# Patient Record
Sex: FEMALE | Race: WHITE | Employment: UNEMPLOYED | URBAN - METROPOLITAN AREA
[De-identification: names, ages, dates, MRNs, and addresses within clinical notes are randomized per-mention and may not be internally consistent; named-entity substitution may affect disease eponyms.]

---

## 2017-02-06 ENCOUNTER — HOSPITAL ENCOUNTER (OUTPATIENT)
Dept: NUCLEAR MEDICINE | Age: 62
Discharge: HOME | End: 2017-02-06
Payer: MEDICARE

## 2017-02-06 DIAGNOSIS — Z96.653 H/O TOTAL KNEE REPLACEMENT, BILATERAL: ICD-10-CM

## 2017-02-06 PROCEDURE — 78315 BONE IMAGING 3 PHASE: CPT

## 2017-02-06 PROCEDURE — 3430000000 HC RX DIAGNOSTIC RADIOPHARMACEUTICAL: Performed by: ORTHOPAEDIC SURGERY

## 2017-02-06 PROCEDURE — A9503 TC99M MEDRONATE: HCPCS | Performed by: ORTHOPAEDIC SURGERY

## 2017-02-06 RX ORDER — TC 99M MEDRONATE 20 MG/10ML
25 INJECTION, POWDER, LYOPHILIZED, FOR SOLUTION INTRAVENOUS
Status: COMPLETED | OUTPATIENT
Start: 2017-02-06 | End: 2017-02-06

## 2017-02-06 RX ADMIN — Medication 25 MILLICURIE: at 10:08

## 2017-02-17 ENCOUNTER — HOSPITAL ENCOUNTER (OUTPATIENT)
Dept: PREADMISSION TESTING | Age: 62
Discharge: HOME | End: 2017-02-17
Payer: MEDICARE

## 2017-02-17 VITALS
BODY MASS INDEX: 39.11 KG/M2 | RESPIRATION RATE: 16 BRPM | HEIGHT: 59 IN | WEIGHT: 194 LBS | HEART RATE: 102 BPM | TEMPERATURE: 99 F | DIASTOLIC BLOOD PRESSURE: 85 MMHG | SYSTOLIC BLOOD PRESSURE: 127 MMHG | OXYGEN SATURATION: 98 %

## 2017-02-17 LAB
ABSOLUTE EOS #: 0 K/UL (ref 0–0.4)
ABSOLUTE LYMPH #: 4.6 K/UL (ref 1–4.8)
ABSOLUTE MONO #: 0.9 K/UL (ref 0.1–1.3)
ANION GAP SERPL CALCULATED.3IONS-SCNC: 15 MMOL/L (ref 9–17)
BASOPHILS # BLD: 0 % (ref 0–2)
BASOPHILS ABSOLUTE: 0 K/UL (ref 0–0.2)
BUN BLDV-MCNC: 27 MG/DL (ref 8–23)
BUN/CREAT BLD: ABNORMAL (ref 9–20)
CALCIUM SERPL-MCNC: 10 MG/DL (ref 8.6–10.4)
CHLORIDE BLD-SCNC: 96 MMOL/L (ref 98–107)
CO2: 26 MMOL/L (ref 20–31)
CREAT SERPL-MCNC: 0.87 MG/DL (ref 0.5–0.9)
DIFFERENTIAL TYPE: ABNORMAL
EOSINOPHILS RELATIVE PERCENT: 0 % (ref 0–4)
GFR AFRICAN AMERICAN: >60 ML/MIN
GFR NON-AFRICAN AMERICAN: >60 ML/MIN
GFR SERPL CREATININE-BSD FRML MDRD: ABNORMAL ML/MIN/{1.73_M2}
GFR SERPL CREATININE-BSD FRML MDRD: ABNORMAL ML/MIN/{1.73_M2}
GLUCOSE BLD-MCNC: 86 MG/DL (ref 70–99)
HCT VFR BLD CALC: 39 % (ref 36–46)
HEMOGLOBIN: 12.9 G/DL (ref 12–16)
LYMPHOCYTES # BLD: 44 % (ref 24–44)
MCH RBC QN AUTO: 29.5 PG (ref 26–34)
MCHC RBC AUTO-ENTMCNC: 33.2 G/DL (ref 31–37)
MCV RBC AUTO: 89.1 FL (ref 80–100)
MONOCYTES # BLD: 9 % (ref 1–7)
PDW BLD-RTO: 13 % (ref 11.5–14.9)
PLATELET # BLD: 334 K/UL (ref 150–450)
PLATELET ESTIMATE: ABNORMAL
PMV BLD AUTO: 7.3 FL (ref 6–12)
POTASSIUM SERPL-SCNC: 4.7 MMOL/L (ref 3.7–5.3)
RBC # BLD: 4.37 M/UL (ref 4–5.2)
RBC # BLD: ABNORMAL 10*6/UL
SEG NEUTROPHILS: 47 % (ref 36–66)
SEGMENTED NEUTROPHILS ABSOLUTE COUNT: 5 K/UL (ref 1.3–9.1)
SODIUM BLD-SCNC: 137 MMOL/L (ref 135–144)
WBC # BLD: 10.5 K/UL (ref 3.5–11)
WBC # BLD: ABNORMAL 10*3/UL

## 2017-02-17 PROCEDURE — 80048 BASIC METABOLIC PNL TOTAL CA: CPT

## 2017-02-17 PROCEDURE — 85025 COMPLETE CBC W/AUTO DIFF WBC: CPT

## 2017-02-17 PROCEDURE — 93005 ELECTROCARDIOGRAM TRACING: CPT

## 2017-02-17 PROCEDURE — 36415 COLL VENOUS BLD VENIPUNCTURE: CPT

## 2017-02-21 ENCOUNTER — HOSPITAL ENCOUNTER (OUTPATIENT)
Age: 62
Discharge: HOME | End: 2017-02-21
Payer: MEDICARE

## 2017-02-21 ENCOUNTER — ANESTHESIA EVENT (OUTPATIENT)
Dept: OPERATING ROOM | Age: 62
End: 2017-02-21
Payer: MEDICARE

## 2017-02-21 ENCOUNTER — HOSPITAL ENCOUNTER (OUTPATIENT)
Dept: GENERAL RADIOLOGY | Age: 62
Discharge: HOME | End: 2017-02-21
Payer: MEDICARE

## 2017-02-21 DIAGNOSIS — L50.9 DIFFUSE URTICARIA: ICD-10-CM

## 2017-02-21 PROCEDURE — 71020 XR CHEST STANDARD TWO VW: CPT

## 2017-02-22 ENCOUNTER — HOSPITAL ENCOUNTER (OUTPATIENT)
Age: 62
Setting detail: OUTPATIENT SURGERY
Discharge: HOME | End: 2017-02-22
Attending: ORTHOPAEDIC SURGERY | Admitting: ORTHOPAEDIC SURGERY
Payer: MEDICARE

## 2017-02-22 ENCOUNTER — ANESTHESIA (OUTPATIENT)
Dept: OPERATING ROOM | Age: 62
End: 2017-02-22
Payer: MEDICARE

## 2017-02-22 VITALS
TEMPERATURE: 97.5 F | HEART RATE: 100 BPM | BODY MASS INDEX: 39.11 KG/M2 | SYSTOLIC BLOOD PRESSURE: 124 MMHG | HEIGHT: 59 IN | RESPIRATION RATE: 16 BRPM | WEIGHT: 194 LBS | DIASTOLIC BLOOD PRESSURE: 79 MMHG | OXYGEN SATURATION: 96 %

## 2017-02-22 VITALS — SYSTOLIC BLOOD PRESSURE: 155 MMHG | TEMPERATURE: 95.2 F | OXYGEN SATURATION: 100 % | DIASTOLIC BLOOD PRESSURE: 74 MMHG

## 2017-02-22 LAB
CULTURE: NORMAL
DIRECT EXAM: NORMAL
GLUCOSE BLD-MCNC: 141 MG/DL (ref 65–105)
GLUCOSE BLD-MCNC: 151 MG/DL (ref 65–105)
Lab: NORMAL
SPECIMEN DESCRIPTION: NORMAL
SPECIMEN DESCRIPTION: NORMAL
STATUS: NORMAL

## 2017-02-22 PROCEDURE — 3600000013 HC SURGERY LEVEL 3 ADDTL 15MIN: Performed by: ORTHOPAEDIC SURGERY

## 2017-02-22 PROCEDURE — 82947 ASSAY GLUCOSE BLOOD QUANT: CPT

## 2017-02-22 PROCEDURE — 87205 SMEAR GRAM STAIN: CPT

## 2017-02-22 PROCEDURE — 6360000002 HC RX W HCPCS: Performed by: ANESTHESIOLOGY

## 2017-02-22 PROCEDURE — 87075 CULTR BACTERIA EXCEPT BLOOD: CPT

## 2017-02-22 PROCEDURE — 3700000000 HC ANESTHESIA ATTENDED CARE: Performed by: ORTHOPAEDIC SURGERY

## 2017-02-22 PROCEDURE — 2580000003 HC RX 258: Performed by: ANESTHESIOLOGY

## 2017-02-22 PROCEDURE — 87070 CULTURE OTHR SPECIMN AEROBIC: CPT

## 2017-02-22 PROCEDURE — 6360000002 HC RX W HCPCS

## 2017-02-22 PROCEDURE — 6360000002 HC RX W HCPCS: Performed by: ORTHOPAEDIC SURGERY

## 2017-02-22 PROCEDURE — 7100000010 HC PHASE II RECOVERY - FIRST 15 MIN: Performed by: ORTHOPAEDIC SURGERY

## 2017-02-22 PROCEDURE — 7100000001 HC PACU RECOVERY - ADDTL 15 MIN: Performed by: ORTHOPAEDIC SURGERY

## 2017-02-22 PROCEDURE — 7100000011 HC PHASE II RECOVERY - ADDTL 15 MIN: Performed by: ORTHOPAEDIC SURGERY

## 2017-02-22 PROCEDURE — 3600000003 HC SURGERY LEVEL 3 BASE: Performed by: ORTHOPAEDIC SURGERY

## 2017-02-22 PROCEDURE — 7100000000 HC PACU RECOVERY - FIRST 15 MIN: Performed by: ORTHOPAEDIC SURGERY

## 2017-02-22 PROCEDURE — 2720000010 HC SURG SUPPLY STERILE: Performed by: ORTHOPAEDIC SURGERY

## 2017-02-22 PROCEDURE — 3700000001 HC ADD 15 MINUTES (ANESTHESIA): Performed by: ORTHOPAEDIC SURGERY

## 2017-02-22 RX ORDER — TRIAMCINOLONE ACETONIDE 40 MG/ML
INJECTION, SUSPENSION INTRA-ARTICULAR; INTRAMUSCULAR PRN
Status: DISCONTINUED | OUTPATIENT
Start: 2017-02-22 | End: 2017-02-22 | Stop reason: HOSPADM

## 2017-02-22 RX ORDER — SODIUM CHLORIDE 0.9 % (FLUSH) 0.9 %
10 SYRINGE (ML) INJECTION EVERY 12 HOURS SCHEDULED
Status: DISCONTINUED | OUTPATIENT
Start: 2017-02-22 | End: 2017-02-22 | Stop reason: HOSPADM

## 2017-02-22 RX ORDER — MEPERIDINE HYDROCHLORIDE 25 MG/ML
12.5 INJECTION INTRAMUSCULAR; INTRAVENOUS; SUBCUTANEOUS EVERY 5 MIN PRN
Status: DISCONTINUED | OUTPATIENT
Start: 2017-02-22 | End: 2017-02-22 | Stop reason: HOSPADM

## 2017-02-22 RX ORDER — DIPHENHYDRAMINE HYDROCHLORIDE 50 MG/ML
12.5 INJECTION INTRAMUSCULAR; INTRAVENOUS
Status: DISCONTINUED | OUTPATIENT
Start: 2017-02-22 | End: 2017-02-22 | Stop reason: HOSPADM

## 2017-02-22 RX ORDER — FENTANYL CITRATE 50 UG/ML
25 INJECTION, SOLUTION INTRAMUSCULAR; INTRAVENOUS EVERY 5 MIN PRN
Status: DISCONTINUED | OUTPATIENT
Start: 2017-02-22 | End: 2017-02-22 | Stop reason: HOSPADM

## 2017-02-22 RX ORDER — METOCLOPRAMIDE HYDROCHLORIDE 5 MG/ML
10 INJECTION INTRAMUSCULAR; INTRAVENOUS
Status: DISCONTINUED | OUTPATIENT
Start: 2017-02-22 | End: 2017-02-22 | Stop reason: HOSPADM

## 2017-02-22 RX ORDER — ROPIVACAINE HYDROCHLORIDE 5 MG/ML
INJECTION, SOLUTION EPIDURAL; INFILTRATION; PERINEURAL PRN
Status: DISCONTINUED | OUTPATIENT
Start: 2017-02-22 | End: 2017-02-22 | Stop reason: HOSPADM

## 2017-02-22 RX ORDER — SODIUM CHLORIDE 0.9 % (FLUSH) 0.9 %
10 SYRINGE (ML) INJECTION PRN
Status: DISCONTINUED | OUTPATIENT
Start: 2017-02-22 | End: 2017-02-22 | Stop reason: HOSPADM

## 2017-02-22 RX ORDER — SODIUM CHLORIDE 9 MG/ML
INJECTION, SOLUTION INTRAVENOUS CONTINUOUS PRN
Status: DISCONTINUED | OUTPATIENT
Start: 2017-02-22 | End: 2017-02-22 | Stop reason: SDUPTHER

## 2017-02-22 RX ORDER — ONDANSETRON 2 MG/ML
4 INJECTION INTRAMUSCULAR; INTRAVENOUS
Status: DISCONTINUED | OUTPATIENT
Start: 2017-02-22 | End: 2017-02-22 | Stop reason: HOSPADM

## 2017-02-22 RX ORDER — ONDANSETRON 2 MG/ML
INJECTION INTRAMUSCULAR; INTRAVENOUS PRN
Status: DISCONTINUED | OUTPATIENT
Start: 2017-02-22 | End: 2017-02-22 | Stop reason: SDUPTHER

## 2017-02-22 RX ORDER — HYDROCODONE BITARTRATE AND ACETAMINOPHEN 5; 325 MG/1; MG/1
TABLET ORAL
Qty: 40 TABLET | Refills: 0 | Status: SHIPPED | OUTPATIENT
Start: 2017-02-22 | End: 2017-05-12

## 2017-02-22 RX ORDER — LABETALOL HYDROCHLORIDE 5 MG/ML
5 INJECTION, SOLUTION INTRAVENOUS EVERY 10 MIN PRN
Status: DISCONTINUED | OUTPATIENT
Start: 2017-02-22 | End: 2017-02-22 | Stop reason: HOSPADM

## 2017-02-22 RX ORDER — HYDRALAZINE HYDROCHLORIDE 20 MG/ML
5 INJECTION INTRAMUSCULAR; INTRAVENOUS EVERY 10 MIN PRN
Status: DISCONTINUED | OUTPATIENT
Start: 2017-02-22 | End: 2017-02-22 | Stop reason: HOSPADM

## 2017-02-22 RX ORDER — SODIUM CHLORIDE, SODIUM LACTATE, POTASSIUM CHLORIDE, CALCIUM CHLORIDE 600; 310; 30; 20 MG/100ML; MG/100ML; MG/100ML; MG/100ML
INJECTION, SOLUTION INTRAVENOUS CONTINUOUS
Status: DISCONTINUED | OUTPATIENT
Start: 2017-02-22 | End: 2017-02-22 | Stop reason: HOSPADM

## 2017-02-22 RX ORDER — PROPOFOL 10 MG/ML
INJECTION, EMULSION INTRAVENOUS PRN
Status: DISCONTINUED | OUTPATIENT
Start: 2017-02-22 | End: 2017-02-22 | Stop reason: SDUPTHER

## 2017-02-22 RX ADMIN — PROPOFOL 200 MG: 10 INJECTION, EMULSION INTRAVENOUS at 07:42

## 2017-02-22 RX ADMIN — ONDANSETRON 4 MG: 2 INJECTION, SOLUTION INTRAMUSCULAR; INTRAVENOUS at 07:50

## 2017-02-22 RX ADMIN — SODIUM CHLORIDE: 9 INJECTION, SOLUTION INTRAVENOUS at 09:00

## 2017-02-22 RX ADMIN — FENTANYL CITRATE 100 MCG: 50 INJECTION, SOLUTION INTRAMUSCULAR; INTRAVENOUS at 07:42

## 2017-02-22 RX ADMIN — SODIUM CHLORIDE: 9 INJECTION, SOLUTION INTRAVENOUS at 07:39

## 2017-02-22 RX ADMIN — HYDROMORPHONE HYDROCHLORIDE 0.5 MG: 1 INJECTION, SOLUTION INTRAMUSCULAR; INTRAVENOUS; SUBCUTANEOUS at 09:22

## 2017-02-22 RX ADMIN — SODIUM CHLORIDE, POTASSIUM CHLORIDE, SODIUM LACTATE AND CALCIUM CHLORIDE: 600; 310; 30; 20 INJECTION, SOLUTION INTRAVENOUS at 07:05

## 2017-02-22 ASSESSMENT — PAIN DESCRIPTION - PAIN TYPE
TYPE: SURGICAL PAIN

## 2017-02-22 ASSESSMENT — PAIN SCALES - GENERAL
PAINLEVEL_OUTOF10: 4
PAINLEVEL_OUTOF10: 3
PAINLEVEL_OUTOF10: 5
PAINLEVEL_OUTOF10: 3
PAINLEVEL_OUTOF10: 0

## 2017-02-22 ASSESSMENT — PAIN DESCRIPTION - ORIENTATION
ORIENTATION: LEFT

## 2017-02-22 ASSESSMENT — PAIN - FUNCTIONAL ASSESSMENT: PAIN_FUNCTIONAL_ASSESSMENT: 0-10

## 2017-02-22 ASSESSMENT — PAIN DESCRIPTION - LOCATION
LOCATION: KNEE

## 2017-02-23 ENCOUNTER — TELEPHONE (OUTPATIENT)
Dept: PAIN MANAGEMENT | Age: 62
End: 2017-02-23

## 2017-02-23 LAB
EKG ATRIAL RATE: 99 BPM
EKG P AXIS: 45 DEGREES
EKG P-R INTERVAL: 158 MS
EKG Q-T INTERVAL: 350 MS
EKG QRS DURATION: 82 MS
EKG QTC CALCULATION (BAZETT): 449 MS
EKG R AXIS: -27 DEGREES
EKG T AXIS: 24 DEGREES
EKG VENTRICULAR RATE: 99 BPM

## 2017-02-24 ENCOUNTER — TELEPHONE (OUTPATIENT)
Dept: PAIN MANAGEMENT | Age: 62
End: 2017-02-24

## 2017-02-28 LAB
CULTURE: NORMAL
CULTURE: NORMAL
DIRECT EXAM: NORMAL
DIRECT EXAM: NORMAL
Lab: NORMAL
SPECIMEN DESCRIPTION: NORMAL
STATUS: NORMAL

## 2017-03-06 ENCOUNTER — HOSPITAL ENCOUNTER (OUTPATIENT)
Dept: PAIN MANAGEMENT | Age: 62
Discharge: HOME | End: 2017-03-06
Payer: MEDICARE

## 2017-03-06 DIAGNOSIS — G89.29 CHRONIC PAIN OF BOTH KNEES: Primary | ICD-10-CM

## 2017-03-06 DIAGNOSIS — G89.29 ENCOUNTER FOR CHRONIC PAIN MANAGEMENT: ICD-10-CM

## 2017-03-06 DIAGNOSIS — M85.80 OSTEOPENIA: ICD-10-CM

## 2017-03-06 DIAGNOSIS — M25.562 CHRONIC PAIN OF BOTH KNEES: Primary | ICD-10-CM

## 2017-03-06 DIAGNOSIS — M25.561 CHRONIC PAIN OF BOTH KNEES: Primary | ICD-10-CM

## 2017-03-06 DIAGNOSIS — Z96.651 HX OF TOTAL KNEE ARTHROPLASTY, RIGHT: ICD-10-CM

## 2017-03-06 PROCEDURE — 99213 OFFICE O/P EST LOW 20 MIN: CPT | Performed by: NURSE PRACTITIONER

## 2017-03-06 PROCEDURE — 99213 OFFICE O/P EST LOW 20 MIN: CPT

## 2017-03-06 RX ORDER — HYDROCODONE BITARTRATE AND ACETAMINOPHEN 5; 325 MG/1; MG/1
1 TABLET ORAL EVERY 8 HOURS PRN
Qty: 90 TABLET | Refills: 0 | Status: SHIPPED | OUTPATIENT
Start: 2017-03-08 | End: 2017-04-03 | Stop reason: SDUPTHER

## 2017-03-13 ENCOUNTER — OFFICE VISIT (OUTPATIENT)
Dept: OBGYN CLINIC | Age: 62
End: 2017-03-13
Payer: MEDICARE

## 2017-03-13 VITALS
HEART RATE: 88 BPM | SYSTOLIC BLOOD PRESSURE: 116 MMHG | WEIGHT: 199 LBS | RESPIRATION RATE: 16 BRPM | DIASTOLIC BLOOD PRESSURE: 74 MMHG | BODY MASS INDEX: 40.19 KG/M2

## 2017-03-13 DIAGNOSIS — R10.2 PELVIC PAIN IN FEMALE: Primary | ICD-10-CM

## 2017-03-13 PROCEDURE — 99213 OFFICE O/P EST LOW 20 MIN: CPT | Performed by: SPECIALIST

## 2017-03-13 ASSESSMENT — ENCOUNTER SYMPTOMS
ABDOMINAL DISTENTION: 0
NAUSEA: 0
EYE PAIN: 0
DIARRHEA: 0
COUGH: 0
CONSTIPATION: 0
VOMITING: 0
APNEA: 0
ABDOMINAL PAIN: 1

## 2017-03-20 ENCOUNTER — OFFICE VISIT (OUTPATIENT)
Dept: OBGYN CLINIC | Age: 62
End: 2017-03-20
Payer: MEDICARE

## 2017-03-20 DIAGNOSIS — R10.2 PELVIC PAIN IN FEMALE: ICD-10-CM

## 2017-03-20 PROCEDURE — 76856 US EXAM PELVIC COMPLETE: CPT | Performed by: SPECIALIST

## 2017-04-03 ENCOUNTER — OFFICE VISIT (OUTPATIENT)
Dept: OBGYN CLINIC | Age: 62
End: 2017-04-03
Payer: MEDICARE

## 2017-04-03 VITALS
SYSTOLIC BLOOD PRESSURE: 124 MMHG | WEIGHT: 200 LBS | DIASTOLIC BLOOD PRESSURE: 83 MMHG | BODY MASS INDEX: 40.4 KG/M2 | RESPIRATION RATE: 16 BRPM | HEART RATE: 62 BPM

## 2017-04-03 DIAGNOSIS — R10.2 PELVIC PAIN IN FEMALE: Primary | ICD-10-CM

## 2017-04-03 DIAGNOSIS — R35.0 URINARY FREQUENCY: ICD-10-CM

## 2017-04-03 DIAGNOSIS — Z90.710 STATUS POST HYSTERECTOMY: ICD-10-CM

## 2017-04-03 LAB
BILIRUBIN, POC: NORMAL
BLOOD URINE, POC: NORMAL
CLARITY, POC: CLEAR
COLOR, POC: YELLOW
GLUCOSE URINE, POC: NORMAL
KETONES, POC: NORMAL
LEUKOCYTE EST, POC: NORMAL
NITRITE, POC: NORMAL
PH, POC: NORMAL
PROTEIN, POC: NORMAL
SPECIFIC GRAVITY, POC: NORMAL
UROBILINOGEN, POC: NORMAL

## 2017-04-03 PROCEDURE — 81002 URINALYSIS NONAUTO W/O SCOPE: CPT | Performed by: SPECIALIST

## 2017-04-03 PROCEDURE — 99212 OFFICE O/P EST SF 10 MIN: CPT | Performed by: SPECIALIST

## 2017-04-03 RX ORDER — HYDROCODONE BITARTRATE AND ACETAMINOPHEN 5; 325 MG/1; MG/1
1 TABLET ORAL EVERY 8 HOURS PRN
Qty: 90 TABLET | Refills: 0 | Status: SHIPPED | OUTPATIENT
Start: 2017-04-07 | End: 2017-05-03 | Stop reason: SDUPTHER

## 2017-04-03 RX ORDER — CLOBETASOL PROPIONATE 0.5 MG/G
OINTMENT TOPICAL
Refills: 0 | COMMUNITY
Start: 2017-03-17 | End: 2018-10-18 | Stop reason: ALTCHOICE

## 2017-04-03 RX ORDER — AMOXICILLIN 500 MG/1
CAPSULE ORAL
Refills: 0 | COMMUNITY
Start: 2017-03-16 | End: 2017-04-03

## 2017-04-03 ASSESSMENT — ENCOUNTER SYMPTOMS
DIARRHEA: 0
VOMITING: 0
CONSTIPATION: 0
ABDOMINAL DISTENTION: 0
COUGH: 0
APNEA: 0
NAUSEA: 0
EYE PAIN: 0
ABDOMINAL PAIN: 0

## 2017-04-05 ENCOUNTER — TELEPHONE (OUTPATIENT)
Dept: PAIN MANAGEMENT | Age: 62
End: 2017-04-05

## 2017-04-25 ENCOUNTER — HOSPITAL ENCOUNTER (OUTPATIENT)
Dept: VASCULAR LAB | Age: 62
Discharge: HOME | End: 2017-04-25
Payer: MEDICARE

## 2017-04-25 ENCOUNTER — HOSPITAL ENCOUNTER (OUTPATIENT)
Age: 62
Discharge: HOME | End: 2017-04-25
Payer: MEDICARE

## 2017-04-25 LAB
C-REACTIVE PROTEIN: 10.9 MG/L (ref 0–5)
HCT VFR BLD CALC: 37.1 % (ref 36–46)
HEMOGLOBIN: 12 G/DL (ref 12–16)
MCH RBC QN AUTO: 28.8 PG (ref 26–34)
MCHC RBC AUTO-ENTMCNC: 32.4 G/DL (ref 31–37)
MCV RBC AUTO: 88.9 FL (ref 80–100)
PDW BLD-RTO: 13.1 % (ref 11.5–14.9)
PLATELET # BLD: 270 K/UL (ref 150–450)
PMV BLD AUTO: 7.8 FL (ref 6–12)
RBC # BLD: 4.18 M/UL (ref 4–5.2)
SEDIMENTATION RATE, ERYTHROCYTE: 58 MM (ref 0–20)
WBC # BLD: 8.1 K/UL (ref 3.5–11)

## 2017-04-25 PROCEDURE — 85027 COMPLETE CBC AUTOMATED: CPT

## 2017-04-25 PROCEDURE — 86140 C-REACTIVE PROTEIN: CPT

## 2017-04-25 PROCEDURE — 36415 COLL VENOUS BLD VENIPUNCTURE: CPT

## 2017-04-25 PROCEDURE — 93971 EXTREMITY STUDY: CPT

## 2017-04-25 PROCEDURE — 85651 RBC SED RATE NONAUTOMATED: CPT

## 2017-04-27 ENCOUNTER — HOSPITAL ENCOUNTER (OUTPATIENT)
Age: 62
Setting detail: SPECIMEN
Discharge: HOME | End: 2017-04-27
Payer: MEDICARE

## 2017-04-27 LAB
APPEARANCE FLUID: NORMAL
COLOR FLUID: YELLOW
CRYSTALS, FLUID: POSITIVE
GLUCOSE, FLUID: 85 MG/DL
RBC FLUID: 680 /MM3
SPECIMEN TYPE: NORMAL
TOTAL PROTEIN, BODY FLUID: 4.5 G/DL
WBC FLUID: 60 /MM3

## 2017-04-27 PROCEDURE — 84157 ASSAY OF PROTEIN OTHER: CPT

## 2017-04-27 PROCEDURE — 88108 CYTOPATH CONCENTRATE TECH: CPT

## 2017-04-27 PROCEDURE — 87070 CULTURE OTHR SPECIMN AEROBIC: CPT

## 2017-04-27 PROCEDURE — 87205 SMEAR GRAM STAIN: CPT

## 2017-04-27 PROCEDURE — 82945 GLUCOSE OTHER FLUID: CPT

## 2017-04-27 PROCEDURE — 89060 EXAM SYNOVIAL FLUID CRYSTALS: CPT

## 2017-04-27 PROCEDURE — 87075 CULTR BACTERIA EXCEPT BLOOD: CPT

## 2017-04-28 ENCOUNTER — TELEPHONE (OUTPATIENT)
Dept: PAIN MANAGEMENT | Age: 62
End: 2017-04-28

## 2017-04-28 LAB
BASO FLUID: ABNORMAL %
CELLS COUNTED: 50
EOSINOPHIL FLUID: ABNORMAL %
FLUID DIFF COMMENT: ABNORMAL
LYMPHOCYTES, BODY FLUID: 52 %
MONOCYTE, FLUID: 12 %
NEUTROPHIL, FLUID: 36 %
OTHER CELLS FLUID: ABNORMAL %

## 2017-05-03 ENCOUNTER — HOSPITAL ENCOUNTER (OUTPATIENT)
Dept: PAIN MANAGEMENT | Age: 62
Discharge: HOME | End: 2017-05-03
Payer: MEDICARE

## 2017-05-03 DIAGNOSIS — G89.29 CHRONIC PAIN OF BOTH KNEES: Primary | ICD-10-CM

## 2017-05-03 DIAGNOSIS — M25.562 CHRONIC PAIN OF BOTH KNEES: Primary | ICD-10-CM

## 2017-05-03 DIAGNOSIS — Z96.651 HX OF TOTAL KNEE ARTHROPLASTY, RIGHT: ICD-10-CM

## 2017-05-03 DIAGNOSIS — M85.80 OSTEOPENIA: ICD-10-CM

## 2017-05-03 DIAGNOSIS — Z96.652 STATUS POST TOTAL LEFT KNEE REPLACEMENT: ICD-10-CM

## 2017-05-03 DIAGNOSIS — G89.29 ENCOUNTER FOR CHRONIC PAIN MANAGEMENT: ICD-10-CM

## 2017-05-03 DIAGNOSIS — M25.561 CHRONIC PAIN OF BOTH KNEES: Primary | ICD-10-CM

## 2017-05-03 LAB
CULTURE: ABNORMAL
CULTURE: ABNORMAL
DIRECT EXAM: ABNORMAL
Lab: ABNORMAL
SPECIMEN DESCRIPTION: ABNORMAL
SPECIMEN DESCRIPTION: ABNORMAL
STATUS: ABNORMAL

## 2017-05-03 PROCEDURE — 99213 OFFICE O/P EST LOW 20 MIN: CPT | Performed by: NURSE PRACTITIONER

## 2017-05-03 PROCEDURE — 99213 OFFICE O/P EST LOW 20 MIN: CPT

## 2017-05-03 RX ORDER — ACETAMINOPHEN 500 MG
500 TABLET ORAL PRN
COMMUNITY

## 2017-05-03 RX ORDER — HYDROCODONE BITARTRATE AND ACETAMINOPHEN 5; 325 MG/1; MG/1
1 TABLET ORAL EVERY 8 HOURS PRN
Qty: 90 TABLET | Refills: 0 | Status: SHIPPED | OUTPATIENT
Start: 2017-05-07 | End: 2017-05-03

## 2017-05-09 ENCOUNTER — TELEPHONE (OUTPATIENT)
Dept: PAIN MANAGEMENT | Age: 62
End: 2017-05-09

## 2017-05-12 RX ORDER — HYDROCODONE BITARTRATE AND ACETAMINOPHEN 5; 325 MG/1; MG/1
1 TABLET ORAL 4 TIMES DAILY PRN
Qty: 120 TABLET | Refills: 0 | Status: SHIPPED | OUTPATIENT
Start: 2017-05-12 | End: 2017-06-06 | Stop reason: SDUPTHER

## 2017-05-17 ENCOUNTER — HOSPITAL ENCOUNTER (OUTPATIENT)
Dept: PHYSICAL THERAPY | Age: 62
Setting detail: THERAPIES SERIES
Discharge: HOME | End: 2017-05-17
Payer: MEDICARE

## 2017-05-17 PROCEDURE — 97110 THERAPEUTIC EXERCISES: CPT

## 2017-05-17 PROCEDURE — G8978 MOBILITY CURRENT STATUS: HCPCS

## 2017-05-17 PROCEDURE — 97162 PT EVAL MOD COMPLEX 30 MIN: CPT

## 2017-05-17 PROCEDURE — G8979 MOBILITY GOAL STATUS: HCPCS

## 2017-05-17 ASSESSMENT — PAIN DESCRIPTION - DESCRIPTORS: DESCRIPTORS: ACHING;BURNING;SHARP;SHOOTING;STABBING

## 2017-05-17 ASSESSMENT — PAIN DESCRIPTION - ONSET: ONSET: ON-GOING

## 2017-05-17 ASSESSMENT — PAIN DESCRIPTION - PAIN TYPE: TYPE: CHRONIC PAIN

## 2017-05-17 ASSESSMENT — PAIN DESCRIPTION - LOCATION: LOCATION: KNEE

## 2017-05-17 ASSESSMENT — PAIN DESCRIPTION - PROGRESSION: CLINICAL_PROGRESSION: GRADUALLY IMPROVING

## 2017-05-17 ASSESSMENT — PAIN DESCRIPTION - FREQUENCY: FREQUENCY: INTERMITTENT

## 2017-05-17 ASSESSMENT — PAIN SCALES - GENERAL: PAINLEVEL_OUTOF10: 4

## 2017-05-17 ASSESSMENT — PAIN DESCRIPTION - ORIENTATION: ORIENTATION: LEFT

## 2017-05-18 ENCOUNTER — HOSPITAL ENCOUNTER (OUTPATIENT)
Dept: PHYSICAL THERAPY | Age: 62
Setting detail: THERAPIES SERIES
Discharge: HOME | End: 2017-05-18
Payer: MEDICARE

## 2017-05-18 PROCEDURE — 97110 THERAPEUTIC EXERCISES: CPT

## 2017-05-18 ASSESSMENT — PAIN SCALES - GENERAL: PAINLEVEL_OUTOF10: 4

## 2017-05-18 ASSESSMENT — PAIN DESCRIPTION - ORIENTATION: ORIENTATION: LEFT

## 2017-05-18 ASSESSMENT — PAIN DESCRIPTION - PAIN TYPE: TYPE: CHRONIC PAIN

## 2017-05-18 ASSESSMENT — PAIN DESCRIPTION - LOCATION: LOCATION: KNEE

## 2017-05-23 ENCOUNTER — HOSPITAL ENCOUNTER (OUTPATIENT)
Dept: PHYSICAL THERAPY | Age: 62
Setting detail: THERAPIES SERIES
Discharge: HOME | End: 2017-05-23
Payer: MEDICARE

## 2017-05-23 PROCEDURE — 97110 THERAPEUTIC EXERCISES: CPT

## 2017-05-24 ENCOUNTER — HOSPITAL ENCOUNTER (OUTPATIENT)
Dept: PHYSICAL THERAPY | Age: 62
Setting detail: THERAPIES SERIES
Discharge: HOME | End: 2017-05-24
Payer: MEDICARE

## 2017-05-24 PROCEDURE — 97110 THERAPEUTIC EXERCISES: CPT

## 2017-05-24 ASSESSMENT — PAIN DESCRIPTION - ORIENTATION: ORIENTATION: LEFT

## 2017-05-24 ASSESSMENT — PAIN SCALES - GENERAL: PAINLEVEL_OUTOF10: 5

## 2017-05-24 ASSESSMENT — PAIN DESCRIPTION - LOCATION: LOCATION: KNEE

## 2017-05-24 ASSESSMENT — PAIN DESCRIPTION - DESCRIPTORS: DESCRIPTORS: ACHING

## 2017-05-24 ASSESSMENT — PAIN DESCRIPTION - FREQUENCY: FREQUENCY: INTERMITTENT

## 2017-05-26 ENCOUNTER — HOSPITAL ENCOUNTER (OUTPATIENT)
Dept: PHYSICAL THERAPY | Age: 62
Setting detail: THERAPIES SERIES
Discharge: HOME | End: 2017-05-26
Payer: MEDICARE

## 2017-05-26 PROCEDURE — 97110 THERAPEUTIC EXERCISES: CPT

## 2017-05-31 ENCOUNTER — HOSPITAL ENCOUNTER (OUTPATIENT)
Dept: PHYSICAL THERAPY | Age: 62
Setting detail: THERAPIES SERIES
Discharge: HOME | End: 2017-05-31
Payer: MEDICARE

## 2017-05-31 PROCEDURE — 97110 THERAPEUTIC EXERCISES: CPT

## 2017-06-05 ENCOUNTER — OFFICE VISIT (OUTPATIENT)
Dept: OBGYN CLINIC | Age: 62
End: 2017-06-05
Payer: MEDICARE

## 2017-06-05 VITALS
SYSTOLIC BLOOD PRESSURE: 124 MMHG | RESPIRATION RATE: 18 BRPM | BODY MASS INDEX: 39.72 KG/M2 | DIASTOLIC BLOOD PRESSURE: 90 MMHG | HEART RATE: 93 BPM | WEIGHT: 197 LBS | HEIGHT: 59 IN

## 2017-06-05 DIAGNOSIS — N64.59 ABNORMAL BREAST FINDING: Primary | ICD-10-CM

## 2017-06-05 PROCEDURE — 99213 OFFICE O/P EST LOW 20 MIN: CPT | Performed by: SPECIALIST

## 2017-06-06 ENCOUNTER — HOSPITAL ENCOUNTER (OUTPATIENT)
Dept: PAIN MANAGEMENT | Age: 62
Discharge: HOME | End: 2017-06-06
Payer: MEDICARE

## 2017-06-06 DIAGNOSIS — M25.562 CHRONIC PAIN OF BOTH KNEES: Primary | ICD-10-CM

## 2017-06-06 DIAGNOSIS — M85.80 OSTEOPENIA: ICD-10-CM

## 2017-06-06 DIAGNOSIS — G89.29 CHRONIC PAIN OF BOTH KNEES: Primary | ICD-10-CM

## 2017-06-06 DIAGNOSIS — Z96.652 STATUS POST TOTAL LEFT KNEE REPLACEMENT: ICD-10-CM

## 2017-06-06 DIAGNOSIS — M25.561 CHRONIC PAIN OF BOTH KNEES: Primary | ICD-10-CM

## 2017-06-06 DIAGNOSIS — G89.29 ENCOUNTER FOR CHRONIC PAIN MANAGEMENT: ICD-10-CM

## 2017-06-06 PROCEDURE — 99213 OFFICE O/P EST LOW 20 MIN: CPT

## 2017-06-06 PROCEDURE — 99213 OFFICE O/P EST LOW 20 MIN: CPT | Performed by: NURSE PRACTITIONER

## 2017-06-06 RX ORDER — HYDROCODONE BITARTRATE AND ACETAMINOPHEN 5; 325 MG/1; MG/1
1 TABLET ORAL 4 TIMES DAILY PRN
Qty: 120 TABLET | Refills: 0 | Status: SHIPPED | OUTPATIENT
Start: 2017-06-11 | End: 2017-07-11 | Stop reason: SDUPTHER

## 2017-06-06 RX ORDER — AMLODIPINE BESYLATE 5 MG/1
TABLET ORAL
Refills: 0 | COMMUNITY
Start: 2017-05-22

## 2017-06-12 ENCOUNTER — HOSPITAL ENCOUNTER (OUTPATIENT)
Dept: PHYSICAL THERAPY | Age: 62
Setting detail: THERAPIES SERIES
Discharge: HOME | End: 2017-06-12
Payer: MEDICARE

## 2017-06-12 PROCEDURE — G8978 MOBILITY CURRENT STATUS: HCPCS

## 2017-06-12 PROCEDURE — 97164 PT RE-EVAL EST PLAN CARE: CPT

## 2017-06-12 PROCEDURE — 97110 THERAPEUTIC EXERCISES: CPT

## 2017-06-12 PROCEDURE — G8979 MOBILITY GOAL STATUS: HCPCS

## 2017-06-19 ENCOUNTER — HOSPITAL ENCOUNTER (OUTPATIENT)
Dept: PHYSICAL THERAPY | Age: 62
Setting detail: THERAPIES SERIES
Discharge: HOME | End: 2017-06-19
Payer: MEDICARE

## 2017-06-19 PROCEDURE — 97110 THERAPEUTIC EXERCISES: CPT

## 2017-06-19 ASSESSMENT — PAIN DESCRIPTION - FREQUENCY: FREQUENCY: INTERMITTENT

## 2017-06-19 ASSESSMENT — PAIN DESCRIPTION - LOCATION: LOCATION: KNEE

## 2017-06-19 ASSESSMENT — PAIN SCALES - GENERAL: PAINLEVEL_OUTOF10: 4

## 2017-06-19 ASSESSMENT — PAIN DESCRIPTION - DESCRIPTORS: DESCRIPTORS: SHARP

## 2017-06-19 ASSESSMENT — PAIN DESCRIPTION - ORIENTATION: ORIENTATION: LEFT

## 2017-06-21 ENCOUNTER — HOSPITAL ENCOUNTER (OUTPATIENT)
Dept: PHYSICAL THERAPY | Age: 62
Setting detail: THERAPIES SERIES
Discharge: HOME | End: 2017-06-21
Payer: MEDICARE

## 2017-06-28 ENCOUNTER — TELEPHONE (OUTPATIENT)
Dept: OBGYN CLINIC | Age: 62
End: 2017-06-28

## 2017-06-28 DIAGNOSIS — N64.59 THICKENING OF SKIN OF BREAST: Primary | ICD-10-CM

## 2017-06-29 ENCOUNTER — HOSPITAL ENCOUNTER (OUTPATIENT)
Dept: PHYSICAL THERAPY | Age: 62
Setting detail: THERAPIES SERIES
Discharge: HOME | End: 2017-06-29
Payer: MEDICARE

## 2017-06-29 PROCEDURE — 97110 THERAPEUTIC EXERCISES: CPT

## 2017-06-29 ASSESSMENT — PAIN DESCRIPTION - ORIENTATION: ORIENTATION: RIGHT

## 2017-06-29 ASSESSMENT — PAIN DESCRIPTION - FREQUENCY: FREQUENCY: INTERMITTENT

## 2017-06-29 ASSESSMENT — PAIN DESCRIPTION - LOCATION: LOCATION: KNEE

## 2017-06-29 ASSESSMENT — PAIN SCALES - GENERAL: PAINLEVEL_OUTOF10: 3

## 2017-06-29 ASSESSMENT — PAIN DESCRIPTION - DESCRIPTORS: DESCRIPTORS: ACHING;DULL

## 2017-07-07 ENCOUNTER — HOSPITAL ENCOUNTER (OUTPATIENT)
Dept: PHYSICAL THERAPY | Age: 62
Setting detail: THERAPIES SERIES
Discharge: HOME | End: 2017-07-07
Payer: MEDICARE

## 2017-07-07 PROCEDURE — 97110 THERAPEUTIC EXERCISES: CPT

## 2017-07-10 ENCOUNTER — HOSPITAL ENCOUNTER (OUTPATIENT)
Dept: WOMENS IMAGING | Age: 62
Discharge: HOME | End: 2017-07-10
Payer: MEDICARE

## 2017-07-10 DIAGNOSIS — N64.59 THICKENING OF SKIN OF BREAST: ICD-10-CM

## 2017-07-10 PROCEDURE — G0206 DX MAMMO INCL CAD UNI: HCPCS

## 2017-07-11 ENCOUNTER — HOSPITAL ENCOUNTER (OUTPATIENT)
Dept: PAIN MANAGEMENT | Age: 62
Discharge: HOME | End: 2017-07-11
Payer: MEDICARE

## 2017-07-11 ENCOUNTER — HOSPITAL ENCOUNTER (OUTPATIENT)
Dept: PHYSICAL THERAPY | Age: 62
Setting detail: THERAPIES SERIES
Discharge: HOME | End: 2017-07-11
Payer: MEDICARE

## 2017-07-11 VITALS
BODY MASS INDEX: 40.32 KG/M2 | WEIGHT: 200 LBS | SYSTOLIC BLOOD PRESSURE: 118 MMHG | HEIGHT: 59 IN | HEART RATE: 70 BPM | OXYGEN SATURATION: 100 % | RESPIRATION RATE: 16 BRPM | TEMPERATURE: 98.4 F | DIASTOLIC BLOOD PRESSURE: 79 MMHG

## 2017-07-11 DIAGNOSIS — M25.561 CHRONIC PAIN OF BOTH KNEES: Primary | ICD-10-CM

## 2017-07-11 DIAGNOSIS — Z96.651 HX OF TOTAL KNEE ARTHROPLASTY, RIGHT: ICD-10-CM

## 2017-07-11 DIAGNOSIS — Z96.652 STATUS POST TOTAL LEFT KNEE REPLACEMENT: ICD-10-CM

## 2017-07-11 DIAGNOSIS — M25.562 CHRONIC PAIN OF BOTH KNEES: Primary | ICD-10-CM

## 2017-07-11 DIAGNOSIS — G89.29 ENCOUNTER FOR CHRONIC PAIN MANAGEMENT: ICD-10-CM

## 2017-07-11 DIAGNOSIS — M85.80 OSTEOPENIA: ICD-10-CM

## 2017-07-11 DIAGNOSIS — G89.29 CHRONIC PAIN OF BOTH KNEES: Primary | ICD-10-CM

## 2017-07-11 PROCEDURE — 99213 OFFICE O/P EST LOW 20 MIN: CPT | Performed by: NURSE PRACTITIONER

## 2017-07-11 PROCEDURE — 99213 OFFICE O/P EST LOW 20 MIN: CPT

## 2017-07-11 PROCEDURE — 77002 NEEDLE LOCALIZATION BY XRAY: CPT

## 2017-07-11 PROCEDURE — 97110 THERAPEUTIC EXERCISES: CPT

## 2017-07-11 PROCEDURE — 20610 DRAIN/INJ JOINT/BURSA W/O US: CPT

## 2017-07-11 RX ORDER — HYDROCODONE BITARTRATE AND ACETAMINOPHEN 5; 325 MG/1; MG/1
1 TABLET ORAL 4 TIMES DAILY PRN
Qty: 120 TABLET | Refills: 0 | Status: SHIPPED | OUTPATIENT
Start: 2017-07-12 | End: 2017-08-11 | Stop reason: SDUPTHER

## 2017-07-11 ASSESSMENT — ENCOUNTER SYMPTOMS
COUGH: 1
GASTROINTESTINAL NEGATIVE: 1

## 2017-07-11 ASSESSMENT — PAIN SCALES - GENERAL: PAINLEVEL_OUTOF10: 4

## 2017-07-11 ASSESSMENT — PAIN DESCRIPTION - FREQUENCY: FREQUENCY: CONTINUOUS

## 2017-07-11 ASSESSMENT — PAIN DESCRIPTION - DESCRIPTORS: DESCRIPTORS: DULL;CONSTANT

## 2017-07-11 ASSESSMENT — PAIN DESCRIPTION - ORIENTATION: ORIENTATION: RIGHT;LEFT

## 2017-07-11 ASSESSMENT — PAIN DESCRIPTION - LOCATION: LOCATION: KNEE

## 2017-07-13 ENCOUNTER — HOSPITAL ENCOUNTER (OUTPATIENT)
Dept: PHYSICAL THERAPY | Age: 62
Setting detail: THERAPIES SERIES
Discharge: HOME | End: 2017-07-13
Payer: MEDICARE

## 2017-07-13 PROCEDURE — 97110 THERAPEUTIC EXERCISES: CPT

## 2017-07-13 ASSESSMENT — PAIN DESCRIPTION - LOCATION: LOCATION: KNEE

## 2017-07-13 ASSESSMENT — PAIN DESCRIPTION - ORIENTATION: ORIENTATION: RIGHT;LEFT

## 2017-07-13 ASSESSMENT — PAIN SCALES - GENERAL: PAINLEVEL_OUTOF10: 0

## 2017-07-21 ENCOUNTER — HOSPITAL ENCOUNTER (OUTPATIENT)
Dept: GENERAL RADIOLOGY | Age: 62
Discharge: HOME | End: 2017-07-21
Payer: MEDICARE

## 2017-07-21 ENCOUNTER — HOSPITAL ENCOUNTER (OUTPATIENT)
Age: 62
Discharge: HOME | End: 2017-07-21
Payer: MEDICARE

## 2017-07-21 DIAGNOSIS — M54.41 MIDLINE LOW BACK PAIN WITH BILATERAL SCIATICA, UNSPECIFIED CHRONICITY: ICD-10-CM

## 2017-07-21 DIAGNOSIS — M54.42 MIDLINE LOW BACK PAIN WITH BILATERAL SCIATICA, UNSPECIFIED CHRONICITY: ICD-10-CM

## 2017-07-21 PROCEDURE — 72100 X-RAY EXAM L-S SPINE 2/3 VWS: CPT

## 2017-08-02 ENCOUNTER — OFFICE VISIT (OUTPATIENT)
Dept: OBGYN CLINIC | Age: 62
End: 2017-08-02
Payer: MEDICARE

## 2017-08-02 VITALS
WEIGHT: 201 LBS | DIASTOLIC BLOOD PRESSURE: 72 MMHG | BODY MASS INDEX: 40.52 KG/M2 | SYSTOLIC BLOOD PRESSURE: 138 MMHG | OXYGEN SATURATION: 99 % | HEART RATE: 103 BPM | HEIGHT: 59 IN | RESPIRATION RATE: 18 BRPM

## 2017-08-02 DIAGNOSIS — N64.59 ABNORMAL BREAST FINDING: Primary | ICD-10-CM

## 2017-08-02 DIAGNOSIS — R31.9 URINARY TRACT INFECTION WITH HEMATURIA, SITE UNSPECIFIED: ICD-10-CM

## 2017-08-02 DIAGNOSIS — R30.9 PAINFUL URINATION: ICD-10-CM

## 2017-08-02 DIAGNOSIS — N39.0 URINARY TRACT INFECTION WITH HEMATURIA, SITE UNSPECIFIED: ICD-10-CM

## 2017-08-02 LAB
BILIRUBIN, POC: NORMAL
BLOOD URINE, POC: NORMAL
CLARITY, POC: CLEAR
COLOR, POC: YELLOW
GLUCOSE URINE, POC: NORMAL
KETONES, POC: NORMAL
LEUKOCYTE EST, POC: NORMAL
NITRITE, POC: NORMAL
PH, POC: 6
PROTEIN, POC: NORMAL
SPECIFIC GRAVITY, POC: 1.02
UROBILINOGEN, POC: NORMAL

## 2017-08-02 PROCEDURE — 99213 OFFICE O/P EST LOW 20 MIN: CPT | Performed by: SPECIALIST

## 2017-08-02 RX ORDER — NITROFURANTOIN 25; 75 MG/1; MG/1
100 CAPSULE ORAL 2 TIMES DAILY
Qty: 14 CAPSULE | Refills: 0 | Status: SHIPPED | OUTPATIENT
Start: 2017-08-02 | End: 2017-08-09

## 2017-08-02 ASSESSMENT — ENCOUNTER SYMPTOMS
ABDOMINAL PAIN: 0
ABDOMINAL DISTENTION: 0
COUGH: 0
NAUSEA: 0
DIARRHEA: 0
APNEA: 0
VOMITING: 0
EYE PAIN: 0
CONSTIPATION: 0

## 2017-08-11 ENCOUNTER — HOSPITAL ENCOUNTER (OUTPATIENT)
Dept: PAIN MANAGEMENT | Age: 62
Discharge: HOME | End: 2017-08-11
Payer: MEDICARE

## 2017-08-11 VITALS
WEIGHT: 197 LBS | RESPIRATION RATE: 20 BRPM | HEIGHT: 59 IN | TEMPERATURE: 98.1 F | DIASTOLIC BLOOD PRESSURE: 72 MMHG | OXYGEN SATURATION: 99 % | SYSTOLIC BLOOD PRESSURE: 116 MMHG | HEART RATE: 96 BPM | BODY MASS INDEX: 39.72 KG/M2

## 2017-08-11 DIAGNOSIS — M25.561 CHRONIC PAIN OF BOTH KNEES: Primary | ICD-10-CM

## 2017-08-11 DIAGNOSIS — Z96.651 HX OF TOTAL KNEE ARTHROPLASTY, RIGHT: ICD-10-CM

## 2017-08-11 DIAGNOSIS — M25.562 CHRONIC PAIN OF BOTH KNEES: Primary | ICD-10-CM

## 2017-08-11 DIAGNOSIS — Z79.899 ENCOUNTER FOR LONG-TERM (CURRENT) USE OF OTHER MEDICATIONS: ICD-10-CM

## 2017-08-11 DIAGNOSIS — G89.29 ENCOUNTER FOR CHRONIC PAIN MANAGEMENT: ICD-10-CM

## 2017-08-11 DIAGNOSIS — G89.29 CHRONIC PAIN OF BOTH KNEES: Primary | ICD-10-CM

## 2017-08-11 DIAGNOSIS — Z96.652 STATUS POST TOTAL LEFT KNEE REPLACEMENT: ICD-10-CM

## 2017-08-11 PROCEDURE — 99214 OFFICE O/P EST MOD 30 MIN: CPT | Performed by: PAIN MEDICINE

## 2017-08-11 PROCEDURE — 99213 OFFICE O/P EST LOW 20 MIN: CPT

## 2017-08-11 RX ORDER — HYDROCODONE BITARTRATE AND ACETAMINOPHEN 5; 325 MG/1; MG/1
1 TABLET ORAL 4 TIMES DAILY PRN
Qty: 120 TABLET | Refills: 0 | Status: SHIPPED | OUTPATIENT
Start: 2017-08-13 | End: 2017-09-12 | Stop reason: SDUPTHER

## 2017-08-11 ASSESSMENT — PAIN DESCRIPTION - DESCRIPTORS: DESCRIPTORS: ACHING;SHARP

## 2017-08-11 ASSESSMENT — PAIN DESCRIPTION - LOCATION: LOCATION: BACK

## 2017-08-11 ASSESSMENT — PAIN DESCRIPTION - FREQUENCY: FREQUENCY: INTERMITTENT

## 2017-08-11 ASSESSMENT — ENCOUNTER SYMPTOMS
BACK PAIN: 1
CONSTIPATION: 0
PHOTOPHOBIA: 0
SHORTNESS OF BREATH: 1
EYES NEGATIVE: 1
HEARTBURN: 0
VOMITING: 0
NAUSEA: 0
COUGH: 0
BLURRED VISION: 0

## 2017-08-11 ASSESSMENT — PAIN DESCRIPTION - ONSET: ONSET: ON-GOING

## 2017-08-11 ASSESSMENT — PAIN SCALES - GENERAL: PAINLEVEL_OUTOF10: 4

## 2017-08-11 ASSESSMENT — PAIN DESCRIPTION - ORIENTATION: ORIENTATION: RIGHT;LEFT

## 2017-08-11 ASSESSMENT — PAIN DESCRIPTION - PAIN TYPE: TYPE: CHRONIC PAIN

## 2017-08-11 ASSESSMENT — PAIN DESCRIPTION - PROGRESSION: CLINICAL_PROGRESSION: GRADUALLY WORSENING

## 2017-09-05 ENCOUNTER — HOSPITAL ENCOUNTER (OUTPATIENT)
Dept: PREADMISSION TESTING | Age: 62
Discharge: HOME | End: 2017-09-05
Payer: MEDICARE

## 2017-09-05 VITALS
OXYGEN SATURATION: 96 % | DIASTOLIC BLOOD PRESSURE: 74 MMHG | SYSTOLIC BLOOD PRESSURE: 116 MMHG | RESPIRATION RATE: 18 BRPM | WEIGHT: 197 LBS | BODY MASS INDEX: 39.72 KG/M2 | TEMPERATURE: 95.7 F | HEIGHT: 59 IN | HEART RATE: 92 BPM

## 2017-09-05 LAB
ABSOLUTE EOS #: 0.2 K/UL (ref 0–0.4)
ABSOLUTE LYMPH #: 2.7 K/UL (ref 1–4.8)
ABSOLUTE MONO #: 0.6 K/UL (ref 0.1–1.3)
ANION GAP SERPL CALCULATED.3IONS-SCNC: 16 MMOL/L (ref 9–17)
BASOPHILS # BLD: 1 %
BASOPHILS ABSOLUTE: 0 K/UL (ref 0–0.2)
BUN BLDV-MCNC: 17 MG/DL (ref 8–23)
BUN/CREAT BLD: ABNORMAL (ref 9–20)
CALCIUM SERPL-MCNC: 9.5 MG/DL (ref 8.6–10.4)
CHLORIDE BLD-SCNC: 98 MMOL/L (ref 98–107)
CO2: 24 MMOL/L (ref 20–31)
CREAT SERPL-MCNC: 0.97 MG/DL (ref 0.5–0.9)
DIFFERENTIAL TYPE: NORMAL
EOSINOPHILS RELATIVE PERCENT: 3 %
GFR AFRICAN AMERICAN: >60 ML/MIN
GFR NON-AFRICAN AMERICAN: 58 ML/MIN
GFR SERPL CREATININE-BSD FRML MDRD: ABNORMAL ML/MIN/{1.73_M2}
GFR SERPL CREATININE-BSD FRML MDRD: ABNORMAL ML/MIN/{1.73_M2}
GLUCOSE BLD-MCNC: 127 MG/DL (ref 70–99)
HCT VFR BLD CALC: 39.8 % (ref 36–46)
HEMOGLOBIN: 13.3 G/DL (ref 12–16)
LYMPHOCYTES # BLD: 40 %
MCH RBC QN AUTO: 29.8 PG (ref 26–34)
MCHC RBC AUTO-ENTMCNC: 33.4 G/DL (ref 31–37)
MCV RBC AUTO: 89.1 FL (ref 80–100)
MONOCYTES # BLD: 9 %
PDW BLD-RTO: 13.1 % (ref 11.5–14.9)
PLATELET # BLD: 292 K/UL (ref 150–450)
PLATELET ESTIMATE: NORMAL
PMV BLD AUTO: 8.9 FL (ref 6–12)
POTASSIUM SERPL-SCNC: 4.5 MMOL/L (ref 3.7–5.3)
RBC # BLD: 4.46 M/UL (ref 4–5.2)
RBC # BLD: NORMAL 10*6/UL
SEG NEUTROPHILS: 47 %
SEGMENTED NEUTROPHILS ABSOLUTE COUNT: 3.4 K/UL (ref 1.3–9.1)
SODIUM BLD-SCNC: 138 MMOL/L (ref 135–144)
WBC # BLD: 6.9 K/UL (ref 3.5–11)
WBC # BLD: NORMAL 10*3/UL

## 2017-09-05 PROCEDURE — 36415 COLL VENOUS BLD VENIPUNCTURE: CPT

## 2017-09-05 PROCEDURE — 80048 BASIC METABOLIC PNL TOTAL CA: CPT

## 2017-09-05 PROCEDURE — 85025 COMPLETE CBC W/AUTO DIFF WBC: CPT

## 2017-09-05 PROCEDURE — 93005 ELECTROCARDIOGRAM TRACING: CPT

## 2017-09-05 ASSESSMENT — PAIN DESCRIPTION - ORIENTATION: ORIENTATION: LEFT

## 2017-09-05 ASSESSMENT — PAIN SCALES - GENERAL: PAINLEVEL_OUTOF10: 4

## 2017-09-05 ASSESSMENT — PAIN DESCRIPTION - PAIN TYPE: TYPE: CHRONIC PAIN

## 2017-09-05 ASSESSMENT — PAIN DESCRIPTION - LOCATION: LOCATION: FOOT

## 2017-09-06 LAB
EKG ATRIAL RATE: 92 BPM
EKG P AXIS: 50 DEGREES
EKG P-R INTERVAL: 190 MS
EKG Q-T INTERVAL: 392 MS
EKG QRS DURATION: 84 MS
EKG QTC CALCULATION (BAZETT): 484 MS
EKG R AXIS: -39 DEGREES
EKG T AXIS: 28 DEGREES
EKG VENTRICULAR RATE: 92 BPM

## 2017-09-06 RX ORDER — SODIUM CHLORIDE 0.9 % (FLUSH) 0.9 %
10 SYRINGE (ML) INJECTION EVERY 12 HOURS SCHEDULED
Status: CANCELLED | OUTPATIENT
Start: 2017-09-06

## 2017-09-06 RX ORDER — SODIUM CHLORIDE 0.9 % (FLUSH) 0.9 %
10 SYRINGE (ML) INJECTION PRN
Status: CANCELLED | OUTPATIENT
Start: 2017-09-06

## 2017-09-06 RX ORDER — SODIUM CHLORIDE 9 MG/ML
INJECTION, SOLUTION INTRAVENOUS CONTINUOUS
Status: CANCELLED | OUTPATIENT
Start: 2017-09-06

## 2017-09-12 ENCOUNTER — HOSPITAL ENCOUNTER (OUTPATIENT)
Dept: PAIN MANAGEMENT | Age: 62
Discharge: HOME | End: 2017-09-12
Payer: MEDICARE

## 2017-09-12 VITALS
OXYGEN SATURATION: 95 % | WEIGHT: 196 LBS | RESPIRATION RATE: 16 BRPM | DIASTOLIC BLOOD PRESSURE: 74 MMHG | HEIGHT: 59 IN | TEMPERATURE: 98.5 F | BODY MASS INDEX: 39.51 KG/M2 | HEART RATE: 93 BPM | SYSTOLIC BLOOD PRESSURE: 99 MMHG

## 2017-09-12 DIAGNOSIS — G89.29 CHRONIC PAIN OF BOTH KNEES: Primary | ICD-10-CM

## 2017-09-12 DIAGNOSIS — Z96.651 HX OF TOTAL KNEE ARTHROPLASTY, RIGHT: ICD-10-CM

## 2017-09-12 DIAGNOSIS — Z96.652 STATUS POST TOTAL LEFT KNEE REPLACEMENT: ICD-10-CM

## 2017-09-12 DIAGNOSIS — G89.29 ENCOUNTER FOR CHRONIC PAIN MANAGEMENT: ICD-10-CM

## 2017-09-12 DIAGNOSIS — Z79.899 ENCOUNTER FOR LONG-TERM (CURRENT) USE OF OTHER MEDICATIONS: ICD-10-CM

## 2017-09-12 DIAGNOSIS — M25.561 CHRONIC PAIN OF BOTH KNEES: Primary | ICD-10-CM

## 2017-09-12 DIAGNOSIS — M25.562 CHRONIC PAIN OF BOTH KNEES: Primary | ICD-10-CM

## 2017-09-12 PROCEDURE — 99213 OFFICE O/P EST LOW 20 MIN: CPT | Performed by: NURSE PRACTITIONER

## 2017-09-12 PROCEDURE — 80307 DRUG TEST PRSMV CHEM ANLYZR: CPT

## 2017-09-12 PROCEDURE — 99213 OFFICE O/P EST LOW 20 MIN: CPT

## 2017-09-12 RX ORDER — HYDROCODONE BITARTRATE AND ACETAMINOPHEN 5; 325 MG/1; MG/1
1 TABLET ORAL 4 TIMES DAILY PRN
Qty: 120 TABLET | Refills: 0 | Status: SHIPPED | OUTPATIENT
Start: 2017-09-14 | End: 2017-10-13 | Stop reason: SDUPTHER

## 2017-09-12 RX ORDER — HYDROCODONE BITARTRATE AND ACETAMINOPHEN 5; 325 MG/1; MG/1
1 TABLET ORAL 4 TIMES DAILY PRN
Qty: 120 TABLET | Refills: 0 | Status: SHIPPED | OUTPATIENT
Start: 2017-09-12 | End: 2017-09-12 | Stop reason: SDUPTHER

## 2017-09-12 ASSESSMENT — ENCOUNTER SYMPTOMS
GASTROINTESTINAL NEGATIVE: 1
ROS SKIN COMMENTS: ARMS
EYES NEGATIVE: 1
RESPIRATORY NEGATIVE: 1

## 2017-09-16 LAB
6-ACETYLMORPHINE, UR: NOT DETECTED
7-AMINOCLONAZEPAM, URINE: NOT DETECTED
ALPHA-OH-ALPRAZ, URINE: NOT DETECTED
ALPRAZOLAM, URINE: NOT DETECTED
AMPHETAMINES, URINE: NOT DETECTED
BARBITURATES, URINE: NOT DETECTED
BENZOYLECGONINE, UR: NOT DETECTED
BUPRENORPHINE URINE: NOT DETECTED
CARISOPRODOL, UR: NOT DETECTED
CLONAZEPAM, URINE: NOT DETECTED
CODEINE, URINE: NOT DETECTED
CREATININE URINE: 85.1 MG/DL (ref 20–400)
DIAZEPAM, URINE: NOT DETECTED
DRUGS EXPECTED, UR: NORMAL
EER HI RES INTERP UR: NORMAL
ETHYL GLUCURONIDE UR: NOT DETECTED
FENTANYL URINE: NOT DETECTED
HYDROCODONE, URINE: PRESENT
HYDROMORPHONE, URINE: NOT DETECTED
LORAZEPAM, URINE: NOT DETECTED
MARIJUANA METAB, UR: NOT DETECTED
MDA, UR: NOT DETECTED
MDEA, EVE, UR: NOT DETECTED
MDMA URINE: NOT DETECTED
MEPERIDINE METAB, UR: NOT DETECTED
METHADONE, URINE: NOT DETECTED
METHAMPHETAMINE, URINE: NOT DETECTED
METHYLPHENIDATE: NOT DETECTED
MIDAZOLAM, URINE: NOT DETECTED
MORPHINE URINE: NOT DETECTED
NORBUPRENORPHINE, URINE: NOT DETECTED
NORDIAZEPAM, URINE: NOT DETECTED
NORFENTANYL, URINE: NOT DETECTED
NORHYDROCODONE, URINE: PRESENT
NOROXYCODONE, URINE: NOT DETECTED
NOROXYMORPHONE, URINE: NOT DETECTED
OXAZEPAM, URINE: NOT DETECTED
OXYCODONE URINE: NOT DETECTED
OXYMORPHONE, URINE: NOT DETECTED
PAIN MANAGEMENT DRUG PANEL INTERP, URINE: NORMAL
PAIN MGT DRUG PANEL, HI RES, UR: NORMAL
PCP,URINE: NOT DETECTED
PHENTERMINE, UR: NOT DETECTED
PROPOXYPHENE, URINE: NOT DETECTED
TAPENTADOL, URINE: NOT DETECTED
TAPENTADOL-O-SULFATE, URINE: NOT DETECTED
TEMAZEPAM, URINE: NOT DETECTED
TRAMADOL, URINE: NOT DETECTED
ZOLPIDEM, URINE: NOT DETECTED

## 2017-09-28 ENCOUNTER — HOSPITAL ENCOUNTER (OUTPATIENT)
Dept: NUCLEAR MEDICINE | Age: 62
Discharge: HOME | End: 2017-09-28
Payer: MEDICARE

## 2017-09-28 ENCOUNTER — HOSPITAL ENCOUNTER (OUTPATIENT)
Dept: NON INVASIVE DIAGNOSTICS | Age: 62
Discharge: HOME | End: 2017-09-28
Payer: MEDICARE

## 2017-09-28 VITALS
SYSTOLIC BLOOD PRESSURE: 141 MMHG | BODY MASS INDEX: 39.51 KG/M2 | WEIGHT: 196 LBS | HEART RATE: 99 BPM | HEIGHT: 59 IN | DIASTOLIC BLOOD PRESSURE: 80 MMHG

## 2017-09-28 DIAGNOSIS — R94.31 ABNORMAL ELECTROCARDIOGRAM: ICD-10-CM

## 2017-09-28 LAB
LV EF: 81 %
LVEF MODALITY: NORMAL

## 2017-09-28 PROCEDURE — 3430000000 HC RX DIAGNOSTIC RADIOPHARMACEUTICAL: Performed by: INTERNAL MEDICINE

## 2017-09-28 PROCEDURE — 78452 HT MUSCLE IMAGE SPECT MULT: CPT

## 2017-09-28 PROCEDURE — A9500 TC99M SESTAMIBI: HCPCS | Performed by: INTERNAL MEDICINE

## 2017-09-28 PROCEDURE — 2580000003 HC RX 258: Performed by: INTERNAL MEDICINE

## 2017-09-28 PROCEDURE — 93017 CV STRESS TEST TRACING ONLY: CPT

## 2017-09-28 PROCEDURE — 6360000002 HC RX W HCPCS: Performed by: INTERNAL MEDICINE

## 2017-09-28 RX ORDER — SODIUM CHLORIDE 0.9 % (FLUSH) 0.9 %
10 SYRINGE (ML) INJECTION PRN
Status: DISCONTINUED | OUTPATIENT
Start: 2017-09-28 | End: 2017-09-29 | Stop reason: HOSPADM

## 2017-09-28 RX ORDER — SODIUM CHLORIDE 0.9 % (FLUSH) 0.9 %
10 SYRINGE (ML) INJECTION PRN
Status: DISCONTINUED | OUTPATIENT
Start: 2017-09-28 | End: 2017-10-01 | Stop reason: HOSPADM

## 2017-09-28 RX ORDER — NITROGLYCERIN 0.4 MG/1
0.4 TABLET SUBLINGUAL EVERY 5 MIN PRN
Status: DISCONTINUED | OUTPATIENT
Start: 2017-09-28 | End: 2017-09-29 | Stop reason: HOSPADM

## 2017-09-28 RX ORDER — METOPROLOL TARTRATE 5 MG/5ML
2.5 INJECTION INTRAVENOUS PRN
Status: DISCONTINUED | OUTPATIENT
Start: 2017-09-28 | End: 2017-09-29 | Stop reason: HOSPADM

## 2017-09-28 RX ORDER — AMINOPHYLLINE DIHYDRATE 25 MG/ML
100 INJECTION, SOLUTION INTRAVENOUS
Status: ACTIVE | OUTPATIENT
Start: 2017-09-28 | End: 2017-09-28

## 2017-09-28 RX ORDER — 0.9 % SODIUM CHLORIDE 0.9 %
250 INTRAVENOUS SOLUTION INTRAVENOUS ONCE
Status: DISCONTINUED | OUTPATIENT
Start: 2017-09-28 | End: 2017-09-29 | Stop reason: HOSPADM

## 2017-09-28 RX ADMIN — Medication 10 ML: at 08:32

## 2017-09-28 RX ADMIN — TETRAKIS(2-METHOXYISOBUTYLISOCYANIDE)COPPER(I) TETRAFLUOROBORATE 9 MILLICURIE: 1 INJECTION, POWDER, LYOPHILIZED, FOR SOLUTION INTRAVENOUS at 07:58

## 2017-09-28 RX ADMIN — REGADENOSON 0.4 MG: 0.08 INJECTION, SOLUTION INTRAVENOUS at 09:11

## 2017-09-28 RX ADMIN — Medication 10 ML: at 07:58

## 2017-09-28 RX ADMIN — TETRAKIS(2-METHOXYISOBUTYLISOCYANIDE)COPPER(I) TETRAFLUOROBORATE 28.1 MILLICURIE: 1 INJECTION, POWDER, LYOPHILIZED, FOR SOLUTION INTRAVENOUS at 09:13

## 2017-09-28 RX ADMIN — Medication 10 ML: at 09:11

## 2017-10-11 ENCOUNTER — ANESTHESIA EVENT (OUTPATIENT)
Dept: OPERATING ROOM | Age: 62
End: 2017-10-11
Payer: MEDICARE

## 2017-10-12 ENCOUNTER — HOSPITAL ENCOUNTER (OUTPATIENT)
Age: 62
Setting detail: OUTPATIENT SURGERY
Discharge: HOME | End: 2017-10-12
Attending: PODIATRIST | Admitting: PODIATRIST
Payer: MEDICARE

## 2017-10-12 ENCOUNTER — ANESTHESIA (OUTPATIENT)
Dept: OPERATING ROOM | Age: 62
End: 2017-10-12
Payer: MEDICARE

## 2017-10-12 VITALS
SYSTOLIC BLOOD PRESSURE: 138 MMHG | HEIGHT: 59 IN | WEIGHT: 195 LBS | DIASTOLIC BLOOD PRESSURE: 84 MMHG | BODY MASS INDEX: 39.31 KG/M2 | OXYGEN SATURATION: 92 % | TEMPERATURE: 98.2 F | HEART RATE: 103 BPM | RESPIRATION RATE: 16 BRPM

## 2017-10-12 VITALS — OXYGEN SATURATION: 91 % | DIASTOLIC BLOOD PRESSURE: 70 MMHG | SYSTOLIC BLOOD PRESSURE: 102 MMHG | TEMPERATURE: 96.8 F

## 2017-10-12 LAB
GLUCOSE BLD-MCNC: 121 MG/DL (ref 65–105)
GLUCOSE BLD-MCNC: 126 MG/DL (ref 65–105)

## 2017-10-12 PROCEDURE — 7100000000 HC PACU RECOVERY - FIRST 15 MIN: Performed by: PODIATRIST

## 2017-10-12 PROCEDURE — 2580000003 HC RX 258: Performed by: ANESTHESIOLOGY

## 2017-10-12 PROCEDURE — 6360000002 HC RX W HCPCS: Performed by: NURSE ANESTHETIST, CERTIFIED REGISTERED

## 2017-10-12 PROCEDURE — 6360000002 HC RX W HCPCS: Performed by: STUDENT IN AN ORGANIZED HEALTH CARE EDUCATION/TRAINING PROGRAM

## 2017-10-12 PROCEDURE — 3700000000 HC ANESTHESIA ATTENDED CARE: Performed by: PODIATRIST

## 2017-10-12 PROCEDURE — 7100000031 HC ASPR PHASE II RECOVERY - ADDTL 15 MIN: Performed by: PODIATRIST

## 2017-10-12 PROCEDURE — 2500000003 HC RX 250 WO HCPCS: Performed by: PODIATRIST

## 2017-10-12 PROCEDURE — 7100000001 HC PACU RECOVERY - ADDTL 15 MIN: Performed by: PODIATRIST

## 2017-10-12 PROCEDURE — 82947 ASSAY GLUCOSE BLOOD QUANT: CPT

## 2017-10-12 PROCEDURE — 3600000012 HC SURGERY LEVEL 2 ADDTL 15MIN: Performed by: PODIATRIST

## 2017-10-12 PROCEDURE — 7100000030 HC ASPR PHASE II RECOVERY - FIRST 15 MIN: Performed by: PODIATRIST

## 2017-10-12 PROCEDURE — 3600000002 HC SURGERY LEVEL 2 BASE: Performed by: PODIATRIST

## 2017-10-12 PROCEDURE — 88305 TISSUE EXAM BY PATHOLOGIST: CPT

## 2017-10-12 PROCEDURE — 2720000010 HC SURG SUPPLY STERILE: Performed by: PODIATRIST

## 2017-10-12 PROCEDURE — 3700000001 HC ADD 15 MINUTES (ANESTHESIA): Performed by: PODIATRIST

## 2017-10-12 RX ORDER — MEPERIDINE HYDROCHLORIDE 25 MG/ML
12.5 INJECTION INTRAMUSCULAR; INTRAVENOUS; SUBCUTANEOUS EVERY 5 MIN PRN
Status: DISCONTINUED | OUTPATIENT
Start: 2017-10-12 | End: 2017-10-12 | Stop reason: HOSPADM

## 2017-10-12 RX ORDER — OXYCODONE HYDROCHLORIDE AND ACETAMINOPHEN 5; 325 MG/1; MG/1
2 TABLET ORAL EVERY 4 HOURS PRN
Status: DISCONTINUED | OUTPATIENT
Start: 2017-10-12 | End: 2017-10-12 | Stop reason: HOSPADM

## 2017-10-12 RX ORDER — DIPHENHYDRAMINE HYDROCHLORIDE 50 MG/ML
12.5 INJECTION INTRAMUSCULAR; INTRAVENOUS
Status: DISCONTINUED | OUTPATIENT
Start: 2017-10-12 | End: 2017-10-12 | Stop reason: HOSPADM

## 2017-10-12 RX ORDER — MIDAZOLAM HYDROCHLORIDE 1 MG/ML
INJECTION INTRAMUSCULAR; INTRAVENOUS PRN
Status: DISCONTINUED | OUTPATIENT
Start: 2017-10-12 | End: 2017-10-12 | Stop reason: SDUPTHER

## 2017-10-12 RX ORDER — ONDANSETRON 2 MG/ML
INJECTION INTRAMUSCULAR; INTRAVENOUS PRN
Status: DISCONTINUED | OUTPATIENT
Start: 2017-10-12 | End: 2017-10-12 | Stop reason: SDUPTHER

## 2017-10-12 RX ORDER — BUPIVACAINE HYDROCHLORIDE 5 MG/ML
INJECTION, SOLUTION EPIDURAL; INTRACAUDAL PRN
Status: DISCONTINUED | OUTPATIENT
Start: 2017-10-12 | End: 2017-10-12 | Stop reason: HOSPADM

## 2017-10-12 RX ORDER — PROPOFOL 10 MG/ML
INJECTION, EMULSION INTRAVENOUS CONTINUOUS PRN
Status: DISCONTINUED | OUTPATIENT
Start: 2017-10-12 | End: 2017-10-12 | Stop reason: SDUPTHER

## 2017-10-12 RX ORDER — SODIUM CHLORIDE 0.9 % (FLUSH) 0.9 %
10 SYRINGE (ML) INJECTION PRN
Status: DISCONTINUED | OUTPATIENT
Start: 2017-10-12 | End: 2017-10-12 | Stop reason: HOSPADM

## 2017-10-12 RX ORDER — FENTANYL CITRATE 50 UG/ML
INJECTION, SOLUTION INTRAMUSCULAR; INTRAVENOUS PRN
Status: DISCONTINUED | OUTPATIENT
Start: 2017-10-12 | End: 2017-10-12 | Stop reason: SDUPTHER

## 2017-10-12 RX ORDER — LABETALOL HYDROCHLORIDE 5 MG/ML
5 INJECTION, SOLUTION INTRAVENOUS EVERY 10 MIN PRN
Status: DISCONTINUED | OUTPATIENT
Start: 2017-10-12 | End: 2017-10-12 | Stop reason: HOSPADM

## 2017-10-12 RX ORDER — DEXAMETHASONE SODIUM PHOSPHATE 4 MG/ML
INJECTION, SOLUTION INTRA-ARTICULAR; INTRALESIONAL; INTRAMUSCULAR; INTRAVENOUS; SOFT TISSUE PRN
Status: DISCONTINUED | OUTPATIENT
Start: 2017-10-12 | End: 2017-10-12 | Stop reason: SDUPTHER

## 2017-10-12 RX ORDER — SODIUM CHLORIDE, SODIUM LACTATE, POTASSIUM CHLORIDE, CALCIUM CHLORIDE 600; 310; 30; 20 MG/100ML; MG/100ML; MG/100ML; MG/100ML
INJECTION, SOLUTION INTRAVENOUS CONTINUOUS
Status: DISCONTINUED | OUTPATIENT
Start: 2017-10-12 | End: 2017-10-12 | Stop reason: HOSPADM

## 2017-10-12 RX ORDER — ONDANSETRON 2 MG/ML
4 INJECTION INTRAMUSCULAR; INTRAVENOUS
Status: DISCONTINUED | OUTPATIENT
Start: 2017-10-12 | End: 2017-10-12 | Stop reason: HOSPADM

## 2017-10-12 RX ORDER — LIDOCAINE HYDROCHLORIDE 10 MG/ML
INJECTION, SOLUTION INFILTRATION; PERINEURAL PRN
Status: DISCONTINUED | OUTPATIENT
Start: 2017-10-12 | End: 2017-10-12 | Stop reason: HOSPADM

## 2017-10-12 RX ORDER — PROPOFOL 10 MG/ML
INJECTION, EMULSION INTRAVENOUS PRN
Status: DISCONTINUED | OUTPATIENT
Start: 2017-10-12 | End: 2017-10-12 | Stop reason: SDUPTHER

## 2017-10-12 RX ORDER — SODIUM CHLORIDE 0.9 % (FLUSH) 0.9 %
10 SYRINGE (ML) INJECTION EVERY 12 HOURS SCHEDULED
Status: DISCONTINUED | OUTPATIENT
Start: 2017-10-12 | End: 2017-10-12 | Stop reason: HOSPADM

## 2017-10-12 RX ADMIN — FENTANYL CITRATE 100 MCG: 50 INJECTION, SOLUTION INTRAMUSCULAR; INTRAVENOUS at 09:41

## 2017-10-12 RX ADMIN — ONDANSETRON 4 MG: 2 INJECTION INTRAMUSCULAR; INTRAVENOUS at 10:26

## 2017-10-12 RX ADMIN — PROPOFOL 30 MG: 10 INJECTION, EMULSION INTRAVENOUS at 09:41

## 2017-10-12 RX ADMIN — SODIUM CHLORIDE, POTASSIUM CHLORIDE, SODIUM LACTATE AND CALCIUM CHLORIDE: 600; 310; 30; 20 INJECTION, SOLUTION INTRAVENOUS at 09:34

## 2017-10-12 RX ADMIN — SODIUM CHLORIDE, POTASSIUM CHLORIDE, SODIUM LACTATE AND CALCIUM CHLORIDE: 600; 310; 30; 20 INJECTION, SOLUTION INTRAVENOUS at 08:16

## 2017-10-12 RX ADMIN — Medication 2 G: at 09:50

## 2017-10-12 RX ADMIN — MIDAZOLAM 2 MG: 1 INJECTION INTRAMUSCULAR; INTRAVENOUS at 09:41

## 2017-10-12 RX ADMIN — DEXAMETHASONE SODIUM PHOSPHATE 4 MG: 4 INJECTION, SOLUTION INTRAMUSCULAR; INTRAVENOUS at 09:41

## 2017-10-12 RX ADMIN — PROPOFOL 100 MCG/KG/MIN: 10 INJECTION, EMULSION INTRAVENOUS at 09:41

## 2017-10-12 ASSESSMENT — ENCOUNTER SYMPTOMS
SHORTNESS OF BREATH: 0
STRIDOR: 0

## 2017-10-12 ASSESSMENT — PAIN SCALES - GENERAL
PAINLEVEL_OUTOF10: 0

## 2017-10-12 ASSESSMENT — PAIN - FUNCTIONAL ASSESSMENT: PAIN_FUNCTIONAL_ASSESSMENT: 0-10

## 2017-10-12 ASSESSMENT — LIFESTYLE VARIABLES: SMOKING_STATUS: 0

## 2017-10-12 NOTE — H&P
HISTORY and Treinta OSBALDO [address]       NAME:  Erinn Johnston  MRN: [id number]   YOB: 1955   Date: 10/12/2017   Age: 59 y.o. Gender: female       COMPLAINT AND PRESENT HISTORY:     Erinn Johnston is 59 y.o.,  female,is here today for Lt 3rd hammertoes correction and lesion excision. Pt C/O of pain, deformity, limitation in the range of motion, soft tissue lesion. The symptoms started 3+ years. Pt states pain with weight bearing, 6/10 intensity at times, radiates throughout Lt foot. Pt states lesion has gradual increased, redness, discharge. No recent falls or trauma. No redness, swelling or rashes. PAST MEDICAL HISTORY     Past Medical History:   Diagnosis Date    Arthritis     Diabetes mellitus ([address]     Eczema     arms, trunk    GERD (gastroesophageal reflux disease)     Hearing loss     left and right ears, wears hearing aids    Hyperlipidemia     Hypertension     Kidney infection     Osteoporosis     Wears glasses     for reading       Pt denies any history of stroke, heart disease, COPD, Asthma, Cancer, Seizures,Thyroid disease, Kidney Disease, Hepatitis, TB, Psychiatric Disorders or Substance abuse. SURGICAL HISTORY       Past Surgical History:   Procedure Laterality Date    APPENDECTOMY      WITH HYSTERECTOMY    BLADDER SUSPENSION      BLEPHAROPLASTY Bilateral     BREAST BIOPSY Left     benign    COLONOSCOPY      CYST REMOVAL Right     HEEL    CYST REMOVAL Right     wrist    HYSTERECTOMY      JOINT REPLACEMENT Bilateral     lt had revision    KNEE ARTHROSCOPY Right 8/12/15    Dr Campbell Amen ARTHROSCOPY Left 02/22/2017     &RIGHT  KNEE MANIPULATION    NE KNEE SCOPE,DIAGNOSTIC Left 2/22/2017    KNEE ARTHROSCOPY FOR LATERAL PATELLA RELEASE, SYNOVECTOMY AND STEROID INJECTION, LEFT KNEE. MANIPULATION OF RIGHT KNEE.  performed by Ankit Mosher MD at [address] Left 05/02/2017    3rd toe    TONSILLECTOMY      TYMPANOPLASTY Left FAMILY HISTORY       Family History   Problem Relation Age of Onset    Stroke Father     Emphysema Father     Diabetes Mother     Heart Failure Mother     Osteoarthritis Mother        SOCIAL HISTORY       Social History     Social History    Marital status:      Spouse name: N/A    Number of children: N/A    Years of education: N/A     Occupational History    disability      Social History Main Topics    Smoking status: Never Smoker    Smokeless tobacco: Never Used    Alcohol use No    Drug use: No    Sexual activity: Not Currently     Other Topics Concern    None     Social History Narrative    None           REVIEW OF SYSTEMS      Allergies   Allergen Reactions    Aspirin Other (See Comments)     Chest pain    Codeine Other (See Comments)     Chest pain    Lidoderm [Lidocaine]      Skin reddened , itching    Avelox [Moxifloxacin] Rash    Sulfa Antibiotics Rash       No current facility-administered medications on file prior to encounter. Current Outpatient Prescriptions on File Prior to Encounter   Medication Sig Dispense Refill    HYDROcodone-acetaminophen (NORCO) 5-325 MG per tablet Take 1 tablet by mouth 4 times daily as needed for Pain .  Earliest Fill Date: 9/14/17 120 tablet 0    amLODIPine (NORVASC) 5 MG tablet take 1 tablet by mouth once daily, takes at night  0    acetaminophen (TYLENOL) 500 MG tablet Take 500 mg by mouth as needed for Pain      hydrOXYzine (ATARAX) 50 MG tablet 50 mg every 6 hours as needed   0    alendronate (FOSAMAX) 35 MG tablet Take 1 tablet by mouth every 7 days 4 tablet 3    omeprazole (PRILOSEC) 20 MG capsule take 1 capsule by mouth once daily (Patient taking differently: take 1 capsule by mouth once patient takes at supper) 90 capsule 0    simvastatin (ZOCOR) 20 MG tablet take 1 tablet by mouth ONCE NIGHTLY 30 tablet 3    loratadine (CLARITIN) 10 MG tablet take 1 tablet by mouth once daily 30 tablet 3    venlafaxine (EFFEXOR-XR) 37.5 MG XR capsule take 1 capsule by mouth once daily (Patient taking differently: take 1 capsule by mouth every evening) 30 capsule 3    lisinopril-hydrochlorothiazide (PRINZIDE) 20-12.5 MG per tablet Take 1 tablet by mouth daily 30 tablet 3    clobetasol (TEMOVATE) 0.05 % ointment apply to affected area once daily if needed for itching for 14 days  0    metFORMIN (GLUCOPHAGE) 500 MG tablet take 1 tablet by mouth three times a day with meals  1    fluocinonide (LIDEX) 0.05 % ointment apply to affected area once daily for 14 days  0    fluticasone (FLONASE) 50 MCG/ACT nasal spray instill 2 sprays into each nostril once daily ,AVOID SPRAYING TOWARDS SEPTUM  0    Blood Glucose Monitoring Suppl (TRUE METRIX METER) W/DEVICE KIT TEST 1 TO 2 TIMES DAILY AS DIRECTED  0    TRUE METRIX BLOOD GLUCOSE TEST strip TEST 1 TO 2 TIMES BEFORE MEALS OR AS DIRECTED  1    Lancets (BD LANCET ULTRAFINE 30G) MISC USE 1 TO 2 TIMES A DAY  1    BIOTIN PO   Take 1 tablet by mouth daily       Calcium Carbonate-Vitamin D (CALCIUM + D PO) Take by mouth      busPIRone (BUSPAR) 10 MG tablet Take 1 tablet by mouth 2 times daily. (Patient taking differently: Take 10 mg by mouth 2 times daily as needed ) 60 tablet 3        General health:  Fairly good. No fever or chills. Skin:  No itching, redness or rash. HEENT:  No headache, epistaxis or sore throat. Neck:  No pain, stiffness or masses. Cardiovascular/Respiratory system:  No chest pain, palpitation or shortness of breath. Gastrointestinal tract: No abdominal pain, nausea, vomiting, diarrhea or constipation. Genitourinary:  No burning on micturition. No hesitancy, urgency, frequency or discoloration of urine. Locomotor:  See HPI. Neuropsychiatric:  No referable complaints.                      GENERAL PHYSICAL EXAM:     Vitals: /76   Pulse 103   Temp 98.4 °F (36.9 °C) (Oral)   Resp 17   Ht 4' 11\" (1.499 m)   Wt 195 lb (88.5 kg)   SpO2 99%   BMI 39.39 kg/m²  Body mass index is 39.39 kg/m². GENERAL APPEARANCE:   Jeramy Renner is 59 y.o.,  female, moderately obese, nourished, conscious, alert. Does not appear to be distress or pain at this time. SKIN:  Warm, dry, no cyanosis or jaundice. HEAD:  Normocephalic, atraumatic, no swelling or tenderness. EYES:  Pupils equal, reactive to light. EARS:  No discharge, no marked hearing loss. NOSE:  No rhinorrhea, epistaxis or septal deformity. THROAT:  Not congested. No ulceration bleeding or discharge. NECK:  No stiffness, trachea central.  No palpable masses or L.N.                 CHEST:  Symmetrical and equal on expansion. HEART:  Tachycardiac, regular rhythm, S1 > S2. No audible murmurs or gallops. LUNGS:  Equal on expansion, normal breath sounds. No adventitious sounds. ABDOMEN:  Obese. Soft on palpation. No localized tenderness. No guarding or rigidity. No palpable organomegaly. LYMPHATICS:  No palpable cervical lymphadenopathy. LOCOMOTOR, BACK AND SPINE:  No tenderness or deformities. EXTREMITIES:  Symmetrical, no pedal edema. Romans sign negative. No discoloration or ulcerations. NEUROLOGIC:  The patient is conscious, alert, oriented, No apparent focal sensory or motor deficits. PROVISIONAL DIAGNOSES / SURGERY:      Lt hammertoe & Soft tissue lesion. Lt foot metatarsal head resection 3rd w/ arthroplasty, Lt foot excision lesion soft tissue.     Patient Active Problem List    Diagnosis Date Noted    Pain in joint, lower leg 07/02/2014     Priority: High     Class: Chronic    Status post total left knee replacement    

## 2017-10-12 NOTE — BRIEF OP NOTE
PODIATRY BRIEF OP NOTE    PATIENT NAME: Georgiana Paul  YOB: 1955  -  59 y.o. female  MRN: [id number]  DATE: 10/12/2017    BILLING #:[id number]     SURGEON: Dr. Gwendolyn Cooper    ASSISTANTS: Abbi Giron PGY1    PRE-OP DIAGNOSIS: 1. Soft tissue mass beneath metatarsal head three, Left foot, 2. Hammertoe left third toe, Metatarsalgia Left Foot. POST-OP DIAGNOSIS: Same as above. PROCEDURE: 1. Resection of head of 3rd metatarsal, Left foot  2. Excision of soft tissue mass, Left foot     ANESTHESIA: MAC with local- 10ccs of 1% Lidocaine plain and 0.5% Marcaine plain    HEMOSTASIS: Pneumatic ankle tourniquet @ 250 mmHg for 37 minutes. ESTIMATED BLOOD LOSS: Less than 5cc.     MATERIALS: 4-0 vicryl, 3-0 prolene, 4-0 prolene    INJECTABLES: 20ccs of 0.5% Marcaine plain    SPECIMEN:   ID Type Source Tests Collected by Time Destination   A :  Tissue Foot SURGICAL PATHOLOGY Adela Mathews DPM 30/11/6720 [id number]      COMPLICATIONS: None    FINDINGS: Consistent with above diagnosis     Abbi Giron DPM  10/12/17, 10:44 AM

## 2017-10-12 NOTE — ANESTHESIA PRE PROCEDURE
Department of Anesthesiology  Preprocedure Note       Name:  Brett Griffiths   Age:  59 y.o.  :  1955                                          MRN:  [id number]         Date:  10/12/2017      Surgeon: Tosin Ferrer):  Teetee Washington DPM    Procedure: Procedure(s):  METATARSAL HEAD RESECTION 3RD LEFT FOOT W/ARTHROPLASTY 3RD LEFT FOOT & EXCISION LESION SOFT TISSUE LEFT FOOT    Medications prior to admission:   Prior to Admission medications    Medication Sig Start Date End Date Taking? Authorizing Provider   HYDROcodone-acetaminophen (NORCO) 5-325 MG per tablet Take 1 tablet by mouth 4 times daily as needed for Pain .  Earliest Fill Date: 17  Yes DARREL López   amLODIPine (NORVASC) 5 MG tablet take 1 tablet by mouth once daily, takes at night 17  Yes Historical Provider, MD   acetaminophen (TYLENOL) 500 MG tablet Take 500 mg by mouth as needed for Pain   Yes Historical Provider, MD   hydrOXYzine (ATARAX) 50 MG tablet 50 mg every 6 hours as needed  17  Yes Historical Provider, MD   alendronate (FOSAMAX) 35 MG tablet Take 1 tablet by mouth every 7 days 16  Yes Ewa Lomas CNP   omeprazole (PRILOSEC) 20 MG capsule take 1 capsule by mouth once daily  Patient taking differently: take 1 capsule by mouth once patient takes at supper 16  Yes Ewa Lomas CNP   simvastatin (ZOCOR) 20 MG tablet take 1 tablet by mouth ONCE NIGHTLY 10/30/15  Yes Ewa Lomas CNP   loratadine (CLARITIN) 10 MG tablet take 1 tablet by mouth once daily 10/20/15  Yes Ewa Lomas CNP   venlafaxine (EFFEXOR-XR) 37.5 MG XR capsule take 1 capsule by mouth once daily  Patient taking differently: take 1 capsule by mouth every evening 9/28/15  Yes Hossein South CNP   lisinopril-hydrochlorothiazide (PRINZIDE) 20-12.5 MG per tablet Take 1 tablet by mouth daily 6/26/15  Yes Rayna Jaramillo MD   clobetasol (TEMOVATE) 0.05 % ointment apply to affected area once daily if needed for itching for 14 days 3/17/17 Height: 4' 11\" (1.499 m)                                              BP Readings from Last 3 Encounters:   10/12/17 139/76   09/28/17 (!) 141/80   09/12/17 99/74       NPO Status: Time of last liquid consumption: 0300                        Time of last solid consumption: 1900                        Date of last liquid consumption: 10/12/17                        Date of last solid food consumption: 10/11/17    BMI:   Wt Readings from Last 3 Encounters:   10/12/17 195 lb (88.5 kg)   09/28/17 196 lb (88.9 kg)   09/12/17 196 lb (88.9 kg)     Body mass index is 39.39 kg/m².     CBC:   Lab Results   Component Value Date    WBC 6.9 09/05/2017    RBC 4.46 09/05/2017    HGB 13.3 09/05/2017    HCT 39.8 09/05/2017    MCV 89.1 09/05/2017    RDW 13.1 09/05/2017     09/05/2017     LR    CMP:   Lab Results   Component Value Date     09/05/2017    K 4.5 09/05/2017    CL 98 09/05/2017    CO2 24 09/05/2017    BUN 17 09/05/2017    CREATININE 0.97 09/05/2017    GFRAA >60 09/05/2017    LABGLOM 58 09/05/2017    GLUCOSE 127 09/05/2017    PROT 8.2 01/16/2017    CALCIUM 9.5 09/05/2017    BILITOT 0.27 01/16/2017    ALKPHOS 56 01/16/2017    AST 37 01/16/2017    ALT 23 01/16/2017       POC Tests:   Recent Labs      10/12/17   0815   POCGLU  126*       Coags: No results found for: PROTIME, INR, APTT    HCG (If Applicable): No results found for: PREGTESTUR, PREGSERUM, HCG, HCGQUANT     ABGs: No results found for: PHART, PO2ART, RAM2JGE, XKS6FJW, BEART, R0KDTGFR     Type & Screen (If Applicable):  No results found for: IGLESIA [facility]    Anesthesia Evaluation  Patient summary reviewed no history of anesthetic complications:   Airway: Mallampati: III  TM distance: >3 FB   Neck ROM: full  Mouth opening: > = 3 FB Dental: normal exam         Pulmonary:negative ROS and normal exam        (-) pneumonia, COPD, asthma, shortness of breath, recent URI, sleep apnea, rhonchi, wheezes, rales and stridor

## 2017-10-13 ENCOUNTER — HOSPITAL ENCOUNTER (OUTPATIENT)
Dept: PAIN MANAGEMENT | Age: 62
Discharge: HOME | End: 2017-10-13
Payer: MEDICARE

## 2017-10-13 VITALS
HEIGHT: 59 IN | SYSTOLIC BLOOD PRESSURE: 129 MMHG | HEART RATE: 98 BPM | RESPIRATION RATE: 16 BRPM | WEIGHT: 196 LBS | TEMPERATURE: 98.2 F | DIASTOLIC BLOOD PRESSURE: 78 MMHG | OXYGEN SATURATION: 98 % | BODY MASS INDEX: 39.51 KG/M2

## 2017-10-13 DIAGNOSIS — M25.561 CHRONIC PAIN OF BOTH KNEES: Primary | ICD-10-CM

## 2017-10-13 DIAGNOSIS — Z96.651 HX OF TOTAL KNEE ARTHROPLASTY, RIGHT: ICD-10-CM

## 2017-10-13 DIAGNOSIS — G89.29 CHRONIC PAIN OF BOTH KNEES: Primary | ICD-10-CM

## 2017-10-13 DIAGNOSIS — Z96.652 STATUS POST TOTAL LEFT KNEE REPLACEMENT: ICD-10-CM

## 2017-10-13 DIAGNOSIS — Z79.899 ENCOUNTER FOR LONG-TERM (CURRENT) USE OF OTHER MEDICATIONS: ICD-10-CM

## 2017-10-13 DIAGNOSIS — G89.29 ENCOUNTER FOR CHRONIC PAIN MANAGEMENT: ICD-10-CM

## 2017-10-13 DIAGNOSIS — M25.562 CHRONIC PAIN OF BOTH KNEES: Primary | ICD-10-CM

## 2017-10-13 PROCEDURE — 99213 OFFICE O/P EST LOW 20 MIN: CPT

## 2017-10-13 PROCEDURE — 99213 OFFICE O/P EST LOW 20 MIN: CPT | Performed by: NURSE PRACTITIONER

## 2017-10-13 RX ORDER — HYDROCODONE BITARTRATE AND ACETAMINOPHEN 5; 325 MG/1; MG/1
1 TABLET ORAL 3 TIMES DAILY PRN
Qty: 90 TABLET | Refills: 0 | Status: SHIPPED | OUTPATIENT
Start: 2017-10-14 | End: 2017-10-13 | Stop reason: SDUPTHER

## 2017-10-13 RX ORDER — HYDROCODONE BITARTRATE AND ACETAMINOPHEN 5; 325 MG/1; MG/1
1 TABLET ORAL EVERY 6 HOURS PRN
Qty: 120 TABLET | Refills: 0 | Status: SHIPPED | OUTPATIENT
Start: 2017-10-13 | End: 2017-11-13 | Stop reason: SDUPTHER

## 2017-10-13 ASSESSMENT — ENCOUNTER SYMPTOMS
BACK PAIN: 1
GASTROINTESTINAL NEGATIVE: 1
EYES NEGATIVE: 1
RESPIRATORY NEGATIVE: 1

## 2017-10-13 NOTE — PROGRESS NOTES
[facility]  Progress  Note    Patient is here today to review medication contract. Chief Complaint:  Bilateral knee pain. History of bilateral knee arthroscopy. PT did help some. No Ed visits. Sleep is okay. Surgery left foot yesterday. \Knee Pain    The incident occurred more than 1 week ago. There was no injury mechanism. The pain is present in the right knee and left knee. Quality: sharp. The pain is at a severity of 0/10. The patient is experiencing no pain. The pain has been intermittent since onset. She reports no foreign bodies present. Exacerbated by: standing. She has tried ice, elevation and heat for the symptoms. The treatment provided mild relief. Patient denies any new neurological symptoms. No bowel or bladder incontinence, no weakness, and no falling. Pill count: appropriate    :  Review of OARRS does not show any aberrant prescription behavior. Medication is helping the patient stay active. Patient denies any side effects and reports adequate analgesia. No sign of misuse/abuse.     Past Medical History:   Diagnosis Date    Arthritis     Diabetes mellitus ([address]     Eczema     arms, trunk    GERD (gastroesophageal reflux disease)     Hearing loss     left and right ears, wears hearing aids    Hyperlipidemia     Hypertension     Kidney infection     Osteoporosis     Wears glasses     for reading       Past Surgical History:   Procedure Laterality Date    APPENDECTOMY      WITH HYSTERECTOMY    ARTHROPLASTY Left 10/12/2017    METATARSAL HEAD RESECTION 3RD LEFT FOOT  & EXCISION LESION SOFT TISSUE LEFT FOOT performed by Juwan Orozco DPM at [facility] BLEPHAROPLASTY Bilateral     BREAST BIOPSY Left     benign    COLONOSCOPY      CYST REMOVAL Right     HEEL    CYST REMOVAL Right     wrist    HYSTERECTOMY      JOINT REPLACEMENT Bilateral     lt had revision    KNEE ARTHROSCOPY Right 8/12/15    Dr Campbell Amen ARTHROSCOPY Psychiatric/Behavioral: Negative. Physical Exam:  /78   Pulse 98   Temp 98.2 °F (36.8 °C)   Resp 16   Ht 4' 11\" (1.499 m)   Wt 196 lb (88.9 kg)   SpO2 98%   BMI 39.59 kg/m²     Physical Exam   Constitutional: She is well-developed, well-nourished, and in no distress. obese   Neck: Normal range of motion. Pulmonary/Chest: Effort normal.   Musculoskeletal:        Right shoulder: She exhibits tenderness. Right knee: Tenderness found. Left knee: Tenderness found. Left foot: There is tenderness. Scar both knees      Cast left foot, sensation and movement intact, toes warm and pink       Record/Diagnostics Review:    As above, I did review the imaging  9/16/2017  1:37 PM - Kamari Cooper Incoming Lab Results From [facility]     Component Results     Component Value Ref Range & Units Status Collected Lab   Pain Management Drug Panel Interp, Urine Consistent   Final 09/12/2017  1:40  [facility]   (NOTE)   ________________________________________________________________   DRUGS EXPECTED:   HYDROCODONE [9/12/17]   ________________________________________________________________   CONSISTENT with medications provided:   HYDROCODONE : based on hydrocodone, norhydrocodone   ________________________________________________________________   INTERPRETIVE INFORMATION: Pain Mgt [place], Mass Spec/EMIT, Ur,                            Interp   Interpretation depends on accuracy and completeness of patient   medication information submitted by client.     6-Acetylmorphine, Ur Not Detected   Final 09/12/2017  1:40  [facility] [address] [facility]   7-Aminoclonazepam, Urine Not Detected   Final 09/12/2017  1:40  [facility], Urine Not Detected   Final 09/12/2017  1:40  [address] [facility]   Alprazolam, Urine Not Detected   Final 09/12/2017  1:40  [address] [facility]   Amphetamines, urine Not Detected   Final 09/12/2017  1:40  [address] [facility]   Barbiturates, Ur Not Detected   Final 09/12/2017  1:40  [address] [facility]   [place], Ur Not Detected   Final 09/12/2017  1:40  [address] [facility]   Buprenorphine Urine Not Detected   Final 09/12/2017  1:40 PM [facility] [address] Not Detected   Final 09/12/2017  1:40  [address] [facility]   (NOTE)   The carisoprodol immunoassay has cross-reactivity to carisoprodol   and meprobamate.     Clonazepam, Urine Not Detected   Final 09/12/2017  1:40  [address] [facility]   Codeine, Urine Not Detected   Final 09/12/2017  1:40  [address] [facility]   MDA, Ur Not Detected   Final 09/12/2017  1:40  [address] [facility]   Diazepam, Urine Not Detected   Final 09/12/2017  1:40  [address] [facility]   Ethyl Glucuronide Ur Not Detected   Final 09/12/2017  1:40  [address] [facility]   Fentanyl, Ur Not Detected   Final 09/12/2017  1:40  [address] [facility]   Hydrocodone, Urine Present   Final 09/12/2017  1:40  [address] [facility]   Hydromorphone, Urine Not Detected   Final 09/12/2017  1:40  [address] [facility]   Lorazepam, Urine Not Detected   Final 09/12/2017  1:40 PM [id number], Ur Not Detected   Final 09/12/2017  1:40 PM [facility] [address] [facility]   MDEA, LEANA, Ur Not Detected   Final 09/12/2017  1:40  [address] [facility]   MDMA URINE Not Detected   Final 09/12/2017  1:40  [address] [facility]   Meperidine Metab, Ur Not Detected   Final 09/12/2017  1:40  [address] [facility]   Methadone, Urine Not Detected   Final 09/12/2017  1:40  [address] [facility]   Methamphetamine, Urine Not Detected   Final 09/12/2017  1:40  [address] [facility]   Methylphenidate Not Detected   Final 09/12/2017  1:40  [address] [facility]   Midazolam, Urine Not Detected  1:40  [facility]   (NOTE)   Methodology: Qualitative Enzyme Immunoassay and Qualitative Liquid   Chromatography-Time of Flight-Mass Spectrometry or Tandem Mass   Spectrometry, Quantitative Spectrophotometry   The absence of expected drug(s) and/or drug metabolite(s) may   indicate non-compliance, inappropriate timing of specimen   collection relative to drug administration, poor drug absorption,   diluted/adulterated urine, or limitations of testing. The   concentration must be greater than or equal to the cutoff to be   reported as present.  If specific drug concentrations are   required, contact the laboratory within two weeks of specimen   collection to request quantification by a second analytical   technique. Interpretive questions should be directed to the   laboratory. Results based on immunoassay detection that do not match clinical   expectations should be   interpreted with caution. Confirmatory testing by mass   spectrometry for immunoassay-based results is available, if   ordered within two weeks of specimen collection. Additional   charges apply. For medical purposes only; not valid for forensic use. This test was developed and its performance characteristics   determined by [employer]. The U.S. Food and Drug   Administration has not approved or cleared this test; however, FDA   clearance or approval is not currently required for clinical use. The results are not intended to be used as the sole means for   clinical diagnosis or patient management decisions. EER Hi Res Interp Ur See Note   Final 09/12/2017  1:40  [facility]   (NOTE)   Access [employer] Enhanced Report using either link below:   -Direct access: [email or web address]   -Enter Username, Password: [email or web address]   Username: [email or web address]   Password: [email or web address]   Performed by Benny Montejo,   [address], [address] [facility] [phone]   www. [email or web address],

## 2017-10-16 LAB — SURGICAL PATHOLOGY REPORT: NORMAL

## 2017-10-16 NOTE — OP NOTE
[address] [facility]                [address] [facility], [address]                               OPERATIVE REPORT    PATIENT NAME: Kash Houston                      :             1955  MED REC NO:   [id number]                               ROOM:  ACCOUNT NO:   [de-identified]                            ADMISSION DATE:  10/13/2017  PROVIDER:     Amanuel Loya    DATE OF PROCEDURE:  10/12/2017      PREOPERATIVE DIAGNOSIS:  Intractable plantar keratosis, plantar  sub-metatarsal head 3 left foot. POSTOPERATIVE DIAGNOSIS:  Intractable plantar keratosis, plantar  sub-metatarsal head 3 left foot. OPERATION PERFORMED:  1. Resection of the head of the third metatarsal left foot. 2.  Excision of soft tissue mass, left foot. SURGEON:  Kelly Healy DPM    ASSISTANT:  Kassie Ludwig PGY1    ANESTHESIA:  MAC with local sedation, 10 mL of 1% percent lidocaine  plain and 0.5% Marcaine plain. HEMOSTASIS:  Pneumatic ankle tourniquet at 250 mmHg for 37 minutes. ESTIMATED BLOOD LOSS:  Less than 5 mL. MATERIALS USED:  1.  4-0 Prolene. 2.  3-0 Prolene. INJECTABLES: 20 mL of 0.5% Marcaine plain. SPECIMEN:  Soft tissue mass plantar left foot. COMPLICATIONS:  None. INDICATIONS FOR PROCEDURE:  This 26-year-old female patient of Dr. Cyndie Calvin has exhausted all conservative treatment options and has  opted for surgical intervention at this time. The risks, benefits,  and complications of the procedure have been discussed with the  patient and the patient expresses full understanding. All the  patient's questions have been answered and no guarantees have been  given or applied. The patient has been n.p.o. since midnight, last  night. A consent form has been reviewed, witnessed, and signed in the  chart. Radiographs have been reviewed and consistent with the above  diagnosis.     DESCRIPTION OF PROCEDURE:  Under mild sedation administered by  Anesthesia Department, the patient was taken from the preoperative  holding area to the operating room and placed on the bed in a supine  position. IV sedation was administered by Anesthesia Department. A  preoperative block of 10 mL of 1:1 mixture of 1% lidocaine plain and  0.5% Marcaine plain was then injected into the patient's left foot. The left foot was then scrubbed, prepped, and draped in the usual  sterile fashion. A timeout was performed to verify the correct  patient, extremity, and procedures to be performed. PROCEDURE IN DETAIL:  Attention was directed to the dorsal aspect of  the third metatarsal head where a 2 cm linear longitudinal incision  was made. The incision was then deepened using blunt and sharp  dissection. Care was taken to retract the extensor tendon. A linear  capsular incision was made with #15 blade down on to the bone. The  capsular and periosteal tissues were then elevated off of the head of  the third metatarsal using a #15 blade. Using a sagittal saw, the  head of the third metatarsal was then resected from a dorsal distal to  plantar proximal orientation. The incision was then flushed with  copious amounts of sterile saline. The capsular and periosteal  tissues were re-approximated using 3-0 Vicryl. The skin was then  re-approximated using 4-0 Prolene. Attention was then directed to the  plantar foot where a 3cm long elliptical incision was made on the  plantar sub-metatarsal head 3 skin lesion. The skin lesion measured roughly   1cm in diameter. The incision was made full thickness. The soft tissue   mass was then freed from all structures and excised with a #15 blade scalpel. The mass was then passed to the back table to be sent to pathology. The   incision was then flushed  with copious amounts of sterile saline. The skin was then closed  using 4-0 Prolene. The incisions were then dressed with Betadine  soaked Adaptic, 4x4s, Kerlix, and then Ace bandage.   The patient  tolerated the procedure and anesthesia well with no complications. The patient was transferred from the operating room to the PACU with  vital signs stable and vascular status intact in all remaining digits  of the foot. The patient will be discharged home with instructions.         Ridge Reina DPM    D: 10/14/2017 12:45:19       T: 10/14/2017 14:22:11     [email or web address]  Job#: [id number]     Doc#: [id number]

## 2017-10-19 NOTE — OP NOTE
Betadine  soaked Adaptic, 4x4s, Kerlix, and then Ace bandage. The patient  tolerated the procedure and anesthesia well with no complications. The patient was transferred from the operating room to the PACU with  vital signs stable and vascular status intact in all remaining digits  of the foot. The patient will be discharged home with instructions.         Daquan Yuan DPM    D: 10/14/2017 12:45:19       T: 10/14/2017 14:22:11     [email or web address]  Job#: [id number]     Doc#: [id number]

## 2017-11-07 ENCOUNTER — OFFICE VISIT (OUTPATIENT)
Dept: OBGYN CLINIC | Age: 62
End: 2017-11-07
Payer: MEDICARE

## 2017-11-07 VITALS
WEIGHT: 194 LBS | BODY MASS INDEX: 39.11 KG/M2 | SYSTOLIC BLOOD PRESSURE: 134 MMHG | DIASTOLIC BLOOD PRESSURE: 92 MMHG | HEART RATE: 103 BPM | TEMPERATURE: 98.6 F | HEIGHT: 59 IN | OXYGEN SATURATION: 99 % | RESPIRATION RATE: 18 BRPM

## 2017-11-07 DIAGNOSIS — N39.0 URINARY TRACT INFECTION WITHOUT HEMATURIA, SITE UNSPECIFIED: Primary | ICD-10-CM

## 2017-11-07 DIAGNOSIS — R30.0 BURNING WITH URINATION: ICD-10-CM

## 2017-11-07 LAB
BILIRUBIN, POC: ABNORMAL
BLOOD URINE, POC: ABNORMAL
CLARITY, POC: CLEAR
COLOR, POC: YELLOW
GLUCOSE URINE, POC: ABNORMAL
KETONES, POC: ABNORMAL
LEUKOCYTE EST, POC: ABNORMAL
NITRITE, POC: ABNORMAL
PH, POC: 5
PROTEIN, POC: ABNORMAL
SPECIFIC GRAVITY, POC: 1
UROBILINOGEN, POC: ABNORMAL

## 2017-11-07 PROCEDURE — G8484 FLU IMMUNIZE NO ADMIN: HCPCS | Performed by: CLINICAL NURSE SPECIALIST

## 2017-11-07 PROCEDURE — 3017F COLORECTAL CA SCREEN DOC REV: CPT | Performed by: CLINICAL NURSE SPECIALIST

## 2017-11-07 PROCEDURE — 3014F SCREEN MAMMO DOC REV: CPT | Performed by: CLINICAL NURSE SPECIALIST

## 2017-11-07 PROCEDURE — 1036F TOBACCO NON-USER: CPT | Performed by: CLINICAL NURSE SPECIALIST

## 2017-11-07 PROCEDURE — G8417 CALC BMI ABV UP PARAM F/U: HCPCS | Performed by: CLINICAL NURSE SPECIALIST

## 2017-11-07 PROCEDURE — G8427 DOCREV CUR MEDS BY ELIG CLIN: HCPCS | Performed by: CLINICAL NURSE SPECIALIST

## 2017-11-07 PROCEDURE — 99213 OFFICE O/P EST LOW 20 MIN: CPT | Performed by: CLINICAL NURSE SPECIALIST

## 2017-11-07 RX ORDER — NITROFURANTOIN 25; 75 MG/1; MG/1
100 CAPSULE ORAL 2 TIMES DAILY
Qty: 14 CAPSULE | Refills: 0 | Status: SHIPPED | OUTPATIENT
Start: 2017-11-07 | End: 2017-11-14

## 2017-11-07 ASSESSMENT — ENCOUNTER SYMPTOMS
ALLERGIC/IMMUNOLOGIC NEGATIVE: 1
RESPIRATORY NEGATIVE: 1
GASTROINTESTINAL NEGATIVE: 1
EYES NEGATIVE: 1

## 2017-11-07 NOTE — PROGRESS NOTES
Subjective:      Patient ID:  Georgiana Paul is a 57 y.o. female who presents for   Chief Complaint   Patient presents with    Urinary Tract Infection     pt states she is here today she is having burning and pain when she uses the restroom she has been feeling very lightheaded and dizzy        HPI     Patient is a 58 yo female who presents for urinary burning , chills and dizziness which began 6 days ago. Patient denies frequency, urgency. Review of Systems   Constitutional: Negative for chills and fever. HENT: Negative. Eyes: Negative. Respiratory: Negative. Cardiovascular: Negative. Gastrointestinal: Negative. Endocrine: Negative. Genitourinary: Positive for dysuria (which began last thursday). Negative for difficulty urinating, enuresis, frequency and urgency. Musculoskeletal: Negative. Skin: Negative. Allergic/Immunologic: Negative. Neurological: Negative. Hematological: Negative. Psychiatric/Behavioral: Negative. BP (!) 134/92 (Site: Left Arm, Position: Sitting, Cuff Size: Medium Adult)   Pulse 103   Temp 98.6 °F (37 °C) (Oral)   Resp 18   Ht 4' 11\" (1.499 m)   Wt 194 lb (88 kg)   SpO2 99%   BMI 39.18 kg/m²    No LMP recorded. Patient has had a hysterectomy. Objective:   Physical Exam   Constitutional: She is oriented to person, place, and time. She appears well-developed and well-nourished. HENT:   Head: Normocephalic and atraumatic. Eyes: Conjunctivae are normal.   Neck: Normal range of motion. Neck supple. Cardiovascular: Normal rate and regular rhythm. Pulmonary/Chest: Effort normal and breath sounds normal.   Abdominal: Bowel sounds are normal.   Neurological: She is oriented to person, place, and time. Skin: Skin is warm and dry. Psychiatric: She has a normal mood and affect. Her behavior is normal. Judgment and thought content normal.   Vitals reviewed. Assessment:     1.  Urinary tract infection without hematuria, site unspecified  POCT Urinalysis no Micro    nitrofurantoin, macrocrystal-monohydrate, (MACROBID) 100 MG capsule   2. Burning with urination  nitrofurantoin, macrocrystal-monohydrate, (MACROBID) 100 MG capsule       Plan:    Return to office if symptoms persist    Return if symptoms worsen or fail to improve. Patient was seen with total face to face time of 15 minutes. More than 50% of this visit was on counseling and education regarding the problems listed below and her options. She was also counseled on her preventative health maintenance recommendations and follow-up.     Electronically signed by: Noemí Plata CNP

## 2017-11-13 ENCOUNTER — HOSPITAL ENCOUNTER (OUTPATIENT)
Dept: PAIN MANAGEMENT | Age: 62
Discharge: HOME | End: 2017-11-13
Payer: MEDICARE

## 2017-11-13 DIAGNOSIS — G89.29 CHRONIC PAIN OF BOTH KNEES: Primary | ICD-10-CM

## 2017-11-13 DIAGNOSIS — G89.29 ENCOUNTER FOR CHRONIC PAIN MANAGEMENT: ICD-10-CM

## 2017-11-13 DIAGNOSIS — M25.562 ARTHRALGIA OF BOTH LOWER LEGS: Chronic | ICD-10-CM

## 2017-11-13 DIAGNOSIS — M25.561 ARTHRALGIA OF BOTH LOWER LEGS: Chronic | ICD-10-CM

## 2017-11-13 DIAGNOSIS — M25.562 CHRONIC PAIN OF BOTH KNEES: Primary | ICD-10-CM

## 2017-11-13 DIAGNOSIS — M25.561 CHRONIC PAIN OF BOTH KNEES: Primary | ICD-10-CM

## 2017-11-13 PROCEDURE — 99214 OFFICE O/P EST MOD 30 MIN: CPT

## 2017-11-13 PROCEDURE — 99214 OFFICE O/P EST MOD 30 MIN: CPT | Performed by: NURSE PRACTITIONER

## 2017-11-13 RX ORDER — HYDROCODONE BITARTRATE AND ACETAMINOPHEN 5; 325 MG/1; MG/1
1 TABLET ORAL EVERY 8 HOURS PRN
Qty: 90 TABLET | Refills: 0 | Status: SHIPPED | OUTPATIENT
Start: 2017-11-13 | End: 2017-12-11 | Stop reason: SDUPTHER

## 2017-11-13 RX ORDER — HYDROCODONE BITARTRATE AND ACETAMINOPHEN 5; 325 MG/1; MG/1
1 TABLET ORAL EVERY 6 HOURS PRN
Qty: 120 TABLET | Refills: 0 | Status: SHIPPED | OUTPATIENT
Start: 2017-11-13 | End: 2017-11-13 | Stop reason: SDUPTHER

## 2017-11-13 ASSESSMENT — ENCOUNTER SYMPTOMS
SHORTNESS OF BREATH: 0
COUGH: 0
CONSTIPATION: 0

## 2017-12-11 ENCOUNTER — HOSPITAL ENCOUNTER (OUTPATIENT)
Dept: PAIN MANAGEMENT | Age: 62
Discharge: HOME | End: 2017-12-11
Payer: MEDICARE

## 2017-12-11 DIAGNOSIS — G89.29 CHRONIC PAIN OF BOTH KNEES: Primary | ICD-10-CM

## 2017-12-11 DIAGNOSIS — G89.29 ENCOUNTER FOR CHRONIC PAIN MANAGEMENT: ICD-10-CM

## 2017-12-11 DIAGNOSIS — M25.561 CHRONIC PAIN OF BOTH KNEES: Primary | ICD-10-CM

## 2017-12-11 DIAGNOSIS — M25.562 CHRONIC PAIN OF BOTH KNEES: Primary | ICD-10-CM

## 2017-12-11 DIAGNOSIS — M25.561 ARTHRALGIA OF BOTH LOWER LEGS: Chronic | ICD-10-CM

## 2017-12-11 DIAGNOSIS — M25.562 ARTHRALGIA OF BOTH LOWER LEGS: Chronic | ICD-10-CM

## 2017-12-11 PROCEDURE — 99213 OFFICE O/P EST LOW 20 MIN: CPT

## 2017-12-11 PROCEDURE — 99213 OFFICE O/P EST LOW 20 MIN: CPT | Performed by: NURSE PRACTITIONER

## 2017-12-11 RX ORDER — HYDROCODONE BITARTRATE AND ACETAMINOPHEN 5; 325 MG/1; MG/1
1 TABLET ORAL EVERY 8 HOURS PRN
Qty: 90 TABLET | Refills: 0 | Status: SHIPPED | OUTPATIENT
Start: 2017-12-13 | End: 2018-01-12 | Stop reason: SDUPTHER

## 2017-12-11 ASSESSMENT — ENCOUNTER SYMPTOMS
COUGH: 0
CONSTIPATION: 0
SHORTNESS OF BREATH: 0

## 2017-12-11 NOTE — PROGRESS NOTES
Patient is here today to review medication contract. Chief Complaint: bilateral knee pain    PMH: Patient has had knee pain for many years with no known injury. She has had three knee surgeries, including bilateral arthroplasty but pain continues. She had PT in the past with moderate relief. Knee Pain    Incident onset: chronic. There was no injury mechanism. The pain is present in the left knee and right knee. The quality of the pain is described as aching and stabbing. The pain is at a severity of 5/10. The pain is moderate. The pain has been fluctuating since onset. Associated symptoms include muscle weakness and tingling. Pertinent negatives include no numbness. The symptoms are aggravated by movement and weight bearing. She has tried rest and non-weight bearing for the symptoms. The treatment provided mild relief. Patient denies any new neurological symptoms. No bowel or bladder incontinence, no weakness, and no falling. Pill count: appropriate     Morphine equivalent: 15    Controlled Substances Monitoring:     Attestation: The Prescription Monitoring Report for this patient was reviewed today.  (Soumya Sloan, CNP)  Documentation: Possible medication side effects, risk of tolerance and/or dependence, and alternative treatments discussed., No signs of potential drug abuse or diversion identified., Existing medication contract. (Soumya Sloan, ARTUR)    Past Medical History:   Diagnosis Date    Arthritis     Diabetes mellitus ([address]     Eczema     arms, trunk    GERD (gastroesophageal reflux disease)     Hearing loss     left and right ears, wears hearing aids    Hyperlipidemia     Hypertension     Kidney infection     Osteoporosis     Wears glasses     for reading       Past Surgical History:   Procedure Laterality Date    APPENDECTOMY      WITH HYSTERECTOMY    ARTHROPLASTY Left 10/12/2017    METATARSAL HEAD RESECTION 3RD LEFT FOOT  & EXCISION LESION SOFT TISSUE LEFT Detected   Final 09/12/2017  1:40  [facility]   Nordiazepam, Urine Not Detected   Final 09/12/2017  1:40  [facility]   Norfentanyl, Urine Not Detected   Final 09/12/2017  1:40  [facility]   NORHYDROCODONE, URINE Present   Final 09/12/2017  1:40  [facility]   Noroxycodone, Urine Not Detected   Final 09/12/2017  1:40  [facility]   NOROXYMORPHONE, URINE Not Detected   Final 09/12/2017  1:40  [facility]   Oxazepam, Urine Not Detected   Final 09/12/2017  1:40  [facility]   Oxycodone Urine Not Detected   Final 09/12/2017  1:40  [facility]   Oxymorphone, Urine Not Detected   Final 09/12/2017  [facility] [address]  [facility]   PCP, Urine Not Detected   Final 09/12/2017  1:40  [facility]   Phentermine, Ur Not Detected   Final 09/12/2017  1:40  [facility]   Propoxyphene, Urine Not Detected   Final 09/12/2017  1:40  [facility]   Tapentadol-O-Sulfate, Urine Not Detected   Final 09/12/2017  1:40  [facility]   Tapentadol, Urine Not Detected   Final 09/12/2017  1:40  [facility]   Temazepam, Urine Not Detected   Final 09/12/2017  1:40  [facility]   Tramadol, Urine Not Detected   Final 09/12/2017  1:40  [facility]   Zolpidem, Urine Not Detected   Final 09/12/2017  1:40  [facility]   Drugs Expected, Ur   Final 09/12/2017  1:40  [facility]   HYDROCODONE [id number] AT 1AM    Creatinine, Ur 85.1  20.0 - 400.0 mg/dL Final 09/12/2017  1:40  [facility]   Pain Mgt Drug Panel, Hi Res, Ur See Below   Final 09/12/2017  1:40  [facility]   (NOTE)   Methodology: Qualitative Enzyme Immunoassay and Qualitative Liquid   Chromatography-Time of Flight-Mass Spectrometry or Tandem Mass   Spectrometry, Quantitative Spectrophotometry   The absence of expected drug(s) and/or drug metabolite(s) may   indicate non-compliance, inappropriate timing of specimen   collection relative to drug administration, poor drug absorption,   diluted/adulterated urine, or limitations of testing. The   concentration must be greater than or equal to the cutoff to be   reported as present.  If specific drug concentrations are   required, contact the laboratory within two weeks of specimen   collection to request quantification by a second analytical   technique. Interpretive questions should be directed to the   laboratory. Results based on immunoassay detection that do not match clinical   expectations should be   interpreted with caution. Confirmatory testing by mass   spectrometry for immunoassay-based results is available, if   ordered within two weeks of specimen collection. Additional   charges apply. For medical purposes only; not valid for forensic use. This test was developed and its performance characteristics   determined by [facility]. The U.S. Food and Drug   Administration has not approved or cleared this test; however, FDA   clearance or approval is not currently required for clinical use. The results are not intended to be used as the sole means for   clinical diagnosis or patient management decisions. EER Hi Res Interp Ur See Note   Final 09/12/2017  1:40  [facility]         Physical Exam   Constitutional: She is oriented to person, place, and time and well-developed, well-nourished, and in no distress. HENT:   Head: Normocephalic. Eyes: EOM are normal.   Neck: Normal range of motion. Pulmonary/Chest: Effort normal.   Musculoskeletal:        Right knee: She exhibits decreased range of motion. Tenderness found. Left knee: She exhibits decreased range of motion. Tenderness found.         Legs:  Neurological: She is alert and oriented to person, place, and time. Skin: Skin is warm and dry. Psychiatric: Affect normal.       Record/Diagnostics Review:    XR Right knee 2016    Assessment:  Problem List Items Addressed This Visit     Arthralgia of both lower legs (Chronic)    Chronic pain of both knees - Primary    Relevant Medications    HYDROcodone-acetaminophen (NORCO) 5-325 MG per tablet (Start on 12/13/2017)    Encounter for chronic pain management      Other Visit Diagnoses    None. Treatment Plan:  DISCUSSION: Treatment options discussed with patient and all questions answered to patient's satisfaction. TREATMENT OPTIONS:   Continue current medication  Contract compliance requirements are met. Satisfactory pain management plan. Medications help with personal goals and self-care needs. Follow up appointment scheduled.

## 2018-01-12 RX ORDER — HYDROCODONE BITARTRATE AND ACETAMINOPHEN 5; 325 MG/1; MG/1
1 TABLET ORAL EVERY 8 HOURS PRN
Qty: 90 TABLET | Refills: 0 | Status: SHIPPED | OUTPATIENT
Start: 2018-01-12 | End: 2018-02-13 | Stop reason: SDUPTHER

## 2018-01-16 ENCOUNTER — HOSPITAL ENCOUNTER (OUTPATIENT)
Dept: PAIN MANAGEMENT | Age: 63
Discharge: HOME | End: 2018-01-16
Payer: MEDICARE

## 2018-01-16 VITALS
RESPIRATION RATE: 16 BRPM | BODY MASS INDEX: 39.51 KG/M2 | HEIGHT: 59 IN | SYSTOLIC BLOOD PRESSURE: 110 MMHG | WEIGHT: 196 LBS | DIASTOLIC BLOOD PRESSURE: 80 MMHG | HEART RATE: 86 BPM

## 2018-01-16 DIAGNOSIS — M25.562 CHRONIC PAIN OF BOTH KNEES: ICD-10-CM

## 2018-01-16 DIAGNOSIS — G89.29 CHRONIC PAIN OF BOTH KNEES: ICD-10-CM

## 2018-01-16 DIAGNOSIS — G89.29 ENCOUNTER FOR CHRONIC PAIN MANAGEMENT: ICD-10-CM

## 2018-01-16 DIAGNOSIS — Z96.653 STATUS POST TOTAL BILATERAL KNEE REPLACEMENT: ICD-10-CM

## 2018-01-16 DIAGNOSIS — M25.561 CHRONIC PAIN OF BOTH KNEES: ICD-10-CM

## 2018-01-16 DIAGNOSIS — M79.604 PAIN OF RIGHT LOWER EXTREMITY: ICD-10-CM

## 2018-01-16 DIAGNOSIS — M17.0 PRIMARY OSTEOARTHRITIS OF BOTH KNEES: Primary | ICD-10-CM

## 2018-01-16 PROCEDURE — 99213 OFFICE O/P EST LOW 20 MIN: CPT | Performed by: NURSE PRACTITIONER

## 2018-01-16 PROCEDURE — 99213 OFFICE O/P EST LOW 20 MIN: CPT

## 2018-01-16 ASSESSMENT — ENCOUNTER SYMPTOMS
SHORTNESS OF BREATH: 0
COUGH: 0
CONSTIPATION: 0

## 2018-01-16 NOTE — PROGRESS NOTES
Patient is here today to review medication contract. Chief Complaint:  bilat knee pain    PMH    Patient has had knee pain for many years with no known injury. She has had three knee surgeries, including bilateral arthroplasty but pain continues. R>L. She had PT in the past with moderate relief. HPI:   Knee Pain    Incident onset: chronic. There was no injury mechanism. The pain is present in the left knee and right knee. The pain is at a severity of 4/10. The pain has been intermittent since onset. Associated symptoms include an inability to bear weight. The symptoms are aggravated by movement (weather). Treatments tried: opioids. The treatment provided mild relief. Pill count: due to fill 1/12/18, took last one last night    Morphine equivalent dose as reported on [place]ARRS:15    Controlled Substances Monitoring:     Attestation: The Prescription Monitoring Report for this patient was reviewed today. DARREL Kate)  Documentation: Possible medication side effects, risk of tolerance and/or dependence, and alternative treatments discussed., Obtaining appropriate analgesic effect of treatment., No signs of potential drug abuse or diversion identified. Carmita Mo, DARREL)  Medication Contracts: Existing medication contract. DARREL Kate)  Review of [place]ARRS does not show any aberrant prescription behavior. Medication is helping the patient stay active. Patient denies any side effects and reports adequate analgesia. No sign of misuse/abuse.     Past Medical History:   Diagnosis Date    Arthritis     Diabetes mellitus ([address]     Eczema     arms, trunk    GERD (gastroesophageal reflux disease)     Hearing loss     left and right ears, wears hearing aids    Hyperlipidemia     Hypertension     Kidney infection     Osteoporosis     Wears glasses     for reading       Past Surgical History:   Procedure Laterality Date    APPENDECTOMY      WITH HYSTERECTOMY    ARTHROPLASTY Left 10/12/2017 osteoarthritis of both knees    -  Primary    Relevant Medications    HYDROcodone-acetaminophen (NORCO) 5-325 MG per tablet            Treatment Plan:  DISCUSSION: Treatment options discussed with patient and all questions answered to patient's satisfaction. TREATMENT OPTIONS:     Continue opioid therapy. Script given to pt norco  Contract requirements met. Satisfactory pain management plan. Medication helps with personal goals and self care needs. Patient is stable on current regimen of meds and medication is effectively managing pain, we will continue current medications without changes. As always, we encourage daily stretching and strengthening exercises, and recommend minimizing use of pain medications unless patient cannot get through daily activities due to pain.   Follow up appointment made for 4 weeks

## 2018-02-13 ENCOUNTER — HOSPITAL ENCOUNTER (OUTPATIENT)
Dept: PAIN MANAGEMENT | Age: 63
Discharge: HOME | End: 2018-02-13
Payer: MEDICARE

## 2018-02-13 VITALS
DIASTOLIC BLOOD PRESSURE: 70 MMHG | OXYGEN SATURATION: 98 % | TEMPERATURE: 98.4 F | SYSTOLIC BLOOD PRESSURE: 133 MMHG | RESPIRATION RATE: 16 BRPM | BODY MASS INDEX: 39.51 KG/M2 | WEIGHT: 196 LBS | HEART RATE: 85 BPM | HEIGHT: 59 IN

## 2018-02-13 DIAGNOSIS — Z51.81 ENCOUNTER FOR MEDICATION MONITORING: ICD-10-CM

## 2018-02-13 DIAGNOSIS — M25.562 CHRONIC PAIN OF BOTH KNEES: ICD-10-CM

## 2018-02-13 DIAGNOSIS — Z96.653 STATUS POST TOTAL BILATERAL KNEE REPLACEMENT: ICD-10-CM

## 2018-02-13 DIAGNOSIS — M17.0 PRIMARY OSTEOARTHRITIS OF BOTH KNEES: ICD-10-CM

## 2018-02-13 DIAGNOSIS — G89.29 ENCOUNTER FOR CHRONIC PAIN MANAGEMENT: Primary | ICD-10-CM

## 2018-02-13 DIAGNOSIS — G89.29 CHRONIC BILATERAL LOW BACK PAIN WITHOUT SCIATICA: ICD-10-CM

## 2018-02-13 DIAGNOSIS — M25.561 CHRONIC PAIN OF BOTH KNEES: ICD-10-CM

## 2018-02-13 DIAGNOSIS — M54.50 CHRONIC BILATERAL LOW BACK PAIN WITHOUT SCIATICA: ICD-10-CM

## 2018-02-13 DIAGNOSIS — G89.29 CHRONIC PAIN OF BOTH KNEES: ICD-10-CM

## 2018-02-13 PROCEDURE — 99213 OFFICE O/P EST LOW 20 MIN: CPT

## 2018-02-13 PROCEDURE — 99214 OFFICE O/P EST MOD 30 MIN: CPT | Performed by: PAIN MEDICINE

## 2018-02-13 RX ORDER — HYDROCODONE BITARTRATE AND ACETAMINOPHEN 5; 325 MG/1; MG/1
1 TABLET ORAL EVERY 8 HOURS PRN
Qty: 90 TABLET | Refills: 0 | Status: SHIPPED | OUTPATIENT
Start: 2018-02-15 | End: 2018-03-13 | Stop reason: SDUPTHER

## 2018-02-13 ASSESSMENT — ENCOUNTER SYMPTOMS
NAUSEA: 0
PHOTOPHOBIA: 0
ABDOMINAL PAIN: 0
BLURRED VISION: 0
CONSTIPATION: 0
ORTHOPNEA: 0
HEARTBURN: 1
SPUTUM PRODUCTION: 0
COUGH: 0

## 2018-02-13 ASSESSMENT — PAIN DESCRIPTION - FREQUENCY: FREQUENCY: INTERMITTENT

## 2018-02-13 ASSESSMENT — PAIN DESCRIPTION - ONSET: ONSET: ON-GOING

## 2018-02-13 ASSESSMENT — PAIN SCALES - GENERAL: PAINLEVEL_OUTOF10: 4

## 2018-02-13 ASSESSMENT — PAIN DESCRIPTION - DESCRIPTORS: DESCRIPTORS: ACHING;SHARP

## 2018-02-13 ASSESSMENT — ACTIVITIES OF DAILY LIVING (ADL): EFFECT OF PAIN ON DAILY ACTIVITIES: UNABLE TO WALK SHORT DISTANCES WITHOUT HAVING PAIN

## 2018-02-13 ASSESSMENT — PAIN DESCRIPTION - PAIN TYPE: TYPE: CHRONIC PAIN

## 2018-02-13 ASSESSMENT — PAIN DESCRIPTION - ORIENTATION: ORIENTATION: RIGHT;LEFT

## 2018-02-13 ASSESSMENT — PAIN DESCRIPTION - LOCATION: LOCATION: KNEE
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
## 2018-03-08 ENCOUNTER — TELEPHONE (OUTPATIENT)
Dept: OBGYN CLINIC | Age: 63
End: 2018-03-08

## 2018-03-08 DIAGNOSIS — N64.59 THICKENING OF SKIN OF BREAST: Primary | ICD-10-CM

## 2018-03-13 ENCOUNTER — HOSPITAL ENCOUNTER (OUTPATIENT)
Dept: PAIN MANAGEMENT | Age: 63
Discharge: HOME | End: 2018-03-13
Payer: MEDICARE

## 2018-03-13 VITALS
BODY MASS INDEX: 38.71 KG/M2 | RESPIRATION RATE: 16 BRPM | HEART RATE: 81 BPM | HEIGHT: 59 IN | TEMPERATURE: 98.2 F | WEIGHT: 192 LBS | OXYGEN SATURATION: 98 % | DIASTOLIC BLOOD PRESSURE: 81 MMHG | SYSTOLIC BLOOD PRESSURE: 134 MMHG

## 2018-03-13 DIAGNOSIS — N64.59 THICKENING OF SKIN OF BREAST: Primary | ICD-10-CM

## 2018-03-13 DIAGNOSIS — M25.562 ARTHRALGIA OF BOTH LOWER LEGS: ICD-10-CM

## 2018-03-13 DIAGNOSIS — Z96.651 HX OF TOTAL KNEE ARTHROPLASTY, RIGHT: ICD-10-CM

## 2018-03-13 DIAGNOSIS — M17.0 PRIMARY OSTEOARTHRITIS OF BOTH KNEES: Primary | ICD-10-CM

## 2018-03-13 DIAGNOSIS — M25.561 CHRONIC PAIN OF BOTH KNEES: ICD-10-CM

## 2018-03-13 DIAGNOSIS — Z96.653 STATUS POST TOTAL BILATERAL KNEE REPLACEMENT: ICD-10-CM

## 2018-03-13 DIAGNOSIS — M25.561 ARTHRALGIA OF BOTH LOWER LEGS: ICD-10-CM

## 2018-03-13 DIAGNOSIS — G89.29 ENCOUNTER FOR CHRONIC PAIN MANAGEMENT: ICD-10-CM

## 2018-03-13 DIAGNOSIS — Z51.81 ENCOUNTER FOR MEDICATION MONITORING: ICD-10-CM

## 2018-03-13 DIAGNOSIS — G89.29 CHRONIC PAIN OF BOTH KNEES: ICD-10-CM

## 2018-03-13 DIAGNOSIS — M79.604 PAIN OF RIGHT LOWER EXTREMITY: ICD-10-CM

## 2018-03-13 DIAGNOSIS — M25.562 CHRONIC PAIN OF BOTH KNEES: ICD-10-CM

## 2018-03-13 DIAGNOSIS — Z96.652 STATUS POST TOTAL LEFT KNEE REPLACEMENT: ICD-10-CM

## 2018-03-13 PROCEDURE — 99213 OFFICE O/P EST LOW 20 MIN: CPT

## 2018-03-13 PROCEDURE — 99213 OFFICE O/P EST LOW 20 MIN: CPT | Performed by: NURSE PRACTITIONER

## 2018-03-13 RX ORDER — HYDROCODONE BITARTRATE AND ACETAMINOPHEN 5; 325 MG/1; MG/1
1 TABLET ORAL EVERY 8 HOURS PRN
Qty: 90 TABLET | Refills: 0 | Status: SHIPPED | OUTPATIENT
Start: 2018-03-17 | End: 2018-04-12 | Stop reason: SDUPTHER

## 2018-03-13 ASSESSMENT — ENCOUNTER SYMPTOMS
GASTROINTESTINAL NEGATIVE: 1
ROS SKIN COMMENTS: CHEST AREA

## 2018-03-13 NOTE — PROGRESS NOTES
Barbiturates, Ur Not Detected   Final 09/12/2017  1:40  [facility] [address] [facility]   [place], Ur Not Detected   Final 09/12/2017  1:40  [facility] [facility]   Buprenorphine Urine Not Detected   Final 09/12/2017  1:40  [address] Not Detected   Final 09/12/2017  1:40  [facility] [facility]   (NOTE)   The carisoprodol immunoassay has cross-reactivity to carisoprodol   and meprobamate.     Clonazepam, Urine Not Detected   Final 09/12/2017  1:40  [facility] [facility]   Codeine, Urine Not Detected   Final 09/12/2017  1:40  [address] [facility]   MDA, Ur Not Detected   Final 09/12/2017  1:40  [facility] [facility]   Diazepam, Urine Not Detected   Final 09/12/2017  1:40  [facility] [facility]   Ethyl Glucuronide Ur Not Detected   Final 09/12/2017  1:40  [address] [facility]   Fentanyl, Ur Not Detected   Final 09/12/2017  1:40  [address] [facility]   Hydrocodone, Urine Present   Final 09/12/2017  1:40  [address] [facility]   Hydromorphone, Urine Not Detected   Final 09/12/2017  1:40  [facility] [facility]   Lorazepam, Urine Not Detected   Final 09/12/2017  1:40  [address] Not Detected   Final 09/12/2017  1:40 PM [address]   MDEA, LEANA, Ur Not Detected   Final 09/12/2017  1:40  [address] [facility]   MDMA URINE Not Detected   Final 09/12/2017  1:40  [address] [facility]   Meperidine Metab, Ur Not Detected   Final 09/12/2017  1:40  [address] [facility]   Methadone, Urine Not Detected   Final 09/12/2017  1:40  [address] [facility]   Methamphetamine, Urine Not Detected   Final 09/12/2017  1:40  [address] [facility]   Methylphenidate Not Detected   Final 09/12/2017  1:40  [address] [facility]   Midazolam, Urine Not Detected   Final 09/12/2017  1:40 PM [address] [facility]   Morphine Urine Not Detected   Final 09/12/2017  1:40  [address] [facility]   Norbuprenorphine, Urine Not Detected   Final 09/12/2017  1:40  [address] [facility]   Nordiazepam, Urine Not Detected   Final 09/12/2017  1:40  [address] [facility]   Norfentanyl, Urine Not Detected   Final 09/12/2017  1:40  [facility] [facility]   NORHYDROCODONE, URINE Present   Final 09/12/2017  1:40  [address] [facility]   Noroxycodone, Urine Not Detected   Final 09/12/2017  1:40  [address] [facility]   NOROXYMORPHONE, URINE Not Detected   Final 09/12/2017  1:40  [address] [facility]   Oxazepam, Urine Not Detected   Final 09/12/2017  1:40  [address] [facility]   Oxycodone Urine Not Detected   Final 09/12/2017  1:40  [address] [facility]   Oxymorphone, Urine Not Detected   Final 09/12/2017  1:40  [address] [facility]   PCP, Urine Not Detected   Final 09/12/2017  1:40  [address] [facility]   Phentermine, Ur Not Detected   Final 09/12/2017  1:40  [address] [facility]   Propoxyphene, Urine Not Detected   Final 09/12/2017  1:40  [address] [facility]   Tapentadol-O-Sulfate, Urine Not Detected   Final 09/12/2017  1:40  [address] [facility]   Tapentadol, Urine Not Detected   Final 09/12/2017  1:40  [address] [facility]   Temazepam, Urine Not Detected   Final 09/12/2017  1:40  [address] [facility]   Tramadol, Urine Not Detected   Final 09/12/2017  1:40  [address] [facility]   Zolpidem, Urine Not Detected   Final 09/12/2017  1:40  [address] [facility]   Drugs Expected, Ur   Final 09/12/2017  1:40  [address] [facility]   HYDROCODONE [id number] AT 1AM    Creatinine, Ur 85.1  20.0 - 400.0 mg/dL Final 09/12/2017  1:40  [address] [facility]   Pain Mgt Drug Panel, Hi Res, Ur See Below   Final 09/12/2017  1:40  [facility]   (NOTE)   Methodology: Qualitative Enzyme Immunoassay and Qualitative Liquid   Chromatography-Time of Flight-Mass Spectrometry or Tandem Mass   Spectrometry, Quantitative Spectrophotometry   The absence of expected drug(s) and/or drug metabolite(s) may   indicate non-compliance, inappropriate timing of specimen   collection relative to drug administration, poor drug absorption,   diluted/adulterated urine, or limitations of testing. The   concentration must be greater than or equal to the cutoff to be   reported as present.  If specific drug concentrations are   required, contact the laboratory within two weeks of specimen   collection to request quantification by a second analytical   technique. Interpretive questions should be directed to the   laboratory. Results based on immunoassay detection that do not match clinical   expectations should be   interpreted with caution. Confirmatory testing by mass   spectrometry for immunoassay-based results is available, if   ordered within two weeks of specimen collection. Additional   charges apply. For medical purposes only; not valid for forensic use. This test was developed and its performance characteristics   determined by [facility]. The U.S. Food and Drug   Administration has not approved or cleared this test; however, FDA   clearance or approval is not currently required for clinical use. The results are not intended to be used as the sole means for   clinical diagnosis or patient management decisions. EER Hi Res Interp Ur See Note   Final 09/12/2017  1:40  [facility]   (NOTE)   Access [place] Enhanced Report using either link below:   -Direct access: [email or web address]   -Enter Username, Password: [email or web address]   Username: [email or web address]   Password: [email or web address]   Performed by [facility],   [place] , [address] [facility] [phone]   www. Sherrie Issa MD, Lab.  Director

## 2018-03-20 ENCOUNTER — HOSPITAL ENCOUNTER (OUTPATIENT)
Dept: WOMENS IMAGING | Age: 63
Discharge: HOME | End: 2018-03-22
Payer: MEDICARE

## 2018-03-20 DIAGNOSIS — N64.59 THICKENING OF SKIN OF BREAST: ICD-10-CM

## 2018-03-20 PROCEDURE — 77066 DX MAMMO INCL CAD BI: CPT

## 2018-04-12 ENCOUNTER — HOSPITAL ENCOUNTER (OUTPATIENT)
Dept: PAIN MANAGEMENT | Age: 63
Discharge: HOME | End: 2018-04-12
Payer: MEDICARE

## 2018-04-12 VITALS
TEMPERATURE: 98.2 F | DIASTOLIC BLOOD PRESSURE: 57 MMHG | WEIGHT: 192 LBS | HEART RATE: 89 BPM | HEIGHT: 59 IN | SYSTOLIC BLOOD PRESSURE: 121 MMHG | OXYGEN SATURATION: 98 % | BODY MASS INDEX: 38.71 KG/M2 | RESPIRATION RATE: 16 BRPM

## 2018-04-12 DIAGNOSIS — M25.562 CHRONIC PAIN OF BOTH KNEES: ICD-10-CM

## 2018-04-12 DIAGNOSIS — Z96.652 STATUS POST TOTAL LEFT KNEE REPLACEMENT: ICD-10-CM

## 2018-04-12 DIAGNOSIS — Z96.653 STATUS POST TOTAL BILATERAL KNEE REPLACEMENT: ICD-10-CM

## 2018-04-12 DIAGNOSIS — Z96.651 HX OF TOTAL KNEE ARTHROPLASTY, RIGHT: ICD-10-CM

## 2018-04-12 DIAGNOSIS — M25.562 ARTHRALGIA OF BOTH LOWER LEGS: ICD-10-CM

## 2018-04-12 DIAGNOSIS — M25.561 ARTHRALGIA OF BOTH LOWER LEGS: ICD-10-CM

## 2018-04-12 DIAGNOSIS — Z51.81 ENCOUNTER FOR MEDICATION MONITORING: ICD-10-CM

## 2018-04-12 DIAGNOSIS — G89.29 CHRONIC BILATERAL LOW BACK PAIN WITHOUT SCIATICA: ICD-10-CM

## 2018-04-12 DIAGNOSIS — G89.29 ENCOUNTER FOR CHRONIC PAIN MANAGEMENT: ICD-10-CM

## 2018-04-12 DIAGNOSIS — M25.561 CHRONIC PAIN OF BOTH KNEES: ICD-10-CM

## 2018-04-12 DIAGNOSIS — G89.29 CHRONIC PAIN OF BOTH KNEES: ICD-10-CM

## 2018-04-12 DIAGNOSIS — M17.0 PRIMARY OSTEOARTHRITIS OF BOTH KNEES: Primary | ICD-10-CM

## 2018-04-12 DIAGNOSIS — M79.604 PAIN OF RIGHT LOWER EXTREMITY: ICD-10-CM

## 2018-04-12 DIAGNOSIS — M54.50 CHRONIC BILATERAL LOW BACK PAIN WITHOUT SCIATICA: ICD-10-CM

## 2018-04-12 PROCEDURE — 99213 OFFICE O/P EST LOW 20 MIN: CPT

## 2018-04-12 PROCEDURE — 80307 DRUG TEST PRSMV CHEM ANLYZR: CPT

## 2018-04-12 PROCEDURE — 99213 OFFICE O/P EST LOW 20 MIN: CPT | Performed by: NURSE PRACTITIONER

## 2018-04-12 RX ORDER — HYDROCODONE BITARTRATE AND ACETAMINOPHEN 5; 325 MG/1; MG/1
1 TABLET ORAL EVERY 8 HOURS PRN
Qty: 90 TABLET | Refills: 0 | Status: SHIPPED | OUTPATIENT
Start: 2018-04-18 | End: 2018-05-10 | Stop reason: SDUPTHER

## 2018-04-12 ASSESSMENT — ENCOUNTER SYMPTOMS
EYES NEGATIVE: 1
GASTROINTESTINAL NEGATIVE: 1
RESPIRATORY NEGATIVE: 1

## 2018-04-16 LAB
6-ACETYLMORPHINE, UR: NOT DETECTED
7-AMINOCLONAZEPAM, URINE: NOT DETECTED
ALPHA-OH-ALPRAZ, URINE: NOT DETECTED
ALPRAZOLAM, URINE: NOT DETECTED
AMPHETAMINES, URINE: NOT DETECTED
BARBITURATES, URINE: NOT DETECTED
BENZOYLECGONINE, UR: NOT DETECTED
BUPRENORPHINE URINE: NOT DETECTED
CARISOPRODOL, UR: NOT DETECTED
CLONAZEPAM, URINE: NOT DETECTED
CODEINE, URINE: NOT DETECTED
CREATININE URINE: 48.4 MG/DL (ref 20–400)
DIAZEPAM, URINE: NOT DETECTED
DRUGS EXPECTED, UR: NORMAL
EER HI RES INTERP UR: NORMAL
ETHYL GLUCURONIDE UR: NOT DETECTED
FENTANYL URINE: NOT DETECTED
HYDROCODONE, URINE: NOT DETECTED
HYDROMORPHONE, URINE: NOT DETECTED
LORAZEPAM, URINE: NOT DETECTED
MARIJUANA METAB, UR: NOT DETECTED
MDA, UR: NOT DETECTED
MDEA, EVE, UR: NOT DETECTED
MDMA URINE: NOT DETECTED
MEPERIDINE METAB, UR: NOT DETECTED
METHADONE, URINE: NOT DETECTED
METHAMPHETAMINE, URINE: NOT DETECTED
METHYLPHENIDATE: NOT DETECTED
MIDAZOLAM, URINE: NOT DETECTED
MORPHINE URINE: NOT DETECTED
NORBUPRENORPHINE, URINE: NOT DETECTED
NORDIAZEPAM, URINE: NOT DETECTED
NORFENTANYL, URINE: NOT DETECTED
NORHYDROCODONE, URINE: NOT DETECTED
NOROXYCODONE, URINE: NOT DETECTED
NOROXYMORPHONE, URINE: NOT DETECTED
OXAZEPAM, URINE: NOT DETECTED
OXYCODONE URINE: NOT DETECTED
OXYMORPHONE, URINE: NOT DETECTED
PAIN MANAGEMENT DRUG PANEL INTERP, URINE: NORMAL
PAIN MGT DRUG PANEL, HI RES, UR: NORMAL
PCP,URINE: NOT DETECTED
PHENTERMINE, UR: NOT DETECTED
PROPOXYPHENE, URINE: NOT DETECTED
TAPENTADOL, URINE: NOT DETECTED
TAPENTADOL-O-SULFATE, URINE: NOT DETECTED
TEMAZEPAM, URINE: NOT DETECTED
TRAMADOL, URINE: NOT DETECTED
ZOLPIDEM, URINE: NOT DETECTED

## 2018-05-10 ENCOUNTER — HOSPITAL ENCOUNTER (OUTPATIENT)
Dept: PAIN MANAGEMENT | Age: 63
Discharge: HOME | End: 2018-05-10
Payer: MEDICARE

## 2018-05-10 ENCOUNTER — HOSPITAL ENCOUNTER (OUTPATIENT)
Age: 63
Discharge: HOME | End: 2018-05-10
Payer: MEDICARE

## 2018-05-10 VITALS
BODY MASS INDEX: 38.71 KG/M2 | OXYGEN SATURATION: 97 % | TEMPERATURE: 98.1 F | SYSTOLIC BLOOD PRESSURE: 128 MMHG | HEART RATE: 92 BPM | DIASTOLIC BLOOD PRESSURE: 75 MMHG | HEIGHT: 59 IN | RESPIRATION RATE: 16 BRPM | WEIGHT: 192 LBS

## 2018-05-10 DIAGNOSIS — Z96.653 STATUS POST TOTAL BILATERAL KNEE REPLACEMENT: ICD-10-CM

## 2018-05-10 DIAGNOSIS — G89.29 CHRONIC PAIN OF BOTH KNEES: ICD-10-CM

## 2018-05-10 DIAGNOSIS — M25.562 ARTHRALGIA OF BOTH LOWER LEGS: ICD-10-CM

## 2018-05-10 DIAGNOSIS — M25.562 CHRONIC PAIN OF BOTH KNEES: ICD-10-CM

## 2018-05-10 DIAGNOSIS — M25.561 CHRONIC PAIN OF BOTH KNEES: ICD-10-CM

## 2018-05-10 DIAGNOSIS — M25.561 ARTHRALGIA OF BOTH LOWER LEGS: ICD-10-CM

## 2018-05-10 DIAGNOSIS — M17.0 PRIMARY OSTEOARTHRITIS OF BOTH KNEES: Primary | ICD-10-CM

## 2018-05-10 DIAGNOSIS — Z96.652 STATUS POST TOTAL LEFT KNEE REPLACEMENT: ICD-10-CM

## 2018-05-10 DIAGNOSIS — G89.29 ENCOUNTER FOR CHRONIC PAIN MANAGEMENT: ICD-10-CM

## 2018-05-10 DIAGNOSIS — G89.29 CHRONIC BILATERAL LOW BACK PAIN WITHOUT SCIATICA: ICD-10-CM

## 2018-05-10 DIAGNOSIS — M54.50 CHRONIC BILATERAL LOW BACK PAIN WITHOUT SCIATICA: ICD-10-CM

## 2018-05-10 DIAGNOSIS — M79.604 PAIN OF RIGHT LOWER EXTREMITY: ICD-10-CM

## 2018-05-10 DIAGNOSIS — Z96.651 HX OF TOTAL KNEE ARTHROPLASTY, RIGHT: ICD-10-CM

## 2018-05-10 LAB
C-REACTIVE PROTEIN: 8 MG/L (ref 0–5)
HCT VFR BLD CALC: 37.9 % (ref 36–46)
HEMOGLOBIN: 12.8 G/DL (ref 12–16)
MCH RBC QN AUTO: 29.9 PG (ref 26–34)
MCHC RBC AUTO-ENTMCNC: 33.8 G/DL (ref 31–37)
MCV RBC AUTO: 88.5 FL (ref 80–100)
NRBC AUTOMATED: NORMAL PER 100 WBC
PDW BLD-RTO: 13.8 % (ref 11.5–14.9)
PLATELET # BLD: 275 K/UL (ref 150–450)
PMV BLD AUTO: 8.1 FL (ref 6–12)
RBC # BLD: 4.29 M/UL (ref 4–5.2)
SEDIMENTATION RATE, ERYTHROCYTE: 43 MM (ref 0–20)
WBC # BLD: 6.4 K/UL (ref 3.5–11)

## 2018-05-10 PROCEDURE — 99213 OFFICE O/P EST LOW 20 MIN: CPT

## 2018-05-10 PROCEDURE — 85027 COMPLETE CBC AUTOMATED: CPT

## 2018-05-10 PROCEDURE — 99213 OFFICE O/P EST LOW 20 MIN: CPT | Performed by: NURSE PRACTITIONER

## 2018-05-10 PROCEDURE — 86140 C-REACTIVE PROTEIN: CPT

## 2018-05-10 PROCEDURE — 36415 COLL VENOUS BLD VENIPUNCTURE: CPT

## 2018-05-10 PROCEDURE — 85651 RBC SED RATE NONAUTOMATED: CPT

## 2018-05-10 RX ORDER — HYDROCODONE BITARTRATE AND ACETAMINOPHEN 5; 325 MG/1; MG/1
1 TABLET ORAL EVERY 8 HOURS PRN
Qty: 90 TABLET | Refills: 0 | Status: SHIPPED | OUTPATIENT
Start: 2018-05-18 | End: 2018-06-19 | Stop reason: SDUPTHER

## 2018-05-10 ASSESSMENT — ENCOUNTER SYMPTOMS
GASTROINTESTINAL NEGATIVE: 1
EYES NEGATIVE: 1
RESPIRATORY NEGATIVE: 1

## 2018-05-24 ENCOUNTER — HOSPITAL ENCOUNTER (OUTPATIENT)
Dept: NUCLEAR MEDICINE | Age: 63
Discharge: HOME | End: 2018-05-26
Payer: MEDICARE

## 2018-05-24 ENCOUNTER — HOSPITAL ENCOUNTER (OUTPATIENT)
Age: 63
Discharge: HOME | End: 2018-05-24
Payer: MEDICARE

## 2018-05-24 DIAGNOSIS — G89.29 CHRONIC PAIN OF BOTH KNEES: ICD-10-CM

## 2018-05-24 DIAGNOSIS — M25.562 CHRONIC PAIN OF BOTH KNEES: ICD-10-CM

## 2018-05-24 DIAGNOSIS — M25.561 CHRONIC PAIN OF BOTH KNEES: ICD-10-CM

## 2018-05-24 LAB
C-REACTIVE PROTEIN: 17.9 MG/L (ref 0–5)
HCT VFR BLD CALC: 37.9 % (ref 36–46)
HEMOGLOBIN: 12.7 G/DL (ref 12–16)
MCH RBC QN AUTO: 30.1 PG (ref 26–34)
MCHC RBC AUTO-ENTMCNC: 33.4 G/DL (ref 31–37)
MCV RBC AUTO: 90 FL (ref 80–100)
NRBC AUTOMATED: NORMAL PER 100 WBC
PDW BLD-RTO: 13.5 % (ref 11.5–14.9)
PLATELET # BLD: 259 K/UL (ref 150–450)
PMV BLD AUTO: 8.6 FL (ref 6–12)
RBC # BLD: 4.21 M/UL (ref 4–5.2)
SEDIMENTATION RATE, ERYTHROCYTE: 45 MM (ref 0–20)
WBC # BLD: 7.7 K/UL (ref 3.5–11)

## 2018-05-24 PROCEDURE — 3430000000 HC RX DIAGNOSTIC RADIOPHARMACEUTICAL: Performed by: ORTHOPAEDIC SURGERY

## 2018-05-24 PROCEDURE — 78315 BONE IMAGING 3 PHASE: CPT

## 2018-05-24 PROCEDURE — 36415 COLL VENOUS BLD VENIPUNCTURE: CPT

## 2018-05-24 PROCEDURE — 85027 COMPLETE CBC AUTOMATED: CPT

## 2018-05-24 PROCEDURE — A9503 TC99M MEDRONATE: HCPCS | Performed by: ORTHOPAEDIC SURGERY

## 2018-05-24 PROCEDURE — 85651 RBC SED RATE NONAUTOMATED: CPT

## 2018-05-24 PROCEDURE — 86140 C-REACTIVE PROTEIN: CPT

## 2018-05-24 RX ORDER — TC 99M MEDRONATE 20 MG/10ML
25 INJECTION, POWDER, LYOPHILIZED, FOR SOLUTION INTRAVENOUS
Status: COMPLETED | OUTPATIENT
Start: 2018-05-24 | End: 2018-05-24

## 2018-05-24 RX ADMIN — Medication 27 MILLICURIE: at 08:11

## 2018-06-19 ENCOUNTER — HOSPITAL ENCOUNTER (OUTPATIENT)
Dept: PAIN MANAGEMENT | Age: 63
Discharge: HOME | End: 2018-06-19
Payer: MEDICARE

## 2018-06-19 VITALS
HEART RATE: 94 BPM | OXYGEN SATURATION: 98 % | RESPIRATION RATE: 16 BRPM | HEIGHT: 59 IN | TEMPERATURE: 98 F | BODY MASS INDEX: 39.11 KG/M2 | WEIGHT: 194 LBS

## 2018-06-19 DIAGNOSIS — Z96.652 STATUS POST TOTAL LEFT KNEE REPLACEMENT: ICD-10-CM

## 2018-06-19 DIAGNOSIS — G89.29 CHRONIC BILATERAL LOW BACK PAIN WITHOUT SCIATICA: ICD-10-CM

## 2018-06-19 DIAGNOSIS — M25.561 ARTHRALGIA OF BOTH LOWER LEGS: ICD-10-CM

## 2018-06-19 DIAGNOSIS — G89.29 ENCOUNTER FOR CHRONIC PAIN MANAGEMENT: ICD-10-CM

## 2018-06-19 DIAGNOSIS — M54.50 CHRONIC BILATERAL LOW BACK PAIN WITHOUT SCIATICA: ICD-10-CM

## 2018-06-19 DIAGNOSIS — Z51.81 ENCOUNTER FOR MEDICATION MONITORING: ICD-10-CM

## 2018-06-19 DIAGNOSIS — M25.562 ARTHRALGIA OF BOTH LOWER LEGS: ICD-10-CM

## 2018-06-19 DIAGNOSIS — M17.0 PRIMARY OSTEOARTHRITIS OF BOTH KNEES: Primary | ICD-10-CM

## 2018-06-19 DIAGNOSIS — Z96.651 HX OF TOTAL KNEE ARTHROPLASTY, RIGHT: ICD-10-CM

## 2018-06-19 DIAGNOSIS — M25.561 CHRONIC PAIN OF BOTH KNEES: ICD-10-CM

## 2018-06-19 DIAGNOSIS — M25.562 CHRONIC PAIN OF BOTH KNEES: ICD-10-CM

## 2018-06-19 DIAGNOSIS — Z96.653 STATUS POST TOTAL BILATERAL KNEE REPLACEMENT: ICD-10-CM

## 2018-06-19 DIAGNOSIS — G89.29 CHRONIC PAIN OF BOTH KNEES: ICD-10-CM

## 2018-06-19 DIAGNOSIS — M79.604 PAIN OF RIGHT LOWER EXTREMITY: ICD-10-CM

## 2018-06-19 PROCEDURE — 99213 OFFICE O/P EST LOW 20 MIN: CPT | Performed by: NURSE PRACTITIONER

## 2018-06-19 PROCEDURE — 99213 OFFICE O/P EST LOW 20 MIN: CPT

## 2018-06-19 RX ORDER — HYDROCODONE BITARTRATE AND ACETAMINOPHEN 5; 325 MG/1; MG/1
1 TABLET ORAL EVERY 8 HOURS PRN
Qty: 90 TABLET | Refills: 0 | Status: SHIPPED | OUTPATIENT
Start: 2018-06-19 | End: 2018-07-20 | Stop reason: SDUPTHER

## 2018-06-19 ASSESSMENT — ENCOUNTER SYMPTOMS
EYES NEGATIVE: 1
GASTROINTESTINAL NEGATIVE: 1
RESPIRATORY NEGATIVE: 1

## 2018-06-28 ENCOUNTER — OFFICE VISIT (OUTPATIENT)
Dept: GASTROENTEROLOGY | Age: 63
End: 2018-06-28
Payer: MEDICARE

## 2018-06-28 VITALS
BODY MASS INDEX: 40.9 KG/M2 | HEIGHT: 59 IN | OXYGEN SATURATION: 96 % | SYSTOLIC BLOOD PRESSURE: 117 MMHG | HEART RATE: 110 BPM | DIASTOLIC BLOOD PRESSURE: 73 MMHG | WEIGHT: 202.9 LBS

## 2018-06-28 DIAGNOSIS — K62.89 RECTAL PAIN: Primary | ICD-10-CM

## 2018-06-28 DIAGNOSIS — Z12.11 SCREEN FOR COLON CANCER: ICD-10-CM

## 2018-06-28 DIAGNOSIS — R10.84 GENERALIZED ABDOMINAL PAIN: ICD-10-CM

## 2018-06-28 PROCEDURE — G8427 DOCREV CUR MEDS BY ELIG CLIN: HCPCS | Performed by: INTERNAL MEDICINE

## 2018-06-28 PROCEDURE — 99244 OFF/OP CNSLTJ NEW/EST MOD 40: CPT | Performed by: INTERNAL MEDICINE

## 2018-06-28 PROCEDURE — G8417 CALC BMI ABV UP PARAM F/U: HCPCS | Performed by: INTERNAL MEDICINE

## 2018-06-28 PROCEDURE — 3017F COLORECTAL CA SCREEN DOC REV: CPT | Performed by: INTERNAL MEDICINE

## 2018-06-28 ASSESSMENT — ENCOUNTER SYMPTOMS
VOMITING: 0
WHEEZING: 0
CONSTIPATION: 1
BACK PAIN: 1
COLOR CHANGE: 0
COUGH: 1
DIARRHEA: 0
SINUS PAIN: 0
STRIDOR: 0
RECTAL PAIN: 1
CHOKING: 0
NAUSEA: 0
ABDOMINAL PAIN: 1
ABDOMINAL DISTENTION: 1
SORE THROAT: 0
SINUS PRESSURE: 0
SHORTNESS OF BREATH: 0
BLOOD IN STOOL: 0
CHEST TIGHTNESS: 0

## 2018-06-29 RX ORDER — POLYETHYLENE GLYCOL 3350 17 G/17G
POWDER, FOR SOLUTION ORAL
Qty: 255 G | Refills: 0 | Status: SHIPPED | OUTPATIENT
Start: 2018-06-29 | End: 2018-08-15

## 2018-07-17 ENCOUNTER — HOSPITAL ENCOUNTER (OUTPATIENT)
Age: 63
Setting detail: OUTPATIENT SURGERY
Discharge: HOME | End: 2018-07-17
Attending: INTERNAL MEDICINE | Admitting: INTERNAL MEDICINE
Payer: MEDICARE

## 2018-07-17 VITALS
HEIGHT: 59 IN | SYSTOLIC BLOOD PRESSURE: 105 MMHG | WEIGHT: 202 LBS | HEART RATE: 90 BPM | RESPIRATION RATE: 14 BRPM | DIASTOLIC BLOOD PRESSURE: 53 MMHG | TEMPERATURE: 97.5 F | BODY MASS INDEX: 40.72 KG/M2 | OXYGEN SATURATION: 98 %

## 2018-07-17 LAB — GLUCOSE BLD-MCNC: 173 MG/DL (ref 65–105)

## 2018-07-17 PROCEDURE — 2709999900 HC NON-CHARGEABLE SUPPLY: Performed by: INTERNAL MEDICINE

## 2018-07-17 PROCEDURE — 3609027000 HC COLONOSCOPY: Performed by: INTERNAL MEDICINE

## 2018-07-17 PROCEDURE — 2580000003 HC RX 258: Performed by: INTERNAL MEDICINE

## 2018-07-17 PROCEDURE — 99153 MOD SED SAME PHYS/QHP EA: CPT | Performed by: INTERNAL MEDICINE

## 2018-07-17 PROCEDURE — 7100000011 HC PHASE II RECOVERY - ADDTL 15 MIN: Performed by: INTERNAL MEDICINE

## 2018-07-17 PROCEDURE — 6360000002 HC RX W HCPCS: Performed by: INTERNAL MEDICINE

## 2018-07-17 PROCEDURE — 7100000010 HC PHASE II RECOVERY - FIRST 15 MIN: Performed by: INTERNAL MEDICINE

## 2018-07-17 PROCEDURE — 82947 ASSAY GLUCOSE BLOOD QUANT: CPT

## 2018-07-17 PROCEDURE — 99152 MOD SED SAME PHYS/QHP 5/>YRS: CPT | Performed by: INTERNAL MEDICINE

## 2018-07-17 RX ORDER — FENTANYL CITRATE 50 UG/ML
INJECTION, SOLUTION INTRAMUSCULAR; INTRAVENOUS PRN
Status: DISCONTINUED | OUTPATIENT
Start: 2018-07-17 | End: 2018-07-17 | Stop reason: HOSPADM

## 2018-07-17 RX ORDER — SODIUM CHLORIDE 9 MG/ML
INJECTION, SOLUTION INTRAVENOUS CONTINUOUS
Status: DISCONTINUED | OUTPATIENT
Start: 2018-07-17 | End: 2018-07-17 | Stop reason: HOSPADM

## 2018-07-17 RX ORDER — MIDAZOLAM HYDROCHLORIDE 1 MG/ML
INJECTION INTRAMUSCULAR; INTRAVENOUS PRN
Status: DISCONTINUED | OUTPATIENT
Start: 2018-07-17 | End: 2018-07-17 | Stop reason: HOSPADM

## 2018-07-17 RX ADMIN — SODIUM CHLORIDE: 9 INJECTION, SOLUTION INTRAVENOUS at 09:22

## 2018-07-17 ASSESSMENT — PAIN - FUNCTIONAL ASSESSMENT: PAIN_FUNCTIONAL_ASSESSMENT: 0-10

## 2018-07-17 ASSESSMENT — PAIN SCALES - GENERAL
PAINLEVEL_OUTOF10: 0

## 2018-07-17 ASSESSMENT — PAIN DESCRIPTION - DESCRIPTORS: DESCRIPTORS: ACHING

## 2018-07-17 NOTE — H&P
RELEASE, SYNOVECTOMY AND STEROID INJECTION, LEFT KNEE. MANIPULATION OF RIGHT KNEE. performed by Maddison Lerner MD at [facility] TOE SURGERY Left 05/02/2017    3rd toe    TONSILLECTOMY      TYMPANOPLASTY Left      FAMILY HISTORY       Family History   Problem Relation Age of Onset    Stroke Father     Emphysema Father     Diabetes Mother     Heart Failure Mother     Osteoarthritis Mother      SOCIAL HISTORY       Social History     Social History    Marital status:      Spouse name: N/A    Number of children: N/A    Years of education: N/A     Occupational History    disability      Social History Main Topics    Smoking status: Never Smoker    Smokeless tobacco: Never Used    Alcohol use No    Drug use: No    Sexual activity: Not Currently     Other Topics Concern    None     Social History Narrative    None     REVIEW OF SYSTEMS      Allergies   Allergen Reactions    Aspirin Other (See Comments)     Chest pain    Codeine Other (See Comments)     Chest pain    Lidoderm [Lidocaine]      Skin reddened , itching    Avelox [Moxifloxacin] Rash    Sulfa Antibiotics Rash     No current facility-administered medications on file prior to encounter. Current Outpatient Prescriptions on File Prior to Encounter   Medication Sig Dispense Refill    polyethylene glycol (MIRALAX) powder Please dispense 4 Ducolax tablets with Miralax Use as directed by following your patient instructions given by office 255 g 0    HYDROcodone-acetaminophen (NORCO) 5-325 MG per tablet Take 1 tablet by mouth every 8 hours as needed for Pain for up to 30 days. . (Patient taking differently: Take 1 tablet by mouth every 8 hours as needed for Pain. Mendoza Rea ) 90 tablet 0    metFORMIN (GLUCOPHAGE) 500 MG tablet Take 1 tablet (500 mg) by mouth 3 times per day with meals  1    amLODIPine (NORVASC) 5 MG tablet take 1 tablet by mouth once daily, takes at night  0    acetaminophen (TYLENOL) 500 MG tablet Take 500 mg by mouth as needed for Pain      clobetasol (TEMOVATE) 0.05 % ointment apply to affected area once daily if needed for itching for 14 days  0    hydrOXYzine (ATARAX) 50 MG tablet 50 mg every 6 hours as needed   0    fluocinonide (LIDEX) 0.05 % ointment apply to affected area once daily for 14 days  0    alendronate (FOSAMAX) 35 MG tablet Take 1 tablet by mouth every 7 days 4 tablet 3    omeprazole (PRILOSEC) 20 MG capsule take 1 capsule by mouth once daily (Patient taking differently: take 1 capsule by mouth once patient takes at supper) 90 capsule 0    simvastatin (ZOCOR) 20 MG tablet take 1 tablet by mouth ONCE NIGHTLY 30 tablet 3    loratadine (CLARITIN) 10 MG tablet take 1 tablet by mouth once daily 30 tablet 3    venlafaxine (EFFEXOR-XR) 37.5 MG XR capsule take 1 capsule by mouth once daily (Patient taking differently: take 1 capsule by mouth every evening) 30 capsule 3    BIOTIN PO   Take 1 tablet by mouth daily       lisinopril-hydrochlorothiazide (PRINZIDE) 20-12.5 MG per tablet Take 1 tablet by mouth daily 30 tablet 3    busPIRone (BUSPAR) 10 MG tablet Take 1 tablet by mouth 2 times daily. (Patient taking differently: Take 10 mg by mouth 2 times daily as needed ) 60 tablet 3    fluticasone (FLONASE) 50 MCG/ACT nasal spray instill 2 sprays into each nostril once daily ,AVOID SPRAYING TOWARDS SEPTUM  0    Blood Glucose Monitoring Suppl (TRUE METRIX METER) W/DEVICE KIT TEST 1 TO 2 TIMES DAILY AS DIRECTED  0    TRUE METRIX BLOOD GLUCOSE TEST strip TEST 1 TO 2 TIMES BEFORE MEALS OR AS DIRECTED  1    Lancets (BD LANCET ULTRAFINE 30G) MISC USE 1 TO 2 TIMES A DAY  1    Calcium Carbonate-Vitamin D (CALCIUM + D PO) Take by mouth       Negative except for what is mentioned in the HPI. GENERAL PHYSICAL EXAM     Vitals: /82   Pulse 102   Temp 97.3 °F (36.3 °C) (Oral)   Resp 18   Ht 4' 11\" (1.499 m)   Wt 202 lb (91.6 kg)   SpO2 98%   BMI 40.80 kg/m²  Body mass index is 40.8 kg/m².      GENERAL arthroplasty, right     Encounter for chronic pain management     HTN (hypertension) 04/30/2015    HLD (hyperlipidemia) 04/30/2015    GERD (gastroesophageal reflux disease) 04/30/2015    Well adult exam 04/30/2015    Osteopenia 04/30/2015    S/P total knee arthroplasty     Chronic pain of both knees     Pain in lower limb     Osteoarthrosis involving lower leg 07/02/2014    Encounter for long-term (current) use of other medications 07/02/2014       ASA Classification:  Class 2 - A normal healthy patient with mild systemic disease    Mallampati Airway Assessment:  Mallampati Class II - (soft palate, fauces & uvula are visible)    I have examined this patient and reviewed the history and physical, vital signs, current medications and review of systems.     Electronically signed by Sherwin Root MD on 7/17/2018 at 9:35 AM        Bibi Bajwa PA-C on 7/17/2018 at 9:22 AM

## 2018-07-20 ENCOUNTER — HOSPITAL ENCOUNTER (OUTPATIENT)
Dept: PAIN MANAGEMENT | Age: 63
Discharge: HOME | End: 2018-07-20
Payer: MEDICARE

## 2018-07-20 VITALS
RESPIRATION RATE: 16 BRPM | HEIGHT: 59 IN | DIASTOLIC BLOOD PRESSURE: 70 MMHG | TEMPERATURE: 98.7 F | SYSTOLIC BLOOD PRESSURE: 127 MMHG | BODY MASS INDEX: 39.11 KG/M2 | HEART RATE: 97 BPM | WEIGHT: 194 LBS | OXYGEN SATURATION: 93 %

## 2018-07-20 DIAGNOSIS — G89.29 ENCOUNTER FOR CHRONIC PAIN MANAGEMENT: ICD-10-CM

## 2018-07-20 DIAGNOSIS — Z96.653 STATUS POST TOTAL BILATERAL KNEE REPLACEMENT: Primary | ICD-10-CM

## 2018-07-20 DIAGNOSIS — M17.0 PRIMARY OSTEOARTHRITIS OF BOTH KNEES: ICD-10-CM

## 2018-07-20 DIAGNOSIS — G89.29 CHRONIC PAIN OF BOTH KNEES: ICD-10-CM

## 2018-07-20 DIAGNOSIS — M25.562 ARTHRALGIA OF BOTH LOWER LEGS: ICD-10-CM

## 2018-07-20 DIAGNOSIS — M25.561 CHRONIC PAIN OF BOTH KNEES: ICD-10-CM

## 2018-07-20 DIAGNOSIS — M25.562 CHRONIC PAIN OF BOTH KNEES: ICD-10-CM

## 2018-07-20 DIAGNOSIS — Z51.81 ENCOUNTER FOR MEDICATION MONITORING: ICD-10-CM

## 2018-07-20 DIAGNOSIS — M25.561 ARTHRALGIA OF BOTH LOWER LEGS: ICD-10-CM

## 2018-07-20 PROCEDURE — 99214 OFFICE O/P EST MOD 30 MIN: CPT | Performed by: PAIN MEDICINE

## 2018-07-20 PROCEDURE — 99213 OFFICE O/P EST LOW 20 MIN: CPT

## 2018-07-20 RX ORDER — HYDROCODONE BITARTRATE AND ACETAMINOPHEN 5; 325 MG/1; MG/1
1 TABLET ORAL EVERY 8 HOURS PRN
Qty: 90 TABLET | Refills: 0 | Status: SHIPPED | OUTPATIENT
Start: 2018-07-20 | End: 2018-08-17 | Stop reason: SDUPTHER

## 2018-07-20 ASSESSMENT — PAIN DESCRIPTION - FREQUENCY: FREQUENCY: CONTINUOUS

## 2018-07-20 ASSESSMENT — ENCOUNTER SYMPTOMS
COUGH: 0
EYES NEGATIVE: 1
BACK PAIN: 1
WHEEZING: 0
BLURRED VISION: 0
HEARTBURN: 1
CONSTIPATION: 1

## 2018-07-20 ASSESSMENT — PAIN DESCRIPTION - ORIENTATION: ORIENTATION: LEFT

## 2018-07-20 ASSESSMENT — PAIN SCALES - GENERAL: PAINLEVEL_OUTOF10: 4

## 2018-07-20 ASSESSMENT — PAIN DESCRIPTION - ONSET: ONSET: ON-GOING

## 2018-07-20 ASSESSMENT — PAIN DESCRIPTION - LOCATION: LOCATION: KNEE;ABDOMEN

## 2018-07-20 ASSESSMENT — PAIN DESCRIPTION - PROGRESSION: CLINICAL_PROGRESSION: GRADUALLY WORSENING

## 2018-07-20 ASSESSMENT — PAIN DESCRIPTION - DESCRIPTORS: DESCRIPTORS: ACHING;CONSTANT;DULL;JABBING;NAGGING;SHARP

## 2018-07-20 ASSESSMENT — PAIN DESCRIPTION - PAIN TYPE: TYPE: CHRONIC PAIN

## 2018-07-20 ASSESSMENT — PAIN DESCRIPTION - DIRECTION: RADIATING_TOWARDS: LEFT AND RIGHT

## 2018-07-20 NOTE — PROGRESS NOTES
[facility] Pain Management  Patient Pain Assessment  RECHECK - Dr. Mainor Mathews    Primary Care Physician: Zoraida Ruvalcaba MD    Chief complaint:   Chief Complaint   Patient presents with    Knee Pain     left   . HISTORY OF PRESENT ILLNESS:  Genna Lacey is 57 y.o. female with chronic knee and abdominal pain. The patient reports most of her pain at todays visit is in the left knee. She has had bilateral knee replacements. She had recent bone scan which showed potential loosening of the hardware on the left knee. She is currently seeing Dr. Kiel Hearn for the knee. She had Colonoscopy on 7/17/18 and reports no findings. Patient return to the pain clinic with a chief complaint of pain involving the knees bilaterally pain in the left knee is worse. This patient had undergone bilateral total knee joint replacements. Patient was evaluated by her orthopedic surgeon at [facility] and was told that left knee prosthesis is loose and there is not much that can be done. She is having the range of movements in her left knee pain. Her pain is unchanged from her previous visit. She is doing home exercises to try to increase the strength in the range of movements. She did physical therapy in the past.   Patient also complains of abdominal pain which is essentially unchanged from previous visit  Patient denies any new neurological symptoms. No bowel or bladder incontinence, no weakness, and no falling. Knee Pain    Incident onset: Long-standing duration. Injury mechanism: Status post knee joint replacements for arthritis. The pain is present in the left knee and right knee (Worse in the left knee). The quality of the pain is described as aching (Jabbing, Sharp, nagging). The pain is at a severity of 4/10 (3-6). The pain is moderate. The pain has been constant since onset. Associated symptoms include an inability to bear weight. Pertinent negatives include no tingling.  Foreign body present: Bilateral History    disability      Social History Main Topics    Smoking status: Never Smoker    Smokeless tobacco: Never Used    Alcohol use No    Drug use: No    Sexual activity: Not Currently     Other Topics Concern    None     Social History Narrative    None      reports that she does not use drugs. REVIEW OF SYSTEMS:  Review of Systems   Constitutional: Negative. Negative for chills, fever and malaise/fatigue. HENT: Negative. Negative for congestion and tinnitus. Eyes: Negative. Negative for blurred vision and photophobia. Respiratory: Negative for cough, sputum production and wheezing. Cardiovascular: Negative. Negative for chest pain and palpitations. Gastrointestinal: Positive for constipation and heartburn. Negative for nausea and vomiting. Genitourinary: Negative. Negative for dysuria and frequency. Musculoskeletal: Positive for back pain and joint pain. Negative for myalgias. Skin: Negative. exyema wide spred body   Neurological: Negative. Negative for dizziness, tingling and focal weakness. Endo/Heme/Allergies: Negative. Psychiatric/Behavioral: Negative. Negative for depression and suicidal ideas. GENERAL PHYSICAL EXAM:  Vitals: /70   Pulse 97   Temp 98.7 °F (37.1 °C) (Oral)   Resp 16   Ht 4' 11\" (1.499 m)   Wt 194 lb (88 kg)   SpO2 93%   BMI 39.18 kg/m² , Body mass index is 39.18 kg/m². Physical Exam   Constitutional: She is oriented to person, place, and time. She appears well-developed and well-nourished. Over weight   HENT:   Head: Normocephalic and atraumatic. Eyes: Conjunctivae and EOM are normal. Pupils are equal, round, and reactive to light. Neck: Normal range of motion. Neck supple. Cardiovascular: Normal rate and regular rhythm. Pulmonary/Chest: Effort normal and breath sounds normal.   Abdominal: Soft. She exhibits no distension. Musculoskeletal:        Left knee: She exhibits no effusion.    Neurological: She Encounter for long-term (current) use of other medications    Arthralgia of both lower legs    Pain in lower limb    S/P total knee arthroplasty    Chronic pain of both knees    HTN (hypertension)    HLD (hyperlipidemia)    GERD (gastroesophageal reflux disease)    Well adult exam    Osteopenia    Encounter for chronic pain management    Hx of total knee arthroplasty, right    Primary osteoarthritis of both knees    Encounter for medication monitoring    Chronic bilateral low back pain without sciatica    Rectal pain    Generalized abdominal pain        ASSESSMENT    Valeriy Blackman is a 57 y.o. female with chronic left knee and abdominal pain    1. Primary osteoarthritis of both knees    2. Status post total bilateral knee replacement    3. Chronic pain of both knees           PLAN  -request records from from orthopedics at [facility]  -suggested talking to family doctor about referral to [facility] to see a knee specialist   -continue current pain medications as it is adequately controlling the patients pain without medication side effects.   -counseled on importance of home exercises       We will continue current pain medications  Current medications are being tolerated without any Adverse side effects. Orders Placed This Encounter   Medications    HYDROcodone-acetaminophen (NORCO) 5-325 MG per tablet     Sig: Take 1 tablet by mouth every 8 hours as needed for Pain for up to 30 days. [address] [facility] Date: 7/20/18     Dispense:  90 tablet     Refill:  0     Urine drug screens have been appropriate. No aberrant activity noted. Analgesia is achieved. Activities of daily living are possible because of medications. Safe use of medications explained to patient. Counselling/Preventive measures for pain  Control:    [x]  Spine strengthening exercises are discussed with patient in detail.      [x] Ill effects of being on chronic pain medications such as sleep disturbances, hormonal changes, withdrawal

## 2018-07-21 ASSESSMENT — ENCOUNTER SYMPTOMS
PHOTOPHOBIA: 0
NAUSEA: 0
SPUTUM PRODUCTION: 0
VOMITING: 0

## 2018-08-15 ENCOUNTER — OFFICE VISIT (OUTPATIENT)
Dept: GASTROENTEROLOGY | Age: 63
End: 2018-08-15
Payer: MEDICARE

## 2018-08-15 VITALS
WEIGHT: 200 LBS | BODY MASS INDEX: 40.32 KG/M2 | OXYGEN SATURATION: 97 % | HEART RATE: 102 BPM | HEIGHT: 59 IN | SYSTOLIC BLOOD PRESSURE: 120 MMHG | DIASTOLIC BLOOD PRESSURE: 75 MMHG

## 2018-08-15 DIAGNOSIS — K62.89 RECTAL PAIN: Primary | ICD-10-CM

## 2018-08-15 DIAGNOSIS — R10.84 GENERALIZED ABDOMINAL PAIN: ICD-10-CM

## 2018-08-15 PROCEDURE — 1036F TOBACCO NON-USER: CPT | Performed by: INTERNAL MEDICINE

## 2018-08-15 PROCEDURE — 99213 OFFICE O/P EST LOW 20 MIN: CPT | Performed by: INTERNAL MEDICINE

## 2018-08-15 PROCEDURE — G8417 CALC BMI ABV UP PARAM F/U: HCPCS | Performed by: INTERNAL MEDICINE

## 2018-08-15 PROCEDURE — 3017F COLORECTAL CA SCREEN DOC REV: CPT | Performed by: INTERNAL MEDICINE

## 2018-08-15 PROCEDURE — G8427 DOCREV CUR MEDS BY ELIG CLIN: HCPCS | Performed by: INTERNAL MEDICINE

## 2018-08-15 ASSESSMENT — ENCOUNTER SYMPTOMS
BLOOD IN STOOL: 0
CONSTIPATION: 0
RESPIRATORY NEGATIVE: 1
DIARRHEA: 1
ABDOMINAL PAIN: 1
ABDOMINAL DISTENTION: 0
BACK PAIN: 1
RECTAL PAIN: 1
ALLERGIC/IMMUNOLOGIC NEGATIVE: 1
EYES NEGATIVE: 1
VOMITING: 0
NAUSEA: 0
ANAL BLEEDING: 0

## 2018-08-17 ENCOUNTER — HOSPITAL ENCOUNTER (OUTPATIENT)
Dept: PAIN MANAGEMENT | Age: 63
Discharge: HOME | End: 2018-08-17
Payer: MEDICARE

## 2018-08-17 VITALS
OXYGEN SATURATION: 96 % | HEIGHT: 59 IN | HEART RATE: 89 BPM | WEIGHT: 200 LBS | RESPIRATION RATE: 16 BRPM | TEMPERATURE: 98.2 F | DIASTOLIC BLOOD PRESSURE: 71 MMHG | BODY MASS INDEX: 40.32 KG/M2 | SYSTOLIC BLOOD PRESSURE: 116 MMHG

## 2018-08-17 DIAGNOSIS — G89.29 ENCOUNTER FOR CHRONIC PAIN MANAGEMENT: ICD-10-CM

## 2018-08-17 DIAGNOSIS — G89.29 CHRONIC BILATERAL LOW BACK PAIN WITHOUT SCIATICA: ICD-10-CM

## 2018-08-17 DIAGNOSIS — M17.0 PRIMARY OSTEOARTHRITIS OF BOTH KNEES: Primary | ICD-10-CM

## 2018-08-17 DIAGNOSIS — Z96.652 STATUS POST TOTAL LEFT KNEE REPLACEMENT: ICD-10-CM

## 2018-08-17 DIAGNOSIS — Z51.81 ENCOUNTER FOR MEDICATION MONITORING: ICD-10-CM

## 2018-08-17 DIAGNOSIS — G89.29 CHRONIC PAIN OF BOTH KNEES: ICD-10-CM

## 2018-08-17 DIAGNOSIS — Z96.651 HX OF TOTAL KNEE ARTHROPLASTY, RIGHT: ICD-10-CM

## 2018-08-17 DIAGNOSIS — M54.50 CHRONIC BILATERAL LOW BACK PAIN WITHOUT SCIATICA: ICD-10-CM

## 2018-08-17 DIAGNOSIS — Z96.653 STATUS POST TOTAL BILATERAL KNEE REPLACEMENT: ICD-10-CM

## 2018-08-17 DIAGNOSIS — M25.562 CHRONIC PAIN OF BOTH KNEES: ICD-10-CM

## 2018-08-17 DIAGNOSIS — M25.561 CHRONIC PAIN OF BOTH KNEES: ICD-10-CM

## 2018-08-17 DIAGNOSIS — M79.604 PAIN OF RIGHT LOWER EXTREMITY: ICD-10-CM

## 2018-08-17 PROCEDURE — 99213 OFFICE O/P EST LOW 20 MIN: CPT

## 2018-08-17 PROCEDURE — 99213 OFFICE O/P EST LOW 20 MIN: CPT | Performed by: NURSE PRACTITIONER

## 2018-08-17 RX ORDER — HYDROCODONE BITARTRATE AND ACETAMINOPHEN 5; 325 MG/1; MG/1
1 TABLET ORAL EVERY 8 HOURS PRN
Qty: 90 TABLET | Refills: 0 | Status: SHIPPED | OUTPATIENT
Start: 2018-08-23 | End: 2018-09-20 | Stop reason: SDUPTHER

## 2018-08-17 RX ORDER — DIPHENHYDRAMINE HCL 25 MG
25 TABLET ORAL EVERY 6 HOURS PRN
COMMUNITY
End: 2019-03-20

## 2018-08-17 ASSESSMENT — ENCOUNTER SYMPTOMS
EYES NEGATIVE: 1
RESPIRATORY NEGATIVE: 1
BACK PAIN: 1
GASTROINTESTINAL NEGATIVE: 1

## 2018-08-17 NOTE — PROGRESS NOTES
[facility] PROGRESS NOTE      Patient here today to review MEDICATION CONTRACT    Chief Complaint:  Left knee    HPI:   She c/o left knee pain. History of 2 knee replacements. She went to [facility] and saw Orthopaedic surgeon for pain left knee, has loosening of hardware. She was told can not do any surgery. She has been in PT which did not help. Her knee pain is the same. She has history of 1 right knee replacement. Pain manageable. She is sleeping okay, She has no Ed visits. Knee Pain    The incident occurred more than 1 week ago. There was no injury mechanism. The pain is present in the left knee. Quality: sharp. The pain is at a severity of 5/10. The pain is moderate. The pain has been constant since onset. Associated symptoms include muscle weakness. Foreign body present: knee replacement. Exacerbated by: any activity. She has tried elevation and ice for the symptoms. The treatment provided mild relief. Patient denies any new neurological symptoms. No bowel or bladder incontinence, no weakness, and no falling. Treatment goals:stop the pain  Functional status:       Aberrancy  None   Analgesia  Pain 5  Adverse  Effects :  BM daily  ADL;s :housework,          Pill count: appropriate  15  Morphine equivalent dose as reported on OARRS:  Attestation: The Prescription Monitoring Report for this patient was reviewed today. DARREL Oconnor CNP)  Documentation: Obtaining appropriate analgesic effect of treatment., No signs of potential drug abuse or diversion identified. DARREL Oconnor CNP)  Medication Contracts: Existing medication contract. DARREL Oconnor CNP)  Review of OARRS does not show any aberrant prescription behavior. Medication is helping the patient stay active. Patient denies any side effects and reports adequate analgesia. No sign of misuse/abuse.         When was the last UDS:    4-12-18       Was the UDS appropriate:    DRUGS EXPECTED:   HYDROCODONE [4-11-18]   ________________________________________________________________   INCONSISTENT with medications provided:   HYDROCODONE : based on the absence of hydrocodone and metabolites   Record/Diagnostics Review:      As above, I did review the imaging    4/16/2018 11:26 AM - Kamari Cooper Incoming Lab Results From [facility]     Component Results     Component Value Ref Range & Units Status Collected Lab   Pain Management Drug Panel Interp, Urine Inconsistent   Final 04/12/2018 11:30 AM [facility]   (NOTE)   ________________________________________________________________   DRUGS EXPECTED:   HYDROCODONE [4-11-18]   ________________________________________________________________   INCONSISTENT with medications provided:   HYDROCODONE : based on the absence of hydrocodone and metabolites   ________________________________________________________________   INTERPRETIVE INFORMATION: Pain Mgt [place], Mass Spec/EMIT, Ur,                            Interp   Interpretation depends on accuracy and completeness of patient   medication information submitted by client. 6-Acetylmorphine, Ur Not Detected   Final 04/12/2018 11:30 AM [facility]   7-Aminoclonazepam, Urine Not Detected   Final 04/12/2018 11:30 AM [facility]   Alpha-OH-Alpraz, Urine Not Detected   Final 04/12/2018 11:30 AM [facility]   Alprazolam, Urine Not Detected   Final 04/12/2018 11:30 AM [facility]   Amphetamines, urine Not Detected   Final 04/12/2018 11:30 AM [facility]   Barbiturates, Ur Not Detected   Final 04/12/2018 11:30 AM [facility]   Benzoylecgonine, Ur Not Detected   Final 04/12/2018 11:30 AM [facility]   Buprenorphine Urine Not Detected   Final 04/12/2018 11:30 AM [facility]   Carisoprodol, Ur Not Detected   Final 04/12/2018 11:30 AM [facility]   (NOTE)   The carisoprodol immunoassay has cross-reactivity to carisoprodol   and meprobamate.     Clonazepam, Urine Not Detected   Final 04/12/2018 11:30 AM [facility]   Codeine, Urine Not Detected   Final 04/12/2018 11:30 AM [facility]   MDA, Ur Not Detected   Final 04/12/2018 Temazepam, Urine Not Detected   Final 04/12/2018 11:30 AM [facility]   Tramadol, Urine Not Detected   Final 04/12/2018 11:30 AM [facility]   Zolpidem, Urine Not Detected   Final 04/12/2018 11:30 AM [facility]   Drugs Expected, Ur   Final 04/12/2018 11:30 AM [facility] [address] Lab   HYDROCODONE 4.11.18 2 PM    Creatinine, Ur 48.4  20.0 - 400.0 mg/dL Final 04/12/2018 11:30 AM [facility]   Pain Mgt Drug Panel, Hi Res, Ur See Below   Final 04/12/2018 11:30 AM [facility]   (NOTE)   Methodology: Qualitative Enzyme Immunoassay and Qualitative Liquid   Chromatography-Time of Flight-Mass Spectrometry or Tandem Mass   Spectrometry, Quantitative Spectrophotometry   The absence of expected drug(s) and/or drug metabolite(s) may   indicate non-compliance, inappropriate timing of specimen   collection relative to drug administration, poor drug absorption,   diluted/adulterated urine, or limitations of testing. The   concentration must be greater than or equal to the cutoff to be   reported as present.  If specific drug concentrations are   required, contact the laboratory within two weeks of specimen   collection to request quantification by a second analytical   technique. Interpretive questions should be directed to the   laboratory. Results based on immunoassay detection that do not match clinical   expectations should be   interpreted with caution. Confirmatory testing by mass   spectrometry for immunoassay-based results is available, if   ordered within two weeks of specimen collection. Additional   charges apply. For medical purposes only; not valid for forensic use. This test was developed and its performance characteristics   determined by [employer]. The U.S. Food and Drug   Administration has not approved or cleared this test; however, FDA   clearance or approval is not currently required for clinical use. The results are not intended to be used as the sole means for   clinical diagnosis or patient management decisions.     EER Hi Res Interp Ur See Note   Final 04/12/2018 11:30 AM [facility]   (NOTE)   Access [facility] Enhanced Report using either link below:   -Direct access: [email or web address]   -Enter Username, Password: [email or web address]   Username: [email or web address]   Password: [email or web address]   Performed by [facility]   [place] [address], [address] [facility] [phone]   www. Vickie Krishnamurthy MD, Lab. Director   Performed at [facility]: WILL[address]AN [address] [phone]            Past Medical History:   Diagnosis Date    Arthritis     Diabetes mellitus ([address]     Eczema     arms, trunk    GERD (gastroesophageal reflux disease)     Hearing loss     left and right ears, wears hearing aids    Hyperlipidemia     Hypertension     Kidney infection     Osteoporosis     Wears glasses     for reading       Past Surgical History:   Procedure Laterality Date    APPENDECTOMY      WITH HYSTERECTOMY    ARTHROPLASTY Left 10/12/2017    METATARSAL HEAD RESECTION 3RD LEFT FOOT  & EXCISION LESION SOFT TISSUE LEFT FOOT performed by Jose Austin DPM at [place] [address] BLEPHAROPLASTY Bilateral     BREAST BIOPSY Left     benign    COLONOSCOPY      CYST REMOVAL Right     HEEL    CYST REMOVAL Right     wrist    HYSTERECTOMY      JOINT REPLACEMENT Bilateral     lt had revision knees    KNEE ARTHROSCOPY Right 8/12/15    Dr Yumiko Goode ARTHROSCOPY Left 02/22/2017     &RIGHT  KNEE MANIPULATION    [place] COLON CA SCRN NOT  [address]  IND N/A 7/17/2018    COLONOSCOPY performed by Joanne Beavers MD at [place] Left 2/22/2017    KNEE ARTHROSCOPY FOR LATERAL PATELLA RELEASE, SYNOVECTOMY AND STEROID INJECTION, LEFT KNEE. MANIPULATION OF RIGHT KNEE.  performed by Betsy Ignacio MD at [address] [place] [address] 66 Left 05/02/2017    3rd toe    TONSILLECTOMY      TYMPANOPLASTY Left        Allergies   Allergen Reactions    Aspirin Other (See with meals, Disp: , Rfl: 1    acetaminophen (TYLENOL) 500 MG tablet, Take 500 mg by mouth as needed for Pain, Disp: , Rfl:     hydrOXYzine (ATARAX) 50 MG tablet, 50 mg every 6 hours as needed , Disp: , Rfl: 0    Blood Glucose Monitoring Suppl (TRUE METRIX METER) W/DEVICE KIT, TEST 1 TO 2 TIMES DAILY AS DIRECTED, Disp: , Rfl: 0    TRUE METRIX BLOOD GLUCOSE TEST strip, TEST 1 TO 2 TIMES BEFORE MEALS OR AS DIRECTED, Disp: , Rfl: 1    Lancets (BD LANCET ULTRAFINE 30G) MISC, USE 1 TO 2 TIMES A DAY, Disp: , Rfl: 1    busPIRone (BUSPAR) 10 MG tablet, Take 1 tablet by mouth 2 times daily. (Patient taking differently: Take 10 mg by mouth 2 times daily as needed ), Disp: 60 tablet, Rfl: 3    Family History   Problem Relation Age of Onset    Stroke Father     Emphysema Father     Diabetes Mother     Heart Failure Mother     Osteoarthritis Mother        Social History     Social History    Marital status:      Spouse name: N/A    Number of children: N/A    Years of education: N/A     Occupational History    disability      Social History Main Topics    Smoking status: Never Smoker    Smokeless tobacco: Never Used    Alcohol use No    Drug use: No    Sexual activity: Not Currently     Other Topics Concern    Not on file     Social History Narrative    No narrative on file       Review of Systems:  Review of Systems   Constitution: Negative. HENT: Negative. Eyes: Negative. Cardiovascular: Negative. Respiratory: Negative. Endocrine: Negative. Hematologic/Lymphatic: Negative. Skin: Positive for rash. Musculoskeletal: Positive for back pain and joint pain. Gastrointestinal: Negative. Genitourinary: Negative. Neurological: Negative. Psychiatric/Behavioral: Negative.           Physical Exam:  /71   Pulse 89   Temp 98.2 °F (36.8 °C) (Oral)   Resp 16   Ht 4' 11\" (1.499 m)   Wt 200 lb (90.7 kg)   SpO2 96%   BMI 40.40 kg/m²     Physical Exam   Constitutional: She is oriented to person, place, and time and well-developed, well-nourished, and in no distress. overweight   HENT:   Head: Normocephalic. Neck: Normal range of motion. Neck supple. Pulmonary/Chest: Effort normal.   Musculoskeletal:        Right knee: Tenderness found. Left knee: She exhibits decreased range of motion and swelling. Tenderness found. Medial joint line and lateral joint line tenderness noted. Lumbar back: She exhibits tenderness. Neurological: She is alert and oriented to person, place, and time. Gait abnormal.   Reflex Scores:       Patellar reflexes are 0 on the right side and 0 on the left side. Achilles reflexes are 1+ on the right side and 1+ on the left side. Skin: Skin is warm, dry and intact.    Psychiatric: Affect and judgment normal.         Assessment:    Problem List Items Addressed This Visit     Osteoarthrosis involving lower leg (Chronic)    Relevant Medications    HYDROcodone-acetaminophen (NORCO) 5-325 MG per tablet (Start on 8/23/2018)    Pain in lower limb    S/P total knee arthroplasty    Relevant Medications    HYDROcodone-acetaminophen (NORCO) 5-325 MG per tablet (Start on 8/23/2018)    Chronic pain of both knees    Relevant Medications    HYDROcodone-acetaminophen (Renetta Ishikawa) 5-325 MG per tablet (Start on 8/23/2018)    Encounter for chronic pain management    Hx of total knee arthroplasty, right    Relevant Medications    HYDROcodone-acetaminophen (Renetta Ishikawa) 5-325 MG per tablet (Start on 8/23/2018)    Primary osteoarthritis of both knees - Primary    Relevant Medications    HYDROcodone-acetaminophen (NORCO) 5-325 MG per tablet (Start on 8/23/2018)    Encounter for medication monitoring    Chronic bilateral low back pain without sciatica    Relevant Medications    HYDROcodone-acetaminophen (NORCO) 5-325 MG per tablet (Start on 8/23/2018)        Treatment Plan:  DISCUSSION: Treatment options discussed with patient and all questions answered to patient's

## 2018-09-14 ENCOUNTER — OFFICE VISIT (OUTPATIENT)
Dept: OBGYN CLINIC | Age: 63
End: 2018-09-14
Payer: MEDICARE

## 2018-09-14 VITALS
RESPIRATION RATE: 16 BRPM | HEART RATE: 102 BPM | WEIGHT: 197 LBS | DIASTOLIC BLOOD PRESSURE: 81 MMHG | SYSTOLIC BLOOD PRESSURE: 131 MMHG | HEIGHT: 59 IN | BODY MASS INDEX: 39.72 KG/M2

## 2018-09-14 DIAGNOSIS — N30.01 ACUTE CYSTITIS WITH HEMATURIA: ICD-10-CM

## 2018-09-14 DIAGNOSIS — R30.0 DYSURIA: ICD-10-CM

## 2018-09-14 DIAGNOSIS — R30.9 PAINFUL URINATION: Primary | ICD-10-CM

## 2018-09-14 LAB
BILIRUBIN, POC: NEGATIVE
BLOOD URINE, POC: ABNORMAL
CLARITY, POC: CLEAR
COLOR, POC: YELLOW
GLUCOSE URINE, POC: NEGATIVE
KETONES, POC: NEGATIVE
LEUKOCYTE EST, POC: ABNORMAL
NITRITE, POC: POSITIVE
PH, POC: 7.5
PROTEIN, POC: ABNORMAL
SPECIFIC GRAVITY, POC: 1
UROBILINOGEN, POC: NORMAL

## 2018-09-14 PROCEDURE — 99213 OFFICE O/P EST LOW 20 MIN: CPT | Performed by: CLINICAL NURSE SPECIALIST

## 2018-09-14 PROCEDURE — 1036F TOBACCO NON-USER: CPT | Performed by: CLINICAL NURSE SPECIALIST

## 2018-09-14 PROCEDURE — G8427 DOCREV CUR MEDS BY ELIG CLIN: HCPCS | Performed by: CLINICAL NURSE SPECIALIST

## 2018-09-14 PROCEDURE — G8417 CALC BMI ABV UP PARAM F/U: HCPCS | Performed by: CLINICAL NURSE SPECIALIST

## 2018-09-14 PROCEDURE — 3017F COLORECTAL CA SCREEN DOC REV: CPT | Performed by: CLINICAL NURSE SPECIALIST

## 2018-09-14 RX ORDER — NITROFURANTOIN 25; 75 MG/1; MG/1
100 CAPSULE ORAL 2 TIMES DAILY
Qty: 14 CAPSULE | Refills: 0 | Status: SHIPPED | OUTPATIENT
Start: 2018-09-14 | End: 2018-09-20 | Stop reason: ALTCHOICE

## 2018-09-14 NOTE — PROGRESS NOTES
Subjective:      Patient ID:  Trinity Chow is a 57 y.o. female who presents for   Chief Complaint   Patient presents with    Urinary Frequency     patient is here today because she has been having painful urination and burning while urinating since Tuesday. HPI   Patient is a 58 yo female who presents for painful urination and burning with urination for the past 4 days. Patient complains of lower abdominal pain which is constant and worse when she urinates, described as sharp. Review of Systems   Constitutional: Negative for chills and fever. HENT: Negative. Eyes: Negative. Respiratory: Negative. Cardiovascular: Negative. Gastrointestinal: Negative. Endocrine: Negative. Genitourinary: Positive for dysuria (for the past 4 days) and pelvic pain (which is described as sharp and constant and worse when she urinates). Musculoskeletal: Negative. Skin: Negative. Allergic/Immunologic: Negative. Neurological: Negative. Hematological: Negative. Psychiatric/Behavioral: Negative. /81 (Site: Left Upper Arm, Position: Sitting, Cuff Size: Large Adult)   Pulse 102   Resp 16   Ht 4' 11\" (1.499 m)   Wt 197 lb (89.4 kg)   BMI 39.79 kg/m²    No LMP recorded. Patient has had a hysterectomy. Objective:   Physical Exam   Constitutional: She is oriented to person, place, and time. She appears well-developed and well-nourished. HENT:   Head: Normocephalic and atraumatic. Eyes: Conjunctivae are normal.   Neck: Normal range of motion. Neck supple. Cardiovascular: Normal rate and regular rhythm. Pulmonary/Chest: Effort normal and breath sounds normal.   Abdominal: Bowel sounds are normal.   Musculoskeletal: Normal range of motion. Neurological: She is oriented to person, place, and time. Skin: Skin is warm and dry. Psychiatric: She has a normal mood and affect. Her behavior is normal. Judgment and thought content normal.   Vitals reviewed.       Assessment:

## 2018-09-20 ENCOUNTER — HOSPITAL ENCOUNTER (OUTPATIENT)
Dept: PAIN MANAGEMENT | Age: 63
Discharge: HOME | End: 2018-09-20
Payer: MEDICARE

## 2018-09-20 VITALS
RESPIRATION RATE: 16 BRPM | HEART RATE: 82 BPM | DIASTOLIC BLOOD PRESSURE: 84 MMHG | TEMPERATURE: 98.4 F | OXYGEN SATURATION: 97 % | WEIGHT: 189 LBS | HEIGHT: 59 IN | SYSTOLIC BLOOD PRESSURE: 129 MMHG | BODY MASS INDEX: 38.1 KG/M2

## 2018-09-20 DIAGNOSIS — Z96.653 STATUS POST TOTAL BILATERAL KNEE REPLACEMENT: ICD-10-CM

## 2018-09-20 DIAGNOSIS — M54.50 CHRONIC BILATERAL LOW BACK PAIN WITHOUT SCIATICA: ICD-10-CM

## 2018-09-20 DIAGNOSIS — G89.29 CHRONIC PAIN OF BOTH KNEES: ICD-10-CM

## 2018-09-20 DIAGNOSIS — M25.562 CHRONIC PAIN OF BOTH KNEES: ICD-10-CM

## 2018-09-20 DIAGNOSIS — M25.561 CHRONIC PAIN OF BOTH KNEES: ICD-10-CM

## 2018-09-20 DIAGNOSIS — Z96.651 HX OF TOTAL KNEE ARTHROPLASTY, RIGHT: ICD-10-CM

## 2018-09-20 DIAGNOSIS — Z96.652 STATUS POST TOTAL LEFT KNEE REPLACEMENT: ICD-10-CM

## 2018-09-20 DIAGNOSIS — M79.604 PAIN OF RIGHT LOWER EXTREMITY: ICD-10-CM

## 2018-09-20 DIAGNOSIS — M25.561 ARTHRALGIA OF BOTH LOWER LEGS: ICD-10-CM

## 2018-09-20 DIAGNOSIS — M25.562 ARTHRALGIA OF BOTH LOWER LEGS: ICD-10-CM

## 2018-09-20 DIAGNOSIS — M17.0 PRIMARY OSTEOARTHRITIS OF BOTH KNEES: Primary | ICD-10-CM

## 2018-09-20 DIAGNOSIS — G89.29 ENCOUNTER FOR CHRONIC PAIN MANAGEMENT: ICD-10-CM

## 2018-09-20 DIAGNOSIS — Z51.81 ENCOUNTER FOR MEDICATION MONITORING: ICD-10-CM

## 2018-09-20 DIAGNOSIS — G89.29 CHRONIC BILATERAL LOW BACK PAIN WITHOUT SCIATICA: ICD-10-CM

## 2018-09-20 PROCEDURE — 99213 OFFICE O/P EST LOW 20 MIN: CPT

## 2018-09-20 PROCEDURE — 99213 OFFICE O/P EST LOW 20 MIN: CPT | Performed by: NURSE PRACTITIONER

## 2018-09-20 PROCEDURE — 80307 DRUG TEST PRSMV CHEM ANLYZR: CPT

## 2018-09-20 RX ORDER — CEPHALEXIN 500 MG/1
500 CAPSULE ORAL
COMMUNITY
Start: 2018-09-16 | End: 2018-09-26

## 2018-09-20 RX ORDER — HYDROCODONE BITARTRATE AND ACETAMINOPHEN 5; 325 MG/1; MG/1
1 TABLET ORAL EVERY 8 HOURS PRN
Qty: 90 TABLET | Refills: 0 | Status: SHIPPED | OUTPATIENT
Start: 2018-09-23 | End: 2018-10-18 | Stop reason: SDUPTHER

## 2018-09-20 ASSESSMENT — ENCOUNTER SYMPTOMS
BACK PAIN: 1
GASTROINTESTINAL NEGATIVE: 1
RESPIRATORY NEGATIVE: 1

## 2018-09-20 NOTE — PROGRESS NOTES
11:30 AM [facility]   Alprazolam, Urine Not Detected    Final 04/12/2018 11:30 AM [facility]   Amphetamines, urine Not Detected    Final 04/12/2018 11:30 AM [facility]   Barbiturates, Ur Not Detected    Final 04/12/2018 11:30 AM [facility]   Benzoylecgonine, Ur Not Detected    Final 04/12/2018 11:30 AM [facility]   Buprenorphine Urine Not Detected    Final 04/12/2018 11:30 AM [facility]   Carisoprodol, Ur Not Detected    Final 04/12/2018 11:30 AM [facility]   (NOTE)   The carisoprodol immunoassay has cross-reactivity to carisoprodol   and meprobamate.     Clonazepam, Urine Not Detected    Final 04/12/2018 11:30 AM [facility]   Codeine, Urine Not Detected    Final 04/12/2018 11:30 AM [facility]   MDA, Ur Not Detected    Final 04/12/2018 11:30 AM [facility]   Diazepam, Urine Not Detected    Final 04/12/2018 11:30 AM [facility]   Ethyl Glucuronide Ur Not Detected    Final 04/12/2018 11:30 AM [facility]   Fentanyl, Ur Not Detected    Final 04/12/2018 11:30 AM [facility]   Hydrocodone, Urine Not Detected    Final 04/12/2018 11:30 AM [facility]   Hydromorphone, Urine Not Detected    Final 04/12/2018 11:30 AM [facility]   Lorazepam, Urine Not Detected    Final 04/12/2018 11:30 AM [facility]   Marijuana Metab, Ur Not Detected    Final 04/12/2018 11:30 AM [facility]   MDEA, LEANA, Ur Not Detected    Final 04/12/2018 11:30 AM [facility]   MDMA, Urine Not Detected    Final 04/12/2018 11:30 AM [facility]   Meperidine Metab, Ur Not Detected    Final 04/12/2018 11:30 AM [facility]   Methadone, Urine Not Detected    Final 04/12/2018 11:30 AM [facility]   Methamphetamine, Urine Not Detected    Final 04/12/2018 11:30 AM [facility]   Methylphenidate Not Detected    Final 04/12/2018 11:30 AM [facility]   Midazolam, Urine Not Detected    Final 04/12/2018 11:30 AM [facility]   Morphine Urine Not Detected    Final 04/12/2018 11:30 AM [facility]   Norbuprenorphine, Urine Not Detected    Final 04/12/2018 11:30 AM [facility]   Nordiazepam, Urine Not Detected    Final 04/12/2018 11:30 AM [facility]   Norfentanyl, Urine Not Detected    Final 04/12/2018 11:30 AM [facility]   NORHYDROCODONE, URINE Not Detected   Final 04/12/2018 11:30 AM [facility]   Noroxycodone, Urine Not Detected    Final 04/12/2018 11:30 AM [facility]   NOROXYMORPHONE, URINE Not Detected    Final 04/12/2018 11:30 AM [facility]   Oxazepam, Urine Not Detected    Final 04/12/2018 11:30 AM [facility]   Oxycodone Urine Not Detected    Final 04/12/2018 11:30 AM [facility]   Oxymorphone, Urine Not Detected    Final 04/12/2018 11:30 AM [facility]   PCP, Urine Not Detected    Final 04/12/2018 11:30 AM [facility]   Phentermine, Ur Not Detected    Final 04/12/2018 11:30 AM [facility]   Propoxyphene, Urine Not Detected    Final 04/12/2018 11:30 AM [facility]   Tapentadol-O-Sulfate, Urine Not Detected    Final 04/12/2018 11:30 AM [facility]   Tapentadol, Urine Not Detected    Final 04/12/2018 11:30 AM [facility]   Temazepam, Urine Not Detected    Final 04/12/2018 11:30 AM [facility]   Tramadol, Urine Not Detected    Final 04/12/2018 11:30 AM [facility]   Zolpidem, Urine Not Detected    Final 04/12/2018 11:30 AM [facility]   Drugs Expected, Ur     Final 04/12/2018 11:30 AM [facility] [address] Lab   HYDROCODONE 4.11.18 2 PM    Creatinine, Ur 48.4  20.0 - 400.0 mg/dL Final 04/12/2018 11:30 AM [facility]   Pain Mgt Drug Panel, Hi Res, Ur See Below    Final 04/12/2018 11:30 AM [facility]   (NOTE)   Methodology: Qualitative Enzyme Immunoassay and Qualitative Liquid   Chromatography-Time of Flight-Mass Spectrometry or Tandem Mass   Spectrometry, Quantitative Spectrophotometry   The absence of expected drug(s) and/or drug metabolite(s) may   indicate non-compliance, inappropriate timing of specimen   collection relative to drug administration, poor drug absorption,   diluted/adulterated urine, or limitations of testing. The   concentration must be greater than or equal to the cutoff to be   reported as present.  If specific drug concentrations are   required, contact the laboratory within two weeks of specimen   collection to request quantification by a second analytical   technique. Interpretive questions should be directed to the   laboratory.    Results based CYST REMOVAL Right     HEEL    CYST REMOVAL Right     wrist    HYSTERECTOMY      JOINT REPLACEMENT Bilateral     lt had revision knees    KNEE ARTHROSCOPY Right 8/12/15    Dr Hitesh Cho ARTHROSCOPY Left 02/22/2017     &RIGHT  KNEE MANIPULATION    [place] COLON CA SCRN NOT  [address]  IND N/A 7/17/2018    COLONOSCOPY performed by Jesse Pulliam MD at [place] Left 2/22/2017    KNEE ARTHROSCOPY FOR LATERAL PATELLA RELEASE, SYNOVECTOMY AND STEROID INJECTION, LEFT KNEE. MANIPULATION OF RIGHT KNEE. performed by Lui Colorado MD at [facility] [address]  Left 05/02/2017    3rd toe    TONSILLECTOMY      TYMPANOPLASTY Left        Allergies   Allergen Reactions    Aspirin Other (See Comments)     Chest pain    Codeine Other (See Comments)     Chest pain    Lidoderm [Lidocaine]      Skin reddened , itching    Avelox [Moxifloxacin] Rash    Sulfa Antibiotics Rash         Current Outpatient Prescriptions:     cephALEXin (KEFLEX) 500 MG capsule, Take 500 mg by mouth, Disp: , Rfl:     [START ON 9/23/2018] HYDROcodone-acetaminophen (NORCO) 5-325 MG per tablet, Take 1 tablet by mouth every 8 hours as needed for Pain for up to 30 days. ., Disp: 90 tablet, Rfl: 0    amLODIPine (NORVASC) 5 MG tablet, take 1 tablet by mouth once daily, takes at night, Disp: , Rfl: 0    alendronate (FOSAMAX) 35 MG tablet, Take 1 tablet by mouth every 7 days, Disp: 4 tablet, Rfl: 3    omeprazole (PRILOSEC) 20 MG capsule, take 1 capsule by mouth once daily (Patient taking differently: take 1 capsule by mouth once patient takes at supper), Disp: 90 capsule, Rfl: 0    loratadine (CLARITIN) 10 MG tablet, take 1 tablet by mouth once daily, Disp: 30 tablet, Rfl: 3    venlafaxine (EFFEXOR-XR) 37.5 MG XR capsule, take 1 capsule by mouth once daily (Patient taking differently: take 1 capsule by mouth every evening), Disp: 30 capsule, Rfl: 3    lisinopril-hydrochlorothiazide (PRINZIDE) 20-12.5 MG per tablet, Take 1 HYDROcodone-acetaminophen ([facility]) 5-325 MG per tablet (Start on 9/23/2018)    Encounter for chronic pain management    Hx of total knee arthroplasty, right    Relevant Medications    HYDROcodone-acetaminophen (NORCO) 5-325 MG per tablet (Start on 9/23/2018)    Primary osteoarthritis of both knees - Primary    Relevant Medications    HYDROcodone-acetaminophen (NORCO) 5-325 MG per tablet (Start on 9/23/2018)    Encounter for medication monitoring    Chronic bilateral low back pain without sciatica    Relevant Medications    HYDROcodone-acetaminophen (NORCO) 5-325 MG per tablet (Start on 9/23/2018)        Treatment Plan:  DISCUSSION: Treatment options discussed with patient and all questions answered to patient's satisfaction. Possible side effects, risk of tolerance and or dependence and alternative treatments discussed    Obtaining appropriate analgesic effect of treatment   No signs of potential drug abuse or diversion identified    [x] Ill effects of being on chronic pain medications such as sleep disturbances, respiratory depression, hormonal changes, withdrawal symptoms, chronic opioid dependence and tolerance as well as risk of taking opioids with Benzodiazepines and taking opioids along with alcohol,  were discussed with patient. I had asked the patient to minimize medication use and utilize pain medications only for uncontrolled rest pain or pain with exertional activities. I advised patient not to self-escalate pain medications without consulting with us. At each of patient's future visits we will try to taper pain medications, while adjusting the adjunct medications, and re-evaluating for Physical Therapy to improve spinal and joint strength. We will continue to have discussions to decrease pain medications as tolerated. Counseled patient on effects their pain medication and /or their medical condition may have on their  ability to drive or operate machinery.  Instructed not to drive or operate

## 2018-09-23 LAB
6-ACETYLMORPHINE, UR: NOT DETECTED
7-AMINOCLONAZEPAM, URINE: NOT DETECTED
ALPHA-OH-ALPRAZ, URINE: NOT DETECTED
ALPRAZOLAM, URINE: NOT DETECTED
AMPHETAMINES, URINE: NOT DETECTED
BARBITURATES, URINE: NOT DETECTED
BENZOYLECGONINE, UR: NOT DETECTED
BUPRENORPHINE URINE: NOT DETECTED
CARISOPRODOL, UR: NOT DETECTED
CLONAZEPAM, URINE: NOT DETECTED
CODEINE, URINE: NOT DETECTED
CREATININE URINE: 54.7 MG/DL (ref 20–400)
DIAZEPAM, URINE: NOT DETECTED
DRUGS EXPECTED, UR: NORMAL
EER HI RES INTERP UR: NORMAL
ETHYL GLUCURONIDE UR: NOT DETECTED
FENTANYL URINE: NOT DETECTED
HYDROCODONE, URINE: PRESENT
HYDROMORPHONE, URINE: NOT DETECTED
LORAZEPAM, URINE: NOT DETECTED
MARIJUANA METAB, UR: NOT DETECTED
MDA, UR: NOT DETECTED
MDEA, EVE, UR: NOT DETECTED
MDMA URINE: NOT DETECTED
MEPERIDINE METAB, UR: NOT DETECTED
METHADONE, URINE: NOT DETECTED
METHAMPHETAMINE, URINE: NOT DETECTED
METHYLPHENIDATE: NOT DETECTED
MIDAZOLAM, URINE: NOT DETECTED
MORPHINE URINE: NOT DETECTED
NORBUPRENORPHINE, URINE: NOT DETECTED
NORDIAZEPAM, URINE: NOT DETECTED
NORFENTANYL, URINE: NOT DETECTED
NORHYDROCODONE, URINE: PRESENT
NOROXYCODONE, URINE: NOT DETECTED
NOROXYMORPHONE, URINE: NOT DETECTED
OXAZEPAM, URINE: NOT DETECTED
OXYCODONE URINE: NOT DETECTED
OXYMORPHONE, URINE: NOT DETECTED
PAIN MANAGEMENT DRUG PANEL INTERP, URINE: NORMAL
PAIN MGT DRUG PANEL, HI RES, UR: NORMAL
PCP,URINE: NOT DETECTED
PHENTERMINE, UR: NOT DETECTED
PROPOXYPHENE, URINE: NOT DETECTED
TAPENTADOL, URINE: NOT DETECTED
TAPENTADOL-O-SULFATE, URINE: NOT DETECTED
TEMAZEPAM, URINE: NOT DETECTED
TRAMADOL, URINE: NOT DETECTED
ZOLPIDEM, URINE: NOT DETECTED

## 2018-10-18 ENCOUNTER — HOSPITAL ENCOUNTER (OUTPATIENT)
Dept: PAIN MANAGEMENT | Age: 63
Discharge: HOME | End: 2018-10-18
Payer: MEDICARE

## 2018-10-18 VITALS
TEMPERATURE: 98.3 F | RESPIRATION RATE: 16 BRPM | OXYGEN SATURATION: 97 % | WEIGHT: 189 LBS | HEART RATE: 74 BPM | DIASTOLIC BLOOD PRESSURE: 79 MMHG | SYSTOLIC BLOOD PRESSURE: 134 MMHG | BODY MASS INDEX: 38.1 KG/M2 | HEIGHT: 59 IN

## 2018-10-18 DIAGNOSIS — G89.29 CHRONIC PAIN OF BOTH KNEES: ICD-10-CM

## 2018-10-18 DIAGNOSIS — Z51.81 ENCOUNTER FOR MEDICATION MONITORING: ICD-10-CM

## 2018-10-18 DIAGNOSIS — G89.29 ENCOUNTER FOR CHRONIC PAIN MANAGEMENT: ICD-10-CM

## 2018-10-18 DIAGNOSIS — M54.50 CHRONIC BILATERAL LOW BACK PAIN WITHOUT SCIATICA: ICD-10-CM

## 2018-10-18 DIAGNOSIS — Z96.651 HX OF TOTAL KNEE ARTHROPLASTY, RIGHT: ICD-10-CM

## 2018-10-18 DIAGNOSIS — M25.562 CHRONIC PAIN OF BOTH KNEES: ICD-10-CM

## 2018-10-18 DIAGNOSIS — G89.29 CHRONIC BILATERAL LOW BACK PAIN WITHOUT SCIATICA: ICD-10-CM

## 2018-10-18 DIAGNOSIS — M25.561 CHRONIC PAIN OF BOTH KNEES: ICD-10-CM

## 2018-10-18 DIAGNOSIS — Z96.652 STATUS POST TOTAL LEFT KNEE REPLACEMENT: ICD-10-CM

## 2018-10-18 DIAGNOSIS — M79.604 PAIN OF RIGHT LOWER EXTREMITY: ICD-10-CM

## 2018-10-18 DIAGNOSIS — Z96.653 STATUS POST TOTAL BILATERAL KNEE REPLACEMENT: ICD-10-CM

## 2018-10-18 DIAGNOSIS — M17.0 PRIMARY OSTEOARTHRITIS OF BOTH KNEES: Primary | ICD-10-CM

## 2018-10-18 PROCEDURE — 99213 OFFICE O/P EST LOW 20 MIN: CPT

## 2018-10-18 PROCEDURE — 99213 OFFICE O/P EST LOW 20 MIN: CPT | Performed by: NURSE PRACTITIONER

## 2018-10-18 RX ORDER — HYDROCODONE BITARTRATE AND ACETAMINOPHEN 5; 325 MG/1; MG/1
1 TABLET ORAL EVERY 8 HOURS PRN
Qty: 90 TABLET | Refills: 0 | Status: SHIPPED | OUTPATIENT
Start: 2018-10-23 | End: 2018-11-20 | Stop reason: SDUPTHER

## 2018-10-18 RX ORDER — DOXYCYCLINE HYCLATE 100 MG/1
100 CAPSULE ORAL 2 TIMES DAILY
COMMUNITY
End: 2018-11-20 | Stop reason: ALTCHOICE

## 2018-10-18 RX ORDER — SULINDAC 150 MG/1
150 TABLET ORAL 2 TIMES DAILY
COMMUNITY
End: 2020-07-15

## 2018-10-18 ASSESSMENT — ENCOUNTER SYMPTOMS
RESPIRATORY NEGATIVE: 1
GASTROINTESTINAL NEGATIVE: 1
EYES NEGATIVE: 1

## 2018-10-18 NOTE — PROGRESS NOTES
[facility]   Methylphenidate Not Detected   Final 09/20/2018  2:10 PM [facility]   Midazolam, Urine Not Detected   Final 09/20/2018  2:10 PM [facility]   Morphine Urine Not Detected   Final 09/20/2018  2:10 PM [facility]   Norbuprenorphine, Urine Not Detected   Final 09/20/2018  2:10 PM [facility]   Nordiazepam, Urine Not Detected   Final 09/20/2018  2:10 PM [facility]   Norfentanyl, Urine Not Detected   Final 09/20/2018  2:10 PM [facility]   NORHYDROCODONE, URINE Present   Final 09/20/2018  2:10 PM [facility]   Noroxycodone, Urine Not Detected   Final 09/20/2018  2:10 PM [facility]   NOROXYMORPHONE, URINE Not Detected   Final 09/20/2018  2:10 PM [facility]   Oxazepam, Urine Not Detected   Final 09/20/2018  2:10 PM [facility]   Oxycodone Urine Not Detected   Final 09/20/2018  2:10 PM [facility]   Oxymorphone, Urine Not Detected   Final 09/20/2018  2:10 PM [facility]   PCP, Urine Not Detected   Final 09/20/2018  2:10 PM [facility]   Phentermine, Ur Not Detected   Final 09/20/2018  2:10 PM [facility]   Propoxyphene, Urine Not Detected   Final 09/20/2018  2:10 PM [facility]   Tapentadol-O-Sulfate, Urine Not Detected   Final 09/20/2018  2:10 PM [facility]   Tapentadol, Urine Not Detected   Final 09/20/2018  2:10 PM [facility]   Temazepam, Urine Not Detected   Final 09/20/2018  2:10 PM [facility]   Tramadol, Urine Not Detected   Final 09/20/2018  2:10 PM [facility]   Zolpidem, Urine Not Detected   Final 09/20/2018  2:10 PM [facility]   Drugs Expected, Ur   Final 09/20/2018  2:10  [facility] [address] [facility]   HYDROCODONE [id number] AT 1130A    Creatinine, Ur 54.7  20.0 - 400.0 mg/dL Final 09/20/2018  2:10 PM [facility]   Pain Mgt Drug Panel, Hi Res, Ur See Below   Final 09/20/2018  2:10 PM [facility]   (NOTE)   Methodology: Qualitative Enzyme Immunoassay and Qualitative Liquid   Chromatography-Time of Flight-Mass Spectrometry or Tandem Mass   Spectrometry, Quantitative Spectrophotometry   The absence of expected drug(s) and/or drug metabolite(s) may   indicate non-compliance, inappropriate timing of specimen   collection relative to drug administration, poor drug absorption,   diluted/adulterated urine, or limitations of testing. The   concentration must be greater than or equal to the cutoff to be   reported as present.  If specific drug concentrations are   required, contact the laboratory within two weeks of specimen   collection to request quantification by a second analytical   technique. Interpretive questions should be directed to the   laboratory. Results based on immunoassay detection that do not match clinical   expectations should be   interpreted with caution. Confirmatory testing by mass   spectrometry for immunoassay-based results is available, if   ordered within two weeks of specimen collection. Additional   charges apply. For medical purposes only; not valid for forensic use. This test was developed and its performance characteristics   determined by [employer]. The U.S. Food and Drug   Administration has not approved or cleared this test; however, FDA   clearance or approval is not currently required for clinical use. The results are not intended to be used as the sole means for   clinical diagnosis or patient management decisions. EER Hi Res Interp Ur See Note   Final 09/20/2018  2:10 PM [facility]   (NOTE)   Access [facility] Enhanced Report using either link below:   -Direct access: [email or web address]   -Enter Username, Password: [email or web address]   Username: [email or web address]   Password: [email or web address]   Performed by [place] [address], [address] [facility] [phone]   www. Lorraine Etienne MD, Lab. Director    Testing Performed By         Pill count: appropriate    Morphine equivalent dose as reported on OARRS:15  @[facility]([id number]:1)@  Review ofOARRS does not show any aberrant prescription behavior. Medication is helping the patient stay active. Patient denies any side effects and reports adequate analgesia.  No sign of misuse/abuse.                   Past hours as needed for Pain for up to 30 days. ., Disp: 90 tablet, Rfl: 0    amLODIPine (NORVASC) 5 MG tablet, take 1 tablet by mouth once daily, takes at night, Disp: , Rfl: 0    alendronate (FOSAMAX) 35 MG tablet, Take 1 tablet by mouth every 7 days, Disp: 4 tablet, Rfl: 3    omeprazole (PRILOSEC) 20 MG capsule, take 1 capsule by mouth once daily (Patient taking differently: take 1 capsule by mouth once patient takes at supper), Disp: 90 capsule, Rfl: 0    simvastatin (ZOCOR) 20 MG tablet, take 1 tablet by mouth ONCE NIGHTLY, Disp: 30 tablet, Rfl: 3    loratadine (CLARITIN) 10 MG tablet, take 1 tablet by mouth once daily, Disp: 30 tablet, Rfl: 3    venlafaxine (EFFEXOR-XR) 37.5 MG XR capsule, take 1 capsule by mouth once daily (Patient taking differently: take 1 capsule by mouth every evening), Disp: 30 capsule, Rfl: 3    BIOTIN PO,  Take 1 tablet by mouth daily , Disp: , Rfl:     lisinopril-hydrochlorothiazide (PRINZIDE) 20-12.5 MG per tablet, Take 1 tablet by mouth daily, Disp: 30 tablet, Rfl: 3    diphenhydrAMINE (BENADRYL) 25 MG tablet, Take 25 mg by mouth every 6 hours as needed for Itching, Disp: , Rfl:     metFORMIN (GLUCOPHAGE) 500 MG tablet, Take 1 tablet (500 mg) by mouth 3 times per day with meals, Disp: , Rfl: 1    acetaminophen (TYLENOL) 500 MG tablet, Take 500 mg by mouth as needed for Pain, Disp: , Rfl:     hydrOXYzine (ATARAX) 50 MG tablet, 50 mg every 6 hours as needed , Disp: , Rfl: 0    Blood Glucose Monitoring Suppl (TRUE METRIX METER) W/DEVICE KIT, TEST 1 TO 2 TIMES DAILY AS DIRECTED, Disp: , Rfl: 0    TRUE METRIX BLOOD GLUCOSE TEST strip, TEST 1 TO 2 TIMES BEFORE MEALS OR AS DIRECTED, Disp: , Rfl: 1    Lancets (BD LANCET ULTRAFINE 30G) MISC, USE 1 TO 2 TIMES A DAY, Disp: , Rfl: 1    Calcium Carbonate-Vitamin D (CALCIUM + D PO), Take by mouth, Disp: , Rfl:     busPIRone (BUSPAR) 10 MG tablet, Take 1 tablet by mouth 2 times daily.  (Patient taking differently: Take 10 mg by mouth 2

## 2018-11-20 ENCOUNTER — HOSPITAL ENCOUNTER (OUTPATIENT)
Dept: PAIN MANAGEMENT | Age: 63
Discharge: HOME | End: 2018-11-20
Payer: MEDICARE

## 2018-11-20 VITALS
BODY MASS INDEX: 38.1 KG/M2 | SYSTOLIC BLOOD PRESSURE: 118 MMHG | RESPIRATION RATE: 16 BRPM | WEIGHT: 189 LBS | HEIGHT: 59 IN | TEMPERATURE: 98.1 F | DIASTOLIC BLOOD PRESSURE: 78 MMHG | HEART RATE: 100 BPM | OXYGEN SATURATION: 98 %

## 2018-11-20 DIAGNOSIS — Z51.81 ENCOUNTER FOR MEDICATION MONITORING: ICD-10-CM

## 2018-11-20 DIAGNOSIS — M25.562 ARTHRALGIA OF BOTH LOWER LEGS: ICD-10-CM

## 2018-11-20 DIAGNOSIS — G89.29 CHRONIC BILATERAL LOW BACK PAIN WITHOUT SCIATICA: ICD-10-CM

## 2018-11-20 DIAGNOSIS — M25.561 CHRONIC PAIN OF BOTH KNEES: ICD-10-CM

## 2018-11-20 DIAGNOSIS — M17.0 PRIMARY OSTEOARTHRITIS OF BOTH KNEES: Primary | ICD-10-CM

## 2018-11-20 DIAGNOSIS — M25.562 CHRONIC PAIN OF BOTH KNEES: ICD-10-CM

## 2018-11-20 DIAGNOSIS — M25.561 ARTHRALGIA OF BOTH LOWER LEGS: ICD-10-CM

## 2018-11-20 DIAGNOSIS — G89.29 ENCOUNTER FOR CHRONIC PAIN MANAGEMENT: ICD-10-CM

## 2018-11-20 DIAGNOSIS — M54.50 CHRONIC BILATERAL LOW BACK PAIN WITHOUT SCIATICA: ICD-10-CM

## 2018-11-20 DIAGNOSIS — Z96.651 HX OF TOTAL KNEE ARTHROPLASTY, RIGHT: ICD-10-CM

## 2018-11-20 DIAGNOSIS — M79.604 PAIN OF RIGHT LOWER EXTREMITY: ICD-10-CM

## 2018-11-20 DIAGNOSIS — G89.29 CHRONIC PAIN OF BOTH KNEES: ICD-10-CM

## 2018-11-20 DIAGNOSIS — Z96.653 STATUS POST TOTAL BILATERAL KNEE REPLACEMENT: ICD-10-CM

## 2018-11-20 PROCEDURE — 99213 OFFICE O/P EST LOW 20 MIN: CPT

## 2018-11-20 PROCEDURE — 99213 OFFICE O/P EST LOW 20 MIN: CPT | Performed by: NURSE PRACTITIONER

## 2018-11-20 RX ORDER — TRIAMCINOLONE ACETONIDE 1 MG/G
CREAM TOPICAL
COMMUNITY
Start: 2018-11-15 | End: 2020-01-02

## 2018-11-20 RX ORDER — BUSPIRONE HYDROCHLORIDE 5 MG/1
TABLET ORAL
COMMUNITY
Start: 2018-11-15 | End: 2018-11-20

## 2018-11-20 RX ORDER — FLUTICASONE PROPIONATE 50 MCG
SPRAY, SUSPENSION (ML) NASAL
COMMUNITY
Start: 2018-10-30

## 2018-11-20 RX ORDER — LIDOCAINE 50 MG/G
OINTMENT TOPICAL
COMMUNITY
Start: 2018-11-13 | End: 2019-06-19

## 2018-11-20 RX ORDER — HYDROCODONE BITARTRATE AND ACETAMINOPHEN 5; 325 MG/1; MG/1
1 TABLET ORAL EVERY 8 HOURS PRN
Qty: 90 TABLET | Refills: 0 | Status: SHIPPED | OUTPATIENT
Start: 2018-11-23 | End: 2018-12-20 | Stop reason: SDUPTHER

## 2018-11-20 ASSESSMENT — ENCOUNTER SYMPTOMS
GASTROINTESTINAL NEGATIVE: 1
EYES NEGATIVE: 1

## 2018-11-20 NOTE — PROGRESS NOTES
side effects, risk of tolerance and or dependence and alternative treatments discussed    Obtaining appropriate analgesic effect of treatment   No signs of potential drug abuse or diversion identified    [x] Ill effects of being on chronic pain medications such as sleepdisturbances, respiratory depression, hormonal changes, withdrawal symptoms, chronic opioid dependence and tolerance as well as risk of taking opioids with Benzodiazepines and taking opioids along with alcohol,  werediscussed with patient. I had asked the patient to minimize medication use and utilize pain medications only for uncontrolled rest pain or pain with exertional activities. I advised patient not to self-escalate painmedications without consulting with us. At each of patient's future visits we will try to taper pain medications, while adjusting the adjunct medications, and re-evaluating for Physical Therapy to improve spinal andjoint strength. We will continue to have discussions to decrease pain medications as tolerated. Counseled patient on effects their pain medication and /or their medical condition mayhave on their  ability to drive or operate machinery. Instructed not to drive or operate machinery if drowsy     I also discussed with the patient regarding the dangers of combining narcotic pain medication with tranquilizers, alcohol or illegal drugs or taking the medication any way other thanprescribed. The dangers were discussed  including respiratory depression and death. Patient was told to tell  all  physicians regarding the medications he is getting from pain clinic. Patient is warned not to take any unprescribed medications over-the-countermedications that can depress breathing . Patient is advised to talk to the pharmacist or physicians if planning to take any over-the-counter medications before  takeing them.  Patient is strongly advised to avoidtranquilizers or  relaxants, illegal drugs  or any medications that can depress breathing  Patient is also advised to tell us if there is any changes in their medications from other physicians.         TREATMENT OPTIONS:     Return in 4 weeks  Medication Agreement Requirements Met  Continue Opioid therapy  Script written for  Hydrocodone left   Follow up appointment made

## 2018-12-20 ENCOUNTER — HOSPITAL ENCOUNTER (OUTPATIENT)
Dept: PAIN MANAGEMENT | Age: 63
Discharge: HOME | End: 2018-12-20
Payer: MEDICARE

## 2018-12-20 VITALS
HEIGHT: 59 IN | SYSTOLIC BLOOD PRESSURE: 126 MMHG | BODY MASS INDEX: 38.1 KG/M2 | DIASTOLIC BLOOD PRESSURE: 75 MMHG | OXYGEN SATURATION: 98 % | RESPIRATION RATE: 16 BRPM | TEMPERATURE: 98.7 F | WEIGHT: 189 LBS | HEART RATE: 99 BPM

## 2018-12-20 DIAGNOSIS — M25.561 CHRONIC PAIN OF BOTH KNEES: ICD-10-CM

## 2018-12-20 DIAGNOSIS — Z96.653 STATUS POST TOTAL BILATERAL KNEE REPLACEMENT: ICD-10-CM

## 2018-12-20 DIAGNOSIS — G89.29 ENCOUNTER FOR CHRONIC PAIN MANAGEMENT: ICD-10-CM

## 2018-12-20 DIAGNOSIS — Z51.81 ENCOUNTER FOR MEDICATION MONITORING: ICD-10-CM

## 2018-12-20 DIAGNOSIS — G89.29 CHRONIC BILATERAL LOW BACK PAIN WITHOUT SCIATICA: Primary | ICD-10-CM

## 2018-12-20 DIAGNOSIS — M54.50 CHRONIC BILATERAL LOW BACK PAIN WITHOUT SCIATICA: Primary | ICD-10-CM

## 2018-12-20 DIAGNOSIS — G89.29 CHRONIC PAIN OF BOTH KNEES: ICD-10-CM

## 2018-12-20 DIAGNOSIS — M17.0 PRIMARY OSTEOARTHRITIS OF BOTH KNEES: ICD-10-CM

## 2018-12-20 DIAGNOSIS — M25.562 CHRONIC PAIN OF BOTH KNEES: ICD-10-CM

## 2018-12-20 PROCEDURE — 99213 OFFICE O/P EST LOW 20 MIN: CPT

## 2018-12-20 PROCEDURE — 99214 OFFICE O/P EST MOD 30 MIN: CPT | Performed by: PAIN MEDICINE

## 2018-12-20 RX ORDER — HYDROCODONE BITARTRATE AND ACETAMINOPHEN 5; 325 MG/1; MG/1
1 TABLET ORAL EVERY 8 HOURS PRN
Qty: 90 TABLET | Refills: 0 | Status: SHIPPED | OUTPATIENT
Start: 2018-12-23 | End: 2019-01-21 | Stop reason: SDUPTHER

## 2018-12-20 ASSESSMENT — ENCOUNTER SYMPTOMS
EYE PAIN: 0
COUGH: 0
RESPIRATORY NEGATIVE: 1
VOMITING: 0
PHOTOPHOBIA: 0
EYE DISCHARGE: 0
ABDOMINAL PAIN: 0
GASTROINTESTINAL NEGATIVE: 1
NAUSEA: 0
BACK PAIN: 1
DIARRHEA: 0
ALLERGIC/IMMUNOLOGIC NEGATIVE: 1
EYES NEGATIVE: 1
SHORTNESS OF BREATH: 0
BLOOD IN STOOL: 0

## 2018-12-20 ASSESSMENT — PAIN DESCRIPTION - LOCATION: LOCATION: KNEE

## 2018-12-20 ASSESSMENT — PAIN DESCRIPTION - DESCRIPTORS: DESCRIPTORS: SHARP;CONSTANT;BURNING

## 2018-12-20 ASSESSMENT — PAIN SCALES - GENERAL: PAINLEVEL_OUTOF10: 6

## 2018-12-20 ASSESSMENT — PAIN DESCRIPTION - ORIENTATION: ORIENTATION: LEFT

## 2018-12-20 ASSESSMENT — PAIN DESCRIPTION - PAIN TYPE: TYPE: CHRONIC PAIN

## 2018-12-20 ASSESSMENT — PAIN DESCRIPTION - PROGRESSION: CLINICAL_PROGRESSION: GRADUALLY WORSENING

## 2018-12-20 ASSESSMENT — PAIN DESCRIPTION - FREQUENCY: FREQUENCY: CONTINUOUS

## 2018-12-20 ASSESSMENT — PAIN DESCRIPTION - ONSET: ONSET: ON-GOING

## 2018-12-20 NOTE — PROGRESS NOTES
98%   BMI 38.17 kg/m² , Body mass index is 38.17 kg/m². Physical Exam   Constitutional: She is oriented to person, place, and time. She appears well-developed and well-nourished. HENT:   Head: Normocephalic and atraumatic. Eyes: Pupils are equal, round, and reactive to light. Conjunctivae are normal.   Neck: Normal range of motion. Neck supple. No tracheal deviation present. No thyromegaly present. Cardiovascular: Normal rate and regular rhythm. Pulmonary/Chest: Effort normal. No respiratory distress. Abdominal: She exhibits no distension. Musculoskeletal: Normal range of motion. She exhibits no edema. Right knee: She exhibits no effusion. Left knee: She exhibits no effusion. Neurological: She is alert and oriented to person, place, and time. She has normal strength. She displays no atrophy, no tremor and normal reflexes. No cranial nerve deficit or sensory deficit. She exhibits normal muscle tone. She displays a negative Romberg sign. Gait abnormal. Coordination normal.   Skin: Skin is warm and dry. No erythema. Psychiatric: She has a normal mood and affect. Her speech is normal and behavior is normal. Judgment normal. Cognition and memory are normal.    Right Knee Exam     Tenderness   The patient is experiencing tenderness in the lateral joint line and medial joint line. Range of Motion   Extension: normal   Flexion: normal     Muscle Strength     The patient has normal right knee strength. Other   Scars: present  Sensation: normal  Pulse: present  Swelling: none  Other tests: no effusion present      Left Knee Exam     Tenderness   The patient is experiencing tenderness in the lateral joint line and medial joint line. Range of Motion   Left knee extension: Painful and limited. Left knee flexion: Painful and limited. Muscle Strength     The patient has normal left knee strength.     Other   Erythema: absent  Scars: present  Sensation: normal  Pulse:

## 2019-01-21 ENCOUNTER — HOSPITAL ENCOUNTER (OUTPATIENT)
Dept: PAIN MANAGEMENT | Age: 64
Discharge: HOME | End: 2019-01-21
Payer: MEDICARE

## 2019-01-21 VITALS
HEIGHT: 59 IN | WEIGHT: 189 LBS | DIASTOLIC BLOOD PRESSURE: 71 MMHG | TEMPERATURE: 99.1 F | OXYGEN SATURATION: 94 % | SYSTOLIC BLOOD PRESSURE: 120 MMHG | HEART RATE: 94 BPM | RESPIRATION RATE: 16 BRPM | BODY MASS INDEX: 38.1 KG/M2

## 2019-01-21 DIAGNOSIS — M79.604 PAIN OF RIGHT LOWER EXTREMITY: ICD-10-CM

## 2019-01-21 DIAGNOSIS — G89.29 CHRONIC PAIN OF BOTH KNEES: ICD-10-CM

## 2019-01-21 DIAGNOSIS — G89.29 CHRONIC BILATERAL LOW BACK PAIN WITHOUT SCIATICA: ICD-10-CM

## 2019-01-21 DIAGNOSIS — F11.90 CHRONIC, CONTINUOUS USE OF OPIOIDS: ICD-10-CM

## 2019-01-21 DIAGNOSIS — M25.562 CHRONIC PAIN OF BOTH KNEES: ICD-10-CM

## 2019-01-21 DIAGNOSIS — G89.29 ENCOUNTER FOR CHRONIC PAIN MANAGEMENT: ICD-10-CM

## 2019-01-21 DIAGNOSIS — M17.0 PRIMARY OSTEOARTHRITIS OF BOTH KNEES: Primary | ICD-10-CM

## 2019-01-21 DIAGNOSIS — M25.561 CHRONIC PAIN OF BOTH KNEES: ICD-10-CM

## 2019-01-21 DIAGNOSIS — Z96.651 HX OF TOTAL KNEE ARTHROPLASTY, RIGHT: ICD-10-CM

## 2019-01-21 DIAGNOSIS — Z96.653 STATUS POST TOTAL BILATERAL KNEE REPLACEMENT: ICD-10-CM

## 2019-01-21 DIAGNOSIS — M54.50 CHRONIC BILATERAL LOW BACK PAIN WITHOUT SCIATICA: ICD-10-CM

## 2019-01-21 PROCEDURE — 99213 OFFICE O/P EST LOW 20 MIN: CPT | Performed by: NURSE PRACTITIONER

## 2019-01-21 PROCEDURE — 99213 OFFICE O/P EST LOW 20 MIN: CPT

## 2019-01-21 RX ORDER — HYDROCODONE BITARTRATE AND ACETAMINOPHEN 5; 325 MG/1; MG/1
1 TABLET ORAL EVERY 8 HOURS PRN
Qty: 90 TABLET | Refills: 0 | Status: SHIPPED | OUTPATIENT
Start: 2019-01-22 | End: 2019-02-18 | Stop reason: SDUPTHER

## 2019-01-21 ASSESSMENT — ENCOUNTER SYMPTOMS
ROS SKIN COMMENTS: LEFT SIDE
EYES NEGATIVE: 1
GASTROINTESTINAL NEGATIVE: 1
COUGH: 1

## 2019-02-18 ENCOUNTER — HOSPITAL ENCOUNTER (OUTPATIENT)
Dept: PAIN MANAGEMENT | Age: 64
Discharge: HOME | End: 2019-02-18
Payer: MEDICARE

## 2019-02-18 VITALS
SYSTOLIC BLOOD PRESSURE: 134 MMHG | WEIGHT: 197 LBS | HEIGHT: 59 IN | DIASTOLIC BLOOD PRESSURE: 81 MMHG | HEART RATE: 88 BPM | OXYGEN SATURATION: 97 % | RESPIRATION RATE: 16 BRPM | BODY MASS INDEX: 39.72 KG/M2

## 2019-02-18 DIAGNOSIS — F11.90 CHRONIC, CONTINUOUS USE OF OPIOIDS: ICD-10-CM

## 2019-02-18 DIAGNOSIS — G89.29 ENCOUNTER FOR CHRONIC PAIN MANAGEMENT: ICD-10-CM

## 2019-02-18 DIAGNOSIS — M25.561 CHRONIC PAIN OF BOTH KNEES: ICD-10-CM

## 2019-02-18 DIAGNOSIS — M79.604 PAIN OF RIGHT LOWER EXTREMITY: ICD-10-CM

## 2019-02-18 DIAGNOSIS — Z96.653 STATUS POST TOTAL BILATERAL KNEE REPLACEMENT: ICD-10-CM

## 2019-02-18 DIAGNOSIS — Z51.81 ENCOUNTER FOR MEDICATION MONITORING: ICD-10-CM

## 2019-02-18 DIAGNOSIS — M17.0 PRIMARY OSTEOARTHRITIS OF BOTH KNEES: Primary | ICD-10-CM

## 2019-02-18 DIAGNOSIS — Z96.651 HX OF TOTAL KNEE ARTHROPLASTY, RIGHT: ICD-10-CM

## 2019-02-18 DIAGNOSIS — M54.50 CHRONIC BILATERAL LOW BACK PAIN WITHOUT SCIATICA: ICD-10-CM

## 2019-02-18 DIAGNOSIS — M25.562 CHRONIC PAIN OF BOTH KNEES: ICD-10-CM

## 2019-02-18 DIAGNOSIS — G89.29 CHRONIC PAIN OF BOTH KNEES: ICD-10-CM

## 2019-02-18 DIAGNOSIS — M25.562 ARTHRALGIA OF BOTH LOWER LEGS: ICD-10-CM

## 2019-02-18 DIAGNOSIS — Z96.652 STATUS POST TOTAL LEFT KNEE REPLACEMENT: ICD-10-CM

## 2019-02-18 DIAGNOSIS — G89.29 CHRONIC BILATERAL LOW BACK PAIN WITHOUT SCIATICA: ICD-10-CM

## 2019-02-18 DIAGNOSIS — M25.561 ARTHRALGIA OF BOTH LOWER LEGS: ICD-10-CM

## 2019-02-18 PROCEDURE — 99213 OFFICE O/P EST LOW 20 MIN: CPT

## 2019-02-18 PROCEDURE — 99213 OFFICE O/P EST LOW 20 MIN: CPT | Performed by: NURSE PRACTITIONER

## 2019-02-18 RX ORDER — HYDROCODONE BITARTRATE AND ACETAMINOPHEN 5; 325 MG/1; MG/1
1 TABLET ORAL EVERY 8 HOURS PRN
Qty: 90 TABLET | Refills: 0 | Status: SHIPPED | OUTPATIENT
Start: 2019-02-22 | End: 2019-03-20 | Stop reason: SDUPTHER

## 2019-02-18 ASSESSMENT — ENCOUNTER SYMPTOMS
COUGH: 1
EYES NEGATIVE: 1
GASTROINTESTINAL NEGATIVE: 1

## 2019-02-20 ENCOUNTER — TELEPHONE (OUTPATIENT)
Dept: UROLOGY | Age: 64
End: 2019-02-20

## 2019-02-25 ENCOUNTER — OFFICE VISIT (OUTPATIENT)
Dept: UROLOGY | Age: 64
End: 2019-02-25
Payer: MEDICARE

## 2019-02-25 VITALS
BODY MASS INDEX: 40.96 KG/M2 | HEIGHT: 59 IN | WEIGHT: 203.2 LBS | HEART RATE: 85 BPM | DIASTOLIC BLOOD PRESSURE: 69 MMHG | SYSTOLIC BLOOD PRESSURE: 113 MMHG | TEMPERATURE: 98.3 F

## 2019-02-25 DIAGNOSIS — N39.41 URGE INCONTINENCE: ICD-10-CM

## 2019-02-25 DIAGNOSIS — R35.0 URINARY FREQUENCY: ICD-10-CM

## 2019-02-25 DIAGNOSIS — R39.15 URGENCY OF URINATION: Primary | ICD-10-CM

## 2019-02-25 DIAGNOSIS — R35.1 NOCTURIA: ICD-10-CM

## 2019-02-25 PROCEDURE — 4040F PNEUMOC VAC/ADMIN/RCVD: CPT | Performed by: UROLOGY

## 2019-02-25 PROCEDURE — 99204 OFFICE O/P NEW MOD 45 MIN: CPT | Performed by: UROLOGY

## 2019-02-25 PROCEDURE — 0509F URINE INCON PLAN DOCD: CPT | Performed by: UROLOGY

## 2019-02-25 PROCEDURE — 1101F PT FALLS ASSESS-DOCD LE1/YR: CPT | Performed by: UROLOGY

## 2019-02-25 PROCEDURE — G8417 CALC BMI ABV UP PARAM F/U: HCPCS | Performed by: UROLOGY

## 2019-02-25 PROCEDURE — 1090F PRES/ABSN URINE INCON ASSESS: CPT | Performed by: UROLOGY

## 2019-02-25 PROCEDURE — G8484 FLU IMMUNIZE NO ADMIN: HCPCS | Performed by: UROLOGY

## 2019-02-25 PROCEDURE — G8427 DOCREV CUR MEDS BY ELIG CLIN: HCPCS | Performed by: UROLOGY

## 2019-02-25 PROCEDURE — 1036F TOBACCO NON-USER: CPT | Performed by: UROLOGY

## 2019-02-25 PROCEDURE — G8399 PT W/DXA RESULTS DOCUMENT: HCPCS | Performed by: UROLOGY

## 2019-02-25 PROCEDURE — 3017F COLORECTAL CA SCREEN DOC REV: CPT | Performed by: UROLOGY

## 2019-02-25 PROCEDURE — 1123F ACP DISCUSS/DSCN MKR DOCD: CPT | Performed by: UROLOGY

## 2019-02-25 RX ORDER — MIRABEGRON 50 MG/1
50 TABLET, FILM COATED, EXTENDED RELEASE ORAL DAILY
Qty: 30 TABLET | Refills: 11 | Status: SHIPPED | OUTPATIENT
Start: 2019-02-25 | End: 2019-05-24

## 2019-02-25 ASSESSMENT — ENCOUNTER SYMPTOMS
VOMITING: 0
WHEEZING: 1
BACK PAIN: 1
NAUSEA: 0
COUGH: 1
ABDOMINAL PAIN: 1
EYE REDNESS: 0
DIARRHEA: 0
CONSTIPATION: 0
EYE PAIN: 0
SHORTNESS OF BREATH: 0

## 2019-03-20 ENCOUNTER — HOSPITAL ENCOUNTER (OUTPATIENT)
Dept: PAIN MANAGEMENT | Age: 64
Discharge: HOME | End: 2019-03-20
Payer: MEDICARE

## 2019-03-20 VITALS
SYSTOLIC BLOOD PRESSURE: 110 MMHG | HEIGHT: 59 IN | DIASTOLIC BLOOD PRESSURE: 69 MMHG | RESPIRATION RATE: 16 BRPM | HEART RATE: 96 BPM | BODY MASS INDEX: 39.51 KG/M2 | WEIGHT: 196 LBS | TEMPERATURE: 98.2 F

## 2019-03-20 DIAGNOSIS — M54.50 CHRONIC BILATERAL LOW BACK PAIN WITHOUT SCIATICA: ICD-10-CM

## 2019-03-20 DIAGNOSIS — M79.604 PAIN OF RIGHT LOWER EXTREMITY: ICD-10-CM

## 2019-03-20 DIAGNOSIS — Z96.651 HX OF TOTAL KNEE ARTHROPLASTY, RIGHT: ICD-10-CM

## 2019-03-20 DIAGNOSIS — M25.561 CHRONIC PAIN OF BOTH KNEES: ICD-10-CM

## 2019-03-20 DIAGNOSIS — G89.29 ENCOUNTER FOR CHRONIC PAIN MANAGEMENT: ICD-10-CM

## 2019-03-20 DIAGNOSIS — G89.29 CHRONIC PAIN OF BOTH KNEES: ICD-10-CM

## 2019-03-20 DIAGNOSIS — F11.90 CHRONIC, CONTINUOUS USE OF OPIOIDS: Primary | ICD-10-CM

## 2019-03-20 DIAGNOSIS — Z51.81 ENCOUNTER FOR MEDICATION MONITORING: ICD-10-CM

## 2019-03-20 DIAGNOSIS — Z96.652 STATUS POST TOTAL LEFT KNEE REPLACEMENT: ICD-10-CM

## 2019-03-20 DIAGNOSIS — Z96.653 STATUS POST TOTAL BILATERAL KNEE REPLACEMENT: ICD-10-CM

## 2019-03-20 DIAGNOSIS — M17.0 PRIMARY OSTEOARTHRITIS OF BOTH KNEES: ICD-10-CM

## 2019-03-20 DIAGNOSIS — M25.562 CHRONIC PAIN OF BOTH KNEES: ICD-10-CM

## 2019-03-20 DIAGNOSIS — G89.29 CHRONIC BILATERAL LOW BACK PAIN WITHOUT SCIATICA: ICD-10-CM

## 2019-03-20 PROCEDURE — 99213 OFFICE O/P EST LOW 20 MIN: CPT

## 2019-03-20 RX ORDER — HYDROCODONE BITARTRATE AND ACETAMINOPHEN 5; 325 MG/1; MG/1
1 TABLET ORAL EVERY 8 HOURS PRN
Qty: 90 TABLET | Refills: 0 | Status: SHIPPED | OUTPATIENT
Start: 2019-03-28 | End: 2019-04-25 | Stop reason: SDUPTHER

## 2019-03-20 ASSESSMENT — ENCOUNTER SYMPTOMS
EYES NEGATIVE: 1
BACK PAIN: 1
RESPIRATORY NEGATIVE: 1

## 2019-04-25 ENCOUNTER — HOSPITAL ENCOUNTER (OUTPATIENT)
Dept: PAIN MANAGEMENT | Age: 64
Discharge: HOME | End: 2019-04-25
Payer: COMMERCIAL

## 2019-04-25 VITALS
HEIGHT: 59 IN | TEMPERATURE: 98.6 F | RESPIRATION RATE: 16 BRPM | HEART RATE: 93 BPM | OXYGEN SATURATION: 97 % | SYSTOLIC BLOOD PRESSURE: 129 MMHG | BODY MASS INDEX: 38.91 KG/M2 | WEIGHT: 193 LBS | DIASTOLIC BLOOD PRESSURE: 77 MMHG

## 2019-04-25 DIAGNOSIS — Z96.651 HX OF TOTAL KNEE ARTHROPLASTY, RIGHT: ICD-10-CM

## 2019-04-25 DIAGNOSIS — M25.562 CHRONIC PAIN OF BOTH KNEES: ICD-10-CM

## 2019-04-25 DIAGNOSIS — M79.604 PAIN OF RIGHT LOWER EXTREMITY: ICD-10-CM

## 2019-04-25 DIAGNOSIS — G89.29 CHRONIC BILATERAL LOW BACK PAIN WITHOUT SCIATICA: ICD-10-CM

## 2019-04-25 DIAGNOSIS — M25.561 CHRONIC PAIN OF BOTH KNEES: ICD-10-CM

## 2019-04-25 DIAGNOSIS — M17.0 PRIMARY OSTEOARTHRITIS OF BOTH KNEES: ICD-10-CM

## 2019-04-25 DIAGNOSIS — Z96.653 STATUS POST TOTAL BILATERAL KNEE REPLACEMENT: ICD-10-CM

## 2019-04-25 DIAGNOSIS — G89.29 ENCOUNTER FOR CHRONIC PAIN MANAGEMENT: ICD-10-CM

## 2019-04-25 DIAGNOSIS — G89.29 CHRONIC PAIN OF BOTH KNEES: ICD-10-CM

## 2019-04-25 DIAGNOSIS — Z51.81 ENCOUNTER FOR MEDICATION MONITORING: ICD-10-CM

## 2019-04-25 DIAGNOSIS — F11.90 CHRONIC, CONTINUOUS USE OF OPIOIDS: Primary | ICD-10-CM

## 2019-04-25 DIAGNOSIS — M25.561 ARTHRALGIA OF BOTH LOWER LEGS: Chronic | ICD-10-CM

## 2019-04-25 DIAGNOSIS — Z96.652 STATUS POST TOTAL LEFT KNEE REPLACEMENT: ICD-10-CM

## 2019-04-25 DIAGNOSIS — M54.50 CHRONIC BILATERAL LOW BACK PAIN WITHOUT SCIATICA: ICD-10-CM

## 2019-04-25 DIAGNOSIS — M25.562 ARTHRALGIA OF BOTH LOWER LEGS: Chronic | ICD-10-CM

## 2019-04-25 PROCEDURE — 99213 OFFICE O/P EST LOW 20 MIN: CPT | Performed by: NURSE PRACTITIONER

## 2019-04-25 PROCEDURE — 99213 OFFICE O/P EST LOW 20 MIN: CPT

## 2019-04-25 RX ORDER — HYDROCODONE BITARTRATE AND ACETAMINOPHEN 5; 325 MG/1; MG/1
1 TABLET ORAL EVERY 8 HOURS PRN
Qty: 90 TABLET | Refills: 0 | Status: SHIPPED | OUTPATIENT
Start: 2019-04-28 | End: 2019-05-24 | Stop reason: SDUPTHER

## 2019-04-25 ASSESSMENT — ENCOUNTER SYMPTOMS
EYES NEGATIVE: 1
RESPIRATORY NEGATIVE: 1
GASTROINTESTINAL NEGATIVE: 1

## 2019-04-25 NOTE — PROGRESS NOTES
[facility] PROGRESS NOTE      Patient here today to review Medication Agreement    Chief Complaint:  Left knee pain      HPI: She c/o left knee pain. History of left knee replacements. States recently it popped when she was turning over in bed. Pain is unchanged. She had recent left foot surgery. States left foot is swollen. Sleep is good. No Ed visits. Knee Pain    The pain is present in the left knee. Quality: sharp. The pain is at a severity of 5/10. The pain is moderate. The pain has been constant since onset. Foreign body present: knee replaced. The symptoms are aggravated by weight bearing. She has tried ice, heat and elevation for the symptoms. The treatment provided mild relief. Patient denies any new neurological symptoms. No bowel or bladder incontinence, no weakness, and no falling.           Treatment goals:  Functional status:  Be more active     Aberrancy:   Any alcoholic beverages  no     Any illegal drugs     no     Analgesia:pain 5      Adverse  Effects :BM daily     ADL;s :activity limited, can not stand long                Pill count: appropriate    Morphine equivalent dose as reported on OARRS:15  Attestation: The Prescription Monitoring Report for this patient was reviewed today. DARREL Armas CNP)  Chronic Pain Routine Monitoring: No signs of potential drug abuse or diversion identified: otherwise, see note documentation, Obtaining appropriate analgesic effect of treatment. (DARREL Armas CNP)  Chronic Pain > 80 MEDD: Obtained or confirmed a written medication contract was on file. DARREL Armas CNP)  Review ofOARRS does not show any aberrant prescription behavior. Medication is helping the patient stay active. Patient denies any side effects and reports adequate analgesia. No sign of misuse/abuse.         When was thelast UDS:    9-20-18         Was the UDS appropriate:yes      Record/Diagnostics Review:      As above, I did review the imaging    UDT 9-20-18 Appropriate: yes  9/23/2018  7:26 PM - Kenneth, [facility] Incoming Lab Results From [email or web address]      Component Value Ref Range & Units Status Collected Lab   Pain Management Drug Panel Interp, Urine Consistent    Final 09/20/2018  2:10 PM [facility]   (NOTE)   ________________________________________________________________   DRUGS EXPECTED:   HYDROCODONE [9/20/18]   ________________________________________________________________   CONSISTENT with medications provided:   HYDROCODONE : based on hydrocodone, norhydrocodone   ________________________________________________________________   INTERPRETIVE INFORMATION: Pain Mgt [place], Mass Spec/EMIT, Ur,                            Interp   Interpretation depends on accuracy and completeness of patient   medication information submitted by client. 6-Acetylmorphine, Ur Not Detected    Final 09/20/2018  2:10 PM [facility]   7-Aminoclonazepam, Urine Not Detected    Final 09/20/2018  2:10 PM [facility]   Alpha-OH-Alpraz, Urine Not Detected    Final 09/20/2018  2:10 PM [facility]   Alprazolam, Urine Not Detected    Final 09/20/2018  2:10 PM [facility]   Amphetamines, urine Not Detected    Final 09/20/2018  2:10 PM [facility]   Barbiturates, Ur Not Detected    Final 09/20/2018  2:10 PM [facility]   Benzoylecgonine, Ur Not Detected    Final 09/20/2018  2:10 PM [facility]   Buprenorphine Urine Not Detected    Final 09/20/2018  2:10 PM [facility]   Carisoprodol, Ur Not Detected    Final 09/20/2018  2:10 PM [facility]   (NOTE)   The carisoprodol immunoassay has cross-reactivity to carisoprodol   and meprobamate.     Clonazepam, Urine Not Detected    Final 09/20/2018  2:10 PM [facility]   Codeine, Urine Not Detected    Final 09/20/2018  2:10 PM [facility]   MDA, Ur Not Detected    Final 09/20/2018  2:10 PM [facility]   Diazepam, Urine Not Detected    Final 09/20/2018  2:10 PM [facility]   Ethyl Glucuronide Ur Not Detected    Final 09/20/2018  2:10 PM [facility]   Fentanyl, Ur Not Detected    Final 09/20/2018  2:10 PM [facility]   Hydrocodone, Urine Present    Final 09/20/2018  2:10 PM [facility]   Hydromorphone, Urine Not Detected    Final 09/20/2018  2:10 PM [facility]   Lorazepam, Urine Not Detected    Final 09/20/2018  2:10 PM [facility]   Marijuana Metab, Ur Not Detected    Final 09/20/2018  2:10 PM [facility]   MDEA, LEANA, Ur Not Detected    Final 09/20/2018  2:10 PM [facility]   MDMA, Urine Not Detected    Final 09/20/2018  2:10 PM [facility]   Meperidine Metab, Ur Not Detected    Final 09/20/2018  2:10 PM [facility]   Methadone, Urine Not Detected    Final 09/20/2018  2:10 PM [facility]   Methamphetamine, Urine Not Detected    Final 09/20/2018  2:10 PM [facility]   Methylphenidate Not Detected    Final 09/20/2018  2:10 PM [facility]   Midazolam, Urine Not Detected    Final 09/20/2018  2:10 PM [facility]   Morphine Urine Not Detected    Final 09/20/2018  2:10 PM [facility]   Norbuprenorphine, Urine Not Detected    Final 09/20/2018  2:10 PM [facility]   Nordiazepam, Urine Not Detected    Final 09/20/2018  2:10 PM [facility]   Norfentanyl, Urine Not Detected    Final 09/20/2018  2:10 PM [facility]   NORHYDROCODONE, URINE Present    Final 09/20/2018  2:10 PM [facility]   Noroxycodone, Urine Not Detected    Final 09/20/2018  2:10 PM [facility]   NOROXYMORPHONE, URINE Not Detected    Final 09/20/2018  2:10 PM [facility]   Oxazepam, Urine Not Detected    Final 09/20/2018  2:10 PM [facility]   Oxycodone Urine Not Detected    Final 09/20/2018  2:10 PM [facility]   Oxymorphone, Urine Not Detected    Final 09/20/2018  2:10 PM [facility]   PCP, Urine Not Detected    Final 09/20/2018  2:10 PM [facility]   Phentermine, Ur Not Detected    Final 09/20/2018  2:10 PM [facility]   Propoxyphene, Urine Not Detected    Final 09/20/2018  2:10 PM [facility]   Tapentadol-O-Sulfate, Urine Not Detected    Final 09/20/2018  2:10 PM [facility]   Tapentadol, Urine Not Detected    Final 09/20/2018  2:10 PM [facility]   Temazepam, Urine Not Detected    Final 09/20/2018  2:10 PM [facility]   Tramadol, Urine Not Detected    Final 09/20/2018  2:10 PM [facility]   Zolpidem, Urine Not Detected    Final 09/20/2018  2:10 PM [facility]   Drugs Expected, Ur     Final 09/20/2018 30G) MISC, USE 1 TO 2 TIMES A DAY, Disp: , Rfl: 1    Calcium Carbonate-Vitamin D (CALCIUM + D PO), Take by mouth, Disp: , Rfl:     busPIRone (BUSPAR) 10 MG tablet, Take 1 tablet by mouth 2 times daily. (Patient taking differently: Take 10 mg by mouth 2 times daily as needed ), Disp: 60 tablet, Rfl: 3    Family History   Problem Relation Age of Onset    Stroke Father     Emphysema Father     Diabetes Mother     Heart Failure Mother     Osteoarthritis Mother        Social History     Socioeconomic History    Marital status:      Spouse name: Not on file    Number of children: Not on file    Years of education: Not on file    Highest education level: Not on file   Occupational History    Occupation: disability   Social Needs    Financial resource strain: Not on file    Food insecurity:     Worry: Not on file     Inability: Not on file    Transportation needs:     Medical: Not on file     Non-medical: Not on file   Tobacco Use    Smoking status: Never Smoker    Smokeless tobacco: Never Used   Substance and Sexual Activity    Alcohol use: No     Alcohol/week: 0.0 oz    Drug use: No    Sexual activity: Not Currently   Lifestyle    Physical activity:     Days per week: Not on file     Minutes per session: Not on file    Stress: Not on file   Relationships    Social connections:     Talks on phone: Not on file     Gets together: Not on file     Attends Latter day service: Not on file     Active member of club or organization: Not on file     Attends meetings of clubs or organizations: Not on file     Relationship status: Not on file    Intimate partner violence:     Fear of current or ex partner: Not on file     Emotionally abused: Not on file     Physically abused: Not on file     Forced sexual activity: Not on file   Other Topics Concern    Not on file   Social History Narrative    Not on file       Review of Systems:  Review of Systems   Constitution: Negative.    HENT: Positive for hearing loss.    Eyes: Negative. Cardiovascular: Positive for leg swelling. Respiratory: Negative. Hematologic/Lymphatic: Negative. Skin: Positive for rash. Musculoskeletal: Positive for joint pain. Gastrointestinal: Negative. Genitourinary: Positive for nocturia. Neurological: Negative. Physical Exam:  /77   Pulse 93   Temp 98.6 °F (37 °C) (Oral)   Resp 16   Ht 4' 11\" (1.499 m)   Wt 193 lb (87.5 kg)   SpO2 97%   BMI 38.98 kg/m²     Physical Exam   Constitutional: She appears well-developed. obese   HENT:   Head: Normocephalic. Neck: Normal range of motion. Neck supple. Pulmonary/Chest: Effort normal.   Musculoskeletal:        Left knee: Tenderness found. Medial joint line and lateral joint line tenderness noted. Left foot: There is tenderness. Neurological: She is alert. Reflex Scores:       Patellar reflexes are 0 on the right side and 0 on the left side. Achilles reflexes are 2+ on the right side. Skin: Skin is warm, dry and intact. Lesion noted. Psychiatric: She has a normal mood and affect.  Her speech is normal and behavior is normal. Judgment normal. Cognition and memory are normal.         Assessment:    Problem List Items Addressed This Visit     S/P total knee arthroplasty    Relevant Medications    HYDROcodone-acetaminophen (Odetta Bare) 5-325 MG per tablet (Start on 4/28/2019)    Primary osteoarthritis of both knees    Relevant Medications    HYDROcodone-acetaminophen (Odetta Bare) 5-325 MG per tablet (Start on 4/28/2019)    Pain in lower limb    Hx of total knee arthroplasty, right    Relevant Medications    HYDROcodone-acetaminophen (NORCO) 5-325 MG per tablet (Start on 4/28/2019)    Encounter for medication monitoring    Encounter for chronic pain management    Chronic, continuous use of opioids - Primary    Chronic pain of both knees    Relevant Medications    HYDROcodone-acetaminophen (NORCO) 5-325 MG per tablet (Start on 4/28/2019)    Chronic bilateral low back pain without sciatica    Relevant Medications    HYDROcodone-acetaminophen (NORCO) 5-325 MG per tablet (Start on 4/28/2019)    Arthralgia of both lower legs (Chronic)            Treatment Plan:  DISCUSSION: Treatment options discussed withpatient and all questions answered to patient's satisfaction. Possible side effects, risk of tolerance and or dependence and alternative treatments discussed    Obtaining appropriate analgesic effect of treatment   No signs of potential drug abuse or diversion identified    [x] Ill effects of being on chronic pain medications such as sleep disturbances, respiratory depression, hormonal changes, withdrawal symptoms, chronic opioid dependence and tolerance as well as risk of taking opioids with Benzodiazepines and taking opioids along with alcohol,  werediscussed with patient. I had asked the patient to minimize medication use and utilize pain medications only for uncontrolled rest pain or pain with exertional activities. I advised patient not to self-escalate painmedications without consulting with us. At each of patient's future visits we will try to taper pain medications, while adjusting the adjunct medications, and re-evaluating for Physical Therapy to improve spinal andjoint strength. We will continue to have discussions to decrease pain medications as tolerated. Counseled patient on effects their pain medication and /or their medical condition mayhave on their  ability to drive or operate machinery. Instructed not to drive or operate machinery if drowsy     I also discussed with the patient regarding the dangers of combining narcotic pain medication with tranquilizers, alcohol or illegal drugs or taking the medication any way other than prescribed. The dangers were discussed  including respiratory depression and death. Patient was told to tell  all  physicians regarding the medications he is getting from pain clinic.  Patient is warned not to take

## 2019-04-29 ENCOUNTER — OFFICE VISIT (OUTPATIENT)
Dept: UROLOGY | Age: 64
End: 2019-04-29
Payer: COMMERCIAL

## 2019-04-29 VITALS
HEART RATE: 100 BPM | SYSTOLIC BLOOD PRESSURE: 124 MMHG | WEIGHT: 200.2 LBS | HEIGHT: 59 IN | TEMPERATURE: 98.3 F | DIASTOLIC BLOOD PRESSURE: 73 MMHG | BODY MASS INDEX: 40.36 KG/M2

## 2019-04-29 DIAGNOSIS — R35.0 URINARY FREQUENCY: ICD-10-CM

## 2019-04-29 DIAGNOSIS — R35.1 NOCTURIA: ICD-10-CM

## 2019-04-29 DIAGNOSIS — N39.41 URGE INCONTINENCE: ICD-10-CM

## 2019-04-29 DIAGNOSIS — R30.0 DYSURIA: Primary | ICD-10-CM

## 2019-04-29 DIAGNOSIS — R39.15 URGENCY OF URINATION: ICD-10-CM

## 2019-04-29 PROCEDURE — 99214 OFFICE O/P EST MOD 30 MIN: CPT | Performed by: UROLOGY

## 2019-04-29 PROCEDURE — 81002 URINALYSIS NONAUTO W/O SCOPE: CPT | Performed by: UROLOGY

## 2019-04-29 ASSESSMENT — ENCOUNTER SYMPTOMS
NAUSEA: 0
BACK PAIN: 0
EYE PAIN: 0
SHORTNESS OF BREATH: 0
VOMITING: 0
EYE REDNESS: 0
CONSTIPATION: 0
WHEEZING: 0
DIARRHEA: 0
COUGH: 0
ABDOMINAL PAIN: 0

## 2019-04-29 NOTE — PROGRESS NOTES
Review of Systems   Constitutional: Positive for chills. Negative for appetite change and fever. Eyes: Negative for pain, redness and visual disturbance. Respiratory: Negative for cough, shortness of breath and wheezing. Cardiovascular: Negative for chest pain and leg swelling. Gastrointestinal: Negative for abdominal pain, constipation, diarrhea, nausea and vomiting. Genitourinary: Positive for dysuria. Negative for difficulty urinating, flank pain, frequency, hematuria and urgency. Musculoskeletal: Negative for back pain, joint swelling and myalgias. Skin: Negative for rash and wound. Neurological: Negative for dizziness, tremors and numbness. Hematological: Does not bruise/bleed easily.

## 2019-05-07 ENCOUNTER — TELEPHONE (OUTPATIENT)
Dept: UROLOGY | Age: 64
End: 2019-05-07

## 2019-05-13 ENCOUNTER — PROCEDURE VISIT (OUTPATIENT)
Dept: UROLOGY | Age: 64
End: 2019-05-13
Payer: COMMERCIAL

## 2019-05-13 VITALS — SYSTOLIC BLOOD PRESSURE: 109 MMHG | HEART RATE: 89 BPM | DIASTOLIC BLOOD PRESSURE: 74 MMHG

## 2019-05-13 DIAGNOSIS — N39.41 URGE INCONTINENCE: Primary | ICD-10-CM

## 2019-05-13 PROCEDURE — 64561 IMPLANT NEUROELECTRODES: CPT | Performed by: UROLOGY

## 2019-05-17 ENCOUNTER — PROCEDURE VISIT (OUTPATIENT)
Dept: UROLOGY | Age: 64
End: 2019-05-17

## 2019-05-17 DIAGNOSIS — N39.46 MIXED STRESS AND URGE URINARY INCONTINENCE: Primary | ICD-10-CM

## 2019-05-17 PROCEDURE — 99024 POSTOP FOLLOW-UP VISIT: CPT | Performed by: NURSE PRACTITIONER

## 2019-05-17 NOTE — PROGRESS NOTES
Patient arrives for interstim lead pull. Tape and gauze removed and leads pulled without complications. Patient to continue bladder diary until seeing Dr. Michelle Cherry on 6/5/19.

## 2019-05-24 ENCOUNTER — HOSPITAL ENCOUNTER (OUTPATIENT)
Dept: PAIN MANAGEMENT | Age: 64
Discharge: HOME | End: 2019-05-24
Payer: COMMERCIAL

## 2019-05-24 VITALS
WEIGHT: 190 LBS | TEMPERATURE: 98 F | BODY MASS INDEX: 38.3 KG/M2 | OXYGEN SATURATION: 96 % | RESPIRATION RATE: 16 BRPM | HEIGHT: 59 IN | HEART RATE: 88 BPM | DIASTOLIC BLOOD PRESSURE: 72 MMHG | SYSTOLIC BLOOD PRESSURE: 126 MMHG

## 2019-05-24 DIAGNOSIS — M25.562 CHRONIC PAIN OF BOTH KNEES: ICD-10-CM

## 2019-05-24 DIAGNOSIS — G89.29 CHRONIC PAIN OF BOTH KNEES: ICD-10-CM

## 2019-05-24 DIAGNOSIS — G89.29 CHRONIC BILATERAL LOW BACK PAIN WITHOUT SCIATICA: ICD-10-CM

## 2019-05-24 DIAGNOSIS — M25.561 CHRONIC PAIN OF BOTH KNEES: ICD-10-CM

## 2019-05-24 DIAGNOSIS — Z51.81 ENCOUNTER FOR MEDICATION MONITORING: ICD-10-CM

## 2019-05-24 DIAGNOSIS — Z96.653 STATUS POST TOTAL BILATERAL KNEE REPLACEMENT: ICD-10-CM

## 2019-05-24 DIAGNOSIS — G89.29 ENCOUNTER FOR CHRONIC PAIN MANAGEMENT: ICD-10-CM

## 2019-05-24 DIAGNOSIS — M17.0 PRIMARY OSTEOARTHRITIS OF BOTH KNEES: ICD-10-CM

## 2019-05-24 DIAGNOSIS — Z96.651 HX OF TOTAL KNEE ARTHROPLASTY, RIGHT: ICD-10-CM

## 2019-05-24 DIAGNOSIS — M25.561 ARTHRALGIA OF BOTH LOWER LEGS: ICD-10-CM

## 2019-05-24 DIAGNOSIS — M25.562 ARTHRALGIA OF BOTH LOWER LEGS: ICD-10-CM

## 2019-05-24 DIAGNOSIS — M54.50 CHRONIC BILATERAL LOW BACK PAIN WITHOUT SCIATICA: ICD-10-CM

## 2019-05-24 PROCEDURE — 99213 OFFICE O/P EST LOW 20 MIN: CPT

## 2019-05-24 PROCEDURE — 80307 DRUG TEST PRSMV CHEM ANLYZR: CPT

## 2019-05-24 PROCEDURE — 99213 OFFICE O/P EST LOW 20 MIN: CPT | Performed by: NURSE PRACTITIONER

## 2019-05-24 RX ORDER — ALBUTEROL SULFATE 90 UG/1
AEROSOL, METERED RESPIRATORY (INHALATION)
Refills: 0 | COMMUNITY
Start: 2019-05-20 | End: 2019-08-26

## 2019-05-24 RX ORDER — HYDROCODONE BITARTRATE AND ACETAMINOPHEN 5; 325 MG/1; MG/1
1 TABLET ORAL EVERY 8 HOURS PRN
Qty: 90 TABLET | Refills: 0 | Status: SHIPPED | OUTPATIENT
Start: 2019-05-28 | End: 2019-06-19 | Stop reason: SDUPTHER

## 2019-05-24 RX ORDER — BENZONATATE 100 MG/1
CAPSULE ORAL
Refills: 0 | COMMUNITY
Start: 2019-05-20 | End: 2019-06-26 | Stop reason: ALTCHOICE

## 2019-05-24 RX ORDER — DOXYCYCLINE HYCLATE 100 MG
TABLET ORAL
Refills: 0 | COMMUNITY
Start: 2019-05-14 | End: 2019-06-05 | Stop reason: ALTCHOICE

## 2019-05-24 ASSESSMENT — ENCOUNTER SYMPTOMS
COUGH: 1
GASTROINTESTINAL NEGATIVE: 1
EYES NEGATIVE: 1

## 2019-05-24 NOTE — PROGRESS NOTES
[facility] PROGRESS NOTE      Patient here today to review Medication Agreement    Chief Complaint:  Left knee pain      HPI: She c/o left knee pain. She has had knee arthroplasty with revision. Her pain has increased. She is sleeping well. She states may have bladder stimulator implanted, currently on dzxycycline and albuterol inhaler for bronchitis. No ED visits. Knee Pain    The incident occurred more than 1 week ago. There was no injury mechanism. The pain is present in the left knee. Quality: sharp. The pain is at a severity of 5/10. The pain is moderate. The pain has been worsening since onset. Associated symptoms include a loss of motion and muscle weakness. Foreign body present: knee replaced. Exacerbated by: standing. She has tried elevation, ice and heat (pain medication) for the symptoms. The treatment provided mild relief. Patient denies any new neurological symptoms. No bowel or bladder incontinence, no weakness, and no falling. Treatment goals:  Functional status:  Be more active     Aberrancy:   Any alcoholic beverages  no     Any illegal drugs     no     Analgesia:pain 5      Adverse  Effects :BM daily     ADL;s :activity limited, can not stand long               Pill count: appropriate    Morphine equivalent dose as reported on OARRS:15  Attestation: The Prescription Monitoring Report for this patient was reviewed today. (DARREL Mei CNP)  Chronic Pain Routine Monitoring: Random urine drug screen sent today., Obtaining appropriate analgesic effect of treatment., No signs of potential drug abuse or diversion identified: otherwise, see note documentation DARREL Mei - CNP)  Chronic Pain > 80 MEDD: Obtained or confirmed a written medication contract was on file. DARREL Mei CNP)  Review ofOARRS does not show any aberrant prescription behavior. Medication is helping the patient stay active.  Patient denies any side effects and reports adequate analgesia. No sign of misuse/abuse.             As above, I did review the imaging     UDT 9-20-18 Appropriate: yes  9/23/2018  7:26 PM - Kenneth, Mhpn Incoming Lab Results From [facility]      Component Value Ref Range & Units Status Collected Lab   Pain Management Drug Panel Interp, Urine Consistent    Final 09/20/2018  2:10 PM [facility]   (NOTE)   ________________________________________________________________   DRUGS EXPECTED:   HYDROCODONE [9/20/18]   ________________________________________________________________   CONSISTENT with medications provided:   HYDROCODONE : based on hydrocodone, norhydrocodone   ________________________________________________________________   INTERPRETIVE INFORMATION: Pain Mgt [place], Mass Spec/EMIT, Ur,                            Interp   Interpretation depends on accuracy and completeness of patient   medication information submitted by client. 6-Acetylmorphine, Ur Not Detected    Final 09/20/2018  2:10 PM [facility]   7-Aminoclonazepam, Urine Not Detected    Final 09/20/2018  2:10 PM [facility]   Alpha-OH-Alpraz, Urine Not Detected    Final 09/20/2018  2:10 PM [facility]   Alprazolam, Urine Not Detected    Final 09/20/2018  2:10 PM [facility]   Amphetamines, urine Not Detected    Final 09/20/2018  2:10 PM [facility]   Barbiturates, Ur Not Detected    Final 09/20/2018  2:10 PM [facility]   Benzoylecgonine, Ur Not Detected    Final 09/20/2018  2:10 PM [facility]   Buprenorphine Urine Not Detected    Final 09/20/2018  2:10 PM [facility]   Carisoprodol, Ur Not Detected    Final 09/20/2018  2:10 PM [facility]   (NOTE)   The carisoprodol immunoassay has cross-reactivity to carisoprodol   and meprobamate.     Clonazepam, Urine Not Detected    Final 09/20/2018  2:10 PM [facility]   Codeine, Urine Not Detected    Final 09/20/2018  2:10 PM [facility]   MDA, Ur Not Detected    Final 09/20/2018  2:10 PM [facility]   Diazepam, Urine Not Detected    Final 09/20/2018  2:10 PM [facility]   Ethyl Glucuronide Ur Not Detected    Final 09/20/2018  2:10 PM [facility]   Fentanyl, Ur Not Detected    Final 09/20/2018  2:10 PM [facility]   Hydrocodone, Urine Present    Final 09/20/2018  2:10 PM [facility]   Hydromorphone, Urine Not Detected    Final 09/20/2018  2:10 PM [facility]   Lorazepam, Urine Not Detected    Final 09/20/2018  2:10 PM [facility]   Marijuana Metab, Ur Not Detected    Final 09/20/2018  2:10 PM [facility]   MDEA, LEANA, Ur Not Detected    Final 09/20/2018  2:10 PM [facility]   MDMA, Urine Not Detected    Final 09/20/2018  2:10 PM [facility]   Meperidine Metab, Ur Not Detected    Final 09/20/2018  2:10 PM [facility]   Methadone, Urine Not Detected    Final 09/20/2018  2:10 PM [facility]   Methamphetamine, Urine Not Detected    Final 09/20/2018  2:10 PM [facility]   Methylphenidate Not Detected    Final 09/20/2018  2:10 PM [facility]   Midazolam, Urine Not Detected    Final 09/20/2018  2:10 PM [facility]   Morphine Urine Not Detected    Final 09/20/2018  2:10 PM [facility]   Norbuprenorphine, Urine Not Detected    Final 09/20/2018  2:10 PM [facility]   Nordiazepam, Urine Not Detected    Final 09/20/2018  2:10 PM [facility]   Norfentanyl, Urine Not Detected    Final 09/20/2018  2:10 PM [facility]   NORHYDROCODONE, URINE Present    Final 09/20/2018  2:10 PM [facility]   Noroxycodone, Urine Not Detected    Final 09/20/2018  2:10 PM [facility]   NOROXYMORPHONE, URINE Not Detected    Final 09/20/2018  2:10 PM [facility]   Oxazepam, Urine Not Detected    Final 09/20/2018  2:10 PM [facility]   Oxycodone Urine Not Detected    Final 09/20/2018  2:10 PM [facility]   Oxymorphone, Urine Not Detected    Final 09/20/2018  2:10 PM [facility]   PCP, Urine Not Detected    Final 09/20/2018  2:10 PM [facility]   Phentermine, Ur Not Detected    Final 09/20/2018  2:10 PM [facility]   Propoxyphene, Urine Not Detected    Final 09/20/2018  2:10 PM [facility]   Tapentadol-O-Sulfate, Urine Not Detected    Final 09/20/2018  2:10 PM [facility]   Tapentadol, Urine Not Detected    Final 09/20/2018  2:10 PM [facility]   Temazepam, Urine Not Detected    Final 09/20/2018  2:10 PM [facility]   Tramadol, Urine Not Detected    Final 09/20/2018  2:10 PM [facility]   Zolpidem, Urine Not Detected    Final 09/20/2018  2:10 PM [facility]   Drugs Expected, Ur     Final 09/20/2018  2:10  [facility] [address] [facility]   HYDROCODONE [id number] AT 1130A    Creatinine, Ur 54.7  20.0 - 400.0 mg/dL Final 09/20/2018  2:10 PM [facility]   Pain Mgt Drug Panel, Hi Res, Ur See Below    Final 09/20/2018  2:10 PM [facility]   (NOTE)   Methodology: Qualitative Enzyme Immunoassay and Qualitative Liquid   Chromatography-Time of Flight-Mass Spectrometry or Tandem Mass   Spectrometry, Quantitative Spectrophotometry   The absence of expected drug(s) and/or drug metabolite(s) may   indicate non-compliance, inappropriate timing of specimen   collection relative to drug administration, poor drug absorption,   diluted/adulterated urine, or limitations of testing. The   concentration must be greater than or equal to the cutoff to be   reported as present.  If specific drug concentrations are   required, contact the laboratory within two weeks of specimen   collection to request quantification by a second analytical   technique. Interpretive questions should be directed to the   laboratory. Results based on immunoassay detection that do not match clinical   expectations should be   interpreted with caution. Confirmatory testing by mass   spectrometry for immunoassay-based results is available, if   ordered within two weeks of specimen collection. Additional   charges apply. For medical purposes only; not valid for forensic use. This test was developed and its performance characteristics   determined by [employer]. The U.S. Food and Drug   Administration has not approved or cleared this test; however, FDA   clearance or approval is not currently required for clinical use. The results are not intended to be used as the sole means for   clinical diagnosis or patient management decisions.     EER Hi Res Interp Ur See Note    Final 09/20/2018  2:10 PM [facility]   (NOTE)   Access [facility] Enhanced Report using either link below:   -Direct access: [email or web address]   -Enter Username, Password: [email or web address]   Username: [email or web address]   Password: [email or web address]   Performed by B[facility] Montejo   [place] , [address] [facility] [phone]   www. Deng Magdaleno MD, Lab. Director                 Past Medical History:   Diagnosis Date    Arthritis     Diabetes mellitus ([address]     Eczema     arms, trunk    GERD (gastroesophageal reflux disease)     Hearing loss     left and right ears, wears hearing aids    Hyperlipidemia     Hypertension     Kidney infection     Osteoporosis     Wears glasses     for reading       Past Surgical History:   Procedure Laterality Date    APPENDECTOMY      WITH HYSTERECTOMY    ARTHROPLASTY Left 10/12/2017    METATARSAL HEAD RESECTION 3RD LEFT FOOT  & EXCISION LESION SOFT TISSUE LEFT FOOT performed by Arsen Leonard DPM at [facility] BLEPHAROPLASTY Bilateral     BREAST BIOPSY Left     benign    COLONOSCOPY      CYST REMOVAL Right     HEEL    CYST REMOVAL Right     wrist    FOOT SURGERY Left 04/12/2019    bunionectomy    HYSTERECTOMY      JOINT REPLACEMENT Bilateral     lt had revision knees    KNEE ARTHROSCOPY Right 8/12/15    Dr China Rendon    KNEE ARTHROSCOPY Left 02/22/2017     &RIGHT  KNEE MANIPULATION    [place] [facility] N/A 7/17/2018    COLONOSCOPY performed by Maru Tena MD at [place] Left 2/22/2017    KNEE ARTHROSCOPY FOR LATERAL PATELLA RELEASE, SYNOVECTOMY AND STEROID INJECTION, LEFT KNEE. MANIPULATION OF RIGHT KNEE.  performed by Len Silva MD at [address] [place] [address] [address] Left 05/02/2017    3rd toe    TONSILLECTOMY      TYMPANOPLASTY Left        Allergies   Allergen Reactions    Aspirin Other (See Comments)     Chest pain    Codeine Other (See Comments)     Chest pain    Lidoderm [Lidocaine]      Skin reddened , itching    Avelox [Moxifloxacin] Rash    Sulfa Antibiotics Rash Current Outpatient Medications:     [START ON 5/28/2019] HYDROcodone-acetaminophen (NORCO) 5-325 MG per tablet, Take 1 tablet by mouth every 8 hours as needed for Pain for up to 30 days. , Disp: 90 tablet, Rfl: 0    fluticasone (FLONASE) 50 MCG/ACT nasal spray, , Disp: , Rfl:     sulindac (CLINORIL) 150 MG tablet, Take 150 mg by mouth 2 times daily, Disp: , Rfl:     amLODIPine (NORVASC) 5 MG tablet, take 1 tablet by mouth once daily, takes at night, Disp: , Rfl: 0    alendronate (FOSAMAX) 35 MG tablet, Take 1 tablet by mouth every 7 days, Disp: 4 tablet, Rfl: 3    omeprazole (PRILOSEC) 20 MG capsule, take 1 capsule by mouth once daily (Patient taking differently: take 1 capsule by mouth once patient takes at supper), Disp: 90 capsule, Rfl: 0    simvastatin (ZOCOR) 20 MG tablet, take 1 tablet by mouth ONCE NIGHTLY, Disp: 30 tablet, Rfl: 3    loratadine (CLARITIN) 10 MG tablet, take 1 tablet by mouth once daily, Disp: 30 tablet, Rfl: 3    venlafaxine (EFFEXOR-XR) 37.5 MG XR capsule, take 1 capsule by mouth once daily (Patient taking differently: take 1 capsule by mouth every evening), Disp: 30 capsule, Rfl: 3    lisinopril-hydrochlorothiazide (PRINZIDE) 20-12.5 MG per tablet, Take 1 tablet by mouth daily, Disp: 30 tablet, Rfl: 3    busPIRone (BUSPAR) 10 MG tablet, Take 1 tablet by mouth 2 times daily.  (Patient taking differently: Take 10 mg by mouth 2 times daily as needed ), Disp: 60 tablet, Rfl: 3    doxycycline hyclate (VIBRA-TABS) 100 MG tablet, take 1 tablet by mouth every 12 hours, Disp: , Rfl: 0    albuterol sulfate  (90 Base) MCG/ACT inhaler, inhale 2 puffs by mouth every 4 hours if needed, Disp: , Rfl: 0    benzonatate (TESSALON) 100 MG capsule, take 1 capsule by mouth three times a day if needed, Disp: , Rfl: 0    lidocaine (XYLOCAINE) 5 % ointment, , Disp: , Rfl:     triamcinolone (KENALOG) 0.1 % cream, , Disp: , Rfl:     metFORMIN (GLUCOPHAGE) 500 MG tablet, Take 1 tablet of current or ex partner: Not on file     Emotionally abused: Not on file     Physically abused: Not on file     Forced sexual activity: Not on file   Other Topics Concern    Not on file   Social History Narrative    Not on file       Review of Systems:  Review of Systems   Constitution: Negative. HENT: Positive for hearing loss. Hearing aids   Eyes: Negative. Cardiovascular: Negative. Respiratory: Positive for cough. Endocrine:        Metformin prn   Hematologic/Lymphatic: Negative. Skin: Positive for rash. Musculoskeletal: Positive for joint pain. Gastrointestinal: Negative. Genitourinary: Positive for nocturia. Neurological: Negative. Psychiatric/Behavioral: Negative. Physical Exam:  /72   Pulse 88   Temp 98 °F (36.7 °C)   Resp 16   Ht 4' 11\" (1.499 m)   Wt 190 lb (86.2 kg)   SpO2 96%   BMI 38.38 kg/m²     Physical Exam   Constitutional: She appears well-developed. overweight   Neck: Normal range of motion. Neck supple. Pulmonary/Chest: Effort normal.   Musculoskeletal:        Right knee: She exhibits decreased range of motion and deformity. Tenderness found. Left knee: She exhibits decreased range of motion and deformity. Tenderness found. Neurological: She is alert. She has normal strength. Gait abnormal.   Reflex Scores:       Patellar reflexes are 0 on the right side and 0 on the left side. Achilles reflexes are 2+ on the right side. Skin: Skin is warm and dry. Lesion noted. Psychiatric: She has a normal mood and affect.  Her speech is normal and behavior is normal. Judgment and thought content normal. Cognition and memory are normal.         Assessment:      Problem List Items Addressed This Visit     S/P total knee arthroplasty    Relevant Medications    HYDROcodone-acetaminophen (NORCO) 5-325 MG per tablet (Start on 5/28/2019)    Primary osteoarthritis of both knees    Relevant Medications    HYDROcodone-acetaminophen (Addi Bare) 5-325 MG per tablet (Start on 5/28/2019)    Osteoarthrosis involving lower leg - Primary (Chronic)    Relevant Medications    HYDROcodone-acetaminophen (NORCO) 5-325 MG per tablet (Start on 5/28/2019)    Hx of total knee arthroplasty, right    Relevant Medications    HYDROcodone-acetaminophen (NORCO) 5-325 MG per tablet (Start on 5/28/2019)    Encounter for medication monitoring    Encounter for chronic pain management    Chronic pain of both knees    Relevant Medications    HYDROcodone-acetaminophen (NORCO) 5-325 MG per tablet (Start on 5/28/2019)    Chronic bilateral low back pain without sciatica    Relevant Medications    HYDROcodone-acetaminophen (NORCO) 5-325 MG per tablet (Start on 5/28/2019)    Arthralgia of both lower legs (Chronic)            Treatment Plan:  DISCUSSION: Treatment options discussed withpatient and all questions answered to patient's satisfaction. Possible side effects, risk of tolerance and or dependence and alternative treatments discussed    Obtaining appropriate analgesic effect of treatment   No signs of potential drug abuse or diversion identified    [x] Ill effects of being on chronic pain medications such as sleep disturbances, respiratory depression, hormonal changes, withdrawal symptoms, chronic opioid dependence and tolerance as well as risk of taking opioids with Benzodiazepines and taking opioids along with alcohol,  werediscussed with patient. I had asked the patient to minimize medication use and utilize pain medications only for uncontrolled rest pain or pain with exertional activities. I advised patient not to self-escalate painmedications without consulting with us. At each of patient's future visits we will try to taper pain medications, while adjusting the adjunct medications, and re-evaluating for Physical Therapy to improve spinal andjoint strength. We will continue to have discussions to decrease pain medications as tolerated.       Counseled patient on effects their pain medication and /or their medical condition mayhave on their  ability to drive or operate machinery. Instructed not to drive or operate machinery if drowsy     I also discussed with the patient regarding the dangers of combining narcotic pain medication with tranquilizers, alcohol or illegal drugs or taking the medication any way other than prescribed. The dangers were discussed  including respiratory depression and death. Patient was told to tell  all  physicians regarding the medications he is getting from pain clinic. Patient is warned not to take any unprescribed medications over-the-countermedications that can depress breathing . Patient is advised to talk to the pharmacist or physicians if planning to take any over-the-counter medications before  takeing them. Patient is strongly advised to avoid tranquilizers or  relaxants, illegal drugs  or any medications that can depress breathing  Patient is also advised to tell us if there is any changes in their medications from other physicians.     1. UDT this visit, had hydrocodone 6:30 am today    TREATMENT OPTIONS:     Return in 4 weeks  Medication Agreement Requirements Met  Continue Opioid therapy  Script written for hydrocodone  Follow up appointment made

## 2019-05-27 LAB
6-ACETYLMORPHINE, UR: NOT DETECTED
7-AMINOCLONAZEPAM, URINE: NOT DETECTED
ALPHA-OH-ALPRAZ, URINE: NOT DETECTED
ALPRAZOLAM, URINE: NOT DETECTED
AMPHETAMINES, URINE: NOT DETECTED
BARBITURATES, URINE: NOT DETECTED
BENZOYLECGONINE, UR: NOT DETECTED
BUPRENORPHINE URINE: NOT DETECTED
CARISOPRODOL, UR: NOT DETECTED
CLONAZEPAM, URINE: NOT DETECTED
CODEINE, URINE: NOT DETECTED
CREATININE URINE: 168.1 MG/DL (ref 20–400)
DIAZEPAM, URINE: NOT DETECTED
DRUGS EXPECTED, UR: NORMAL
EER HI RES INTERP UR: NORMAL
ETHYL GLUCURONIDE UR: NOT DETECTED
FENTANYL URINE: NOT DETECTED
HYDROCODONE, URINE: PRESENT
HYDROMORPHONE, URINE: NOT DETECTED
LORAZEPAM, URINE: NOT DETECTED
MARIJUANA METAB, UR: NOT DETECTED
MDA, UR: NOT DETECTED
MDEA, EVE, UR: NOT DETECTED
MDMA URINE: NOT DETECTED
MEPERIDINE METAB, UR: NOT DETECTED
METHADONE, URINE: NOT DETECTED
METHAMPHETAMINE, URINE: NOT DETECTED
METHYLPHENIDATE: NOT DETECTED
MIDAZOLAM, URINE: NOT DETECTED
MORPHINE URINE: NOT DETECTED
NORBUPRENORPHINE, URINE: NOT DETECTED
NORDIAZEPAM, URINE: NOT DETECTED
NORFENTANYL, URINE: NOT DETECTED
NORHYDROCODONE, URINE: PRESENT
NOROXYCODONE, URINE: NOT DETECTED
NOROXYMORPHONE, URINE: NOT DETECTED
OXAZEPAM, URINE: NOT DETECTED
OXYCODONE URINE: NOT DETECTED
OXYMORPHONE, URINE: NOT DETECTED
PAIN MANAGEMENT DRUG PANEL INTERP, URINE: NORMAL
PAIN MGT DRUG PANEL, HI RES, UR: NORMAL
PCP,URINE: NOT DETECTED
PHENTERMINE, UR: NOT DETECTED
PROPOXYPHENE, URINE: NOT DETECTED
TAPENTADOL, URINE: NOT DETECTED
TAPENTADOL-O-SULFATE, URINE: NOT DETECTED
TEMAZEPAM, URINE: NOT DETECTED
TRAMADOL, URINE: NOT DETECTED
ZOLPIDEM, URINE: NOT DETECTED

## 2019-06-05 ENCOUNTER — OFFICE VISIT (OUTPATIENT)
Dept: UROLOGY | Age: 64
End: 2019-06-05
Payer: COMMERCIAL

## 2019-06-05 VITALS
HEIGHT: 59 IN | TEMPERATURE: 98.2 F | SYSTOLIC BLOOD PRESSURE: 91 MMHG | HEART RATE: 90 BPM | BODY MASS INDEX: 38.22 KG/M2 | DIASTOLIC BLOOD PRESSURE: 56 MMHG | WEIGHT: 189.6 LBS

## 2019-06-05 DIAGNOSIS — R35.0 URINARY FREQUENCY: ICD-10-CM

## 2019-06-05 DIAGNOSIS — N39.46 MIXED STRESS AND URGE URINARY INCONTINENCE: Primary | ICD-10-CM

## 2019-06-05 DIAGNOSIS — N39.41 URGE INCONTINENCE: ICD-10-CM

## 2019-06-05 DIAGNOSIS — R35.1 NOCTURIA: ICD-10-CM

## 2019-06-05 DIAGNOSIS — R39.15 URGENCY OF URINATION: ICD-10-CM

## 2019-06-05 PROCEDURE — 99214 OFFICE O/P EST MOD 30 MIN: CPT | Performed by: UROLOGY

## 2019-06-05 ASSESSMENT — ENCOUNTER SYMPTOMS
VOMITING: 0
ABDOMINAL PAIN: 0
BACK PAIN: 0
SHORTNESS OF BREATH: 0
NAUSEA: 0
WHEEZING: 0
EYE PAIN: 0
COUGH: 0
COLOR CHANGE: 0
EYE REDNESS: 0

## 2019-06-05 NOTE — PROGRESS NOTES
[facility]  [facility]OLOGY SPECIAL[facility] - [place]  [address]  [address]  [address] [facility] [address]  Dept: [address] [facility] Urology Office Note - Established    Patient:  Gurmeet Badillo  YOB: 1955  Date: 6/5/2019    The patient is a 70 y.o. female whopresents today for evaluation of the following problems:   Chief Complaint   Patient presents with    Incontinence     f/u after lead pull; pt states she noticed a big difference in symptoms with interstim        HPI Here for follow up on Interstim placed 5/13/19. Her lead was pulled 5/17/19. Since the lead pull- she has nocturia x3 ( was 1x) pad count 4 ( 1-2) pad with lead. She has noticed more urgency, more frequency, no dysuria, no hematuria. She has tried Myrbetriq in the past with no improvement. Summary of old records: N/A    Additional History: N/A    Procedures Today: N/A    Urinalysis today:  No results found for this visit on 06/05/19.     Imaging Reviewed during this Office Visit: none  (results were independently reviewed by physician and radiology report verified)    AUA Symptom Score (6/5/2019):  INCOMPLETE EMPTYING: How often have you had the sensation of not emptying your bladder?: Not at all  FREQUENCY: How often do you have to urinate less than every two hours?: About Half the time  INTERMITTENCY: How often have you found you stopped and started again several times when you urinated?: Less than 1 to 5 times  URGENCY: How often have you found it difficult to postpone urination?: About Half the time  WEAK STREAM: How often have you had a weak urinary stream?: Not at all  STRAINING: How often have you had to strain to start  urination?: Not at all  @(5505)@  TOTAL I-PSS SCORE[de-identified] 10  How would you feel if you were to spend the rest of your life with your urinary condition?: Mostly Dissatisfied    Last BUN and creatinine:  Lab Results   Component Value Date    BUN 17 09/05/2017     Lab Results   Component Value Date CREATININE 0.97 (H) 09/05/2017       Additional Lab/Culture results: none    PAST MEDICAL, FAMILY AND SOCIAL HISTORY UPDATE:  Past Medical History:   Diagnosis Date    Arthritis     Diabetes mellitus ([address]     Eczema     arms, trunk    GERD (gastroesophageal reflux disease)     Hearing loss     left and right ears, wears hearing aids    Hyperlipidemia     Hypertension     Kidney infection     Osteoporosis     Wears glasses     for reading     Past Surgical History:   Procedure Laterality Date    APPENDECTOMY      WITH HYSTERECTOMY    ARTHROPLASTY Left 10/12/2017    METATARSAL HEAD RESECTION 3RD LEFT FOOT  & EXCISION LESION SOFT TISSUE LEFT FOOT performed by Maria Antonia Jhaveri DPM at [facility] BLEPHAROPLASTY Bilateral     BREAST BIOPSY Left     benign    COLONOSCOPY      CYST REMOVAL Right     HEEL    CYST REMOVAL Right     wrist    FOOT SURGERY Left 04/12/2019    bunionectomy    HYSTERECTOMY      JOINT REPLACEMENT Bilateral     lt had revision knees    KNEE ARTHROSCOPY Right 8/12/15    Dr Ofelia Dutton    KNEE ARTHROSCOPY Left 02/22/2017     &RIGHT  KNEE MANIPULATION    [place] COLON CA SCRN NOT  [address]  IND N/A 7/17/2018    COLONOSCOPY performed by Mary Liz MD at [place] Left 2/22/2017    KNEE ARTHROSCOPY FOR LATERAL PATELLA RELEASE, SYNOVECTOMY AND STEROID INJECTION, LEFT KNEE. MANIPULATION OF RIGHT KNEE.  performed by Radha Earl MD at [address] [place] [address] [employer] TOE SURGERY Left 05/02/2017    3rd toe    TONSILLECTOMY      TYMPANOPLASTY Left      Family History   Problem Relation Age of Onset    Stroke Father     Emphysema Father     Diabetes Mother     Heart Failure Mother     Osteoarthritis Mother      Outpatient Medications Marked as Taking for the 6/5/19 encounter (Office Visit) with Stacia Jiménez MD   Medication Sig Dispense Refill    albuterol sulfate  (90 Base) MCG/ACT inhaler inhale 2 puffs by mouth every 4 hours if needed by mouth 2 times daily as needed ) 60 tablet 3      (All medications reviewed and updated by provider sincelast office visit or hospitalization)   Aspirin; Codeine; Lidoderm [lidocaine]; Avelox [moxifloxacin]; and Sulfa antibiotics  Social History     Tobacco Use   Smoking Status Never Smoker   Smokeless Tobacco Never Used      (If patient a smoker, smoking cessation counseling offered)     Social History     Substance and Sexual Activity   Alcohol Use No    Alcohol/week: 0.0 oz       REVIEW OF SYSTEMS:  Review of Systems      Physical Exam:      Vitals:    06/05/19 1346   BP: (!) 91/56   Pulse: 90   Temp: 98.2 °F (36.8 °C)     Body mass index is 38.27 kg/m². Patient is a 70 y.o. female in noacute distress and alert and oriented to person, place and time. Physical Exam  Constitutional: Patient in no acute distress. Neuro: Alert andoriented to person, place and time. Psych: Mood normal, affect normal  Skin: No rash noted  HEENT: Head: Normocephalic and atraumatic  Conjunctivae and EOM are normal. Pupils are equal, round  Nose: Normal  Right External Ear: Normal; Left External Ear: Normal  Mouth: Mucosa Moist  Neck: Supple  Lungs: Respiratory effort is normal  Cardiovascular: Warm & Pink  Abdomen: Soft, non-tender, non-distended with no CVA,  No flank tenderness,  Or hepatosplenomegaly   Lymphatics: No palpable lymphadenopathy. Bladder non-tender and not distended. Musculoskeletal: Normalgait and station      Assessment and Plan      1. Mixed stress and urge urinary incontinence    2. Urge incontinence    3. Urgency of urination    4. Urinary frequency    5. Nocturia           Plan:   interstim stage 1 and 2 MAC st v's    Prescriptions Ordered:  No orders of the defined types were placed in this encounter. Orders Placed:  No orders of the defined types were placed in this encounter. Alfonso Lin MD    Agree with the ROS entered by the MA.

## 2019-06-18 ENCOUNTER — TELEPHONE (OUTPATIENT)
Dept: UROLOGY | Age: 64
End: 2019-06-18

## 2019-06-18 NOTE — TELEPHONE ENCOUNTER
Anjana [address]g 1 & [address] @ ST 07/10/2019 3:30pm  PAT @ ST 06/26/2019 1:00pm            Spoke with patient regarding procedure information. *STOP BLOOD THINNERS*. Patient will be picking up procedure info in the office.

## 2019-06-19 ENCOUNTER — TELEPHONE (OUTPATIENT)
Dept: PAIN MANAGEMENT | Age: 64
End: 2019-06-19

## 2019-06-19 ENCOUNTER — HOSPITAL ENCOUNTER (OUTPATIENT)
Dept: PAIN MANAGEMENT | Age: 64
Discharge: HOME | End: 2019-06-19
Payer: COMMERCIAL

## 2019-06-19 VITALS
RESPIRATION RATE: 16 BRPM | OXYGEN SATURATION: 96 % | BODY MASS INDEX: 38.1 KG/M2 | HEART RATE: 89 BPM | TEMPERATURE: 98.9 F | DIASTOLIC BLOOD PRESSURE: 68 MMHG | HEIGHT: 59 IN | WEIGHT: 189 LBS | SYSTOLIC BLOOD PRESSURE: 112 MMHG

## 2019-06-19 DIAGNOSIS — M25.561 CHRONIC PAIN OF BOTH KNEES: Primary | ICD-10-CM

## 2019-06-19 DIAGNOSIS — M25.562 CHRONIC PAIN OF BOTH KNEES: Primary | ICD-10-CM

## 2019-06-19 DIAGNOSIS — M25.562 ARTHRALGIA OF BOTH LOWER LEGS: ICD-10-CM

## 2019-06-19 DIAGNOSIS — Z96.653 STATUS POST TOTAL BILATERAL KNEE REPLACEMENT: ICD-10-CM

## 2019-06-19 DIAGNOSIS — G89.29 CHRONIC PAIN OF BOTH KNEES: Primary | ICD-10-CM

## 2019-06-19 DIAGNOSIS — M25.561 ARTHRALGIA OF BOTH LOWER LEGS: ICD-10-CM

## 2019-06-19 DIAGNOSIS — M17.0 PRIMARY OSTEOARTHRITIS OF BOTH KNEES: ICD-10-CM

## 2019-06-19 DIAGNOSIS — G89.29 ENCOUNTER FOR CHRONIC PAIN MANAGEMENT: ICD-10-CM

## 2019-06-19 PROCEDURE — 99214 OFFICE O/P EST MOD 30 MIN: CPT | Performed by: PAIN MEDICINE

## 2019-06-19 PROCEDURE — 99213 OFFICE O/P EST LOW 20 MIN: CPT

## 2019-06-19 RX ORDER — HYDROCODONE BITARTRATE AND ACETAMINOPHEN 5; 325 MG/1; MG/1
1 TABLET ORAL EVERY 8 HOURS PRN
Qty: 90 TABLET | Refills: 0 | Status: ON HOLD | OUTPATIENT
Start: 2019-06-29 | End: 2019-07-10 | Stop reason: SDUPTHER

## 2019-06-19 ASSESSMENT — ENCOUNTER SYMPTOMS
GASTROINTESTINAL NEGATIVE: 1
EYES NEGATIVE: 1
COUGH: 0
NAUSEA: 0
SHORTNESS OF BREATH: 0
CONSTIPATION: 0
BACK PAIN: 1
EYE DISCHARGE: 0
ABDOMINAL PAIN: 0
RESPIRATORY NEGATIVE: 1
EYE PAIN: 0
ALLERGIC/IMMUNOLOGIC NEGATIVE: 1

## 2019-06-19 ASSESSMENT — PAIN DESCRIPTION - FREQUENCY: FREQUENCY: CONTINUOUS

## 2019-06-19 ASSESSMENT — PAIN SCALES - GENERAL: PAINLEVEL_OUTOF10: 0

## 2019-06-19 ASSESSMENT — PAIN DESCRIPTION - ORIENTATION: ORIENTATION: RIGHT;LEFT;LOWER

## 2019-06-19 ASSESSMENT — PAIN DESCRIPTION - ONSET: ONSET: ON-GOING

## 2019-06-19 ASSESSMENT — PAIN DESCRIPTION - DESCRIPTORS: DESCRIPTORS: SHARP;CONSTANT;BURNING

## 2019-06-19 ASSESSMENT — PAIN DESCRIPTION - PROGRESSION: CLINICAL_PROGRESSION: NOT CHANGED

## 2019-06-19 ASSESSMENT — PAIN DESCRIPTION - LOCATION: LOCATION: BACK;LEG

## 2019-06-19 ASSESSMENT — PAIN DESCRIPTION - PAIN TYPE: TYPE: CHRONIC PAIN

## 2019-06-19 NOTE — PROGRESS NOTES
[facility] Pain Management  Patient Pain Assessment  RECHECK - Dr. Amy Ko    Primary Care Physician: Sheri Estrada MD    Chief complaint:   Chief Complaint   Patient presents with    Lower Back Pain   . HISTORY OF PRESENT ILLNESS:  Ana Bills is 70 y.o. female with     Patient returns to the pain clinic long time ago but continued to have pain. .  She is being followed by the physicians [facility] with a chief complaint of pain involving the knees bilaterally. Pain is worse in the left knee. Patient had undergone total knee joint replacement done long time ago but continued to have pain-she also has right total knee joint replacement done by Dr. Maria Eugenia Logan and her pain in the right knee is somewhat minimal.  She is being followed by the [facility] orthopedic department for her knee. Currently patient with no other surgeon wants to touch the knee as the original surgeon has left [facility]. Patient has been on [address] [place] [address] [employer] for about 5 years. She did not have any interventions done. Knee Pain    The incident occurred more than 1 week ago. Incident location: left total knee joint replacement  The injury mechanism is unknown (Previous surgery). The pain is present in the right knee and left knee (Worse in the left knee). The quality of the pain is described as burning Izora Diver). The pain is at a severity of 6/10. The pain is moderate. The pain has been constant since onset. Associated symptoms include an inability to bear weight and muscle weakness. Possible foreign bodies include metal. The symptoms are aggravated by movement, weight bearing and palpation. RX Monitoring 5/24/2019   Attestation The Prescription Monitoring Report for this patient was reviewed today. Periodic Controlled Substance Monitoring Random urine drug screen sent today.;Obtaining appropriate analgesic effect of treatment. ;No signs of potential drug abuse or diversion identified: otherwise, see note documentation   Chronic Pain > No             Past Medical History      Diagnosis Date    Arthritis     Diabetes mellitus ([facility] [address]     Eczema     arms, trunk    GERD (gastroesophageal reflux disease)     Hearing loss     left and right ears, wears hearing aids    Hyperlipidemia     Hypertension     Kidney infection     Osteoporosis     Wears glasses     for reading       Surgical History  Past Surgical History:   Procedure Laterality Date    APPENDECTOMY      WITH HYSTERECTOMY    ARTHROPLASTY Left 10/12/2017    METATARSAL HEAD RESECTION 3RD LEFT FOOT  & EXCISION LESION SOFT TISSUE LEFT FOOT performed by Laina Milton DPM at [facility] BLEPHAROPLASTY Bilateral     BREAST BIOPSY Left     benign    COLONOSCOPY      CYST REMOVAL Right     HEEL    CYST REMOVAL Right     wrist    FOOT SURGERY Left 04/12/2019    bunionectomy    HYSTERECTOMY      JOINT REPLACEMENT Bilateral     lt had revision knees    KNEE ARTHROSCOPY Right 8/12/15    Dr Ginette Mckinley    KNEE ARTHROSCOPY Left 02/22/2017     &RIGHT  KNEE MANIPULATION    [place] COLON [facility]CRN NOT  [facility] N/A 7/17/2018    COLONOSCOPY performed by Marge Damon MD at [place] Left 2/22/2017    KNEE ARTHROSCOPY FOR LATERAL PATELLA RELEASE, SYNOVECTOMY AND STEROID INJECTION, LEFT KNEE. MANIPULATION OF RIGHT KNEE. performed by Cesar Forrester MD at [address] [place] [address] Left 05/02/2017    3rd toe    TONSILLECTOMY      TYMPANOPLASTY Left        Medications  Current Outpatient Medications   Medication Sig Dispense Refill    albuterol sulfate  (90 Base) MCG/ACT inhaler inhale 2 puffs by mouth every 4 hours if needed  0    benzonatate (TESSALON) 100 MG capsule take 1 capsule by mouth three times a day if needed  0    HYDROcodone-acetaminophen (NORCO) 5-325 MG per tablet Take 1 tablet by mouth every 8 hours as needed for Pain for up to 30 days.  90 tablet 0    fluticasone (FLONASE) 50 MCG/ACT nasal spray       triamcinolone (KENALOG) 0.1 % cream       metFORMIN (GLUCOPHAGE) 500 MG tablet Take 1 tablet (500 mg) by mouth 3 times per day with meals  1    amLODIPine (NORVASC) 5 MG tablet take 1 tablet by mouth once daily, takes at night  0    acetaminophen (TYLENOL) 500 MG tablet Take 500 mg by mouth as needed for Pain      hydrOXYzine (ATARAX) 50 MG tablet 50 mg every 6 hours as needed   0    alendronate (FOSAMAX) 35 MG tablet Take 1 tablet by mouth every 7 days 4 tablet 3    omeprazole (PRILOSEC) 20 MG capsule take 1 capsule by mouth once daily (Patient taking differently: take 1 capsule by mouth once patient takes at supper) 90 capsule 0    simvastatin (ZOCOR) 20 MG tablet take 1 tablet by mouth ONCE NIGHTLY 30 tablet 3    loratadine (CLARITIN) 10 MG tablet take 1 tablet by mouth once daily 30 tablet 3    venlafaxine (EFFEXOR-XR) 37.5 MG XR capsule take 1 capsule by mouth once daily (Patient taking differently: take 1 capsule by mouth every evening) 30 capsule 3    lisinopril-hydrochlorothiazide (PRINZIDE) 20-12.5 MG per tablet Take 1 tablet by mouth daily 30 tablet 3    Calcium Carbonate-Vitamin D (CALCIUM + D PO) Take by mouth      busPIRone (BUSPAR) 10 MG tablet Take 1 tablet by mouth 2 times daily. (Patient taking differently: Take 10 mg by mouth 2 times daily as needed ) 60 tablet 3    sulindac (CLINORIL) 150 MG tablet Take 150 mg by mouth 2 times daily      Blood Glucose Monitoring Suppl (TRUE METRIX METER) W/DEVICE KIT TEST 1 TO 2 TIMES DAILY AS DIRECTED  0    TRUE METRIX BLOOD GLUCOSE TEST strip TEST 1 TO 2 TIMES BEFORE MEALS OR AS DIRECTED  1    Lancets (BD LANCET ULTRAFINE 30G) MISC USE 1 TO 2 TIMES A DAY  1     No current facility-administered medications for this encounter. Allergies  Aspirin; Codeine; Lidoderm [lidocaine];  Avelox [moxifloxacin]; and Sulfa antibiotics    Family History  family history includes Diabetes in her mother; Emphysema in her father; Heart Failure in her mother; Osteoarthritis myalgias. Skin: Negative. Negative for rash. Allergic/Immunologic: Negative. Neurological: Negative for speech difficulty, weakness and headaches. Hematological: Negative. Psychiatric/Behavioral: Positive for behavioral problems. The patient is not nervous/anxious. Depression            GENERAL PHYSICAL EXAM:  Vitals: /68   Pulse 89   Temp 98.9 °F (37.2 °C) (Oral)   Resp 16   Ht 4' 11\" (1.499 m)   Wt 189 lb (85.7 kg)   SpO2 96%   BMI 38.17 kg/m² , Body mass index is 38.17 kg/m². Physical Exam   Constitutional: She is oriented to person, place, and time. She appears well-developed and well-nourished. HENT:   Head: Normocephalic and atraumatic. Eyes: Pupils are equal, round, and reactive to light. Conjunctivae are normal.   Neck: Normal range of motion. Neck supple. No tracheal deviation present. No thyromegaly present. Cardiovascular: Normal rate and regular rhythm. Pulmonary/Chest: Effort normal.   Abdominal: She exhibits no distension. Musculoskeletal: Normal range of motion. Neurological: She is alert and oriented to person, place, and time. She has normal strength. She displays no atrophy, no tremor and normal reflexes. No cranial nerve deficit or sensory deficit. She exhibits normal muscle tone. Skin: Skin is warm and dry. Psychiatric: She has a normal mood and affect. Her behavior is normal. Judgment and thought content normal.    Right Knee Exam     Tenderness   The patient is experiencing tenderness in the lateral joint line and medial joint line. Range of Motion   Right knee extension: minimally painful. Right knee flexion: minimally painful. Other   Erythema: absent  Scars: present  Sensation: normal  Pulse: present  Swelling: none      Left Knee Exam     Tenderness   The patient is experiencing tenderness in the medial joint line and lateral joint line. Range of Motion   Left knee extension: minimally painful.    Left knee flexion: minimally painful. Other   Sensation: normal  Pulse: present  Swelling: none            Nurses Notes and Vital Signs reviewed. DATA  Labs:  Benzodiazepine Screen, Urine   Date Value Ref Range Status   06/25/2014 NEGATIVE NEG Final     Comment:           (Positive cutoff 200 ng/mL)                    Imaging:  Radiology Images and Reports reviewed where indicated and necessary     HISTORY:   ORDERING SYSTEM PROVIDED HISTORY: Chronic pain of both knees   TECHNOLOGIST PROVIDED HISTORY:   Ordering Physician Provided Reason for Exam: chronic pain, both knees   Mechanism of Injury: pt fell on rt knee sept 2017   Additional signs and symptoms: bilateral knee pain- lt knee all the time   Relevant Medical/Surgical History: bilateral knee pain replacements ( lt   8-9yrs, rt 2years)       FINDINGS:   There is symmetric blood flow activity.  Increased blood pool activity   surrounding the left knee arthroplasty.       Delayed images show photopenia corresponding with the left knee arthroplasty. There is increased uptake surrounding the femoral and tibial components of   the left knee prosthesis matching the location of blood pool activity. Activity is greatest along the lateral tibial component and medial and   lateral femoral condylar components of the prosthesis.       Photopenia from right knee arthroplasty with mild surrounding uptake.           Impression   1.  Blood pool and delayed phase uptake surrounding the left knee   arthroplasty could be due to early loosening.       2.  No evidence of complication of the right knee arthroplasty.        Patient Active Problem List   Diagnosis    Osteoarthrosis involving lower leg    Encounter for long-term (current) use of other medications    Arthralgia of both lower legs    Pain in lower limb    S/P total knee arthroplasty    Chronic pain of both knees    HLD (hyperlipidemia)    GERD (gastroesophageal reflux disease)    Osteopenia    Encounter for chronic pain management    Hx of total knee arthroplasty, right    Primary osteoarthritis of both knees    Encounter for medication monitoring    Chronic bilateral low back pain without sciatica    Rectal pain    Generalized abdominal pain    Chronic, continuous use of opioids        ASSESSMENT    Isiah Pabon is a 70 y.o. female with    1. Osteoarthrosis involving lower leg    2. Arthralgia of both lower legs    3. Chronic pain of both knees    4. Encounter for chronic pain management    5. Status post total bilateral knee replacement           PLAN    We will continue current pain medications  Current medications are being tolerated without any Adverse side effects. Orders Placed This Encounter   Medications    HYDROcodone-acetaminophen (NORCO) 5-325 MG per tablet     Sig: Take 1 tablet by mouth every 8 hours as needed for Pain for up to 30 days. Dispense:  90 tablet     Refill:  0     Reduce doses taken as pain becomes manageable     Urine drug screens have been appropriate. No aberrant activity noted. Analgesia is achieved. Activities of daily living are possible because of medications. Safe use of medications explained to patient. Counselling/Preventive measures for pain  Control:    [x]Knee strengthening exercises are discussed with patient in detail. [x] Ill effects of being on chronic pain medications such as sleep disturbances, hormonal changes, withdrawal symptoms,  chronic opioid dependence and tolerance were discussed with patient. I had asked the patient to minimize medication use and utilize pain medications only for uncontrolled rest pain or pain with exertional activities. I advised patient not to self escalate pain medications without consulting with us. At each of patient's future visits we will try to taper pain medications, while adjusting the adjunct medications, and re-evaluating for Physical Therapy to improve spinal and joint strength.  We will continue to have discussions to decrease pain medications as tolerated. I also discussed with the patient regarding the dangers of combining narcotic pain medication with tranquilizers, alcohol or illegal drugs or taking the medication any other than prescribed. The dangers including the respiratory depression and death. Patient was told to tell  to all  physicians regarding the medications he is getting from pain clinic. Patient is warned not to take any unprescribed medications over-the-counter medications that can depress breathing . Patient is advised to talk to the pharmacist or physicians if planning to take any over-the-counter medications before  takeing them. Patient is strongly advised to avoid tranquilizers or  Relaxants for any medications that can depress breathing or recreational drugs. Patient is also advised to tell us if there is any changes in his medications from other physicians. We discussed the same at today's visit and have not been to implement it, as the patient's pain is not under control with current medications. I briefly discussed with the patient regarding genicular nerve blocks to diagnose the pain and if it reduces good pain relief since. Patient at present is not sure she did she is advised to think about it. We also will be decreasing her amount of medication over the. Of time. In the meantime she is advised to continue with exercises and strengthening the thigh muscles. Decision Making Process : Patient's health history and referral records thoroughly reviewed before focused physical examination and discussion with patient. I have spent 20 Over 50% of today's visit is spent on examining the patient and counseling and coordinating the care. Level of complexity of date to be reviewed is Moderate. The chart date reviewed include the following: Imaging Reports. Summary of Care. Time spent reviewing with patient the below reports:   Medication safety, Treatment options.     Level of diagnosis and

## 2019-06-26 ENCOUNTER — HOSPITAL ENCOUNTER (OUTPATIENT)
Dept: PREADMISSION TESTING | Age: 64
Discharge: HOME | End: 2019-06-30
Payer: COMMERCIAL

## 2019-06-26 VITALS
WEIGHT: 195.77 LBS | SYSTOLIC BLOOD PRESSURE: 118 MMHG | DIASTOLIC BLOOD PRESSURE: 80 MMHG | TEMPERATURE: 98.4 F | HEIGHT: 59 IN | HEART RATE: 96 BPM | RESPIRATION RATE: 20 BRPM | BODY MASS INDEX: 39.47 KG/M2

## 2019-06-26 LAB
ANION GAP SERPL CALCULATED.3IONS-SCNC: 13 MMOL/L (ref 9–17)
BUN BLDV-MCNC: 23 MG/DL (ref 8–23)
CHLORIDE BLD-SCNC: 99 MMOL/L (ref 98–107)
CO2: 24 MMOL/L (ref 20–31)
CREAT SERPL-MCNC: 1.26 MG/DL (ref 0.5–0.9)
GFR AFRICAN AMERICAN: 52 ML/MIN
GFR NON-AFRICAN AMERICAN: 43 ML/MIN
GFR SERPL CREATININE-BSD FRML MDRD: ABNORMAL ML/MIN/{1.73_M2}
GFR SERPL CREATININE-BSD FRML MDRD: ABNORMAL ML/MIN/{1.73_M2}
GLUCOSE BLD-MCNC: 118 MG/DL (ref 70–99)
HCT VFR BLD CALC: 38.8 % (ref 36.3–47.1)
HEMOGLOBIN: 12.8 G/DL (ref 11.9–15.1)
POTASSIUM SERPL-SCNC: 4.6 MMOL/L (ref 3.7–5.3)
SODIUM BLD-SCNC: 136 MMOL/L (ref 135–144)

## 2019-06-26 PROCEDURE — 85018 HEMOGLOBIN: CPT

## 2019-06-26 PROCEDURE — 82565 ASSAY OF CREATININE: CPT

## 2019-06-26 PROCEDURE — 80051 ELECTROLYTE PANEL: CPT

## 2019-06-26 PROCEDURE — 36415 COLL VENOUS BLD VENIPUNCTURE: CPT

## 2019-06-26 PROCEDURE — 82947 ASSAY GLUCOSE BLOOD QUANT: CPT

## 2019-06-26 PROCEDURE — 93005 ELECTROCARDIOGRAM TRACING: CPT | Performed by: UROLOGY

## 2019-06-26 PROCEDURE — 87086 URINE CULTURE/COLONY COUNT: CPT

## 2019-06-26 PROCEDURE — 85014 HEMATOCRIT: CPT

## 2019-06-26 PROCEDURE — 84520 ASSAY OF UREA NITROGEN: CPT

## 2019-06-26 RX ORDER — SODIUM CHLORIDE, SODIUM LACTATE, POTASSIUM CHLORIDE, CALCIUM CHLORIDE 600; 310; 30; 20 MG/100ML; MG/100ML; MG/100ML; MG/100ML
1000 INJECTION, SOLUTION INTRAVENOUS CONTINUOUS
Status: CANCELLED | OUTPATIENT
Start: 2019-06-26

## 2019-06-26 NOTE — H&P
History and Physical    Pt Name: Bria Saenz  MRN: [id number]  YOB: 1955  Date of evaluation: 6/26/2019  Primary Care Physician: Lilo Russo MD  Patient evaluated at the request of  Dr. Irais Samaniego    Reason for evaluation:   urinary incontinence   SUBJECTIVE:   History of Chief Complaint:   According to urology note in Jun/2019    Ileana Veloz is a 70 y.o. female   Who has a dx of  urinary incontinence  , urinary urgency ,   hx of DM, HPI Here for follow up on Interstim placed 5/13/19. Her lead was pulled 5/17/19. Since the lead pull- she has nocturia x3 ( was 1x) pad count 4 ( 1-2) pad with lead. She has noticed more urgency, more frequency, no dysuria, no hematuria. She has tried Myrbetriq in the past with no improvement.                     Past Medical History      has a past medical history of Arthritis, Bunion of left foot, Diabetes mellitus ([address] Eczema, GERD (gastroesophageal reflux disease), Hearing loss, Hyperlipidemia, Hypertension, Kidney infection, Osteoporosis, Urge incontinence, Urinary frequency, and Wears glasses. Past Surgical History   has a past surgical history that includes cyst removal (Right); Hysterectomy; Tonsillectomy; Tympanoplasty (Left); Blepharoplasty (Bilateral); Appendectomy; Colonoscopy; Knee arthroscopy (Right, 8/12/15); Breast biopsy (Left); cyst removal (Right); bladder suspension; Knee arthroscopy (Left, 02/22/2017); pr knee scope,diagnostic (Left, 2/22/2017); Toe Surgery (Left, 05/02/2017); arthroplasty (Left, 10/12/2017); joint replacement (Bilateral); pr colon ca scrn not hi rsk ind (N/A, 7/17/2018); and Foot surgery (Left, 04/12/2019). Medications   Scheduled Meds:  Current Outpatient Rx   Medication Sig Dispense Refill    [START ON 6/29/2019] HYDROcodone-acetaminophen (NORCO) 5-325 MG per tablet Take 1 tablet by mouth every 8 hours as needed for Pain for up to 30 days.  90 tablet 0    albuterol sulfate  (90 Base) MCG/ACT inhaler

## 2019-06-27 LAB
CULTURE: NORMAL
EKG ATRIAL RATE: 93 BPM
EKG P AXIS: 41 DEGREES
EKG P-R INTERVAL: 192 MS
EKG Q-T INTERVAL: 390 MS
EKG QRS DURATION: 86 MS
EKG QTC CALCULATION (BAZETT): 484 MS
EKG R AXIS: -30 DEGREES
EKG T AXIS: 20 DEGREES
EKG VENTRICULAR RATE: 93 BPM
Lab: NORMAL
SPECIMEN DESCRIPTION: NORMAL

## 2019-06-27 PROCEDURE — 93010 ELECTROCARDIOGRAM REPORT: CPT | Performed by: INTERNAL MEDICINE

## 2019-07-09 ENCOUNTER — TELEPHONE (OUTPATIENT)
Dept: UROLOGY | Age: 64
End: 2019-07-09

## 2019-07-10 ENCOUNTER — HOSPITAL ENCOUNTER (OUTPATIENT)
Age: 64
Setting detail: OUTPATIENT SURGERY
Discharge: HOME | End: 2019-07-10
Attending: UROLOGY | Admitting: UROLOGY
Payer: COMMERCIAL

## 2019-07-10 ENCOUNTER — ANESTHESIA EVENT (OUTPATIENT)
Dept: OPERATING ROOM | Age: 64
End: 2019-07-10
Payer: COMMERCIAL

## 2019-07-10 ENCOUNTER — ANESTHESIA (OUTPATIENT)
Dept: OPERATING ROOM | Age: 64
End: 2019-07-10
Payer: COMMERCIAL

## 2019-07-10 ENCOUNTER — APPOINTMENT (OUTPATIENT)
Dept: GENERAL RADIOLOGY | Age: 64
End: 2019-07-10
Attending: UROLOGY
Payer: COMMERCIAL

## 2019-07-10 VITALS
HEART RATE: 97 BPM | BODY MASS INDEX: 39.41 KG/M2 | DIASTOLIC BLOOD PRESSURE: 60 MMHG | TEMPERATURE: 97.2 F | OXYGEN SATURATION: 97 % | HEIGHT: 59 IN | RESPIRATION RATE: 17 BRPM | SYSTOLIC BLOOD PRESSURE: 123 MMHG | WEIGHT: 195.5 LBS

## 2019-07-10 VITALS — OXYGEN SATURATION: 97 % | DIASTOLIC BLOOD PRESSURE: 63 MMHG | SYSTOLIC BLOOD PRESSURE: 115 MMHG

## 2019-07-10 DIAGNOSIS — M17.0 PRIMARY OSTEOARTHRITIS OF BOTH KNEES: ICD-10-CM

## 2019-07-10 DIAGNOSIS — Z96.653 STATUS POST TOTAL BILATERAL KNEE REPLACEMENT: ICD-10-CM

## 2019-07-10 DIAGNOSIS — M25.561 CHRONIC PAIN OF BOTH KNEES: ICD-10-CM

## 2019-07-10 DIAGNOSIS — M25.562 CHRONIC PAIN OF BOTH KNEES: ICD-10-CM

## 2019-07-10 DIAGNOSIS — G89.29 CHRONIC PAIN OF BOTH KNEES: ICD-10-CM

## 2019-07-10 LAB
GLUCOSE BLD-MCNC: 122 MG/DL (ref 65–105)
GLUCOSE BLD-MCNC: 132 MG/DL (ref 74–100)
POC POTASSIUM: 4.3 MMOL/L (ref 3.5–4.5)

## 2019-07-10 PROCEDURE — C1883 ADAPT/EXT, PACING/NEURO LEAD: HCPCS | Performed by: UROLOGY

## 2019-07-10 PROCEDURE — 2580000003 HC RX 258: Performed by: UROLOGY

## 2019-07-10 PROCEDURE — 3600000014 HC SURGERY LEVEL 4 ADDTL 15MIN: Performed by: UROLOGY

## 2019-07-10 PROCEDURE — 82947 ASSAY GLUCOSE BLOOD QUANT: CPT

## 2019-07-10 PROCEDURE — 72220 X-RAY EXAM SACRUM TAILBONE: CPT

## 2019-07-10 PROCEDURE — 2500000003 HC RX 250 WO HCPCS: Performed by: NURSE ANESTHETIST, CERTIFIED REGISTERED

## 2019-07-10 PROCEDURE — 2709999900 HC NON-CHARGEABLE SUPPLY: Performed by: UROLOGY

## 2019-07-10 PROCEDURE — C1894 INTRO/SHEATH, NON-LASER: HCPCS | Performed by: UROLOGY

## 2019-07-10 PROCEDURE — 7100000010 HC PHASE II RECOVERY - FIRST 15 MIN: Performed by: UROLOGY

## 2019-07-10 PROCEDURE — 2580000003 HC RX 258: Performed by: ANESTHESIOLOGY

## 2019-07-10 PROCEDURE — 6360000002 HC RX W HCPCS: Performed by: NURSE ANESTHETIST, CERTIFIED REGISTERED

## 2019-07-10 PROCEDURE — 3600000004 HC SURGERY LEVEL 4 BASE: Performed by: UROLOGY

## 2019-07-10 PROCEDURE — 6360000002 HC RX W HCPCS: Performed by: ANESTHESIOLOGY

## 2019-07-10 PROCEDURE — C1787 PATIENT PROGR, NEUROSTIM: HCPCS | Performed by: UROLOGY

## 2019-07-10 PROCEDURE — 3700000001 HC ADD 15 MINUTES (ANESTHESIA): Performed by: UROLOGY

## 2019-07-10 PROCEDURE — 3700000000 HC ANESTHESIA ATTENDED CARE: Performed by: UROLOGY

## 2019-07-10 PROCEDURE — C1767 GENERATOR, NEURO NON-RECHARG: HCPCS | Performed by: UROLOGY

## 2019-07-10 PROCEDURE — 84132 ASSAY OF SERUM POTASSIUM: CPT

## 2019-07-10 PROCEDURE — 6360000002 HC RX W HCPCS: Performed by: STUDENT IN AN ORGANIZED HEALTH CARE EDUCATION/TRAINING PROGRAM

## 2019-07-10 PROCEDURE — 7100000011 HC PHASE II RECOVERY - ADDTL 15 MIN: Performed by: UROLOGY

## 2019-07-10 PROCEDURE — 2500000003 HC RX 250 WO HCPCS: Performed by: UROLOGY

## 2019-07-10 DEVICE — Z DUP USE 2628873 GENERATOR NEUROSTIMULATOR H1.7XL2IN THK3IN TORQ WRNCH PROD: Type: IMPLANTABLE DEVICE | Site: BACK | Status: FUNCTIONAL

## 2019-07-10 DEVICE — KIT NEUROSTIMULATOR LD L28CM DIA1.27MM ELECTRD SPC 1.5MM: Type: IMPLANTABLE DEVICE | Site: BACK | Status: FUNCTIONAL

## 2019-07-10 RX ORDER — DIPHENHYDRAMINE HYDROCHLORIDE 50 MG/ML
12.5 INJECTION INTRAMUSCULAR; INTRAVENOUS
Status: DISCONTINUED | OUTPATIENT
Start: 2019-07-10 | End: 2019-07-10 | Stop reason: HOSPADM

## 2019-07-10 RX ORDER — BUPIVACAINE HYDROCHLORIDE AND EPINEPHRINE 2.5; 5 MG/ML; UG/ML
INJECTION, SOLUTION EPIDURAL; INFILTRATION; INTRACAUDAL; PERINEURAL PRN
Status: DISCONTINUED | OUTPATIENT
Start: 2019-07-10 | End: 2019-07-10 | Stop reason: ALTCHOICE

## 2019-07-10 RX ORDER — MORPHINE SULFATE 2 MG/ML
2 INJECTION, SOLUTION INTRAMUSCULAR; INTRAVENOUS EVERY 5 MIN PRN
Status: DISCONTINUED | OUTPATIENT
Start: 2019-07-10 | End: 2019-07-10 | Stop reason: HOSPADM

## 2019-07-10 RX ORDER — DOCUSATE SODIUM 100 MG/1
100 CAPSULE, LIQUID FILLED ORAL 2 TIMES DAILY
Qty: 60 CAPSULE | Refills: 0 | Status: SHIPPED | OUTPATIENT
Start: 2019-07-10 | End: 2019-08-09

## 2019-07-10 RX ORDER — LABETALOL HYDROCHLORIDE 5 MG/ML
5 INJECTION, SOLUTION INTRAVENOUS EVERY 10 MIN PRN
Status: DISCONTINUED | OUTPATIENT
Start: 2019-07-10 | End: 2019-07-10 | Stop reason: HOSPADM

## 2019-07-10 RX ORDER — WATER 1000 ML/1000ML
INJECTION, SOLUTION INTRAVENOUS PRN
Status: DISCONTINUED | OUTPATIENT
Start: 2019-07-10 | End: 2019-07-10 | Stop reason: ALTCHOICE

## 2019-07-10 RX ORDER — MAGNESIUM HYDROXIDE 1200 MG/15ML
LIQUID ORAL CONTINUOUS PRN
Status: COMPLETED | OUTPATIENT
Start: 2019-07-10 | End: 2019-07-10

## 2019-07-10 RX ORDER — HYDROCODONE BITARTRATE AND ACETAMINOPHEN 5; 325 MG/1; MG/1
1 TABLET ORAL EVERY 6 HOURS PRN
Qty: 8 TABLET | Refills: 0 | Status: SHIPPED | OUTPATIENT
Start: 2019-07-10 | End: 2019-07-13

## 2019-07-10 RX ORDER — HYDRALAZINE HYDROCHLORIDE 20 MG/ML
5 INJECTION INTRAMUSCULAR; INTRAVENOUS EVERY 10 MIN PRN
Status: DISCONTINUED | OUTPATIENT
Start: 2019-07-10 | End: 2019-07-10 | Stop reason: HOSPADM

## 2019-07-10 RX ORDER — PROPOFOL 10 MG/ML
INJECTION, EMULSION INTRAVENOUS PRN
Status: DISCONTINUED | OUTPATIENT
Start: 2019-07-10 | End: 2019-07-10 | Stop reason: SDUPTHER

## 2019-07-10 RX ORDER — DOXYCYCLINE HYCLATE 100 MG
100 TABLET ORAL 2 TIMES DAILY
Qty: 10 TABLET | Refills: 0 | Status: SHIPPED | OUTPATIENT
Start: 2019-07-10 | End: 2019-07-15

## 2019-07-10 RX ORDER — FENTANYL CITRATE 50 UG/ML
25 INJECTION, SOLUTION INTRAMUSCULAR; INTRAVENOUS EVERY 5 MIN PRN
Status: DISCONTINUED | OUTPATIENT
Start: 2019-07-10 | End: 2019-07-10 | Stop reason: HOSPADM

## 2019-07-10 RX ORDER — FENTANYL CITRATE 50 UG/ML
50 INJECTION, SOLUTION INTRAMUSCULAR; INTRAVENOUS EVERY 5 MIN PRN
Status: DISCONTINUED | OUTPATIENT
Start: 2019-07-10 | End: 2019-07-10 | Stop reason: HOSPADM

## 2019-07-10 RX ORDER — OXYCODONE HYDROCHLORIDE AND ACETAMINOPHEN 5; 325 MG/1; MG/1
2 TABLET ORAL PRN
Status: DISCONTINUED | OUTPATIENT
Start: 2019-07-10 | End: 2019-07-10 | Stop reason: HOSPADM

## 2019-07-10 RX ORDER — OXYCODONE HYDROCHLORIDE AND ACETAMINOPHEN 5; 325 MG/1; MG/1
1 TABLET ORAL PRN
Status: DISCONTINUED | OUTPATIENT
Start: 2019-07-10 | End: 2019-07-10 | Stop reason: HOSPADM

## 2019-07-10 RX ORDER — SODIUM CHLORIDE, SODIUM LACTATE, POTASSIUM CHLORIDE, CALCIUM CHLORIDE 600; 310; 30; 20 MG/100ML; MG/100ML; MG/100ML; MG/100ML
1000 INJECTION, SOLUTION INTRAVENOUS CONTINUOUS
Status: DISCONTINUED | OUTPATIENT
Start: 2019-07-10 | End: 2019-07-10 | Stop reason: HOSPADM

## 2019-07-10 RX ORDER — MEPERIDINE HYDROCHLORIDE 50 MG/ML
12.5 INJECTION INTRAMUSCULAR; INTRAVENOUS; SUBCUTANEOUS EVERY 5 MIN PRN
Status: DISCONTINUED | OUTPATIENT
Start: 2019-07-10 | End: 2019-07-10 | Stop reason: HOSPADM

## 2019-07-10 RX ORDER — LIDOCAINE HYDROCHLORIDE 10 MG/ML
INJECTION, SOLUTION EPIDURAL; INFILTRATION; INTRACAUDAL; PERINEURAL PRN
Status: DISCONTINUED | OUTPATIENT
Start: 2019-07-10 | End: 2019-07-10 | Stop reason: SDUPTHER

## 2019-07-10 RX ORDER — FENTANYL CITRATE 50 UG/ML
INJECTION, SOLUTION INTRAMUSCULAR; INTRAVENOUS PRN
Status: DISCONTINUED | OUTPATIENT
Start: 2019-07-10 | End: 2019-07-10 | Stop reason: SDUPTHER

## 2019-07-10 RX ORDER — ONDANSETRON 2 MG/ML
4 INJECTION INTRAMUSCULAR; INTRAVENOUS
Status: DISCONTINUED | OUTPATIENT
Start: 2019-07-10 | End: 2019-07-10 | Stop reason: HOSPADM

## 2019-07-10 RX ADMIN — PROPOFOL 40 MG: 10 INJECTION, EMULSION INTRAVENOUS at 15:44

## 2019-07-10 RX ADMIN — Medication 2 G: at 15:34

## 2019-07-10 RX ADMIN — LIDOCAINE HYDROCHLORIDE 50 MG: 10 INJECTION, SOLUTION EPIDURAL; INFILTRATION; INTRACAUDAL; PERINEURAL at 15:31

## 2019-07-10 RX ADMIN — PROPOFOL 40 MG: 10 INJECTION, EMULSION INTRAVENOUS at 15:40

## 2019-07-10 RX ADMIN — FENTANYL CITRATE 50 MCG: 50 INJECTION INTRAMUSCULAR; INTRAVENOUS at 15:31

## 2019-07-10 RX ADMIN — MORPHINE SULFATE 2 MG: 2 INJECTION, SOLUTION INTRAMUSCULAR; INTRAVENOUS at 16:49

## 2019-07-10 RX ADMIN — PROPOFOL 30 MG: 10 INJECTION, EMULSION INTRAVENOUS at 15:34

## 2019-07-10 RX ADMIN — PROPOFOL 40 MG: 10 INJECTION, EMULSION INTRAVENOUS at 15:31

## 2019-07-10 RX ADMIN — MORPHINE SULFATE 2 MG: 2 INJECTION, SOLUTION INTRAMUSCULAR; INTRAVENOUS at 17:08

## 2019-07-10 RX ADMIN — SODIUM CHLORIDE, POTASSIUM CHLORIDE, SODIUM LACTATE AND CALCIUM CHLORIDE 1000 ML: 600; 310; 30; 20 INJECTION, SOLUTION INTRAVENOUS at 13:33

## 2019-07-10 ASSESSMENT — PULMONARY FUNCTION TESTS
PIF_VALUE: 0
PIF_VALUE: 2
PIF_VALUE: 0
PIF_VALUE: 1
PIF_VALUE: 0
PIF_VALUE: 1
PIF_VALUE: 0
PIF_VALUE: 1
PIF_VALUE: 0
PIF_VALUE: 1
PIF_VALUE: 0

## 2019-07-10 ASSESSMENT — PAIN SCALES - GENERAL
PAINLEVEL_OUTOF10: 7
PAINLEVEL_OUTOF10: 0
PAINLEVEL_OUTOF10: 7
PAINLEVEL_OUTOF10: 0
PAINLEVEL_OUTOF10: 7
PAINLEVEL_OUTOF10: 7

## 2019-07-10 ASSESSMENT — ENCOUNTER SYMPTOMS
STRIDOR: 0
SHORTNESS OF BREATH: 0

## 2019-07-10 ASSESSMENT — PAIN DESCRIPTION - DESCRIPTORS
DESCRIPTORS: SHARP
DESCRIPTORS: STABBING

## 2019-07-10 ASSESSMENT — PAIN DESCRIPTION - LOCATION
LOCATION: BACK

## 2019-07-10 ASSESSMENT — PAIN DESCRIPTION - ORIENTATION: ORIENTATION: RIGHT

## 2019-07-10 ASSESSMENT — PAIN - FUNCTIONAL ASSESSMENT: PAIN_FUNCTIONAL_ASSESSMENT: 0-10

## 2019-07-10 ASSESSMENT — PAIN DESCRIPTION - PAIN TYPE
TYPE: SURGICAL PAIN
TYPE: SURGICAL PAIN

## 2019-07-10 ASSESSMENT — PAIN DESCRIPTION - FREQUENCY
FREQUENCY: CONTINUOUS
FREQUENCY: CONTINUOUS

## 2019-07-10 NOTE — ANESTHESIA PRE PROCEDURE
Department of Anesthesiology  Preprocedure Note       Name:  Yuliana Riggs   Age:  70 y.o.  :  1955                                          MRN:  [id number]         Date:  7/10/2019      Surgeon: García Gu):  Myesha Turner MD    Procedure: SACRAL NERVE STIMULATOR IMPLANT STAGE 1 AND 2  C-ARM (N/A )    Medications prior to admission:   Prior to Admission medications    Medication Sig Start Date End Date Taking? Authorizing Provider   Mirabegron ER (MYRBETRIQ) 50 MG TB24 Take by mouth   Yes Historical Provider, MD   HYDROcodone-acetaminophen (NORCO) 5-325 MG per tablet Take 1 tablet by mouth every 8 hours as needed for Pain for up to 30 days.  19 Yes Sid Bernal MD   fluticasone [facility]) 50 MCG/ACT nasal spray  10/30/18  Yes Historical Provider, MD   triamcinolone (KENALOG) 0.1 % cream  11/15/18  Yes Historical Provider, MD   sulindac (CLINORIL) 150 MG tablet Take 150 mg by mouth 2 times daily   Yes Historical Provider, MD   amLODIPine (NORVASC) 5 MG tablet take 1 tablet by mouth once daily, takes at night 17  Yes Historical Provider, MD   hydrOXYzine (ATARAX) 50 MG tablet 50 mg every 6 hours as needed  17  Yes Historical Provider, MD   alendronate (FOSAMAX) 35 MG tablet Take 1 tablet by mouth every 7 days 16  Yes DARREL Quinn CNP   omeprazole (PRILOSEC) 20 MG capsule take 1 capsule by mouth once daily  Patient taking differently: take 1 capsule by mouth once patient takes at supper 16  Yes DARREL Quinn CNP   simvastatin (ZOCOR) 20 MG tablet take 1 tablet by mouth ONCE NIGHTLY 10/30/15  Yes DARREL Quinn CNP   loratadine (CLARITIN) 10 MG tablet take 1 tablet by mouth once daily 10/20/15  Yes DARREL Quinn CNP   venlafaxine (EFFEXOR-XR) 37.5 MG XR capsule take 1 capsule by mouth once daily  Patient taking differently: take 1 capsule by mouth every evening 9/28/15  Yes Alveta Bosworth, APRN - CNP   lisinopril-hydrochlorothiazide G89.29    HLD (hyperlipidemia) E78.5    GERD (gastroesophageal reflux disease) K21.9    Osteopenia M85.80    Encounter for chronic pain management G89.29    Hx of total knee arthroplasty, right Z96.651    Primary osteoarthritis of both knees M17.0    Encounter for medication monitoring Z51.81    Chronic bilateral low back pain without sciatica M54.5, G89.29    Rectal pain K62.89    Generalized abdominal pain R10.84    Chronic, continuous use of opioids F11.90       Past Medical History:        Diagnosis Date    Arthritis     Bronchitis     Bunion of left foot     Diabetes mellitus ([facility] [address]     not on any meds    Eczema     arms, trunk    GERD (gastroesophageal reflux disease)     Hearing loss     left and right ears, wears hearing aids    Hyperlipidemia     Hypertension     Kidney infection     Osteoporosis     Urge incontinence     Urinary frequency     Wears glasses     for reading       Past Surgical History:        Procedure Laterality Date    APPENDECTOMY      WITH HYSTERECTOMY    ARTHROPLASTY Left 10/12/2017    METATARSAL HEAD RESECTION 3RD LEFT FOOT  & EXCISION LESION SOFT TISSUE LEFT FOOT performed [facility] Yuki Martin DPM [facility] BLEPHAROPLASTY Bilateral     BREAST BIOPSY Left     benign    COLONOSCOPY      CYST REMOVAL Right     HEEL    CYST REMOVAL Right     wrist    FOOT SURGERY Left 04/12/2019    bunionectomy    HYSTERECTOMY      JOINT REPLACEMENT Bilateral     lt had revision knees    KNEE ARTHROSCOPY Right 8/12/15    Dr Lisbet Vo ARTHROSCOPY Left 02/22/2017     &RIGHT  KNEE MANIPULATION    [place] COLON CA SCRN NOT HI [facility] IND N/A 7/17/2018    COLONOSCOPY performed by [id number], MD at [place] Left 2/22/2017    KNEE ARTHROSCOPY FOR LATERAL PATELLA RELEASE, SYNOVECTOMY AND STEROID INJECTION, LEFT KNEE. MANIPULATION OF RIGHT KNEE.  performed by Jeffery Leyva MD at [facility] [address] Left

## 2019-07-10 NOTE — ANESTHESIA POSTPROCEDURE EVALUATION
Department of Anesthesiology  Postprocedure Note    Patient: Nelli Lopez  MRN: [id number]  YOB: 1955  Date of evaluation: 7/10/2019  Time:  4:48 PM     Procedure Summary     Date:  07/10/19 Room / Location:  [facility] OR  / [facility] OR    Anesthesia Start:  3327 Anesthesia Stop:  5289    Procedure:  SACRAL NERVE STIMULATOR IMPLANT STAGE 1 AND 2  C-ARM (N/A ) Diagnosis:  (INCONTINENCE)    Surgeon:  Maritza Mahajan MD Responsible Provider:  Fatuma Bonds MD    Anesthesia Type:  MAC ASA Status:  3          Anesthesia Type: MAC    Trinity Phase I:      Trinity Phase II: Trinity Score: 9    Last vitals: Reviewed and per EMR flowsheets.        Anesthesia Post Evaluation    Patient location during evaluation: PACU  Patient participation: complete - patient participated  Level of consciousness: awake  Airway patency: patent  Nausea & Vomiting: no vomiting  Complications: no  Cardiovascular status: hemodynamically stable  Respiratory status: acceptable  Hydration status: stable

## 2019-07-25 ENCOUNTER — HOSPITAL ENCOUNTER (OUTPATIENT)
Dept: PAIN MANAGEMENT | Age: 64
Discharge: HOME | End: 2019-07-25
Payer: COMMERCIAL

## 2019-07-25 VITALS
HEART RATE: 92 BPM | DIASTOLIC BLOOD PRESSURE: 68 MMHG | SYSTOLIC BLOOD PRESSURE: 115 MMHG | HEIGHT: 59 IN | BODY MASS INDEX: 39.31 KG/M2 | RESPIRATION RATE: 16 BRPM | WEIGHT: 195 LBS

## 2019-07-25 DIAGNOSIS — M25.561 ARTHRALGIA OF BOTH LOWER LEGS: Primary | Chronic | ICD-10-CM

## 2019-07-25 DIAGNOSIS — Z96.651 STATUS POST TOTAL RIGHT KNEE REPLACEMENT: ICD-10-CM

## 2019-07-25 DIAGNOSIS — M17.0 PRIMARY OSTEOARTHRITIS OF BOTH KNEES: ICD-10-CM

## 2019-07-25 DIAGNOSIS — Z51.81 ENCOUNTER FOR MEDICATION MONITORING: ICD-10-CM

## 2019-07-25 DIAGNOSIS — M25.561 CHRONIC PAIN OF BOTH KNEES: ICD-10-CM

## 2019-07-25 DIAGNOSIS — F11.90 CHRONIC, CONTINUOUS USE OF OPIOIDS: ICD-10-CM

## 2019-07-25 DIAGNOSIS — M25.562 ARTHRALGIA OF BOTH LOWER LEGS: Primary | Chronic | ICD-10-CM

## 2019-07-25 DIAGNOSIS — M25.562 CHRONIC PAIN OF BOTH KNEES: ICD-10-CM

## 2019-07-25 DIAGNOSIS — G89.29 CHRONIC PAIN OF BOTH KNEES: ICD-10-CM

## 2019-07-25 DIAGNOSIS — Z96.653 STATUS POST TOTAL BILATERAL KNEE REPLACEMENT: ICD-10-CM

## 2019-07-25 PROCEDURE — 99213 OFFICE O/P EST LOW 20 MIN: CPT | Performed by: NURSE PRACTITIONER

## 2019-07-25 PROCEDURE — 99213 OFFICE O/P EST LOW 20 MIN: CPT

## 2019-07-25 RX ORDER — M-VIT,TX,IRON,MINS/CALC/FOLIC 27MG-0.4MG
1 TABLET ORAL DAILY
COMMUNITY
End: 2020-05-28

## 2019-07-25 RX ORDER — HYDROCODONE BITARTRATE AND ACETAMINOPHEN 5; 325 MG/1; MG/1
1 TABLET ORAL EVERY 8 HOURS PRN
Qty: 90 TABLET | Refills: 0 | Status: SHIPPED | OUTPATIENT
Start: 2019-07-31 | End: 2019-08-26 | Stop reason: SDUPTHER

## 2019-07-25 ASSESSMENT — ENCOUNTER SYMPTOMS
SHORTNESS OF BREATH: 0
COUGH: 0
CONSTIPATION: 0

## 2019-07-25 NOTE — PROGRESS NOTES
Patient is here today to review medication contract. Chief Complaint:  Knee pain    PMH     Patient has had knee pain for many years with no known injury. She has had three knee surgeries, including bilateral arthroplasty but pain continues. R>L. She had PT in the past with moderate relief. Recent bladder stimulator placement. Also feeling upset due to death of her mother this week. HPI:     Knee Pain    There was no injury mechanism. The pain is present in the left knee and right knee (L>R). The quality of the pain is described as aching and stabbing. The pain is at a severity of 5/10. The pain has been constant since onset. Nothing aggravates the symptoms. The treatment provided moderate relief. Pill count: appropriate with 2 extra days prescription adjusted appropriately    Morphine equivalent: 15    Periodic Controlled Substance Monitoring: Possible medication side effects, risk of tolerance/dependence & alternative treatments discussed., No signs of potential drug abuse or diversion identified. , Assessed functional status., Obtaining appropriate analgesic effect of treatment.  Nikolai Villarreal, APRN - CNP)      Past Medical History:   Diagnosis Date    Arthritis     Bronchitis     Bunion of left foot     Diabetes mellitus ([facility] [address]     not on any meds    Eczema     arms, trunk    GERD (gastroesophageal reflux disease)     Hearing loss     left and right ears, wears hearing aids    Hyperlipidemia     Hypertension     Kidney infection     Osteoporosis     Urge incontinence     Urinary frequency     Wears glasses     for reading       Past Surgical History:   Procedure Laterality Date    APPENDECTOMY      WITH HYSTERECTOMY    ARTHROPLASTY Left 10/12/2017    METATARSAL HEAD RESECTION 3RD LEFT FOOT  & EXCISION LESION SOFT TISSUE LEFT FOOT performed by Yordy Thornton DPM at [facility] BLEPHAROPLASTY Bilateral     BREAST BIOPSY Left     benign    COLONOSCOPY % cream, , Disp: , Rfl:     sulindac (CLINORIL) 150 MG tablet, Take 150 mg by mouth 2 times daily, Disp: , Rfl:     amLODIPine (NORVASC) 5 MG tablet, take 1 tablet by mouth once daily, takes at night, Disp: , Rfl: 0    acetaminophen (TYLENOL) 500 MG tablet, Take 500 mg by mouth as needed for Pain, Disp: , Rfl:     hydrOXYzine (ATARAX) 50 MG tablet, 50 mg every 6 hours as needed , Disp: , Rfl: 0    Blood Glucose Monitoring Suppl (TRUE METRIX METER) W/DEVICE KIT, TEST 1 TO 2 TIMES DAILY AS DIRECTED, Disp: , Rfl: 0    TRUE METRIX BLOOD GLUCOSE TEST strip, TEST 1 TO 2 TIMES BEFORE MEALS OR AS DIRECTED, Disp: , Rfl: 1    Lancets (BD LANCET ULTRAFINE 30G) MISC, USE 1 TO 2 TIMES A DAY, Disp: , Rfl: 1    alendronate (FOSAMAX) 35 MG tablet, Take 1 tablet by mouth every 7 days, Disp: 4 tablet, Rfl: 3    omeprazole (PRILOSEC) 20 MG capsule, take 1 capsule by mouth once daily (Patient taking differently: take 1 capsule by mouth once patient takes at supper), Disp: 90 capsule, Rfl: 0    simvastatin (ZOCOR) 20 MG tablet, take 1 tablet by mouth ONCE NIGHTLY, Disp: 30 tablet, Rfl: 3    loratadine (CLARITIN) 10 MG tablet, take 1 tablet by mouth once daily, Disp: 30 tablet, Rfl: 3    venlafaxine (EFFEXOR-XR) 37.5 MG XR capsule, take 1 capsule by mouth once daily (Patient taking differently: take 1 capsule by mouth every evening), Disp: 30 capsule, Rfl: 3    lisinopril-hydrochlorothiazide (PRINZIDE) 20-12.5 MG per tablet, Take 1 tablet by mouth daily, Disp: 30 tablet, Rfl: 3    Calcium Carbonate-Vitamin D (CALCIUM + D PO), Take by mouth, Disp: , Rfl:     busPIRone (BUSPAR) 10 MG tablet, Take 1 tablet by mouth 2 times daily.  (Patient taking differently: Take 10 mg by mouth 2 times daily as needed ), Disp: 60 tablet, Rfl: 3    Family History   Problem Relation Age of Onset    Stroke Father     Emphysema Father     Diabetes Mother     Heart Failure Mother     Osteoarthritis Mother        Social History Musculoskeletal:        Left knee: She exhibits decreased range of motion. She exhibits no swelling. Neurological: She is alert and oriented to person, place, and time. Skin: Skin is warm and dry. Record/Diagnostics Review:    Last james May  and was appropriate     XR Right knee 2016  FINDINGS: A right total knee arthroplasty is noted in situ in satisfactory alignment and position. No loosening or uday-implant fractures are noted. Expected and postoperative soft tissue swelling and joint air are noted ventrally.               Impression   IMPRESSION : Stable right total knee arthroplasty without radiographic evidence of hardware complication. Soft tissue swelling and joint air are noted, expected for immediate postoperative status       Assessment:  Problem List Items Addressed This Visit     Arthralgia of both lower legs - Primary (Chronic)    Status post total right knee replacement    Relevant Medications    HYDROcodone-acetaminophen (NORCO) 5-325 MG per tablet (Start on 7/31/2019)    Chronic pain of both knees    Relevant Medications    HYDROcodone-acetaminophen (NORCO) 5-325 MG per tablet (Start on 7/31/2019)    Primary osteoarthritis of both knees    Relevant Medications    HYDROcodone-acetaminophen (NORCO) 5-325 MG per tablet (Start on 7/31/2019)    Encounter for medication monitoring    Chronic, continuous use of opioids      Other Visit Diagnoses     Status post total bilateral knee replacement        Relevant Medications    HYDROcodone-acetaminophen (NORCO) 5-325 MG per tablet (Start on 7/31/2019)             Treatment Plan:  Patient relates current medications are helping the pain. Patient reports taking pain medications as prescribed, denies obtaining medications from different sources and denies use of illegal drugs. Patient denies side effects from medications like nausea, vomiting, constipation or drowsiness.  Patient reports current activities of daily living are possible due to medications and would like to continue them. As always, we encourage daily stretching and strengthening exercises, and recommend minimizing use of pain medications unless patient cannot get through daily activities due to pain. Contract requirements met. Continue opioid therapy.  Script written for norco  Follow up appointment made for 4 weeks

## 2019-08-15 ENCOUNTER — TELEPHONE (OUTPATIENT)
Dept: UROLOGY | Age: 64
End: 2019-08-15

## 2019-08-15 ENCOUNTER — OFFICE VISIT (OUTPATIENT)
Dept: UROLOGY | Age: 64
End: 2019-08-15

## 2019-08-15 VITALS
WEIGHT: 196.2 LBS | DIASTOLIC BLOOD PRESSURE: 68 MMHG | HEART RATE: 91 BPM | SYSTOLIC BLOOD PRESSURE: 109 MMHG | HEIGHT: 59 IN | BODY MASS INDEX: 39.55 KG/M2 | TEMPERATURE: 98.2 F

## 2019-08-15 DIAGNOSIS — L76.82 PAIN AT SURGICAL INCISION: Primary | ICD-10-CM

## 2019-08-15 DIAGNOSIS — M79.2 NERVE PAIN: ICD-10-CM

## 2019-08-15 DIAGNOSIS — N39.41 URGE INCONTINENCE: ICD-10-CM

## 2019-08-15 DIAGNOSIS — N39.46 MIXED STRESS AND URGE URINARY INCONTINENCE: ICD-10-CM

## 2019-08-15 PROCEDURE — 99024 POSTOP FOLLOW-UP VISIT: CPT | Performed by: NURSE PRACTITIONER

## 2019-08-15 RX ORDER — CEPHALEXIN 500 MG/1
500 CAPSULE ORAL 3 TIMES DAILY
Qty: 21 CAPSULE | Refills: 0 | Status: SHIPPED | OUTPATIENT
Start: 2019-08-15 | End: 2019-09-26

## 2019-08-15 ASSESSMENT — ENCOUNTER SYMPTOMS
EYE PAIN: 0
SHORTNESS OF BREATH: 0
EYE REDNESS: 0
BACK PAIN: 0
VOMITING: 0
WHEEZING: 0
NAUSEA: 0
ABDOMINAL PAIN: 0
COUGH: 0
CONSTIPATION: 0
DIARRHEA: 0

## 2019-08-15 NOTE — TELEPHONE ENCOUNTER
Per Kristy Ascencio have pt decrease intensity with remote and if pt unsure how Kristy Ascencio will call pt. Spoke to patient regarding Vanessa's advised. Pt stated\" I only wear it at night I have it on 4, not sure how to change it. \" writer informed pt will have vanessa give her a call. Per pt \" incision looks okay just feels loose. \"  Pt denies fever chills burning. \"       Per Kristy Ascencio thinks she needs to be seen in office to have incision site checked.

## 2019-08-15 NOTE — TELEPHONE ENCOUNTER
Per Aileen Ham have pt come in today. Pt notified appointment made for 3:30PM. Verbal understanding given call ended.

## 2019-08-15 NOTE — PROGRESS NOTES
tablet Take 1 tablet by mouth daily 30 tablet 3    Calcium Carbonate-Vitamin D (CALCIUM + D PO) Take by mouth      busPIRone (BUSPAR) 10 MG tablet Take 1 tablet by mouth 2 times daily. (Patient taking differently: Take 10 mg by mouth 2 times daily as needed ) 60 tablet 3      (All medications reviewed and updated by provider sincelast office visit or hospitalization)   Aspirin; Codeine; Avelox [moxifloxacin]; Lidoderm [lidocaine]; and Sulfa antibiotics  Social History     Tobacco Use   Smoking Status Never Smoker   Smokeless Tobacco Never Used      (If patient a smoker, smoking cessation counseling offered)     Social History     Substance and Sexual Activity   Alcohol Use No    Alcohol/week: 0.0 standard drinks       REVIEW OF SYSTEMS:  Review of Systems      Physical Exam:      Vitals:    08/15/19 1535   BP: 109/68   Pulse: 91   Temp: 98.2 °F (36.8 °C)     Body mass index is 39.63 kg/m². Patient is a 70 y.o. female in noacute distress and alert and oriented to person, place and time. Physical Exam  Constitutional: Patient in no acute distress. Neuro: Alert andoriented to person, place and time. Psych: Mood normal, affect normal  Skin: No rash noted  HEENT: Head: Normocephalic and atraumatic  Conjunctivae and EOM are normal. Pupils are equal, round  Nose: Normal  Right External Ear: Normal; Left External Ear: Normal  Mouth: Mucosa Moist  Neck: Supple  Lungs: Respiratory effort is normal  Cardiovascular: strong and reg, no edema. Abdomen: Soft, non-tender, non-distended   Bladder non-tender and not distended. RT buttock incison approximated, reddened at tender at rt  inner corner  Musculoskeletal: Normal gait and station      Assessment and Plan      1. Mixed stress and urge urinary incontinence    2. Urge incontinence           Plan: At C4 setting no complaints about leg vibration or nerve pain, - had ambulate in office, only feels device stim at vaginal area.       Incision red- Keflex 3x per day for 1

## 2019-08-15 NOTE — LETTER
[facility]  [facility] - [address]  Dept: [phone]  Dept Fax: [phone]        8/15/19    Patient: Erwin Alexandre  YOB: 1955    Dear Collin Barahona MD,    I had the pleasure of seeing one of your patients, Dao Carlisle today in the office today. Below are the relevant portions of my assessment and plan of care. IMPRESSION:  1. Mixed stress and urge urinary incontinence    2. Urge incontinence        PLAN:  Incision red- Keflex 3x per day for 1 week    Bring  back to office on Tuesday for wound check and transfer new setting to  Home device ( forgot home )     Decrease fluids 2 hours before bed    Tea is a bladder irritant- this may increase urinary symptoms. Thank you for allowing me to participate in the care of this patient. I will keep you updated on this patient's follow up and I look forward to serving you and your patients again in the future.     Dany Newsome, APRN - CNP

## 2019-08-21 ENCOUNTER — OFFICE VISIT (OUTPATIENT)
Dept: UROLOGY | Age: 64
End: 2019-08-21

## 2019-08-21 VITALS
WEIGHT: 196.21 LBS | DIASTOLIC BLOOD PRESSURE: 84 MMHG | HEART RATE: 92 BPM | TEMPERATURE: 98.5 F | BODY MASS INDEX: 39.56 KG/M2 | SYSTOLIC BLOOD PRESSURE: 132 MMHG | HEIGHT: 59 IN

## 2019-08-21 DIAGNOSIS — M79.2 NERVE PAIN: ICD-10-CM

## 2019-08-21 DIAGNOSIS — L76.82 PAIN AT SURGICAL INCISION: ICD-10-CM

## 2019-08-21 DIAGNOSIS — N39.46 MIXED STRESS AND URGE URINARY INCONTINENCE: Primary | ICD-10-CM

## 2019-08-21 PROCEDURE — 99024 POSTOP FOLLOW-UP VISIT: CPT | Performed by: NURSE PRACTITIONER

## 2019-08-21 ASSESSMENT — ENCOUNTER SYMPTOMS
ABDOMINAL PAIN: 1
SHORTNESS OF BREATH: 0
NAUSEA: 0
COUGH: 0
VOMITING: 0
EYE PAIN: 0
DIARRHEA: 1
CONSTIPATION: 0
EYE REDNESS: 0
BACK PAIN: 0
WHEEZING: 0

## 2019-08-21 NOTE — LETTER
[facility]  [facility] - [address]  Dept: [phone]  Dept Fax: [phone]        8/21/19    Patient: Carla Friday  YOB: 1955    Dear Kathrin Manriquez MD,    I had the pleasure of seeing one of your patients, Laurel Lopez today in the office today. Below are the relevant portions of my assessment and plan of care. IMPRESSION:  1. Mixed stress and urge urinary incontinence    2. Nerve pain    3. Pain at surgical incision        PLAN:  site looks good. Continue Keflex 3x per day until gone- unless diarrhea gets worse then discontinue. Yogurt with active cultures     Interstim device settings sent to pts box- she did not have her home  at last visit    Follow up as scheduled- call with questions or concerns sooner. No follow-ups on file. Prescriptions Ordered:  No orders of the defined types were placed in this encounter. Orders Placed:  No orders of the defined types were placed in this encounter. Thank you for allowing me to participate in the care of this patient. I will keep you updated on this patient's follow up and I look forward to serving you and your patients again in the future.     Naveed Gonzalez, DARREL - CNP

## 2019-08-21 NOTE — PROGRESS NOTES
[facility]  [facility] - [address] [address]  Dept: [address] [facility] U[facility] Office Note - Established    Patient:  Shila Edwards  YOB: 1955  Date: 8/21/2019    The patient is a 70 y.o. female whopresents today for evaluation of the following problems:   Chief Complaint   Patient presents with    Wound Check       HPI  Here for follow up on wound check. Her incision feels much less tender, no fevers. She started having diarrhea yesterday and has had none today. Her days has been a little mixed up, had to get up early to take her daughter for surgery, has noticed a little more frequency, no dysuria, no leak. She has reported electrical vibrations going down her leg last visit- we did change her settings and she reports improvement on new settings. Summary of old records: N/A    Additional History: N/A    Procedures Today: Interstim programming:     The programming head was placed over the implanted neurostimulator. Previous programed electrode selectionsettings: c4 amp 0.9    Final electrode setting: same    Impedence all under 4000 ohms verified: yes    Battery life checked: 72-90    Sensation in pelvic area. Vag area     Urinalysis today:  No results found for this visit on 08/21/19.     Imaging Reviewed during this Office Visit: (results were independently reviewed by physician and radiology report verified)    AUA Symptom Score (8/21/2019):  INCOMPLETE EMPTYING: How often have you had the sensation of not emptying your bladder?: Not at all  FREQUENCY: How often do you have to urinate less than every two hours?: About Half the time  INTERMITTENCY: How often have you found you stopped and started again several times when you urinated?: Not at all  URGENCY: How often have you found it difficult to postpone urination?: About Half the time  WEAK STREAM: How often have you had a weak urinary stream?: Not at NERVE STIMULATOR IMPLANT STAGE 1 AND 2  C-ARM performed by Maritza Mahajan MD at [address] Left 05/02/2017    3rd toe    TONSILLECTOMY      TYMPANOPLASTY Left      Family History   Problem Relation Age of Onset    Stroke Father     Emphysema Father     Diabetes Mother     Heart Failure Mother     Osteoarthritis Mother      Outpatient Medications Marked as Taking for the 8/21/19 encounter (Office Visit) with DARREL Boo CNP   Medication Sig Dispense Refill    cephALEXin (KEFLEX) 500 MG capsule Take 1 capsule by mouth 3 times daily 21 capsule 0    Multiple Vitamins-Minerals (THERAPEUTIC MULTIVITAMIN-MINERALS) tablet Take 1 tablet by mouth daily      HYDROcodone-acetaminophen (NORCO) 5-325 MG per tablet Take 1 tablet by mouth every 8 hours as needed for Pain for up to 30 days.  90 tablet 0    Mirabegron ER (MYRBETRIQ) 50 MG TB24 Take by mouth      albuterol sulfate  (90 Base) MCG/ACT inhaler inhale 2 puffs by mouth every 4 hours if needed  0    fluticasone (FLONASE) 50 MCG/ACT nasal spray       triamcinolone (KENALOG) 0.1 % cream       sulindac (CLINORIL) 150 MG tablet Take 150 mg by mouth 2 times daily      amLODIPine (NORVASC) 5 MG tablet take 1 tablet by mouth once daily, takes at night  0    acetaminophen (TYLENOL) 500 MG tablet Take 500 mg by mouth as needed for Pain      hydrOXYzine (ATARAX) 50 MG tablet 50 mg every 6 hours as needed   0    alendronate (FOSAMAX) 35 MG tablet Take 1 tablet by mouth every 7 days 4 tablet 3    omeprazole (PRILOSEC) 20 MG capsule take 1 capsule by mouth once daily (Patient taking differently: take 1 capsule by mouth once patient takes at supper) 90 capsule 0    simvastatin (ZOCOR) 20 MG tablet take 1 tablet by mouth ONCE NIGHTLY 30 tablet 3    loratadine (CLARITIN) 10 MG tablet take 1 tablet by mouth once daily 30 tablet 3    venlafaxine (EFFEXOR-XR) 37.5 MG XR capsule take 1 capsule by mouth once daily (Patient taking

## 2019-08-21 NOTE — PATIENT INSTRUCTIONS
site looks good. Continue Keflex 3x per day until gone- unless diarrhea gets worse then discontinue. Yogurt with active cultures     Interstim device settings sent to pts box- she did not have her home  at last visit    Follow up as scheduled- call with questions or concerns sooner.

## 2019-08-26 ENCOUNTER — HOSPITAL ENCOUNTER (OUTPATIENT)
Dept: PAIN MANAGEMENT | Age: 64
Discharge: HOME | End: 2019-08-26
Payer: COMMERCIAL

## 2019-08-26 VITALS
DIASTOLIC BLOOD PRESSURE: 88 MMHG | RESPIRATION RATE: 16 BRPM | SYSTOLIC BLOOD PRESSURE: 152 MMHG | HEIGHT: 59 IN | WEIGHT: 190 LBS | BODY MASS INDEX: 38.3 KG/M2 | OXYGEN SATURATION: 99 % | TEMPERATURE: 99.2 F | HEART RATE: 83 BPM

## 2019-08-26 DIAGNOSIS — Z79.899 ENCOUNTER FOR LONG-TERM (CURRENT) USE OF OTHER MEDICATIONS: ICD-10-CM

## 2019-08-26 DIAGNOSIS — Z96.653 STATUS POST TOTAL BILATERAL KNEE REPLACEMENT: ICD-10-CM

## 2019-08-26 DIAGNOSIS — Z96.651 HX OF TOTAL KNEE ARTHROPLASTY, RIGHT: ICD-10-CM

## 2019-08-26 DIAGNOSIS — G89.29 CHRONIC PAIN OF BOTH KNEES: ICD-10-CM

## 2019-08-26 DIAGNOSIS — M25.561 ARTHRALGIA OF BOTH LOWER LEGS: Primary | Chronic | ICD-10-CM

## 2019-08-26 DIAGNOSIS — M25.561 CHRONIC PAIN OF BOTH KNEES: ICD-10-CM

## 2019-08-26 DIAGNOSIS — G89.29 CHRONIC BILATERAL LOW BACK PAIN WITHOUT SCIATICA: ICD-10-CM

## 2019-08-26 DIAGNOSIS — M79.604 PAIN OF RIGHT LOWER EXTREMITY: ICD-10-CM

## 2019-08-26 DIAGNOSIS — K62.89 RECTAL PAIN: ICD-10-CM

## 2019-08-26 DIAGNOSIS — M54.50 CHRONIC BILATERAL LOW BACK PAIN WITHOUT SCIATICA: ICD-10-CM

## 2019-08-26 DIAGNOSIS — M25.562 CHRONIC PAIN OF BOTH KNEES: ICD-10-CM

## 2019-08-26 DIAGNOSIS — G89.29 ENCOUNTER FOR CHRONIC PAIN MANAGEMENT: ICD-10-CM

## 2019-08-26 DIAGNOSIS — Z96.651 STATUS POST TOTAL RIGHT KNEE REPLACEMENT: ICD-10-CM

## 2019-08-26 DIAGNOSIS — M17.0 PRIMARY OSTEOARTHRITIS OF BOTH KNEES: ICD-10-CM

## 2019-08-26 DIAGNOSIS — M25.562 ARTHRALGIA OF BOTH LOWER LEGS: Primary | Chronic | ICD-10-CM

## 2019-08-26 DIAGNOSIS — Z51.81 ENCOUNTER FOR MEDICATION MONITORING: ICD-10-CM

## 2019-08-26 PROCEDURE — 99213 OFFICE O/P EST LOW 20 MIN: CPT

## 2019-08-26 PROCEDURE — 99213 OFFICE O/P EST LOW 20 MIN: CPT | Performed by: NURSE PRACTITIONER

## 2019-08-26 RX ORDER — GABAPENTIN 100 MG/1
CAPSULE ORAL
Refills: 0 | COMMUNITY
Start: 2019-08-22

## 2019-08-26 RX ORDER — HYDROCODONE BITARTRATE AND ACETAMINOPHEN 5; 325 MG/1; MG/1
1 TABLET ORAL EVERY 8 HOURS PRN
Qty: 90 TABLET | Refills: 0 | Status: SHIPPED | OUTPATIENT
Start: 2019-08-31 | End: 2019-09-26 | Stop reason: SDUPTHER

## 2019-08-26 ASSESSMENT — ENCOUNTER SYMPTOMS
RESPIRATORY NEGATIVE: 1
EYES NEGATIVE: 1
GASTROINTESTINAL NEGATIVE: 1

## 2019-08-26 NOTE — PROGRESS NOTES
Review:      As above, I did review the imaging    5/27/2019  3:55 PM - Kamari Cooper Incoming Lab Results From [employer]     Component Value Ref Range & Units Status Collected Lab   Pain Management Drug Panel Interp, Urine Consistent   Final 05/24/2019  1:45 PM [facility]   (NOTE)   ________________________________________________________________   DRUGS EXPECTED:   HYDROCODONE [5/24/19]   ________________________________________________________________   CONSISTENT with medications provided:   HYDROCODONE : based on hydrocodone, norhydrocodone   ________________________________________________________________   INTERPRETIVE INFORMATION: Pain Mgt [place], Mass Spec/EMIT, Ur,                            Interp   Interpretation depends on accuracy and completeness of patient   medication information submitted by client. 6-Acetylmorphine, Ur Not Detected   Final 05/24/2019  1:45 PM [facility]   7-Aminoclonazepam, Urine Not Detected   Final 05/24/2019  1:45 PM [facility]   Alpha-OH-Alpraz, Urine Not Detected   Final 05/24/2019  1:45 PM [facility]   Alprazolam, Urine Not Detected   Final 05/24/2019  1:45 PM [facility]   Amphetamines, urine Not Detected   Final 05/24/2019  1:45 PM [facility]   Barbiturates, Ur Not Detected   Final 05/24/2019  1:45 PM [facility]   Benzoylecgonine, Ur Not Detected   Final 05/24/2019  1:45 PM [facility]   Buprenorphine Urine Not Detected   Final 05/24/2019  1:45 PM [facility]   Carisoprodol, Ur Not Detected   Final 05/24/2019  1:45 PM [facility]   (NOTE)   The carisoprodol immunoassay has cross-reactivity to carisoprodol   and meprobamate.     Clonazepam, Urine Not Detected   Final 05/24/2019  1:45 PM [facility]   Codeine, Urine Not Detected   Final 05/24/2019  1:45 PM [facility]   MDA, Ur Not Detected   Final 05/24/2019  1:45 PM [facility]   Diazepam, Urine Not Detected   Final 05/24/2019  1:45 PM [facility]   Ethyl Glucuronide Ur Not Detected   Final 05/24/2019  1:45 PM [facility]   Fentanyl, Ur Not Detected   Final 05/24/2019  1:45 PM [facility]   Hydrocodone, Urine Present   Final Lab   HYDROCODONE ON 5/24/19 630 AM    Creatinine, Ur 168.1  20.0 - 400.0 mg/dL Final 05/24/2019  1:45 PM [facility]   Pain Mgt Drug Panel, Hi Res, Ur See Below   Final 05/24/2019  1:45 PM [facility]   (NOTE)   Methodology: Qualitative Enzyme Immunoassay and Qualitative Liquid   Chromatography-Time of Flight-Mass Spectrometry or Tandem Mass   Spectrometry, Quantitative Spectrophotometry   The absence of expected drug(s) and/or drug metabolite(s) may   indicate non-compliance, inappropriate timing of specimen   collection relative to drug administration, poor drug absorption,   diluted/adulterated urine, or limitations of testing. The   concentration must be greater than or equal to the cutoff to be   reported as present.  If specific drug concentrations are   required, contact the laboratory within two weeks of specimen   collection to request quantification by a second analytical   technique. Interpretive questions should be directed to the   laboratory. Results based on immunoassay detection that do not match clinical   expectations should be   interpreted with caution. Confirmatory testing by mass   spectrometry for immunoassay-based results is available, if   ordered within two weeks of specimen collection. Additional   charges apply. For medical purposes only; not valid for forensic use. This test was developed and its performance characteristics   determined by [employer]. The U.S. Food and Drug   Administration has not approved or cleared this test; however, FDA   clearance or approval is not currently required for clinical use. The results are not intended to be used as the sole means for   clinical diagnosis or patient management decisions. EER Hi Res Interp Ur See Note   Final 05/24/2019  1:45 PM [facility]   (NOTE)   Access [facility] Enhanced Report using either link below:   -Direct access: [email or web address]   -Enter Username, Password: [email or web address]   Username: [email or web address]   Password: [email or web address]   Performed by [facility], [address] [phone]   www. Paula Elias MD, Lab. Director            Past Medical History:   Diagnosis Date    Arthritis     Bronchitis     Bunion of left foot     Diabetes mellitus ([facility] [address]     not on any meds    Eczema     arms, trunk    GERD (gastroesophageal reflux disease)     Hearing loss     left and right ears, wears hearing aids    Hyperlipidemia     Hypertension     Kidney infection     Osteoporosis     Urge incontinence     Urinary frequency     Wears glasses     for reading       Past Surgical History:   Procedure Laterality Date    APPENDECTOMY      WITH HYSTERECTOMY    ARTHROPLASTY Left 10/12/2017    METATARSAL HEAD RESECTION 3RD LEFT FOOT  & EXCISION LESION SOFT TISSUE LEFT FOOT performed [facility] Hailey Guzman DPM at [facility] BLEPHAROPLASTY Bilateral     BREAST BIOPSY Left     benign    COLONOSCOPY      CYST REMOVAL Right     HEEL    CYST REMOVAL Right     wrist    FOOT SURGERY Left 04/12/2019    bunionectomy    HYSTERECTOMY      JOINT REPLACEMENT Bilateral     lt had revision knees    KNEE ARTHROSCOPY Right 8/12/15    Dr Vega Every ARTHROSCOPY Left 02/22/2017     &RIGHT  KNEE MANIPULATION    OTHER SURGICAL HISTORY  07/10/2019    SACRAL NERVE STIMULATOR IMPLANT STAGE 1 AND 2      VA COLON CA SCRN NOT  [address]  IND N/A 7/17/2018    COLONOSCOPY performed by Vik Maya MD at [place] Left 2/22/2017    KNEE ARTHROSCOPY FOR LATERAL PATELLA RELEASE, SYNOVECTOMY AND STEROID INJECTION, LEFT KNEE. MANIPULATION OF RIGHT KNEE.  performed by Thi Melgoza MD at [address] [place] [address] Ln N/A 7/10/2019    SACRAL NERVE STIMULATOR IMPLANT STAGE 1 AND 2  C-ARM performed by Juan Sawyer MD at [address] [place] [address] Left 05/02/2017    3rd toe    TONSILLECTOMY      TYMPANOPLASTY Left        Allergies Disp: , Rfl:     acetaminophen (TYLENOL) 500 MG tablet, Take 500 mg by mouth as needed for Pain, Disp: , Rfl:     hydrOXYzine (ATARAX) 50 MG tablet, 50 mg every 6 hours as needed , Disp: , Rfl: 0    busPIRone (BUSPAR) 10 MG tablet, Take 1 tablet by mouth 2 times daily.  (Patient taking differently: Take 10 mg by mouth 2 times daily as needed ), Disp: 60 tablet, Rfl: 3    Family History   Problem Relation Age of Onset    Stroke Father     Emphysema Father     Diabetes Mother     Heart Failure Mother     Osteoarthritis Mother        Social History     Socioeconomic History    Marital status:      Spouse name: Not on file    Number of children: Not on file    Years of education: Not on file    Highest education level: Not on file   Occupational History    Occupation: disability   Social Needs    Financial resource strain: Not on file    Food insecurity:     Worry: Not on file     Inability: Not on file    Transportation needs:     Medical: Not on file     Non-medical: Not on file   Tobacco Use    Smoking status: Never Smoker    Smokeless tobacco: Never Used   Substance and Sexual Activity    Alcohol use: No     Alcohol/week: 0.0 standard drinks    Drug use: No    Sexual activity: Not Currently   Lifestyle    Physical activity:     Days per week: Not on file     Minutes per session: Not on file    Stress: Not on file   Relationships    Social connections:     Talks on phone: Not on file     Gets together: Not on file     Attends Rastafarian service: Not on file     Active member of club or organization: Not on file     Attends meetings of clubs or organizations: Not on file     Relationship status: Not on file    Intimate partner violence:     Fear of current or ex partner: Not on file     Emotionally abused: Not on file     Physically abused: Not on file     Forced sexual activity: Not on file   Other Topics Concern    Not on file   Social History Narrative    Not on file       Review of Systems:  Review of Systems   Constitution: Negative. HENT: Negative. Eyes: Negative. Cardiovascular: Negative. Respiratory: Negative. Endocrine: Negative. Not on RX diabetic   Hematologic/Lymphatic: Negative. Skin: Negative. Musculoskeletal: Positive for joint pain. Gastrointestinal: Negative. Genitourinary:        Bladder stimulator   Neurological: Negative. Psychiatric/Behavioral: Negative. Physical Exam:  BP (!) 152/88   Pulse 83   Temp 99.2 °F (37.3 °C) (Oral)   Resp 16   Ht 4' 11\" (1.499 m)   Wt 190 lb (86.2 kg)   SpO2 99%   BMI 38.38 kg/m²     Physical Exam   Constitutional: She appears well-developed. Neck: Normal range of motion. Neck supple. Pulmonary/Chest: Effort normal.   Musculoskeletal:        Right knee: She exhibits deformity. Tenderness found. Medial joint line and lateral joint line tenderness noted. Left knee: She exhibits swelling and deformity. Tenderness found. Medial joint line and lateral joint line tenderness noted. Neurological: She is alert. Gait abnormal.   Reflex Scores:       Patellar reflexes are 0 on the right side and 0 on the left side. Achilles reflexes are 2+ on the right side. Skin: Skin is warm, dry and intact. Psychiatric: She has a normal mood and affect.  Her speech is normal and behavior is normal. Judgment and thought content normal. Cognition and memory are normal.         Assessment:    ,  Problem List Items Addressed This Visit     Status post total right knee replacement    Relevant Medications    HYDROcodone-acetaminophen (NORCO) 5-325 MG per tablet (Start on 8/31/2019)    Rectal pain    Primary osteoarthritis of both knees    Relevant Medications    HYDROcodone-acetaminophen (NORCO) 5-325 MG per tablet (Start on 8/31/2019)    Pain in lower limb    Hx of total knee arthroplasty, right    Relevant Medications    HYDROcodone-acetaminophen (NORCO) 5-325 MG per tablet (Start on 8/31/2019)

## 2019-08-26 NOTE — DISCHARGE INSTR - COC
Continuity of Care Form    Patient Name: Maira Salgado   :  1955  MRN:  [id number]    Admit date:  2019  Discharge date:  ***    Code Status Order: Prior   Advance Directives:     Admitting Physician:  No admitting provider for patient encounter. PCP: Josue Romberg, MD    Discharging Nurse: [facility] Unit/Room#: No information available for this encounter. Discharging Unit Phone Number: ***    Emergency Contact:   Extended Emergency Contact Information  Primary Emergency Contact: Marcello Torres  Address: [address] [place] [address]           [address] [facility], [address] [facility] [address]  Phone: [phone]  Work Phone: [phone]  Mobile Phone: [phone]  Relation: Spouse   needed? No  Secondary Emergency Contact: Eve [place] of [address]  Phone: [phone]  Work Phone: [phone]  Mobile Phone: [phone]  Relation: Child   needed?  No    Past Surgical History:  Past Surgical History:   Procedure Laterality Date    APPENDECTOMY      WITH HYSTERECTOMY    ARTHROPLASTY Left 10/12/2017    METATARSAL HEAD RESECTION 3RD LEFT FOOT  & EXCISION LESION SOFT TISSUE LEFT FOOT performed by Charlotte Benítez DPM at [facility] BLEPHAROPLASTY Bilateral     BREAST BIOPSY Left     benign    COLONOSCOPY      CYST REMOVAL Right     HEEL    CYST REMOVAL Right     wrist    FOOT SURGERY Left 2019    bunionectomy    HYSTERECTOMY      JOINT REPLACEMENT Bilateral     lt had revision knees    KNEE ARTHROSCOPY Right 8/12/15    Dr Anahi Keane ARTHROSCOPY Left 2017     &RIGHT  KNEE MANIPULATION    OTHER SURGICAL HISTORY  07/10/2019    SACRAL NERVE STIMULATOR IMPLANT STAGE 1 AND 2      VA COLON CA SCRN NOT HI [facility] IND N/A 2018    COLONOSCOPY performed by Mathew Araujo MD at [place] Left 2017    KNEE ARTHROSCOPY FOR LATERAL PATELLA RELEASE, [place]:915209645}  Med Admin  {P DME [facility]}  Med Delivery   { TAWANDA MED Delivery:600777757}    Wound Care Documentation and Therapy:  Incision 08/12/15 Knee Right (Active)   Number of days: 1475       Incision 16 Knee Right (Active)   Number of days: 1146       Incision 17 Knee Left (Active)   Number of days: 692       Incision 10/12/17 Foot Left (Active)   Number of days: 683        Elimination:  Continence:   · Bowel: {YES / XT:94475}  · Bladder: {YES / XL:87324}  Urinary Catheter: {Urinary Catheter:814810677}   Colostomy/Ileostomy/Ileal Conduit: {YES / W}       Date of Last BM: ***  No intake or output data in the 24 hours ending 19 1315  No intake/output data recorded.     Safety Concerns:     [facility] Safety Concerns:733343872}    Impairments/Disabilities:      [facility] Impairments/Disabilities:229464322}    Nutrition Therapy:  Current Nutrition Therapy:   [facility] Diet List:206226515}    Routes of Feeding: {[facility] DME Other Feedings:741608475}  Liquids: {Slp liquid thickness:35450}  Daily Fluid Restriction: {CHP DME Yes amt example:160397627}  Last Modified Barium Swallow with Video (Video Swallowing Test): {Done Not Done GBWL:805175701}    Treatments at the Time of Hospital Discharge:   Respiratory Treatments: ***  Oxygen Therapy:  {Therapy; copd oxygen:93638}  Ventilator:    { CC Vent KFPC:499519118}    Rehab Therapies: {THERAPEUTIC INTERVENTION:8243727268}  Weight Bearing Status/Restrictions: [employer] Weight Bearin}  Other Medical Equipment (for information only, NOT a DME order):  {EQUIPMENT:858520176}  Other Treatments: ***    Patient's personal belongings (please select all that are sent with patient):  {P DME Belongings:739019714}    RN SIGNATURE:  {Esignature:762760463}    CASE MANAGEMENT/SOCIAL WORK SECTION    Inpatient Status Date: ***    Readmission Risk Assessment Score:  Readmission Risk              Risk of Unplanned Readmission:        0

## 2019-08-28 ENCOUNTER — HOSPITAL ENCOUNTER (OUTPATIENT)
Dept: WOMENS IMAGING | Age: 64
Discharge: HOME | End: 2019-08-30
Payer: COMMERCIAL

## 2019-08-28 DIAGNOSIS — Z12.31 SCREENING MAMMOGRAM, ENCOUNTER FOR: ICD-10-CM

## 2019-08-28 PROCEDURE — 77063 BREAST TOMOSYNTHESIS BI: CPT

## 2019-09-20 ENCOUNTER — OFFICE VISIT (OUTPATIENT)
Dept: OBGYN CLINIC | Age: 64
End: 2019-09-20
Payer: COMMERCIAL

## 2019-09-20 ENCOUNTER — HOSPITAL ENCOUNTER (OUTPATIENT)
Age: 64
Setting detail: SPECIMEN
Discharge: HOME | End: 2019-09-20
Payer: COMMERCIAL

## 2019-09-20 VITALS
SYSTOLIC BLOOD PRESSURE: 119 MMHG | BODY MASS INDEX: 39.51 KG/M2 | HEIGHT: 59 IN | DIASTOLIC BLOOD PRESSURE: 75 MMHG | WEIGHT: 196 LBS | HEART RATE: 94 BPM

## 2019-09-20 DIAGNOSIS — Z01.419 WELL WOMAN EXAM: Primary | ICD-10-CM

## 2019-09-20 DIAGNOSIS — R30.0 BURNING WITH URINATION: ICD-10-CM

## 2019-09-20 DIAGNOSIS — Z90.710 STATUS POST HYSTERECTOMY: ICD-10-CM

## 2019-09-20 DIAGNOSIS — N76.0 ACUTE VAGINITIS: ICD-10-CM

## 2019-09-20 LAB
BILIRUBIN, POC: ABNORMAL
BLOOD URINE, POC: ABNORMAL
CLARITY, POC: ABNORMAL
COLOR, POC: YELLOW
GLUCOSE URINE, POC: ABNORMAL
KETONES, POC: ABNORMAL
LEUKOCYTE EST, POC: ABNORMAL
NITRITE, POC: ABNORMAL
PH, POC: 7
PROTEIN, POC: ABNORMAL
SPECIFIC GRAVITY, POC: 1.01
UROBILINOGEN, POC: ABNORMAL

## 2019-09-20 PROCEDURE — 81002 URINALYSIS NONAUTO W/O SCOPE: CPT | Performed by: SPECIALIST

## 2019-09-20 PROCEDURE — G0101 CA SCREEN;PELVIC/BREAST EXAM: HCPCS | Performed by: SPECIALIST

## 2019-09-20 RX ORDER — NITROFURANTOIN 25; 75 MG/1; MG/1
100 CAPSULE ORAL 2 TIMES DAILY
Qty: 20 CAPSULE | Refills: 0 | Status: SHIPPED | OUTPATIENT
Start: 2019-09-20 | End: 2019-09-30

## 2019-09-20 RX ORDER — FLUCONAZOLE 100 MG/1
100 TABLET ORAL DAILY
Qty: 7 TABLET | Refills: 0 | Status: SHIPPED | OUTPATIENT
Start: 2019-09-20 | End: 2019-09-27

## 2019-09-20 RX ORDER — METRONIDAZOLE 500 MG/1
500 TABLET ORAL 2 TIMES DAILY
Qty: 14 TABLET | Refills: 0 | Status: SHIPPED | OUTPATIENT
Start: 2019-09-20 | End: 2019-09-27

## 2019-09-20 ASSESSMENT — ENCOUNTER SYMPTOMS
VOMITING: 0
NAUSEA: 0
COUGH: 0
DIARRHEA: 0
CONSTIPATION: 0
EYE PAIN: 0
ABDOMINAL PAIN: 0
ABDOMINAL DISTENTION: 0
APNEA: 0

## 2019-09-20 NOTE — PROGRESS NOTES
tablet Take 150 mg by mouth 2 times daily      amLODIPine (NORVASC) 5 MG tablet take 1 tablet by mouth once daily, takes at night  0    acetaminophen (TYLENOL) 500 MG tablet Take 500 mg by mouth as needed for Pain      hydrOXYzine (ATARAX) 50 MG tablet 50 mg every 6 hours as needed   0    alendronate (FOSAMAX) 35 MG tablet Take 1 tablet by mouth every 7 days 4 tablet 3    omeprazole (PRILOSEC) 20 MG capsule take 1 capsule by mouth once daily (Patient taking differently: take 1 capsule by mouth once patient takes at supper) 90 capsule 0    simvastatin (ZOCOR) 20 MG tablet take 1 tablet by mouth ONCE NIGHTLY 30 tablet 3    loratadine (CLARITIN) 10 MG tablet take 1 tablet by mouth once daily 30 tablet 3    venlafaxine (EFFEXOR-XR) 37.5 MG XR capsule take 1 capsule by mouth once daily (Patient taking differently: take 1 capsule by mouth every evening) 30 capsule 3    lisinopril-hydrochlorothiazide (PRINZIDE) 20-12.5 MG per tablet Take 1 tablet by mouth daily 30 tablet 3    Calcium Carbonate-Vitamin D (CALCIUM + D PO) Take by mouth      busPIRone (BUSPAR) 10 MG tablet Take 1 tablet by mouth 2 times daily. (Patient taking differently: Take 10 mg by mouth 2 times daily as needed ) 60 tablet 3     No current Epic-ordered facility-administered medications on file.       Problem List Items Addressed This Visit     None      Visit Diagnoses     Well woman exam    -  Primary    Relevant Orders    PAP Smear    Status post hysterectomy        Relevant Orders    PAP Smear    Acute vaginitis        Relevant Orders    PAP Smear    Burning with urination        Relevant Orders    POCT Urinalysis no Micro (Completed)    PAP Smear        Allergies   Allergen Reactions    Aspirin Other (See Comments)     Chest pain    Codeine Other (See Comments)     Chest pain    Avelox [Moxifloxacin] Rash    Lidoderm [Lidocaine] Rash     Skin reddened , itching    Sulfa Antibiotics Rash     Orders Placed This Encounter   Procedures    Answer: Thin Prep     Order Specific Question:   Prior Abnormal Pap Test     Answer:   No     Order Specific Question:   Screening or Diagnostic     Answer:   Screening     Order Specific Question:   HPV Requested? Answer:   Yes -  If ASCUS Reflex HPV     Order Specific Question:   High Risk Patient     Answer:   N/A    POCT Urinalysis no Micro        Patient is here today for her annual exam.  She has had a hysterectomy. She complains that she has been having burning with urination. She states that last week it burned so bad that she had to get into the tub to scrub to make it stop. Since then she has noticed small amount of blood on her panty liner. She denies nausea, vomiting and fever. Review of Systems   Constitutional: Negative for activity change, appetite change and fever. HENT: Negative for ear discharge and ear pain. Eyes: Negative for pain and visual disturbance. Respiratory: Negative for apnea and cough. Cardiovascular: Negative for chest pain, palpitations and leg swelling. Gastrointestinal: Negative for abdominal distention, abdominal pain, constipation, diarrhea, nausea and vomiting. Endocrine: Negative. Genitourinary: Positive for dysuria. Negative for difficulty urinating, urgency and vaginal discharge. Musculoskeletal: Negative for neck pain and neck stiffness. Skin: Negative. Neurological: Negative for light-headedness and numbness. Hematological: Negative. Does not bruise/bleed easily. Objective:   Physical Exam   Constitutional: She is oriented to person, place, and time. Vital signs are normal. She appears well-developed and well-nourished. HENT:   Head: Normocephalic and atraumatic. Neck: No thyroid mass and no thyromegaly present. Cardiovascular: Normal rate and regular rhythm.    Pulmonary/Chest: Effort normal and breath sounds normal. Right breast exhibits no inverted nipple, no mass, no nipple discharge, no skin change and no tenderness. Left breast exhibits no inverted nipple, no mass, no nipple discharge, no skin change and no tenderness. No breast tenderness, discharge or bleeding. Abdominal: Soft. Bowel sounds are normal. She exhibits no distension and no mass. There is no tenderness. There is no rebound and no guarding. Hernia confirmed negative in the right inguinal area and confirmed negative in the left inguinal area. Genitourinary: Rectum normal. Rectal exam shows no fissure, no mass and anal tone normal. No breast tenderness, discharge or bleeding. Pelvic exam was performed with patient supine. There is rash on the right labia. There is no lesion on the right labia. There is rash on the left labia. There is no lesion on the left labia. Right adnexum displays no mass and no tenderness. Left adnexum displays no mass and no tenderness. No tenderness in the vagina. No signs of injury around the vagina. Vaginal discharge found. Genitourinary Comments: Patient had a hysterectomy   Musculoskeletal: Normal range of motion. She exhibits no edema or tenderness. Lymphadenopathy:        Right: No inguinal adenopathy present. Left: No inguinal adenopathy present. Neurological: She is alert and oriented to person, place, and time. Skin: Skin is warm and dry. Psychiatric: She has a normal mood and affect. Judgment normal.   Nursing note and vitals reviewed. Assessment:      Patient status post hysterectomy with vaginitis and vulvar irritation, otherwise normal annual exam.  Will treat with Flagyl and Diflucan. Vaginal pap smear was done. Normal mammogram from August 2019 was reviewed. Explained to patient that the small amount of blood she has is most likely due to irritation due to wiping. Patient will return if this continues. Patient with dysuria. Urinalysis in office today shows moderate blood. Will treat with Macrobid.       Plan:      Orders Placed This Encounter   Procedures    PAP Smear    POCT

## 2019-09-26 ENCOUNTER — HOSPITAL ENCOUNTER (OUTPATIENT)
Dept: PAIN MANAGEMENT | Age: 64
Discharge: HOME | End: 2019-09-26
Payer: COMMERCIAL

## 2019-09-26 VITALS
BODY MASS INDEX: 39.51 KG/M2 | OXYGEN SATURATION: 97 % | HEART RATE: 86 BPM | DIASTOLIC BLOOD PRESSURE: 94 MMHG | WEIGHT: 196 LBS | RESPIRATION RATE: 16 BRPM | TEMPERATURE: 98.2 F | SYSTOLIC BLOOD PRESSURE: 146 MMHG | HEIGHT: 59 IN

## 2019-09-26 DIAGNOSIS — G89.29 ENCOUNTER FOR CHRONIC PAIN MANAGEMENT: ICD-10-CM

## 2019-09-26 DIAGNOSIS — Z96.651 HX OF TOTAL KNEE ARTHROPLASTY, RIGHT: ICD-10-CM

## 2019-09-26 DIAGNOSIS — G89.29 CHRONIC PAIN OF BOTH KNEES: ICD-10-CM

## 2019-09-26 DIAGNOSIS — M25.561 ARTHRALGIA OF BOTH LOWER LEGS: Primary | Chronic | ICD-10-CM

## 2019-09-26 DIAGNOSIS — Z96.651 STATUS POST TOTAL RIGHT KNEE REPLACEMENT: ICD-10-CM

## 2019-09-26 DIAGNOSIS — F11.90 CHRONIC, CONTINUOUS USE OF OPIOIDS: ICD-10-CM

## 2019-09-26 DIAGNOSIS — M17.0 PRIMARY OSTEOARTHRITIS OF BOTH KNEES: ICD-10-CM

## 2019-09-26 DIAGNOSIS — K62.89 RECTAL PAIN: ICD-10-CM

## 2019-09-26 DIAGNOSIS — M25.561 CHRONIC PAIN OF BOTH KNEES: ICD-10-CM

## 2019-09-26 DIAGNOSIS — M25.562 CHRONIC PAIN OF BOTH KNEES: ICD-10-CM

## 2019-09-26 DIAGNOSIS — Z96.653 STATUS POST TOTAL BILATERAL KNEE REPLACEMENT: ICD-10-CM

## 2019-09-26 DIAGNOSIS — M79.604 PAIN OF RIGHT LOWER EXTREMITY: ICD-10-CM

## 2019-09-26 DIAGNOSIS — Z79.899 ENCOUNTER FOR LONG-TERM (CURRENT) USE OF OTHER MEDICATIONS: ICD-10-CM

## 2019-09-26 DIAGNOSIS — M25.562 ARTHRALGIA OF BOTH LOWER LEGS: Primary | Chronic | ICD-10-CM

## 2019-09-26 PROCEDURE — 99213 OFFICE O/P EST LOW 20 MIN: CPT

## 2019-09-26 PROCEDURE — 99213 OFFICE O/P EST LOW 20 MIN: CPT | Performed by: NURSE PRACTITIONER

## 2019-09-26 RX ORDER — HYDROCODONE BITARTRATE AND ACETAMINOPHEN 5; 325 MG/1; MG/1
1 TABLET ORAL EVERY 8 HOURS PRN
Qty: 90 TABLET | Refills: 0 | Status: SHIPPED | OUTPATIENT
Start: 2019-09-30 | End: 2019-10-25 | Stop reason: SDUPTHER

## 2019-09-26 ASSESSMENT — ENCOUNTER SYMPTOMS
RESPIRATORY NEGATIVE: 1
GASTROINTESTINAL NEGATIVE: 1
EYES NEGATIVE: 1

## 2019-09-26 NOTE — PROGRESS NOTES
Incoming Lab Results From [employer]      Component Value Ref Range & Units Status Collected Lab   Pain Management Drug Panel Interp, Urine Consistent    Final 05/24/2019  1:45 PM [facility]   (NOTE)   ________________________________________________________________   DRUGS EXPECTED:   HYDROCODONE [5/24/19]   ________________________________________________________________   CONSISTENT with medications provided:   HYDROCODONE : based on hydrocodone, norhydrocodone   ________________________________________________________________   INTERPRETIVE INFORMATION: Pain Mgt [place], [place] Spec/EMIT, Ur,                            Interp   Interpretation depends on accuracy and completeness of patient   medication information submitted by client. 6-Acetylmorphine, Ur Not Detected    Final 05/24/2019  1:45 PM [facility]   7-Aminoclonazepam, Urine Not Detected    Final 05/24/2019  1:45 PM [facility]   Alpha-OH-Alpraz, Urine Not Detected    Final 05/24/2019  1:45 PM [facility]   Alprazolam, Urine Not Detected    Final 05/24/2019  1:45 PM [facility]   Amphetamines, urine Not Detected    Final 05/24/2019  1:45 PM [facility]   Barbiturates, Ur Not Detected    Final 05/24/2019  1:45 PM [facility]   Benzoylecgonine, Ur Not Detected    Final 05/24/2019  1:45 PM [facility]   Buprenorphine Urine Not Detected    Final 05/24/2019  1:45 PM [facility]   Carisoprodol, Ur Not Detected    Final 05/24/2019  1:45 PM [facility]   (NOTE)   The carisoprodol immunoassay has cross-reactivity to carisoprodol   and meprobamate.     Clonazepam, Urine Not Detected    Final 05/24/2019  1:45 PM [facility]   Codeine, Urine Not Detected    Final 05/24/2019  1:45 PM [facility]   MDA, Ur Not Detected    Final 05/24/2019  1:45 PM [facility]   Diazepam, Urine Not Detected    Final 05/24/2019  1:45 PM [facility]   Ethyl Glucuronide Ur Not Detected    Final 05/24/2019  1:45 PM [facility]   Fentanyl, Ur Not Detected    Final 05/24/2019  1:45 PM [facility]   Hydrocodone, Urine Present    Final 05/24/2019  1:45 PM [facility]   Hydromorphone, Urine Not Detected    [facility] [address] [phone]   www. Lawanda Dougherty MD, Lab. Director                   Past Medical History:   Diagnosis Date    Arthritis     Bronchitis     Bunion of left foot     Diabetes mellitus ([address]     not on any meds    Eczema     arms, trunk    GERD (gastroesophageal reflux disease)     Hearing loss     left and right ears, wears hearing aids    Hyperlipidemia     Hypertension     Kidney infection     Osteoporosis     Urge incontinence     Urinary frequency     Wears glasses     for reading       Past Surgical History:   Procedure Laterality Date    APPENDECTOMY      WITH HYSTERECTOMY    ARTHROPLASTY Left 10/12/2017    METATARSAL HEAD RESECTION 3RD LEFT FOOT  & EXCISION LESION SOFT TISSUE LEFT FOOT performed by Leonid Romo DPM at [facility] BLEPHAROPLASTY Bilateral     BREAST BIOPSY Left     benign    COLONOSCOPY      CYST REMOVAL Right     HEEL    CYST REMOVAL Right     wrist    FOOT SURGERY Left 04/12/2019    bunionectomy    HYSTERECTOMY      JOINT REPLACEMENT Bilateral     lt had revision knees    KNEE ARTHROSCOPY Right 8/12/15    Dr Marshall Armenta ARTHROSCOPY Left 02/22/2017     &RIGHT  KNEE MANIPULATION    OTHER SURGICAL HISTORY  07/10/2019    SACRAL NERVE STIMULATOR IMPLANT STAGE 1 AND 2      [place] COLON CA SCRN NOT  [address] IND N/A 7/17/2018    COLONOSCOPY performed by Maria D García MD at [place] Left 2/22/2017    KNEE ARTHROSCOPY FOR LATERAL PATELLA RELEASE, SYNOVECTOMY AND STEROID INJECTION, LEFT KNEE. MANIPULATION OF RIGHT KNEE.  performed by Webb Barthel, MD at [address] N/A 7/10/2019    SACRAL NERVE STIMULATOR IMPLANT STAGE 1 AND 2  C-ARM performed by Niya Alatorre MD at [address] [facility] Left 05/02/2017    3rd toe    TONSILLECTOMY      TYMPANOPLASTY Left        Allergies   Allergen Reactions    Aspirin Disp: 7 tablet, Rfl: 0    fluticasone (FLONASE) 50 MCG/ACT nasal spray, , Disp: , Rfl:     triamcinolone (KENALOG) 0.1 % cream, , Disp: , Rfl:     acetaminophen (TYLENOL) 500 MG tablet, Take 500 mg by mouth as needed for Pain, Disp: , Rfl:     hydrOXYzine (ATARAX) 50 MG tablet, 50 mg every 6 hours as needed , Disp: , Rfl: 0    simvastatin (ZOCOR) 20 MG tablet, take 1 tablet by mouth ONCE NIGHTLY, Disp: 30 tablet, Rfl: 3    busPIRone (BUSPAR) 10 MG tablet, Take 1 tablet by mouth 2 times daily.  (Patient taking differently: Take 10 mg by mouth 2 times daily as needed ), Disp: 60 tablet, Rfl: 3    Family History   Problem Relation Age of Onset    Stroke Father     Emphysema Father     Diabetes Mother     Heart Failure Mother     Osteoarthritis Mother        Social History     Socioeconomic History    Marital status:      Spouse name: Not on file    Number of children: Not on file    Years of education: Not on file    Highest education level: Not on file   Occupational History    Occupation: disability   Social Needs    Financial resource strain: Not on file    Food insecurity:     Worry: Not on file     Inability: Not on file    Transportation needs:     Medical: Not on file     Non-medical: Not on file   Tobacco Use    Smoking status: Never Smoker    Smokeless tobacco: Never Used   Substance and Sexual Activity    Alcohol use: No     Alcohol/week: 0.0 standard drinks    Drug use: No    Sexual activity: Not Currently   Lifestyle    Physical activity:     Days per week: Not on file     Minutes per session: Not on file    Stress: Not on file   Relationships    Social connections:     Talks on phone: Not on file     Gets together: Not on file     Attends Alevism service: Not on file     Active member of club or organization: Not on file     Attends meetings of clubs or organizations: Not on file     Relationship status: Not on file    Intimate partner violence:     Fear of current or ex

## 2019-10-04 LAB — CYTOLOGY REPORT: NORMAL

## 2019-10-25 ENCOUNTER — HOSPITAL ENCOUNTER (OUTPATIENT)
Dept: PAIN MANAGEMENT | Age: 64
Discharge: HOME | End: 2019-10-25
Payer: COMMERCIAL

## 2019-10-25 VITALS
RESPIRATION RATE: 16 BRPM | SYSTOLIC BLOOD PRESSURE: 125 MMHG | HEART RATE: 73 BPM | DIASTOLIC BLOOD PRESSURE: 70 MMHG | HEIGHT: 59 IN | BODY MASS INDEX: 38.91 KG/M2 | WEIGHT: 193 LBS | TEMPERATURE: 98.6 F | OXYGEN SATURATION: 96 %

## 2019-10-25 DIAGNOSIS — Z79.899 ENCOUNTER FOR LONG-TERM (CURRENT) USE OF OTHER MEDICATIONS: Primary | ICD-10-CM

## 2019-10-25 DIAGNOSIS — Z96.651 STATUS POST TOTAL RIGHT KNEE REPLACEMENT: ICD-10-CM

## 2019-10-25 DIAGNOSIS — M17.0 PRIMARY OSTEOARTHRITIS OF BOTH KNEES: ICD-10-CM

## 2019-10-25 DIAGNOSIS — Z51.81 ENCOUNTER FOR MEDICATION MONITORING: ICD-10-CM

## 2019-10-25 DIAGNOSIS — M25.562 CHRONIC PAIN OF BOTH KNEES: ICD-10-CM

## 2019-10-25 DIAGNOSIS — G89.29 CHRONIC BILATERAL LOW BACK PAIN WITHOUT SCIATICA: ICD-10-CM

## 2019-10-25 DIAGNOSIS — Z96.653 STATUS POST TOTAL BILATERAL KNEE REPLACEMENT: ICD-10-CM

## 2019-10-25 DIAGNOSIS — M25.561 CHRONIC PAIN OF BOTH KNEES: ICD-10-CM

## 2019-10-25 DIAGNOSIS — M54.50 CHRONIC BILATERAL LOW BACK PAIN WITHOUT SCIATICA: ICD-10-CM

## 2019-10-25 DIAGNOSIS — F11.90 CHRONIC, CONTINUOUS USE OF OPIOIDS: ICD-10-CM

## 2019-10-25 DIAGNOSIS — G89.29 CHRONIC PAIN OF BOTH KNEES: ICD-10-CM

## 2019-10-25 DIAGNOSIS — M79.604 PAIN OF RIGHT LOWER EXTREMITY: ICD-10-CM

## 2019-10-25 DIAGNOSIS — G89.29 ENCOUNTER FOR CHRONIC PAIN MANAGEMENT: ICD-10-CM

## 2019-10-25 PROCEDURE — 99213 OFFICE O/P EST LOW 20 MIN: CPT | Performed by: NURSE PRACTITIONER

## 2019-10-25 PROCEDURE — 99213 OFFICE O/P EST LOW 20 MIN: CPT

## 2019-10-25 RX ORDER — HYDROCODONE BITARTRATE AND ACETAMINOPHEN 5; 325 MG/1; MG/1
1 TABLET ORAL EVERY 8 HOURS PRN
Qty: 90 TABLET | Refills: 0 | Status: SHIPPED | OUTPATIENT
Start: 2019-10-30 | End: 2019-11-26 | Stop reason: SDUPTHER

## 2019-10-25 ASSESSMENT — ENCOUNTER SYMPTOMS
EYES NEGATIVE: 1
GASTROINTESTINAL NEGATIVE: 1
RESPIRATORY NEGATIVE: 1

## 2019-11-18 ENCOUNTER — OFFICE VISIT (OUTPATIENT)
Dept: UROLOGY | Age: 64
End: 2019-11-18
Payer: COMMERCIAL

## 2019-11-18 VITALS
SYSTOLIC BLOOD PRESSURE: 127 MMHG | BODY MASS INDEX: 39.11 KG/M2 | HEIGHT: 59 IN | HEART RATE: 92 BPM | DIASTOLIC BLOOD PRESSURE: 78 MMHG | WEIGHT: 194 LBS

## 2019-11-18 DIAGNOSIS — N39.41 URGE INCONTINENCE: ICD-10-CM

## 2019-11-18 DIAGNOSIS — R35.1 NOCTURIA: ICD-10-CM

## 2019-11-18 DIAGNOSIS — R35.0 URINARY FREQUENCY: ICD-10-CM

## 2019-11-18 DIAGNOSIS — R30.0 DYSURIA: Primary | ICD-10-CM

## 2019-11-18 LAB
APPEARANCE FLUID: NORMAL
BILIRUBIN, POC: NORMAL
BLOOD URINE, POC: NORMAL
CLARITY, POC: CLEAR
COLOR, POC: YELLOW
GLUCOSE URINE, POC: NORMAL
KETONES, POC: NORMAL
LEUKOCYTE EST, POC: NORMAL
NITRITE, POC: NORMAL
PH, POC: NORMAL
PROTEIN, POC: NORMAL
SPECIFIC GRAVITY, POC: NORMAL
UROBILINOGEN, POC: NORMAL

## 2019-11-18 PROCEDURE — 81002 URINALYSIS NONAUTO W/O SCOPE: CPT | Performed by: UROLOGY

## 2019-11-18 PROCEDURE — 99214 OFFICE O/P EST MOD 30 MIN: CPT | Performed by: UROLOGY

## 2019-11-18 ASSESSMENT — ENCOUNTER SYMPTOMS
EYE PAIN: 0
WHEEZING: 0
DIARRHEA: 0
BACK PAIN: 0
NAUSEA: 0
EYE REDNESS: 0
COUGH: 0
CONSTIPATION: 0
SHORTNESS OF BREATH: 0
ABDOMINAL PAIN: 0
VOMITING: 0

## 2019-11-26 ENCOUNTER — HOSPITAL ENCOUNTER (OUTPATIENT)
Dept: PAIN MANAGEMENT | Age: 64
Discharge: HOME | End: 2019-11-26
Payer: COMMERCIAL

## 2019-11-26 VITALS
TEMPERATURE: 98.8 F | HEIGHT: 59 IN | OXYGEN SATURATION: 97 % | DIASTOLIC BLOOD PRESSURE: 79 MMHG | HEART RATE: 88 BPM | BODY MASS INDEX: 39.11 KG/M2 | SYSTOLIC BLOOD PRESSURE: 151 MMHG | RESPIRATION RATE: 16 BRPM | WEIGHT: 194 LBS

## 2019-11-26 DIAGNOSIS — M17.0 PRIMARY OSTEOARTHRITIS OF BOTH KNEES: ICD-10-CM

## 2019-11-26 DIAGNOSIS — M79.604 PAIN OF RIGHT LOWER EXTREMITY: ICD-10-CM

## 2019-11-26 DIAGNOSIS — M25.562 CHRONIC PAIN OF BOTH KNEES: ICD-10-CM

## 2019-11-26 DIAGNOSIS — Z79.899 ENCOUNTER FOR LONG-TERM (CURRENT) USE OF OTHER MEDICATIONS: ICD-10-CM

## 2019-11-26 DIAGNOSIS — M25.561 ARTHRALGIA OF BOTH LOWER LEGS: Primary | Chronic | ICD-10-CM

## 2019-11-26 DIAGNOSIS — M25.562 ARTHRALGIA OF BOTH LOWER LEGS: Primary | Chronic | ICD-10-CM

## 2019-11-26 DIAGNOSIS — G89.29 ENCOUNTER FOR CHRONIC PAIN MANAGEMENT: ICD-10-CM

## 2019-11-26 DIAGNOSIS — G89.29 CHRONIC PAIN OF BOTH KNEES: ICD-10-CM

## 2019-11-26 DIAGNOSIS — Z96.653 STATUS POST TOTAL BILATERAL KNEE REPLACEMENT: ICD-10-CM

## 2019-11-26 DIAGNOSIS — F11.90 CHRONIC, CONTINUOUS USE OF OPIOIDS: ICD-10-CM

## 2019-11-26 DIAGNOSIS — M25.561 CHRONIC PAIN OF BOTH KNEES: ICD-10-CM

## 2019-11-26 DIAGNOSIS — Z96.651 STATUS POST TOTAL RIGHT KNEE REPLACEMENT: ICD-10-CM

## 2019-11-26 PROCEDURE — 99213 OFFICE O/P EST LOW 20 MIN: CPT

## 2019-11-26 PROCEDURE — 99213 OFFICE O/P EST LOW 20 MIN: CPT | Performed by: NURSE PRACTITIONER

## 2019-11-26 RX ORDER — ERGOCALCIFEROL 1.25 MG/1
50000 CAPSULE ORAL
COMMUNITY
End: 2020-05-28

## 2019-11-26 RX ORDER — HYDROCODONE BITARTRATE AND ACETAMINOPHEN 5; 325 MG/1; MG/1
1 TABLET ORAL EVERY 8 HOURS PRN
Qty: 90 TABLET | Refills: 0 | Status: SHIPPED | OUTPATIENT
Start: 2019-12-02 | End: 2020-01-02 | Stop reason: SDUPTHER

## 2019-11-26 RX ORDER — BENZONATATE 100 MG/1
100 CAPSULE ORAL 3 TIMES DAILY PRN
COMMUNITY
End: 2020-02-25 | Stop reason: ALTCHOICE

## 2019-11-26 RX ORDER — DOXYCYCLINE HYCLATE 100 MG
100 TABLET ORAL 2 TIMES DAILY
COMMUNITY
End: 2020-01-02

## 2019-11-26 ASSESSMENT — ENCOUNTER SYMPTOMS
EYES NEGATIVE: 1
RESPIRATORY NEGATIVE: 1
GASTROINTESTINAL NEGATIVE: 1

## 2020-01-02 ENCOUNTER — HOSPITAL ENCOUNTER (OUTPATIENT)
Dept: PAIN MANAGEMENT | Age: 65
Discharge: HOME | End: 2020-01-02
Payer: COMMERCIAL

## 2020-01-02 VITALS
RESPIRATION RATE: 16 BRPM | SYSTOLIC BLOOD PRESSURE: 129 MMHG | HEART RATE: 85 BPM | TEMPERATURE: 98 F | HEIGHT: 59 IN | OXYGEN SATURATION: 97 % | WEIGHT: 194 LBS | DIASTOLIC BLOOD PRESSURE: 77 MMHG | BODY MASS INDEX: 39.11 KG/M2

## 2020-01-02 PROCEDURE — 99213 OFFICE O/P EST LOW 20 MIN: CPT

## 2020-01-02 PROCEDURE — 99213 OFFICE O/P EST LOW 20 MIN: CPT | Performed by: NURSE PRACTITIONER

## 2020-01-02 RX ORDER — HYDROCODONE BITARTRATE AND ACETAMINOPHEN 5; 325 MG/1; MG/1
1 TABLET ORAL EVERY 8 HOURS PRN
Qty: 90 TABLET | Refills: 0 | Status: SHIPPED | OUTPATIENT
Start: 2020-01-02 | End: 2020-01-27 | Stop reason: SDUPTHER

## 2020-01-02 ASSESSMENT — ENCOUNTER SYMPTOMS
RESPIRATORY NEGATIVE: 1
EYES NEGATIVE: 1
GASTROINTESTINAL NEGATIVE: 1

## 2020-01-02 NOTE — PROGRESS NOTES
[facility] PROGRESS NOTE      Patient here today to review Medication Agreement    Chief Complaint:  Left knee pain      HPI: She c/o left knee pain which has increased. She has history of bilateral knee arthroplasty. She states surgery was not recommended again on the left knee. She states is sleeping well. She has had no ED visits. Knee Pain    The incident occurred more than 1 week ago. There was no injury mechanism. The pain is present in the left knee. Quality: sharp. The pain is at a severity of 4/10. The pain has been worsening since onset. Associated symptoms include a loss of motion and muscle weakness. Foreign body present: knee replacement  Exacerbated by: sitting. She has tried heat (movement, ) for the symptoms. The treatment provided mild relief. Patient denies any new neurological symptoms. No bowel or bladder incontinence, no weakness, and no falling. Treatment goals:  Functional status: be more active     Aberrancy:   Any alcoholic beverages  no     Any illegal drugs   no        Analgesia:pain 4   Adverse  Effects :  none     ADL;s :paces self, housework knee exercises                Pill count: appropriate    Morphine equivalent dose as reported on OARRS:15  Periodic Controlled Substance Monitoring: Possible medication side effects, risk of tolerance/dependence & alternative treatments discussed., No signs of potential drug abuse or diversion identified. , Assessed functional status., Obtaining appropriate analgesic effect of treatment. Eliceo Sneed, APRN - CNP)  Review ofOARRS does not show any aberrant prescription behavior. Medication is helping the patient stay active. Patient denies any side effects and reports adequate analgesia.  No sign of misuse/abuse.             As above, I did review the imaging     When was thelast UDS:    5-24-19         Was the UDS appropriate:yes        Record/Diagnostics Review:       As above, I did review the imaging     5/27/2019  3:55 PM - Kenneth, Mhpn Incoming Lab Results From [employer]      Component Value Ref Range & Units Status Collected Lab   Pain Management Drug Panel Interp, Urine Consistent    Final 05/24/2019  1:45 PM [facility]   (NOTE)   ________________________________________________________________   DRUGS EXPECTED:   HYDROCODONE [5/24/19]   ________________________________________________________________   CONSISTENT with medications provided:   HYDROCODONE : based on hydrocodone, norhydrocodone   ________________________________________________________________   INTERPRETIVE INFORMATION: Pain Mgt Leger, Mass Spec/EMIT, Ur,                            Interp   Interpretation depends on accuracy and completeness of patient   medication information submitted by client. 6-Acetylmorphine, Ur Not Detected    Final 05/24/2019  1:45 PM [facility]   7-Aminoclonazepam, Urine Not Detected    Final 05/24/2019  1:45 PM [facility]   Alpha-OH-Alpraz, Urine Not Detected    Final 05/24/2019  1:45 PM [facility]   Alprazolam, Urine Not Detected    Final 05/24/2019  1:45 PM [facility]   Amphetamines, urine Not Detected    Final 05/24/2019  1:45 PM [facility]   Barbiturates, Ur Not Detected    Final 05/24/2019  1:45 PM [facility]   Benzoylecgonine, Ur Not Detected    Final 05/24/2019  1:45 PM [facility]   Buprenorphine Urine Not Detected    Final 05/24/2019  1:45 PM [facility]   Carisoprodol, Ur Not Detected    Final 05/24/2019  1:45 PM [facility]   (NOTE)   The carisoprodol immunoassay has cross-reactivity to carisoprodol   and meprobamate.     Clonazepam, Urine Not Detected    Final 05/24/2019  1:45 PM [facility]   Codeine, Urine Not Detected    Final 05/24/2019  1:45 PM [facility]   MDA, Ur Not Detected    Final 05/24/2019  1:45 PM [facility]   Diazepam, Urine Not Detected    Final 05/24/2019  1:45 PM [facility]   Ethyl Glucuronide Ur Not Detected    Final 05/24/2019  1:45 PM [facility]   Fentanyl, Ur Not Detected    Final 05/24/2019  1:45 PM [facility]   Hydrocodone, Urine Present    Final 05/24/2019  1:45 PM [facility]   Hydromorphone, Urine Not Detected    Final 05/24/2019  1:45 PM [facility]   Lorazepam, Urine Not Detected    Final 05/24/2019  1:45 PM [facility]   Marijuana Metab, Ur Not Detected    Final 05/24/2019  1:45 PM [facility]   MDEA, LEANA, Ur Not Detected    Final 05/24/2019  1:45 PM [facility]   MDMA, Urine Not Detected    Final 05/24/2019  1:45 PM [facility]   Meperidine Metab, Ur Not Detected    Final 05/24/2019  1:45 PM [facility]   Methadone, Urine Not Detected    Final 05/24/2019  1:45 PM [facility]   Methamphetamine, Urine Not Detected    Final 05/24/2019  1:45 PM [facility]   Methylphenidate Not Detected    Final 05/24/2019  1:45 PM [facility]   Midazolam, Urine Not Detected    Final 05/24/2019  1:45 PM [facility]   Morphine Urine Not Detected    Final 05/24/2019  1:45 PM [facility]   Norbuprenorphine, Urine Not Detected    Final 05/24/2019  1:45 PM [facility]   Nordiazepam, Urine Not Detected    Final 05/24/2019  1:45 PM [facility]   Norfentanyl, Urine Not Detected    Final 05/24/2019  1:45 PM [facility]   NORHYDROCODONE, URINE Present    Final 05/24/2019  1:45 PM [facility]   Noroxycodone, Urine Not Detected    Final 05/24/2019  1:45 PM [facility]   NOROXYMORPHONE, URINE Not Detected    Final 05/24/2019  1:45 PM [facility]   Oxazepam, Urine Not Detected    Final 05/24/2019  1:45 PM [facility]   Oxycodone Urine Not Detected    Final 05/24/2019  1:45 PM [facility]   Oxymorphone, Urine Not Detected    Final 05/24/2019  1:45 PM [facility]   PCP, Urine Not Detected    Final 05/24/2019  1:45 PM [facility]   Phentermine, Ur Not Detected    Final 05/24/2019  1:45 PM [facility]   Propoxyphene, Urine Not Detected    Final 05/24/2019  1:45 PM [facility]   Tapentadol-O-Sulfate, Urine Not Detected    Final 05/24/2019  1:45 PM [facility]   Tapentadol, Urine Not Detected    Final 05/24/2019  1:45 PM [facility]   Temazepam, Urine Not Detected    Final 05/24/2019  1:45 PM [facility]   Tramadol, Urine Not Detected    Final 05/24/2019  1:45 PM [facility]   Zolpidem, Urine Not Detected    Final 05/24/2019  1:45 PM [facility]   Drugs Expected, Ur     Final 05/24/2019  1:45  [facility] [address] [facility]   HYDROCODONE ON 5/24/19 630 AM    Creatinine, Ur 168.1  20.0 - 400.0 mg/dL Final 05/24/2019  1:45 PM [facility]   Pain Mgt Drug Panel, Hi Res, Ur See Below    Final 05/24/2019  1:45 PM [facility]   (NOTE)   Methodology: Qualitative Enzyme Immunoassay and Qualitative Liquid   Chromatography-Time of Flight-Mass Spectrometry or Tandem Mass   Spectrometry, Quantitative Spectrophotometry   The absence of expected drug(s) and/or drug metabolite(s) may   indicate non-compliance, inappropriate timing of specimen   collection relative to drug administration, poor drug absorption,   diluted/adulterated urine, or limitations of testing. The   concentration must be greater than or equal to the cutoff to be   reported as present.  If specific drug concentrations are   required, contact the laboratory within two weeks of specimen   collection to request quantification by a second analytical   technique. Interpretive questions should be directed to the   laboratory. Results based on immunoassay detection that do not match clinical   expectations should be   interpreted with caution. Confirmatory testing by mass   spectrometry for immunoassay-based results is available, if   ordered within two weeks of specimen collection. Additional   charges apply. For medical purposes only; not valid for forensic use. This test was developed and its performance characteristics   determined by [employer] [facility]. The U.S. Food and Drug   Administration has not approved or cleared this test; however, FDA   clearance or approval is not currently required for clinical use. The results are not intended to be used as the sole means for   clinical diagnosis or patient management decisions. EER Hi Res Interp Ur See Note    Final 05/24/2019  1:45 PM [facility]   (NOTE)   Access [facility] Enhanced Report using either link below:   -Direct access: [email or web address]   -Enter Username, Password: [email or web address]   Username: [email or web address]   Password:  Aspirin Other (See Comments)     Chest pain    Codeine Other (See Comments)     Chest pain    Avelox [Moxifloxacin] Rash    Lidoderm [Lidocaine] Rash     Skin reddened , itching    Sulfa Antibiotics Rash         Current Outpatient Medications:     HYDROcodone-acetaminophen (NORCO) 5-325 MG per tablet, Take 1 tablet by mouth every 8 hours as needed for Pain for up to 30 days. , Disp: 90 tablet, Rfl: 0    vitamin D (ERGOCALCIFEROL) 1.25 MG (11774 UT) CAPS capsule, Take 50,000 Units by mouth every 30 days, Disp: , Rfl:     gabapentin (NEURONTIN) 100 MG capsule, take 1 capsule by mouth once daily at bedtime, Disp: , Rfl: 0    Multiple Vitamins-Minerals (THERAPEUTIC MULTIVITAMIN-MINERALS) tablet, Take 1 tablet by mouth daily, Disp: , Rfl:     sulindac (CLINORIL) 150 MG tablet, Take 150 mg by mouth 2 times daily, Disp: , Rfl:     amLODIPine (NORVASC) 5 MG tablet, take 1 tablet by mouth once daily, takes at night, Disp: , Rfl: 0    alendronate (FOSAMAX) 35 MG tablet, Take 1 tablet by mouth every 7 days, Disp: 4 tablet, Rfl: 3    omeprazole (PRILOSEC) 20 MG capsule, take 1 capsule by mouth once daily (Patient taking differently: take 1 capsule by mouth once patient takes at supper), Disp: 90 capsule, Rfl: 0    simvastatin (ZOCOR) 20 MG tablet, take 1 tablet by mouth ONCE NIGHTLY, Disp: 30 tablet, Rfl: 3    loratadine (CLARITIN) 10 MG tablet, take 1 tablet by mouth once daily, Disp: 30 tablet, Rfl: 3    venlafaxine (EFFEXOR-XR) 37.5 MG XR capsule, take 1 capsule by mouth once daily (Patient taking differently: take 1 capsule by mouth every evening), Disp: 30 capsule, Rfl: 3    lisinopril-hydrochlorothiazide (PRINZIDE) 20-12.5 MG per tablet, Take 1 tablet by mouth daily, Disp: 30 tablet, Rfl: 3    Calcium Carbonate-Vitamin D (CALCIUM + D PO), Take by mouth, Disp: , Rfl:     benzonatate (TESSALON PERLES) 100 MG capsule, Take 100 mg by mouth 3 times daily as needed for Cough, Disp: , Rfl:     fluticasone (FLONASE) 50 MCG/ACT nasal spray, , Disp: , Rfl:     acetaminophen (TYLENOL) 500 MG tablet, Take 500 mg by mouth as needed for Pain, Disp: , Rfl:     hydrOXYzine (ATARAX) 50 MG tablet, 50 mg every 6 hours as needed , Disp: , Rfl: 0    busPIRone (BUSPAR) 10 MG tablet, Take 1 tablet by mouth 2 times daily.  (Patient taking differently: Take 10 mg by mouth 2 times daily as needed ), Disp: 60 tablet, Rfl: 3    Family History   Problem Relation Age of Onset    Stroke Father     Emphysema Father     Diabetes Mother     Heart Failure Mother     Osteoarthritis Mother        Social History     Socioeconomic History    Marital status:      Spouse name: Not on file    Number of children: Not on file    Years of education: Not on file    Highest education level: Not on file   Occupational History    Occupation: disability   Social Needs    Financial resource strain: Not on file    Food insecurity:     Worry: Not on file     Inability: Not on file    Transportation needs:     Medical: Not on file     Non-medical: Not on file   Tobacco Use    Smoking status: Never Smoker    Smokeless tobacco: Never Used   Substance and Sexual Activity    Alcohol use: No     Alcohol/week: 0.0 standard drinks    Drug use: No    Sexual activity: Not Currently   Lifestyle    Physical activity:     Days per week: Not on file     Minutes per session: Not on file    Stress: Not on file   Relationships    Social connections:     Talks on phone: Not on file     Gets together: Not on file     Attends Christianity service: Not on file     Active member of club or organization: Not on file     Attends meetings of clubs or organizations: Not on file     Relationship status: Not on file    Intimate partner violence:     Fear of current or ex partner: Not on file     Emotionally abused: Not on file     Physically abused: Not on file     Forced sexual activity: Not on file   Other Topics Concern    Not on file   Social History Narrative    Not on file       Review of Systems:  Review of Systems   Constitution: Negative. HENT: Negative. Eyes: Negative. Cardiovascular: Negative. Respiratory: Negative. Endocrine:        Blood sugar 161 yesterday   Hematologic/Lymphatic: Bruises/bleeds easily. Skin:        Healed sores on arms   Musculoskeletal: Positive for joint pain. Gastrointestinal: Negative. Genitourinary: Negative. Neurological: Positive for loss of balance. Psychiatric/Behavioral: Negative. Physical Exam:  /77   Pulse 85   Temp 98 °F (36.7 °C) (Oral)   Resp 16   Ht 4' 11\" (1.499 m)   Wt 194 lb (88 kg)   SpO2 97%   BMI 39.18 kg/m²     Physical Exam  HENT:      Head: Normocephalic. Pulmonary:      Effort: Pulmonary effort is normal.   Musculoskeletal:      Left knee: She exhibits decreased range of motion and deformity. Tenderness found. Medial joint line tenderness noted. Skin:     General: Skin is warm and dry. Neurological:      Mental Status: She is alert. Gait: Gait abnormal.      Deep Tendon Reflexes:      Reflex Scores:       Patellar reflexes are 0 on the right side and 0 on the left side. Achilles reflexes are 2+ on the right side. Psychiatric:         Attention and Perception: Attention normal.         Mood and Affect: Mood normal.         Behavior: Behavior normal.         Thought Content:  Thought content normal.         Cognition and Memory: Cognition normal.         Judgment: Judgment normal.           Assessment:  Problem List Items Addressed This Visit     Status post total right knee replacement    Relevant Medications    HYDROcodone-acetaminophen (NORCO) 5-325 MG per tablet    Status post total bilateral knee replacement    Relevant Medications    HYDROcodone-acetaminophen (NORCO) 5-325 MG per tablet    Primary osteoarthritis of both knees    Relevant Medications    HYDROcodone-acetaminophen (NORCO) 5-325 MG per tablet    Pain in lower limb taking the medication any way other than prescribed. The dangers were discussed  including respiratory depression and death. Patient was told to tell  all  physicians regarding the medications he is getting from pain clinic. Patient is warned not to take any unprescribed medications over-the-countermedications that can depress breathing . Patient is advised to talk to the pharmacist or physicians if planning to take any over-the-counter medications before  takeing them. Patient is strongly advised to avoid tranquilizers or  relaxants, illegal drugs  or any medications that can depress breathing  Patient is also advised to tell us if there is any changes in their medications from other physicians.         TREATMENT OPTIONS:     Return in 4 weeks  Medication Agreement Requirements Met  Continue Opioid therapy  Script written for  hydrocodone  Follow up appointment made

## 2020-01-02 NOTE — DISCHARGE INSTR - COC
Continuity of Care Form    Patient Name: Jacqueline Jon   :  1955  MRN:  [id number]    Admit date:  (Not on file)  Discharge date:  ***    Code Status Order: Prior   Advance Directives:     Admitting Physician:  No admitting provider for patient encounter. PCP: Richard Villatoro MD    Discharging Nurse: [facility] Unit/Room#: No information available for this encounter. Discharging Unit Phone Number: ***    Emergency Contact:   Extended Emergency Contact Information  Primary Emergency Contact: Marcello Torres  Address: [address] [place] [address], [address] [facility] [address] Phone: [phone]  Work Phone: [phone]  Mobile Phone: [phone]  Relation: Spouse   needed? No  Secondary Emergency Contact: Queenie Rubi [facility] [address] [facility] Phone: [phone]  Work Phone: [phone]  Mobile Phone: [phone]  Relation: Child   needed?  No    Past Surgical History:  Past Surgical History:   Procedure Laterality Date    APPENDECTOMY      WITH HYSTERECTOMY    ARTHROPLASTY Left 10/12/2017    METATARSAL HEAD RESECTION 3RD LEFT FOOT  & EXCISION LESION SOFT TISSUE LEFT FOOT performed [facility] Jake Reid DPM at [facility] BLEPHAROPLASTY Bilateral     BREAST BIOPSY Left     benign    COLONOSCOPY      CYST REMOVAL Right     HEEL    CYST REMOVAL Right     wrist    FOOT SURGERY Left 2019    bunionectomy    HYSTERECTOMY      JOINT REPLACEMENT Bilateral     lt had revision knees    KNEE ARTHROSCOPY Right 8/12/15    Dr Toño Martino ARTHROSCOPY Left 2017     &RIGHT  KNEE MANIPULATION    OTHER SURGICAL HISTORY  07/10/2019    SACRAL NERVE STIMULATOR IMPLANT STAGE 1 AND 2      VT COLON CA SCRN NOT HI [facility] IND N/A 2018    COLONOSCOPY performed by Ruben Butt MD at [place] Left 2017    KNEE ARTHROSCOPY FOR LATERAL PATELLA RELEASE, SYNOVECTOMY AND STEROID INJECTION, LEFT KNEE. MANIPULATION OF RIGHT KNEE. performed by Manny Prasad MD at [address] N/A 7/10/2019    SACRAL NERVE STIMULATOR IMPLANT STAGE 1 AND 2  C-ARM performed by Reshma Hale MD at [address] [place] [address] Drive TOE SURGERY Left 05/02/2017    3rd toe    TONSILLECTOMY      TYMPANOPLASTY Left        Immunization History:   Immunization History   Administered Date(s) Administered    Influenza Virus Vaccine 11/24/2014       Active Problems:  Patient Active Problem List   Diagnosis Code    Osteoarthrosis involving lower leg M17.10    Encounter for long-term (current) use of other medications Z79.899    Arthralgia of both lower legs M25.561, M25.562    Pain in lower limb M79.606    Status post total right knee replacement Z96.651    Chronic pain of both knees M25.561, M25.562, G89.29    HLD (hyperlipidemia) E78.5    GERD (gastroesophageal reflux disease) K21.9    Osteopenia M85.80    Encounter for chronic pain management G89.29    Status post total bilateral knee replacement Z96.653    Primary osteoarthritis of both knees M17.0    Encounter for medication monitoring Z51.81    Chronic bilateral low back pain without sciatica M54.5, G89.29    Rectal pain K62.89    Generalized abdominal pain R10.84    Chronic, continuous use of opioids F11.90       Isolation/Infection:   Isolation          No Isolation        Patient Infection Status     None to display          Nurse Assessment:  Last Vital Signs: There were no vitals taken for this visit.     Last documented pain score (0-10 scale):    Last Weight:   Wt Readings from Last 1 Encounters:   11/26/19 194 lb (88 kg)     Mental Status:  {IP PT MENTAL STATUS:85293}    IV Access:  {[facility] IV ACCESS:397831595}    Nursing Mobility/ADLs:  Walking   {CHP DME WWPN:415119397}  Transfer  {CHP DME FKIX:247807180}  Bathing  {CHP DME BUCX:407207085}  Dressing  {CHP DME QTOF:162067792}  Toileting  {CHP DME Readmission:        0           Discharging to Facility/ Agency   · Name:   · Address:  · Phone:  · Fax:    Dialysis Facility (if applicable)   · Name:  · Address:  · Dialysis Schedule:  · Phone:  · Fax:    / signature: {Esignature:012955597}    PHYSICIAN SECTION    Prognosis: {Prognosis:7199846036}    Condition at Discharge: Christina Jordan Patient Condition:616852999}    Rehab Potential (if transferring to Rehab): {Prognosis:6217297711}    Recommended Labs or Other Treatments After Discharge: ***    Physician Certification: I certify the above information and transfer of Marshall Jiang  is necessary for the continuing treatment of the diagnosis listed and that she requires {Admit to Appropriate Level of Care:89475} for {GREATER/LESS:164810075} 30 days.      Update Admission H&P: {CHP DME Changes in IUWXZ:655033947}    PHYSICIAN SIGNATURE:  {Esignature:701432132}

## 2020-01-27 ENCOUNTER — HOSPITAL ENCOUNTER (OUTPATIENT)
Dept: PAIN MANAGEMENT | Age: 65
Discharge: HOME | End: 2020-01-27
Payer: COMMERCIAL

## 2020-01-27 VITALS
RESPIRATION RATE: 16 BRPM | BODY MASS INDEX: 39.11 KG/M2 | DIASTOLIC BLOOD PRESSURE: 108 MMHG | WEIGHT: 194 LBS | HEIGHT: 59 IN | SYSTOLIC BLOOD PRESSURE: 120 MMHG | TEMPERATURE: 98.5 F | HEART RATE: 100 BPM | OXYGEN SATURATION: 98 %

## 2020-01-27 PROCEDURE — 99213 OFFICE O/P EST LOW 20 MIN: CPT

## 2020-01-27 PROCEDURE — 99214 OFFICE O/P EST MOD 30 MIN: CPT | Performed by: PAIN MEDICINE

## 2020-01-27 RX ORDER — HYDROCODONE BITARTRATE AND ACETAMINOPHEN 5; 325 MG/1; MG/1
1 TABLET ORAL EVERY 8 HOURS PRN
Qty: 90 TABLET | Refills: 0 | Status: SHIPPED | OUTPATIENT
Start: 2020-02-01 | End: 2020-02-25 | Stop reason: SDUPTHER

## 2020-01-27 ASSESSMENT — ENCOUNTER SYMPTOMS
EYE PAIN: 0
NAUSEA: 0
GASTROINTESTINAL NEGATIVE: 1
EYE REDNESS: 0
RESPIRATORY NEGATIVE: 1
EYES NEGATIVE: 1
WHEEZING: 0
ABDOMINAL PAIN: 0
CONSTIPATION: 0
ALLERGIC/IMMUNOLOGIC NEGATIVE: 1
SHORTNESS OF BREATH: 0

## 2020-01-27 ASSESSMENT — PAIN DESCRIPTION - ORIENTATION: ORIENTATION: RIGHT;LEFT;LOWER

## 2020-01-27 ASSESSMENT — PAIN DESCRIPTION - PAIN TYPE: TYPE: CHRONIC PAIN

## 2020-01-27 ASSESSMENT — PAIN SCALES - GENERAL: PAINLEVEL_OUTOF10: 5

## 2020-01-27 ASSESSMENT — PAIN DESCRIPTION - LOCATION: LOCATION: BACK;LEG

## 2020-01-27 ASSESSMENT — PAIN DESCRIPTION - DESCRIPTORS: DESCRIPTORS: SHARP;CONSTANT;BURNING

## 2020-01-27 ASSESSMENT — PAIN DESCRIPTION - FREQUENCY: FREQUENCY: CONTINUOUS

## 2020-01-27 ASSESSMENT — PAIN DESCRIPTION - PROGRESSION: CLINICAL_PROGRESSION: NOT CHANGED

## 2020-01-27 ASSESSMENT — PAIN - FUNCTIONAL ASSESSMENT: PAIN_FUNCTIONAL_ASSESSMENT: PREVENTS OR INTERFERES SOME ACTIVE ACTIVITIES AND ADLS

## 2020-01-27 ASSESSMENT — PAIN DESCRIPTION - ONSET: ONSET: ON-GOING

## 2020-01-27 NOTE — PROGRESS NOTES
[facility] Pain Management  Patient Pain Assessment  RECHECK - Dr. Parks Monday    Primary Care Physician: Rashmi Connor MD    Chief complaint:   Chief Complaint   Patient presents with    Knee Pain   . HISTORY OF PRESENT ILLNESS:  Quiana Carranza is 75 y.o. female with chief complaint of pain involving the knees bilaterally    Pt here for monthly follow up for medication refill for L knee pain s/p bilateral total knees. She takes her percocet TID. No new issues or complaints. Pt still complains of chronic knee pain and is considering getting a second orthopedic opinion in regards to operation on her L knee. Last PT was a few years ago. She is not interested in more PT even though it helped and does not want injections. Attending note:  Patient is a 17-year-old female with a history of bilateral knee replacements and knee pain. Patient reports her pain is somewhat worse. She is looking for a surgeon for possible revision of the knee joint. She reports her pain is gradually getting worse. Knee Pain    The incident occurred more than 1 week ago. There was no injury mechanism. The pain is present in the left knee and right knee. Quality: sharp. The pain is at a severity of 7/10 (3-8). The pain is moderate. The pain has been constant since onset. Pertinent negatives include no numbness or tingling. Foreign body present: mechanical knee. The symptoms are aggravated by weight bearing. Treatments tried: percocet. The treatment provided mild relief. Patient relates current medications are helping the pain. Patient reports taking pain medications as prescribed, denies obtaining medications from different sources and denies use of illegal drugs. Patient denies side effects from medications like nausea, vomiting, constipation or drowsiness. Patient reports current activities of daily living ar possible due to medications and would like to continue them.        RX Monitoring 1/2/2020 Attestation -   Acute Pain Prescriptions -   Periodic Controlled Substance Monitoring Possible medication side effects, risk of tolerance/dependence & alternative treatments discussed. ;No signs of potential drug abuse or diversion identified. ;Assessed functional status. ;Obtaining appropriate analgesic effect of treatment. Chronic Pain > 50 MEDD Obtained or confirmed \"Consent for Opioid Use\" on file. Chronic Pain > 80 MEDD -       Current Pain Assessment  Pain Assessment  Pain Assessment: 0-10  Pain Level: 5  Patient's Stated Pain Goal: No pain(to decrease  pain with increased activity)  Pain Type: Chronic pain  Pain Location: Back, Leg  Pain Orientation: Right, Left, Lower  Pain Radiating Towards: none  Pain Descriptors: Sharp, Constant, Burning  Pain Frequency: Continuous  Pain Onset: On-going  Clinical Progression: Not changed  Effect of Pain on Daily Activities: cant walk long distances , bending, lifting  Functional Pain Assessment: Prevents or interferes some active activities and ADLs  Non-Pharmaceutical Pain Intervention(s): Rest, Repositioned, Cold applied                    ADVERSE MEDICATION EFFECTS:   Constipation: no  Bowel Regimen: Yes  Diet: common adult  Appetite:  ok  Sedation:  no  Urinary Retention: no    FOCUSED PAINSCALE:  Highest : 7  Lowest :3  Average: Range-5  When and What  was your last procedure:  Hx of bilateral  arthroscopy   Was your procedure effective:  no    ACTIVITY/SOCIAL/EMOTIONAL:  Sleep Pattern: 4 hours per night. difficulty falling asleep, nightime awakenings and difficulty falling back asleep if awakened  Energy Level:  Tired/Fatigued  Currently attending Physical Therapy:  No  Home Exercises: every other day  Stretching   Mobility: painful to walk  Do you use assistive devices?   Yes, walker  Have you fallen in the last 30 days?:  No  Currently seeing a Psychiatrist or Psychologist:  No  Emotional Issues: normal   Mood: appropriate     ABERRANT BEHAVIORS SINCE LAST VISIT:  Have you ever been treated in another Pain Clinic no  Refills for prescriptions appropriate: yes  Lost rx/pills:no  Taking more medication than prescribed:  no  Are you receiving PAIN medications from  other doctors: no  Last Urine/Serum Drug Screen : 5/24/19  Was Serum/UDS as anticipated? yes  Brought pill bottles in :yes   Was Pill count appropriate? :yes   Are currently pregnant?not applicable  Recent ER visits: No             Past Medical History      Diagnosis Date    Arthritis     Bronchitis     Bunion of left foot     Diabetes mellitus ([address]     not on any meds    Eczema     arms, trunk    GERD (gastroesophageal reflux disease)     Hearing loss     left and right ears, wears hearing aids    Hyperlipidemia     Hypertension     Kidney infection     Osteoporosis     Urge incontinence     Urinary frequency     Wears glasses     for reading       Surgical History  Past Surgical History:   Procedure Laterality Date    APPENDECTOMY      WITH HYSTERECTOMY    ARTHROPLASTY Left 10/12/2017    METATARSAL HEAD RESECTION 3RD LEFT FOOT  & EXCISION LESION SOFT TISSUE LEFT FOOT performed [facility] David Moreno DPM at [facility] BLEPHAROPLASTY Bilateral     BREAST BIOPSY Left     benign    COLONOSCOPY      CYST REMOVAL Right     HEEL    CYST REMOVAL Right     wrist    FOOT SURGERY Left 04/12/2019    bunionectomy    HYSTERECTOMY      JOINT REPLACEMENT Bilateral     lt had revision knees    KNEE ARTHROSCOPY Right 8/12/15    Dr Franklin Cordoba ARTHROSCOPY Left 02/22/2017     &RIGHT  KNEE MANIPULATION    OTHER SURGICAL HISTORY  07/10/2019    SACRAL NERVE STIMULATOR IMPLANT STAGE 1 AND 2      [place] COLON CA SCRN NOT  [address] IND N/A 7/17/2018    COLONOSCOPY performed by Gautam Oliver MD at [place] Left 2/22/2017    KNEE ARTHROSCOPY FOR LATERAL PATELLA RELEASE, SYNOVECTOMY AND STEROID INJECTION, LEFT KNEE. MANIPULATION OF RIGHT KNEE.  performed by Ju Puga MD at [address] N/A 7/10/2019    SACRAL NERVE STIMULATOR IMPLANT STAGE 1 AND 2  C-ARM performed by Joe Vargas MD at [address] Left 05/02/2017    3rd toe    TONSILLECTOMY      TYMPANOPLASTY Left        Medications  Current Outpatient Medications   Medication Sig Dispense Refill    HYDROcodone-acetaminophen (NORCO) 5-325 MG per tablet Take 1 tablet by mouth every 8 hours as needed for Pain for up to 30 days.  90 tablet 0    vitamin D (ERGOCALCIFEROL) 1.25 MG (65298 UT) CAPS capsule Take 50,000 Units by mouth every 30 days      benzonatate (TESSALON PERLES) 100 MG capsule Take 100 mg by mouth 3 times daily as needed for Cough      gabapentin (NEURONTIN) 100 MG capsule take 1 capsule by mouth once daily at bedtime  0    Multiple Vitamins-Minerals (THERAPEUTIC MULTIVITAMIN-MINERALS) tablet Take 1 tablet by mouth daily      fluticasone (FLONASE) 50 MCG/ACT nasal spray       sulindac (CLINORIL) 150 MG tablet Take 150 mg by mouth 2 times daily      amLODIPine (NORVASC) 5 MG tablet take 1 tablet by mouth once daily, takes at night  0    acetaminophen (TYLENOL) 500 MG tablet Take 500 mg by mouth as needed for Pain      hydrOXYzine (ATARAX) 50 MG tablet 50 mg every 6 hours as needed   0    alendronate (FOSAMAX) 35 MG tablet Take 1 tablet by mouth every 7 days 4 tablet 3    omeprazole (PRILOSEC) 20 MG capsule take 1 capsule by mouth once daily (Patient taking differently: take 1 capsule by mouth once patient takes at supper) 90 capsule 0    simvastatin (ZOCOR) 20 MG tablet take 1 tablet by mouth ONCE NIGHTLY 30 tablet 3    loratadine (CLARITIN) 10 MG tablet take 1 tablet by mouth once daily 30 tablet 3    venlafaxine (EFFEXOR-XR) 37.5 MG XR capsule take 1 capsule by mouth once daily (Patient taking differently: take 1 capsule by mouth every evening) 30 capsule 3    lisinopril-hydrochlorothiazide (PRINZIDE) 20-12.5 MG per tablet Take 1 tablet by mouth daily 30 tablet 3    Calcium Carbonate-Vitamin D (CALCIUM + D PO) Take by mouth      busPIRone (BUSPAR) 10 MG tablet Take 1 tablet by mouth 2 times daily. (Patient taking differently: Take 10 mg by mouth 2 times daily as needed ) 60 tablet 3     No current facility-administered medications for this encounter. Allergies  Aspirin; Codeine; Avelox [moxifloxacin]; Lidoderm [lidocaine]; and Sulfa antibiotics    Family History  family history includes Diabetes in her mother; Emphysema in her father; Heart Failure in her mother; Osteoarthritis in her mother; Stroke in her father. Social History  Social History     Socioeconomic History    Marital status:      Spouse name: None    Number of children: None    Years of education: None    Highest education level: None   Occupational History    Occupation: disability   Social Needs    Financial resource strain: None    Food insecurity:     Worry: None     Inability: None    Transportation needs:     Medical: None     Non-medical: None   Tobacco Use    Smoking status: Never Smoker    Smokeless tobacco: Never Used   Substance and Sexual Activity    Alcohol use: No     Alcohol/week: 0.0 standard drinks    Drug use: No    Sexual activity: Not Currently   Lifestyle    Physical activity:     Days per week: None     Minutes per session: None    Stress: None   Relationships    Social connections:     Talks on phone: None     Gets together: None     Attends Bahai service: None     Active member of club or organization: None     Attends meetings of clubs or organizations: None     Relationship status: None    Intimate partner violence:     Fear of current or ex partner: None     Emotionally abused: None     Physically abused: None     Forced sexual activity: None   Other Topics Concern    None   Social History Narrative    None      reports no history of drug use.         REVIEW OF SYSTEMS:  Review of Systems   Constitutional: Negative. Negative for diaphoresis and unexpected weight change. HENT: Negative. Negative for congestion and ear pain. Eyes: Negative. Negative for pain, redness and visual disturbance. Respiratory: Negative. Negative for shortness of breath and wheezing. Cardiovascular: Negative. Gastrointestinal: Negative. Negative for abdominal pain, constipation and nausea. Endocrine: Negative. Negative for cold intolerance and polyuria. Genitourinary: Negative. Negative for dysuria and hematuria. Musculoskeletal: Positive for arthralgias. Left knee pain   Skin: Negative. Negative for pallor and wound. Allergic/Immunologic: Negative. Neurological: Negative. Negative for tingling, syncope, numbness and headaches. Hematological: Negative. Does not bruise/bleed easily. Psychiatric/Behavioral: Negative. Negative for self-injury, sleep disturbance and suicidal ideas. The patient is not nervous/anxious. GENERAL PHYSICAL EXAM:  Vitals: BP (!) 120/108   Pulse 100   Temp 98.5 °F (36.9 °C) (Oral)   Resp 16   Ht 4' 11\" (1.499 m)   Wt 194 lb (88 kg)   SpO2 98%   BMI 39.18 kg/m² , Body mass index is 39.18 kg/m². Physical Exam  Constitutional:       Appearance: Normal appearance. She is obese. HENT:      Head: Normocephalic and atraumatic. Nose: Nose normal.      Mouth/Throat:      Mouth: Mucous membranes are moist.   Eyes:      Extraocular Movements: Extraocular movements intact. Neck:      Musculoskeletal: Normal range of motion and neck supple. Cardiovascular:      Rate and Rhythm: Normal rate and regular rhythm. Pulmonary:      Effort: Pulmonary effort is normal. No respiratory distress. Abdominal:      General: Abdomen is flat. Palpations: Abdomen is soft. Musculoskeletal:      Right lower leg: No edema. Left lower leg: No edema. Skin:     General: Skin is warm and dry. Findings: No lesion.           Neurological:      General: No focal (hyperlipidemia)    GERD (gastroesophageal reflux disease)    Osteopenia    Encounter for chronic pain management    Status post total bilateral knee replacement    Primary osteoarthritis of both knees    Encounter for medication monitoring    Chronic bilateral low back pain without sciatica    Rectal pain    Generalized abdominal pain    Chronic, continuous use of opioids        Electronically signed by Александр Marie MD on 1/27/2020 at 2:29 PM     NAME:  Almas Andino  MRN: [id number]   YOB: 1955   Date: 1/27/2020   Age: 75 y.o. Gender: female       Almas Andino is 75 y.o. ,female, referred because of Knee Pain        I Performed a history and physical examination of the patient and discussed management with the resident/ Physician Assistant/ Nurse Practitioner. I reviewed the Resident/ Physician Assistant/ Nurse Practitioner note and agree with the documented findings and plan of care. Any areas of disagreement are noted on the chart. I was present for any key portions of any procedure. I have documented in the chart those procedures where I was not present during key Portions. I agree with the chief complaint, past medical history, past surgical history, Social & family history, Allergies, medications as documented unless noted below. I have personally evaluated this patient and have completed at least one if not all key elements of the (History, physical exam and plan of care). Additional findings are added to the note above    Diagnosis:   1. Status post total bilateral knee replacement    2. Primary osteoarthritis of both knees    3. Chronic, continuous use of opioids    4. Encounter for long-term (current) use of other medications    5. Encounter for medication monitoring    6. Encounter for chronic pain management    7. Chronic pain of both knees          Plan of Care:      We will continue current pain medications  Current medications are being tolerated without any Adverse is strongly advised to avoid tranquilizers or  Relaxants for any medications that can depress breathing or recreational drugs. Patient is also advised to tell us if there is any changes in his medications from other physicians. We discussed the same at today's visit and have not been to implement it, as the patient's pain is not under control with current medications. Decision Making Process : Patient's health history and referral records thoroughly reviewed before focused physical examination and discussion with patient. I have spent 20 Over 50% of today's visit is spent on examining the patient and counseling and coordinating the care. Level of complexity of date to be reviewed is Moderate. The chart date reviewed include the following: Imaging Reports. Summary of Care. Time spent reviewing with patient the below reports:   Medication safety, Treatment options. Level of diagnosis and management options of this case is multiple: involving the following management options: Interventions as needed, medication management as appropriate, future visits, activity modification, heat/ice as needed, Urine drug screen as required. [x]The patient's questions were answered to the best of my abilities. This note was created using voice recognition software. There may be inaccuracies of transcription  that are inadvertently overlooked prior to the signature. There is any questions about the transcription please contact me. Return in  4 weeks  with CNP  for further plan of treatment.       Suki Mijares MD on 1/27/2020 at 2:45 PM

## 2020-02-03 ENCOUNTER — OFFICE VISIT (OUTPATIENT)
Dept: ORTHOPEDIC SURGERY | Age: 65
End: 2020-02-03
Payer: COMMERCIAL

## 2020-02-03 ENCOUNTER — HOSPITAL ENCOUNTER (OUTPATIENT)
Age: 65
Setting detail: SPECIMEN
Discharge: HOME | End: 2020-02-03
Payer: COMMERCIAL

## 2020-02-03 LAB — SEDIMENTATION RATE, ERYTHROCYTE: 55 MM (ref 0–20)

## 2020-02-03 PROCEDURE — 36415 COLL VENOUS BLD VENIPUNCTURE: CPT

## 2020-02-03 PROCEDURE — 99203 OFFICE O/P NEW LOW 30 MIN: CPT | Performed by: PHYSICIAN ASSISTANT

## 2020-02-03 PROCEDURE — 85651 RBC SED RATE NONAUTOMATED: CPT

## 2020-02-04 ASSESSMENT — ENCOUNTER SYMPTOMS
COLOR CHANGE: 0
RHINORRHEA: 0
COUGH: 0
EYES NEGATIVE: 1
NAUSEA: 0
VOMITING: 0

## 2020-02-04 NOTE — PROGRESS NOTES
Patient ID: Chelsey Jones is a 75 y.o. female. Chief Complaint   Patient presents with   [facility]    Pain     left knee         HPI  Ms. Jairon Gruber is a 58-year-old female with extensive surgical history of this left knee who presents for evaluation of chronic left knee pain. Patient is here for second opinion about her left total knee arthroplasty. 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 She states this hardware remained in place until she had it removed by Dr. Kyung Montiel 20 years ago. Patient subsequently underwent an initial total knee arthroplasty at [address] [facility] 15 years ago. He subsequently underwent a TKA revision 6 years ago by Dr. Luis Enrique Bee. She states this knee was doing okay until she had a fall approximately 2 years ago where She was evaluated at [address] [facility] and was told she \"had a fracture in the knee\" however unsure of where this fracture was just states it was \"near the component\". She states at that time no surgical intervention was recommended and she has continued to have pain ever since that fall. Pain is diffuse in nature however is most severe to the anterior and medial aspect of this knee. She has had no falls recently. She denies any infectious-like symptoms including no history of recent fever, chills, knee joint warmth, knee joint redness. She denies any paresthesias of this left lower extremity. She also does have a history of right total knee arthroplasty performed approximately 3 years ago by Dr. Carmela Robledo that is doing very well. Patient sees [facility] pain management and is currently taking Percocet 3 times daily. She would really like to decrease the amount of pain medication required however due to the severity of pain in this left knee she does not think she is able to at this time.     Past Medical History:   Diagnosis Date    disability   Tobacco Use    Smoking status: Never Smoker    Smokeless tobacco: Never Used   Substance and Sexual Activity    Alcohol use: No     Alcohol/week: 0.0 standard drinks    Drug use: No    Sexual activity: Not Currently        Review of Systems   Constitutional: Negative for chills and fever. HENT: Negative for congestion and rhinorrhea. Eyes: Negative. Respiratory: Negative for cough. Cardiovascular: Negative for chest pain and leg swelling. Gastrointestinal: Negative for nausea and vomiting. Musculoskeletal: Positive for arthralgias (Left knee- History of revision left TKA). Skin: Negative for color change and rash. Neurological: Negative for dizziness and numbness. Physical Exam  Constitutional:       Appearance: She is well-developed. HENT:      Head: Normocephalic and atraumatic. Neck:      Musculoskeletal: Normal range of motion and neck supple. Cardiovascular:      Rate and Rhythm: Normal rate. Pulmonary:      Effort: Pulmonary effort is normal.   Abdominal:      Palpations: Abdomen is soft. Musculoskeletal:      Comments: Left Knee: Inspection: Midline incision is well-healed today without evidence of incisional erythema. No evidence of joint erythema or ecchymosis. Patient does have appropriate alignment of the left knee. Palpation: Moderate tenderness to the medial joint line. No tenderness to lateral joint line. No tenderness to the anterior knee. Passive ROM: Extension: 3, Flexion: 77  Knee is stable to both varus and valgus stress. Skin:     General: Skin is warm and dry. Findings: No rash. Neurological:      Mental Status: She is alert and oriented to person, place, and time. Psychiatric:         Behavior: Behavior normal.         Assessment:     Encounter Diagnoses   Name Primary?     History of revision of total knee arthroplasty Yes    Chronic pain of left knee     Osteopenia of other site          X-Rays taken in clinic

## 2020-02-07 ENCOUNTER — TELEPHONE (OUTPATIENT)
Dept: ORTHOPEDIC SURGERY | Age: 65
End: 2020-02-07

## 2020-02-11 ENCOUNTER — HOSPITAL ENCOUNTER (OUTPATIENT)
Age: 65
Setting detail: SPECIMEN
Discharge: HOME | End: 2020-02-11
Payer: COMMERCIAL

## 2020-02-11 LAB — C-REACTIVE PROTEIN: 7.3 MG/L (ref 0–5)

## 2020-02-11 PROCEDURE — 36415 COLL VENOUS BLD VENIPUNCTURE: CPT

## 2020-02-11 PROCEDURE — 86140 C-REACTIVE PROTEIN: CPT

## 2020-02-17 ENCOUNTER — TELEPHONE (OUTPATIENT)
Dept: ORTHOPEDIC SURGERY | Age: 65
End: 2020-02-17

## 2020-02-17 NOTE — TELEPHONE ENCOUNTER
----- Message from RYNE, [address] sent at 2/12/2020  9:30 AM EST -----  Chronically elevated CRP and ESR in the setting of painful total joint revision.   Please refer to Dr. Maday Kimball for further evaluation

## 2020-02-17 NOTE — TELEPHONE ENCOUNTER
Left message for patient to call back to inform her of results and give referral. I will fax information to [facility] [place].

## 2020-02-25 ENCOUNTER — HOSPITAL ENCOUNTER (OUTPATIENT)
Dept: PAIN MANAGEMENT | Age: 65
Discharge: HOME | End: 2020-02-25
Payer: COMMERCIAL

## 2020-02-25 VITALS
SYSTOLIC BLOOD PRESSURE: 129 MMHG | TEMPERATURE: 98.1 F | DIASTOLIC BLOOD PRESSURE: 76 MMHG | BODY MASS INDEX: 39.11 KG/M2 | OXYGEN SATURATION: 98 % | WEIGHT: 194 LBS | RESPIRATION RATE: 16 BRPM | HEIGHT: 59 IN | HEART RATE: 64 BPM

## 2020-02-25 PROCEDURE — 99213 OFFICE O/P EST LOW 20 MIN: CPT

## 2020-02-25 PROCEDURE — 99213 OFFICE O/P EST LOW 20 MIN: CPT | Performed by: NURSE PRACTITIONER

## 2020-02-25 RX ORDER — HYDROCODONE BITARTRATE AND ACETAMINOPHEN 5; 325 MG/1; MG/1
1 TABLET ORAL EVERY 8 HOURS PRN
Qty: 90 TABLET | Refills: 0 | Status: SHIPPED | OUTPATIENT
Start: 2020-03-02 | End: 2020-03-26 | Stop reason: SDUPTHER

## 2020-02-25 ASSESSMENT — ENCOUNTER SYMPTOMS
EYES NEGATIVE: 1
RESPIRATORY NEGATIVE: 1
GASTROINTESTINAL NEGATIVE: 1

## 2020-02-25 NOTE — DISCHARGE INSTR - COC
Continuity of Care Form    Patient Name: Minerva Sweeney   :  1955  MRN:  [id number]    Admit date:  2020  Discharge date:  ***    Code Status Order: Prior   Advance Directives:     Admitting Physician:  No admitting provider for patient encounter. PCP: Edgar Olivo MD    Discharging Nurse: [facility] Unit/Room#: No information available for this encounter. Discharging Unit Phone Number: ***    Emergency Contact:   Extended Emergency Contact Information  Primary Emergency Contact: Marcello Torres  Address: [address] [place] [address], [address] [place] [address] [address] [facility] Phone: [phone]  Work Phone: [phone]  Mobile Phone: [phone]  Relation: Spouse   needed? No  Secondary Emergency Contact: Maggie Wilson [facility] [address]  Phone: [phone]  Work Phone: [phone]  Mobile Phone: [phone]  Relation: Child   needed?  No    Past Surgical History:  Past Surgical History:   Procedure Laterality Date    APPENDECTOMY      WITH HYSTERECTOMY    ARTHROPLASTY Left 10/12/2017    METATARSAL HEAD RESECTION 3RD LEFT FOOT  & EXCISION LESION SOFT TISSUE LEFT FOOT performed [facility] Polina Jackson DPM [facility] BLEPHAROPLASTY Bilateral     BREAST BIOPSY Left     benign    COLONOSCOPY      CYST REMOVAL Right     HEEL    CYST REMOVAL Right     wrist    FOOT SURGERY Left 2019    bunionectomy    HYSTERECTOMY      JOINT REPLACEMENT Bilateral     lt had revision knees    KNEE ARTHROSCOPY Right 8/12/15    Dr Kitchen Form ARTHROSCOPY Left 2017     &RIGHT  KNEE MANIPULATION    OTHER SURGICAL HISTORY  07/10/2019    SACRAL NERVE STIMULATOR IMPLANT STAGE 1 AND 2      MS COLON CA SCRN NOT HI [facility] IND N/A 2018    COLONOSCOPY performed by nAne Irwin MD at [place] Left 2017    KNEE ARTHROSCOPY FOR LATERAL PATELLA RELEASE, SYNOVECTOMY AND STEROID INJECTION, LEFT KNEE. MANIPULATION OF RIGHT KNEE. performed by Simran Robins MD at [address] N/A 7/10/2019    SACRAL NERVE STIMULATOR IMPLANT STAGE 1 AND 2  C-ARM performed by Arsen Hoffmann MD at [address] TOE SURGERY Left 05/02/2017    3rd toe    TONSILLECTOMY      TYMPANOPLASTY Left        Immunization History:   Immunization History   Administered Date(s) Administered    Influenza Virus Vaccine 11/24/2014       Active Problems:  Patient Active Problem List   Diagnosis Code    Osteoarthrosis involving lower leg M17.10    Encounter for long-term (current) use of other medications Z79.899    Arthralgia of both lower legs M25.561, M25.562    Pain in lower limb M79.606    Status post total right knee replacement Z96.651    Chronic pain of both knees M25.561, M25.562, G89.29    HLD (hyperlipidemia) E78.5    GERD (gastroesophageal reflux disease) K21.9    Osteopenia M85.80    Encounter for chronic pain management G89.29    Status post total bilateral knee replacement Z96.653    Primary osteoarthritis of both knees M17.0    Encounter for medication monitoring Z51.81    Chronic bilateral low back pain without sciatica M54.5, G89.29    Rectal pain K62.89    Generalized abdominal pain R10.84    Chronic, continuous use of opioids F11.90       Isolation/Infection:   Isolation          No Isolation        Patient Infection Status     None to display          Nurse Assessment:  Last Vital Signs: There were no vitals taken for this visit.     Last documented pain score (0-10 scale):    Last Weight:   Wt Readings from Last 1 Encounters:   01/27/20 194 lb (88 kg)     Mental Status:  {IP PT MENTAL STATUS:20829}    IV Access:  { TAWANDA IV ACCESS:777016695}    Nursing Mobility/ADLs:  Walking   {CHP DME WPIH:844792176}  Transfer  {CHP DME VQRO:004766296}  Bathing  {CHP DME UUTM:470612808}  Dressing  {CHP DME ENQK:610935486}  Toileting  {CHP DME

## 2020-02-25 NOTE — PROGRESS NOTES
[facility] PROGRESS NOTE      Patient here today to review Medication Agreement    Chief Complaint:  Left knee pain      HPI: She c/o left knee pain  Which has increased. She has history of 3 knee surgeries. She has had knee replacement surgery. Her knee pops out. She saw Orthopaedic  Cherly Nyhan, PA and had x-ray and lab work and is referred to  Phil Mcarthur. Her sed rate was elevated at 55. Her CRP was elevated 7. 3. She sees her PCP this week , she states it was discussed with her about having a bone scan done and WBC scan. Knee Pain    The incident occurred more than 1 week ago. There was no injury mechanism. The pain is present in the left knee. Quality: sharp. The pain is at a severity of 7/10. The pain is moderate. The pain has been constant since onset. Associated symptoms include a loss of motion and muscle weakness. Foreign body present: knee replacement  Exacerbated by: standing, walking. She has tried heat (pain med,) for the symptoms. The treatment provided mild relief. 2/11/2020  6:10 PM - Kenneth henry Incoming Lab Results From [facility]     Component Value Ref Range & Units Status Collected Lab   CRP 7.3High   0.0 - 5.0 mg/L Final 02/11/2020  1:05 PM [address]  Lab   Testing Performed By     Yelitza Leon Name Director Address Valid Date Range     2/3/2020  7:44 PM - KennethKamari marrufo Incoming Lab Results From [facility]     Component Value Ref Range & Units Status Collected Lab   Sed Rate 55High   0 - 20 mm Final 02/03/2020  3:00  [address] Lab             Patient denies any new neurological symptoms. No bowel or bladder incontinence, no weakness, and no falling.     Treatment goals:  Functional status: be more active     Aberrancy:   Any alcoholic beverages  no     Any illegal drugs   no        Analgesia:pain  7    Adverse  Effects :  none     ADL;s :paces self, housework knee exercises                 Pill count: appropriate #16 differently: take 1 capsule by mouth once patient takes at supper), Disp: 90 capsule, Rfl: 0    simvastatin (ZOCOR) 20 MG tablet, take 1 tablet by mouth ONCE NIGHTLY, Disp: 30 tablet, Rfl: 3    loratadine (CLARITIN) 10 MG tablet, take 1 tablet by mouth once daily, Disp: 30 tablet, Rfl: 3    venlafaxine (EFFEXOR-XR) 37.5 MG XR capsule, take 1 capsule by mouth once daily (Patient taking differently: take 1 capsule by mouth every evening), Disp: 30 capsule, Rfl: 3    lisinopril-hydrochlorothiazide (PRINZIDE) 20-12.5 MG per tablet, Take 1 tablet by mouth daily, Disp: 30 tablet, Rfl: 3    busPIRone (BUSPAR) 10 MG tablet, Take 1 tablet by mouth 2 times daily.  (Patient taking differently: Take 10 mg by mouth 2 times daily as needed ), Disp: 60 tablet, Rfl: 3    fluticasone (FLONASE) 50 MCG/ACT nasal spray, , Disp: , Rfl:     acetaminophen (TYLENOL) 500 MG tablet, Take 500 mg by mouth as needed for Pain, Disp: , Rfl:     hydrOXYzine (ATARAX) 50 MG tablet, 50 mg every 6 hours as needed , Disp: , Rfl: 0    Calcium Carbonate-Vitamin D (CALCIUM + D PO), Take by mouth, Disp: , Rfl:     Family History   Problem Relation Age of Onset    Stroke Father     Emphysema Father     Diabetes Mother     Heart Failure Mother     Osteoarthritis Mother        Social History     Socioeconomic History    Marital status:      Spouse name: Not on file    Number of children: Not on file    Years of education: Not on file    Highest education level: Not on file   Occupational History    Occupation: disability   Social Needs    Financial resource strain: Not on file    Food insecurity:     Worry: Not on file     Inability: Not on file    Transportation needs:     Medical: Not on file     Non-medical: Not on file   Tobacco Use    Smoking status: Never Smoker    Smokeless tobacco: Never Used   Substance and Sexual Activity    Alcohol use: No     Alcohol/week: 0.0 standard drinks    Drug use: No    Sexual activity: Not Currently   Lifestyle    Physical activity:     Days per week: Not on file     Minutes per session: Not on file    Stress: Not on file   Relationships    Social connections:     Talks on phone: Not on file     Gets together: Not on file     Attends Restorationism service: Not on file     Active member of club or organization: Not on file     Attends meetings of clubs or organizations: Not on file     Relationship status: Not on file    Intimate partner violence:     Fear of current or ex partner: Not on file     Emotionally abused: Not on file     Physically abused: Not on file     Forced sexual activity: Not on file   Other Topics Concern    Not on file   Social History Narrative    Not on file       Travel Screen    Have you been in contact with someone who was sick    Yes ----------   no --x--------    unable to assess ---------    Do you have any of the following symptoms:      Abdominal pain ------  Bruising or bleeding ------ Cough  ----  Not new  Joint pain -------- Muscle pain -------  Rash  ------    Vomiting -------- Weakness  -------  None of these _____    Diarrhea -------  Red eye -------   Unable to assess ----------      Traveled outside the [address] [place] [address] in the last month   yes---- no---x--      Location-----------------------------------  start  date  ----------- -----  end date  -        Review of Systems:  Review of Systems   Constitution: Positive for chills. HENT: Positive for hearing loss. Left ear hearing aid   Eyes: Negative. Cardiovascular: Negative. Respiratory: Negative. Endocrine: Negative. Diabetic   Hematologic/Lymphatic: Bruises/bleeds easily. Skin: Positive for rash. Musculoskeletal: Positive for joint pain. Gastrointestinal: Negative. Genitourinary: Negative. Neurological: Negative. Psychiatric/Behavioral: Negative.           Physical Exam:  /76   Pulse 64   Temp 98.1 °F (36.7 °C) (Oral)   Resp 16   Ht 4' 11\" (1.499 m)   Wt 194 lb (88 kg) Plan:  DISCUSSION: Treatment options discussed withpatient and all questions answered to patient's satisfaction. Possible side effects, risk of tolerance and or dependence and alternative treatments discussed    Obtaining appropriate analgesic effect of treatment   No signs of potential drug abuse or diversion identified    [x] Ill effects of being on chronic pain medications such as sleep disturbances, respiratory depression, hormonal changes, withdrawal symptoms, chronic opioid dependence and tolerance as well as risk of taking opioids with Benzodiazepines and taking opioids along with alcohol,  werediscussed with patient. I had asked the patient to minimize medication use and utilize pain medications only for uncontrolled rest pain or pain with exertional activities. I advised patient not to self-escalate painmedications without consulting with us. At each of patient's future visits we will try to taper pain medications, while adjusting the adjunct medications, and re-evaluating for Physical Therapy to improve spinal andjoint strength. We will continue to have discussions to decrease pain medications as tolerated. Counseled patient on effects their pain medication and /or their medical condition mayhave on their  ability to drive or operate machinery. Instructed not to drive or operate machinery if drowsy     I also discussed with the patient regarding the dangers of combining narcotic pain medication with tranquilizers, alcohol or illegal drugs or taking the medication any way other than prescribed. The dangers were discussed  including respiratory depression and death. Patient was told to tell  all  physicians regarding the medications he is getting from pain clinic. Patient is warned not to take any unprescribed medications over-the-countermedications that can depress breathing .  Patient is advised to talk to the pharmacist or physicians if planning to take any over-the-counter medications before takeing them. Patient is strongly advised to avoid tranquilizers or  relaxants, illegal drugs  or any medications that can depress breathing  Patient is also advised to tell us if there is any changes in their medications from other physicians.         TREATMENT OPTIONS:     Return in 4 weeks  Medication Agreement Requirements Met  Continue Opioid therapy  Script written for  Hydrocodone  Follow up appointment made

## 2020-03-04 ENCOUNTER — HOSPITAL ENCOUNTER (OUTPATIENT)
Dept: WOMENS IMAGING | Age: 65
Discharge: HOME | End: 2020-03-06
Payer: COMMERCIAL

## 2020-03-04 PROCEDURE — 77080 DXA BONE DENSITY AXIAL: CPT

## 2020-03-23 ENCOUNTER — TELEPHONE (OUTPATIENT)
Dept: ORTHOPEDIC SURGERY | Age: 65
End: 2020-03-23

## 2020-04-28 ENCOUNTER — HOSPITAL ENCOUNTER (OUTPATIENT)
Dept: PAIN MANAGEMENT | Age: 65
Discharge: HOME | End: 2020-04-28
Payer: COMMERCIAL

## 2020-04-28 PROCEDURE — 99213 OFFICE O/P EST LOW 20 MIN: CPT | Performed by: NURSE PRACTITIONER

## 2020-04-28 PROCEDURE — 99213 OFFICE O/P EST LOW 20 MIN: CPT

## 2020-04-28 RX ORDER — HYDROCODONE BITARTRATE AND ACETAMINOPHEN 5; 325 MG/1; MG/1
1 TABLET ORAL EVERY 8 HOURS PRN
Qty: 90 TABLET | Refills: 0 | Status: SHIPPED | OUTPATIENT
Start: 2020-05-01 | End: 2020-05-28 | Stop reason: SDUPTHER

## 2020-04-28 ASSESSMENT — ENCOUNTER SYMPTOMS
EYES NEGATIVE: 1
RESPIRATORY NEGATIVE: 1
GASTROINTESTINAL NEGATIVE: 1

## 2020-04-28 NOTE — PROGRESS NOTES
[address] PAIN CLINIC PROGRESS NOTE      Patient  Video visit for review Medication Agreement    Chief Complaint:  Left knee pain      HPI:   She c/o left knee pain which has increased. She had left knee revision about 7 years ago. And then fell on her left knee about 2 years ago. She was referred to Dr Hodan Frausto and she had CT scan done and he wants to remove fluid from her knee and check for any infection. She had an elevated Sed rate and CRP. Her sleep varies, sometimes the knee pain wakes her up. She has not been active. She has had no Ed visits. Knee Pain    The incident occurred more than 1 week ago. There was no injury mechanism. The pain is present in the left knee. Quality: sharp. The pain is at a severity of 5/10. The pain is moderate. The pain has been worsening since onset. Associated symptoms include a loss of motion and muscle weakness. Foreign body present: knee replaced. Exacerbated by: standing, walking, steps. She has tried ice and elevation for the symptoms. The treatment provided mild relief. Any new diagnostic workup: [] no  [x] yes [] Xray [] CT scan [] MRI [] DEXA scan     [] Other           CT [facility] knee at [facility]    Patient denies any new neurological symptoms. No bowel or bladder incontinence, no weakness, and no falling. Treatment goals:  Functional status: to make knee pain better      Aberrancy:   Any alcoholic beverages  no     Any illegal drugs   no      Analgesia:pain a 5      Adverse  Effects none    ADL;s :limited        Pill count: appropriate    Morphine equivalent dose as reported on OARRS:15  Periodic Controlled Substance Monitoring: Possible medication side effects, risk of tolerance/dependence & alternative treatments discussed., No signs of potential drug abuse or diversion identified. , Assessed functional status., Obtaining appropriate analgesic effect of treatment.  Mon[address] Street, APRN - CNP)  Review ofOARRS does not show any aberrant prescription behavior. Medication is helping the patient stay active. Patient denies any side effects and reports adequate analgesia. No sign of misuse/abuse. When was thelast UDS:    5-24-19         Was the UDS appropriate:yes      Record/Diagnostics Review:      As above, I did review the imaging  5/27/2019  3:55 PM - Kenneth, Kaceypn Incoming Lab Results From [facility]     Component Value Ref Range & Units Status Collected Lab   Pain Management Drug Panel Interp, Urine Consistent   Final 05/24/2019  1:45 PM [facility]   (NOTE)   ________________________________________________________________   DRUGS EXPECTED:   HYDROCODONE [5/24/19]   ________________________________________________________________   CONSISTENT with medications provided:   HYDROCODONE : based on hydrocodone, norhydrocodone   ________________________________________________________________   INTERPRETIVE INFORMATION: Pain Mgt [place], [place] Spec/EMIT, Ur,                            Interp   Interpretation depends on accuracy and completeness of patient   medication information submitted by client. 6-Acetylmorphine, Ur Not Detected   Final 05/24/2019  1:45 PM [facility]   7-Aminoclonazepam, Urine Not Detected   Final 05/24/2019  1:45 PM [facility]   Alpha-OH-Alpraz, Urine Not Detected   Final 05/24/2019  1:45 PM [facility]   Alprazolam, Urine Not Detected   Final 05/24/2019  1:45 PM [facility]   Amphetamines, urine Not Detected   Final 05/24/2019  1:45 PM [facility]   Barbiturates, Ur Not Detected   Final 05/24/2019  1:45 PM [facility]   Benzoylecgonine, Ur Not Detected   Final 05/24/2019  1:45 PM [facility]   Buprenorphine Urine Not Detected   Final 05/24/2019  1:45 PM [facility]   Carisoprodol, Ur Not Detected   Final 05/24/2019  1:45 PM [facility]   (NOTE)   The carisoprodol immunoassay has cross-reactivity to carisoprodol   and meprobamate.     Clonazepam, Urine Not Detected   Final 05/24/2019  1:45 PM [facility]   Codeine, Urine Not Detected   Final 05/24/2019  1:45 PM [facility]   MDA, Ur Not Detected   Final EER Hi Res Interp Ur See Note   Final 05/24/2019  1:45 PM [facility]   (NOTE)   Access [facility] Enhanced Report using either link below:   -Direct access: [email or web address]   -Enter Username, Password: [email or web address]   Username: [email or web address]   Password: [email or web address]   Performed by [facility] [place] [id number] [facility] [phone]   www. Mary Grace Sheriff MD, Lab. Director    Testing Performed By             Past Medical History:   Diagnosis Date    Arthritis     Bronchitis     Bunion of left foot     Diabetes mellitus ([address]     not on any meds    Eczema     arms, trunk    GERD (gastroesophageal reflux disease)     Hearing loss     left and right ears, wears hearing aids    Hyperlipidemia     Hypertension     Kidney infection     Osteoporosis     Urge incontinence     Urinary frequency     Wears glasses     for reading       Past Surgical History:   Procedure Laterality Date    APPENDECTOMY      WITH HYSTERECTOMY    ARTHROPLASTY Left 10/12/2017    METATARSAL HEAD RESECTION 3RD LEFT FOOT  & EXCISION LESION SOFT TISSUE LEFT FOOT [facility] BLEPHAROPLASTY Bilateral     BREAST BIOPSY Left     benign    COLONOSCOPY      CYST REMOVAL Right     HEEL    CYST REMOVAL Right     wrist    FOOT SURGERY Left 04/12/2019    bunionectomy    HYSTERECTOMY      JOINT REPLACEMENT Bilateral     lt had revision knees    KNEE ARTHROSCOPY Right 8/12/15    Dr Pamela Peterson ARTHROSCOPY Left 02/22/2017     &RIGHT  KNEE MANIPULATION    OTHER SURGICAL HISTORY  07/10/2019    SACRAL NERVE STIMULATOR IMPLANT STAGE 1 AND 2      MO COLON CA SCRN NOT  [address] IND N/A 7/17/2018    COLONOSCOPY performed by Marcellus Davila MD at [place] Left 2/22/2017    KNEE ARTHROSCOPY FOR LATERAL PATELLA RELEASE, SYNOVECTOMY AND STEROID INJECTION, LEFT KNEE. MANIPULATION OF RIGHT KNEE.  performed by Risa Vaughan MD at [address] N/A 7/10/2019    SACRAL NERVE STIMULATOR IMPLANT STAGE 1 AND 2  C-ARM performed by Jay Mosher MD at [address] Left 05/02/2017    3rd toe    TONSILLECTOMY      TYMPANOPLASTY Left        Allergies   Allergen Reactions    Aspirin Other (See Comments)     Chest pain    Codeine Other (See Comments)     Chest pain    Avelox [Moxifloxacin] Rash    Lidoderm [Lidocaine] Rash     Skin reddened , itching    Sulfa Antibiotics Rash         Current Outpatient Medications:     HYDROcodone-acetaminophen (NORCO) 5-325 MG per tablet, Take 1 tablet by mouth every 8 hours as needed for Pain for up to 30 days. , Disp: 90 tablet, Rfl: 0    vitamin D (ERGOCALCIFEROL) 1.25 MG (27719 UT) CAPS capsule, Take 50,000 Units by mouth every 30 days, Disp: , Rfl:     gabapentin (NEURONTIN) 100 MG capsule, take 1 capsule by mouth once daily at bedtime, Disp: , Rfl: 0    Multiple Vitamins-Minerals (THERAPEUTIC MULTIVITAMIN-MINERALS) tablet, Take 1 tablet by mouth daily, Disp: , Rfl:     sulindac (CLINORIL) 150 MG tablet, Take 150 mg by mouth 2 times daily, Disp: , Rfl:     amLODIPine (NORVASC) 5 MG tablet, take 1 tablet by mouth once daily, takes at night, Disp: , Rfl: 0    alendronate (FOSAMAX) 35 MG tablet, Take 1 tablet by mouth every 7 days, Disp: 4 tablet, Rfl: 3    omeprazole (PRILOSEC) 20 MG capsule, take 1 capsule by mouth once daily (Patient taking differently: take 1 capsule by mouth once patient takes at supper), Disp: 90 capsule, Rfl: 0    simvastatin (ZOCOR) 20 MG tablet, take 1 tablet by mouth ONCE NIGHTLY, Disp: 30 tablet, Rfl: 3    loratadine (CLARITIN) 10 MG tablet, take 1 tablet by mouth once daily, Disp: 30 tablet, Rfl: 3    venlafaxine (EFFEXOR-XR) 37.5 MG XR capsule, take 1 capsule by mouth once daily (Patient taking differently: take 1 capsule by mouth every evening), Disp: 30 capsule, Rfl: 3   drug abuse or diversion identified    [x] Ill effects of being on chronic pain medications such as sleep disturbances, respiratory depression, hormonal changes, withdrawal symptoms, chronic opioid dependence and tolerance as well as risk of taking opioids with Benzodiazepines and taking opioids along with alcohol,  werediscussed with patient. I had asked the patient to minimize medication use and utilize pain medications only for uncontrolled rest pain or pain with exertional activities. I advised patient not to self-escalate painmedications without consulting with us. At each of patient's future visits we will try to taper pain medications, while adjusting the adjunct medications, and re-evaluating for Physical Therapy to improve spinal andjoint strength. We will continue to have discussions to decrease pain medications as tolerated. Counseled patient on effects their pain medication and /or their medical condition mayhave on their  ability to drive or operate machinery. Instructed not to drive or operate machinery if drowsy     I also discussed with the patient regarding the dangers of combining narcotic pain medication with tranquilizers, alcohol or illegal drugs or taking the medication any way other than prescribed. The dangers were discussed  including respiratory depression and death. Patient was told to tell  all  physicians regarding the medications he is getting from pain clinic. Patient is warned not to take any unprescribed medications over-the-countermedications that can depress breathing . Patient is advised to talk to the pharmacist or physicians if planning to take any over-the-counter medications before  takeing them. Patient is strongly advised to avoid tranquilizers or  relaxants, illegal drugs  or any medications that can depress breathing  Patient is also advised to tell us if there is any changes in their medications from other physicians.     Oksana Sal is a 75 y.o. female being evaluated by a Virtual Visit (video visit) encounter to address concerns as mentioned above. A caregiver was present when appropriate. Due to this being a TeleHealth encounter (During GDIFA-68 public health emergency), evaluation of the following organ systems was limited: Vitals/Constitutional/EENT/Resp/CV/GI//MS/Neuro/Skin/Heme-Lymph-Imm. Pursuant to the emergency declaration under the [address] [place] [address] and the [employer] and [employer] Act, this Virtual Visit was conducted with patient's (and/or legal guardian's) consent, to reduce the risk of exposure to COVID-19 and provide necessary medical care. Location:patient at home, provider at [address] [place] [address] were provided through a video synchronous discussion virtually to substitute for in-person encounter. --DARREL Diaz - CNP on 4/28/2020 at 2:16 PM    An electronic signature was used to authenticate this note.     TREATMENT OPTIONS:       Medication Agreement Requirements Met  Continue Opioid therapy  Script written for  hydrocodone  Follow up appointment made

## 2020-05-18 ENCOUNTER — OFFICE VISIT (OUTPATIENT)
Dept: UROLOGY | Age: 65
End: 2020-05-18
Payer: COMMERCIAL

## 2020-05-18 VITALS — BODY MASS INDEX: 40.4 KG/M2 | WEIGHT: 200.4 LBS | HEIGHT: 59 IN

## 2020-05-18 PROCEDURE — 99214 OFFICE O/P EST MOD 30 MIN: CPT | Performed by: UROLOGY

## 2020-05-18 ASSESSMENT — ENCOUNTER SYMPTOMS
COUGH: 0
CONSTIPATION: 0
EYE REDNESS: 0
VOMITING: 0
BACK PAIN: 0
EYE PAIN: 0
ABDOMINAL PAIN: 0
SHORTNESS OF BREATH: 0
NAUSEA: 0
WHEEZING: 0
DIARRHEA: 0

## 2020-05-18 NOTE — PROGRESS NOTES
Review of Systems   Constitutional: Negative for appetite change, chills and fever. Eyes: Negative for pain, redness and visual disturbance. Respiratory: Negative for cough, shortness of breath and wheezing. Cardiovascular: Negative for chest pain and leg swelling. Gastrointestinal: Negative for abdominal pain, constipation, diarrhea, nausea and vomiting. Genitourinary: Positive for dysuria. Negative for difficulty urinating, flank pain, frequency, hematuria and urgency. Musculoskeletal: Negative for back pain, joint swelling and myalgias. Skin: Negative for rash and wound. Neurological: Negative for dizziness, tremors and numbness. Hematological: Does not bruise/bleed easily.
Hyperlipidemia     Hypertension     Kidney infection     Osteoporosis     Urge incontinence     Urinary frequency     Wears glasses     for reading     Past Surgical History:   Procedure Laterality Date    APPENDECTOMY      WITH HYSTERECTOMY    ARTHROPLASTY Left 10/12/2017    METATARSAL HEAD RESECTION 3RD LEFT FOOT  & EXCISION LESION SOFT TISSUE LEFT FOOT performed by Antwon Lux DPM at [facility] BLEPHAROPLASTY Bilateral     BREAST BIOPSY Left     benign    COLONOSCOPY      CYST REMOVAL Right     HEEL    CYST REMOVAL Right     wrist    FOOT SURGERY Left 04/12/2019    bunionectomy    HYSTERECTOMY      JOINT REPLACEMENT Bilateral     lt had revision knees    KNEE ARTHROSCOPY Right 8/12/15    Dr Madi Barreto ARTHROSCOPY Left 02/22/2017     &RIGHT  KNEE MANIPULATION    OTHER SURGICAL HISTORY  07/10/2019    SACRAL NERVE STIMULATOR IMPLANT STAGE 1 AND 2      [place] COLON CA SCRN NOT HI RSK IND N/A 7/17/2018    COLONOSCOPY performed by Einar Scheuermann, MD at [place] Left 2/22/2017    KNEE ARTHROSCOPY FOR LATERAL PATELLA RELEASE, SYNOVECTOMY AND STEROID INJECTION, LEFT KNEE. MANIPULATION OF RIGHT KNEE. performed by Jono Marcos MD at [address] [place] [address] Ln N/A 7/10/2019    SACRAL NERVE STIMULATOR IMPLANT STAGE 1 AND 2  C-ARM performed by Rea Ballesteros MD at [address] [place] [address] Left 05/02/2017    3rd toe    TONSILLECTOMY      TYMPANOPLASTY Left      Family History   Problem Relation Age of Onset    Stroke Father     Emphysema Father     Diabetes Mother     Heart Failure Mother     Osteoarthritis Mother      Outpatient Medications Marked as Taking for the 5/18/20 encounter (Office Visit) with Rea Ballesteros MD   Medication Sig Dispense Refill    HYDROcodone-acetaminophen (NORCO) 5-325 MG per tablet Take 1 tablet by mouth every 8 hours as needed for Pain for up to 30 days.  90 tablet 0    vitamin D

## 2020-05-28 ENCOUNTER — HOSPITAL ENCOUNTER (OUTPATIENT)
Dept: PAIN MANAGEMENT | Age: 65
Discharge: HOME | End: 2020-05-28
Payer: COMMERCIAL

## 2020-05-28 PROCEDURE — 99213 OFFICE O/P EST LOW 20 MIN: CPT

## 2020-05-28 PROCEDURE — 99442 PR PHYS/QHP TELEPHONE EVALUATION 11-20 MIN: CPT | Performed by: NURSE PRACTITIONER

## 2020-05-28 RX ORDER — HYDROCODONE BITARTRATE AND ACETAMINOPHEN 5; 325 MG/1; MG/1
1 TABLET ORAL EVERY 8 HOURS PRN
Qty: 90 TABLET | Refills: 0 | Status: SHIPPED | OUTPATIENT
Start: 2020-06-02 | End: 2020-06-26 | Stop reason: SDUPTHER

## 2020-05-28 RX ORDER — METHOCARBAMOL 750 MG/1
TABLET ORAL
COMMUNITY
Start: 2020-05-16 | End: 2021-04-13 | Stop reason: ALTCHOICE

## 2020-05-28 ASSESSMENT — ENCOUNTER SYMPTOMS
RESPIRATORY NEGATIVE: 1
GASTROINTESTINAL NEGATIVE: 1

## 2020-05-28 NOTE — PROGRESS NOTES
misuse/abuse.         When was thelast UDS:   5-          Was the UDS appropriate: yes      Record/Diagnostics Review:      As above, I did review the imaging      5/27/2019  3:55 PM - Kamari Cooper Incoming Lab Results From [facility]     Component Value Ref Range & Units Status Collected Lab   Pain Management Drug Panel Interp, Urine Consistent   Final 05/24/2019  1:45 PM [facility]   (NOTE)   ________________________________________________________________   DRUGS EXPECTED:   HYDROCODONE [5/24/19]   ________________________________________________________________   CONSISTENT with medications provided:   HYDROCODONE : based on hydrocodone, norhydrocodone   ________________________________________________________________         Past Medical History:   Diagnosis Date    Arthritis     Bronchitis     Bunion of left foot     Diabetes mellitus ([address]     not on any meds    Eczema     arms, trunk    GERD (gastroesophageal reflux disease)     Hearing loss     left and right ears, wears hearing aids    Hyperlipidemia     Hypertension     Kidney infection     Osteoporosis     Urge incontinence     Urinary frequency     Wears glasses     for reading       Past Surgical History:   Procedure Laterality Date    APPENDECTOMY      WITH HYSTERECTOMY    ARTHROPLASTY Left 10/12/2017    METATARSAL HEAD RESECTION 3RD LEFT FOOT  & EXCISION LESION SOFT TISSUE LEFT FOOT performed by Gladys Heimlich, DPM at [facility] BLEPHAROPLASTY Bilateral     BREAST BIOPSY Left     benign    COLONOSCOPY      CYST REMOVAL Right     HEEL    CYST REMOVAL Right     wrist    FINE NEEDLE ASPIRATION  05/26/2020    left knee    FOOT SURGERY Left 04/12/2019    bunionectomy    HYSTERECTOMY      JOINT REPLACEMENT Bilateral     lt had revision knees    KNEE ARTHROSCOPY Right 8/12/15    Dr Myesha Wells    KNEE ARTHROSCOPY Left 02/22/2017     &RIGHT  KNEE MANIPULATION    OTHER SURGICAL HISTORY  07/10/2019    SACRAL NERVE 37.5 MG XR capsule, take 1 capsule by mouth once daily (Patient taking differently: take 1 capsule by mouth every evening), Disp: 30 capsule, Rfl: 3    lisinopril-hydrochlorothiazide (PRINZIDE) 20-12.5 MG per tablet, Take 1 tablet by mouth daily, Disp: 30 tablet, Rfl: 3    Calcium Carbonate-Vitamin D (CALCIUM + D PO), Take by mouth, Disp: , Rfl:     fluticasone (FLONASE) 50 MCG/ACT nasal spray, , Disp: , Rfl:     acetaminophen (TYLENOL) 500 MG tablet, Take 500 mg by mouth as needed for Pain, Disp: , Rfl:     hydrOXYzine (ATARAX) 50 MG tablet, 50 mg every 6 hours as needed , Disp: , Rfl: 0    busPIRone (BUSPAR) 10 MG tablet, Take 1 tablet by mouth 2 times daily.  (Patient taking differently: Take 10 mg by mouth 2 times daily as needed ), Disp: 60 tablet, Rfl: 3    Family History   Problem Relation Age of Onset    Stroke Father     Emphysema Father     Diabetes Mother     Heart Failure Mother     Osteoarthritis Mother        Social History     Socioeconomic History    Marital status:      Spouse name: Not on file    Number of children: Not on file    Years of education: Not on file    Highest education level: Not on file   Occupational History    Occupation: disability   Social Needs    Financial resource strain: Not on file    Food insecurity     Worry: Not on file     Inability: Not on file   [employer] needs     Medical: Not on file     Non-medical: Not on file   Tobacco Use    Smoking status: Never Smoker    Smokeless tobacco: Never Used   Substance and Sexual Activity    Alcohol use: No     Alcohol/week: 0.0 standard drinks    Drug use: No    Sexual activity: Not Currently   Lifestyle    Physical activity     Days per week: Not on file     Minutes per session: Not on file    Stress: Not on file   Relationships    Social connections     Talks on phone: Not on file     Gets together: Not on file     Attends Mosque service: Not on file     Active member of club or organization: Not on file     Attends meetings of clubs or organizations: Not on file     Relationship status: Not on file    Intimate partner violence     Fear of current or ex partner: Not on file     Emotionally abused: Not on file     Physically abused: Not on file     Forced sexual activity: Not on file   Other Topics Concern    Not on file   Social History Narrative    Not on file         Review of Systems:  Review of Systems   Constitution: Negative. HENT: Positive for hearing loss. Hearing aids   Cardiovascular: Negative. Respiratory: Negative. Endocrine: Negative. Hematologic/Lymphatic: Negative. Skin: Negative. Musculoskeletal: Positive for joint pain. Gastrointestinal: Negative. Genitourinary: Negative. Neurological: Positive for loss of balance. Psychiatric/Behavioral: Negative. Physical Exam:    Physical Exam  Skin:         Neurological:      Mental Status: She is alert. Psychiatric:         Mood and Affect: Mood normal.         Thought Content:  Thought content normal.           Assessment:      Problem List Items Addressed This Visit     Status post total right knee replacement    Relevant Medications    HYDROcodone-acetaminophen (NORCO) 5-325 MG per tablet (Start on 6/2/2020)    Status post total bilateral knee replacement - Primary    Relevant Medications    HYDROcodone-acetaminophen ([address]) 5-325 MG per tablet (Start on 6/2/2020)    Primary osteoarthritis of both knees    Relevant Medications    HYDROcodone-acetaminophen (NORCO) 5-325 MG per tablet (Start on 6/2/2020)    Encounter for medication monitoring    Encounter for long-term (current) use of other medications    Chronic, continuous use of opioids    Chronic pain of both knees    Relevant Medications    HYDROcodone-acetaminophen (NORCO) 5-325 MG per tablet (Start on 6/2/2020)    Arthralgia of both lower legs (Chronic)            Treatment Plan:  DISCUSSION: Treatment options discussed

## 2020-06-03 ENCOUNTER — OFFICE VISIT (OUTPATIENT)
Dept: UROLOGY | Age: 65
End: 2020-06-03
Payer: COMMERCIAL

## 2020-06-03 VITALS — HEIGHT: 59 IN | WEIGHT: 196 LBS | BODY MASS INDEX: 39.51 KG/M2 | TEMPERATURE: 98.4 F

## 2020-06-03 PROCEDURE — 99213 OFFICE O/P EST LOW 20 MIN: CPT | Performed by: NURSE PRACTITIONER

## 2020-06-03 ASSESSMENT — ENCOUNTER SYMPTOMS
COUGH: 0
WHEEZING: 0
SHORTNESS OF BREATH: 0
NAUSEA: 0
ABDOMINAL PAIN: 0
EYE REDNESS: 0
COLOR CHANGE: 0
EYE PAIN: 0
VOMITING: 0
BACK PAIN: 0

## 2020-06-03 NOTE — PROGRESS NOTES
Review of Systems   Constitutional: Negative for appetite change, chills and fever. Eyes: Negative for pain, redness and visual disturbance. Respiratory: Negative for cough, shortness of breath and wheezing. Cardiovascular: Negative for chest pain and leg swelling. Gastrointestinal: Negative for abdominal pain, nausea and vomiting. Genitourinary: Positive for urgency (leaks sometimes ). Negative for difficulty urinating, dysuria, flank pain, frequency, hematuria and vaginal discharge. Musculoskeletal: Negative for back pain, joint swelling and myalgias. Skin: Negative for color change, rash and wound. Neurological: Negative for dizziness, tremors and numbness. Hematological: Negative for adenopathy. Does not bruise/bleed easily.

## 2020-06-03 NOTE — PROGRESS NOTES
[facility]  [facility] UROLOGY SPECIALISTS - [address] [address]  Dept: [address] [facility] Urology Office Note - Established    Patient:  Vishnu Ewing  YOB: 1955  Date: 6/3/2020    The patient is a 75 y.o. female whopresents today for evaluation of the following problems:   Chief Complaint   Patient presents with    Urinary Frequency     interstim check  battery is dead       HPI  Here for interstim check. She had a fall and felt interstim did not work after that, worsening nocturia now 2x per night, having urge incontinence, using 4-5 pads per day. Morning water pill. Drinks a large glass of decaf tea at 5 pm, restricts after 8 pm. bedtime 10-12 pm.     Knee surgery sched for 27th of July    Summary of old records: N/A    Additional History: N/A    Procedures Today:  Interstim programming:     The programming head was placed over the implanted neurostimulator. Previous programed electrode selectionsettings: c4 amps 0.8    Final electrode setting: C6 amp     Impedence all under 4000 ohms verified:yes    Battery life checked: 28-49 months     Sensation in pelvic area. Right buttock     Urinalysis today:  No results found for this visit on 06/03/20.     Imaging Reviewed during this Office Visit:   (results were independently reviewed by physician and radiology report verified)    AUA Symptom Score (6/3/2020):  INCOMPLETE EMPTYING: How often have you had the sensation of not emptying your bladder?: Less than 1 to 5 times  FREQUENCY: How often do you have to urinate less than every two hours?: Less than 1 to 5 times  INTERMITTENCY: How often have you found you stopped and started again several times when you urinated?: Not at all  URGENCY: How often have you found it difficult to postpone urination?: Less than 1 to 5 times  WEAK STREAM: How often have you had a weak urinary stream?: Not at all  STRAINING: How often have you had to strain to start INJECTION, LEFT KNEE. MANIPULATION OF RIGHT KNEE. performed by Marline Cruz MD at [address] N/A 7/10/2019    SACRAL NERVE STIMULATOR IMPLANT STAGE 1 AND 2  C-ARM performed by Hieu Brown MD at [address] Left 05/02/2017    3rd toe    TONSILLECTOMY      TYMPANOPLASTY Left      Family History   Problem Relation Age of Onset    Stroke Father     Emphysema Father     Diabetes Mother     Heart Failure Mother     Osteoarthritis Mother      Outpatient Medications Marked as Taking for the 6/3/20 encounter (Office Visit) with DARREL Hyman - CNP   Medication Sig Dispense Refill    D3-50 1.25 MG (04795 UT) CAPS take 1 capsule by mouth every month      HYDROcodone-acetaminophen (NORCO) 5-325 MG per tablet Take 1 tablet by mouth every 8 hours as needed for Pain for up to 30 days.  90 tablet 0    gabapentin (NEURONTIN) 100 MG capsule take 1 capsule by mouth once daily at bedtime  0    fluticasone (FLONASE) 50 MCG/ACT nasal spray       sulindac (CLINORIL) 150 MG tablet Take 150 mg by mouth 2 times daily      amLODIPine (NORVASC) 5 MG tablet take 1 tablet by mouth once daily, takes at night  0    acetaminophen (TYLENOL) 500 MG tablet Take 500 mg by mouth as needed for Pain      hydrOXYzine (ATARAX) 50 MG tablet 50 mg every 6 hours as needed   0    alendronate (FOSAMAX) 35 MG tablet Take 1 tablet by mouth every 7 days 4 tablet 3    omeprazole (PRILOSEC) 20 MG capsule take 1 capsule by mouth once daily (Patient taking differently: take 1 capsule by mouth once patient takes at supper) 90 capsule 0    simvastatin (ZOCOR) 20 MG tablet take 1 tablet by mouth ONCE NIGHTLY 30 tablet 3    loratadine (CLARITIN) 10 MG tablet take 1 tablet by mouth once daily 30 tablet 3    venlafaxine (EFFEXOR-XR) 37.5 MG XR capsule take 1 capsule by mouth once daily (Patient taking differently: take 1 capsule by mouth every evening) 30 capsule 3   

## 2020-06-03 NOTE — LETTER
[facility]  [facility] UROLOGY SPECIALISTS - [address]  Dept: [phone]  Dept Fax: [phone]        6/3/20    Patient: John Asif  YOB: 1955    Dear Rachelle Ward MD,    I had the pleasure of seeing one of your patients, Lindsay Kimball today in the office today. Below are the relevant portions of my assessment and plan of care. IMPRESSION:  1. Urge incontinence    2. Nocturia        PLAN:  1 month follow up on interstim changes. - complex interstim changes       Return in about 6 weeks (around 7/15/2020). Thank you for allowing me to participate in the care of this patient. I will keep you updated on this patient's follow up and I look forward to serving you and your patients again in the future.     DARREL Irby - CNP

## 2020-06-26 ENCOUNTER — HOSPITAL ENCOUNTER (OUTPATIENT)
Dept: PAIN MANAGEMENT | Age: 65
Discharge: HOME | End: 2020-06-26
Payer: COMMERCIAL

## 2020-06-26 PROCEDURE — 99442 PR PHYS/QHP TELEPHONE EVALUATION 11-20 MIN: CPT | Performed by: NURSE PRACTITIONER

## 2020-06-26 PROCEDURE — 99213 OFFICE O/P EST LOW 20 MIN: CPT

## 2020-06-26 RX ORDER — TRIAMCINOLONE ACETONIDE 1 MG/G
CREAM TOPICAL
COMMUNITY
Start: 2020-06-14

## 2020-06-26 RX ORDER — IVERMECTIN 3 MG/1
TABLET ORAL
COMMUNITY
Start: 2020-06-05 | End: 2020-06-26 | Stop reason: ALTCHOICE

## 2020-06-26 RX ORDER — HYDROCODONE BITARTRATE AND ACETAMINOPHEN 5; 325 MG/1; MG/1
1 TABLET ORAL EVERY 8 HOURS PRN
Qty: 90 TABLET | Refills: 0 | Status: SHIPPED | OUTPATIENT
Start: 2020-07-02 | End: 2020-07-24 | Stop reason: SDUPTHER

## 2020-06-26 ASSESSMENT — ENCOUNTER SYMPTOMS
RESPIRATORY NEGATIVE: 1
EYES NEGATIVE: 1
GASTROINTESTINAL NEGATIVE: 1

## 2020-06-26 NOTE — PROGRESS NOTES
analgesia. No sign of misuse/abuse.         When was thelast UDS: 5-            Was the UDS appropriate:yes      Record/Diagnostics Review:      As above, I did review the imaging       When was thelast UDS:   5-          Was the UDS appropriate: yes        Record/Diagnostics Review:       As above, I did review the imaging        5/27/2019  3:55 PM - Kenneth, [facility] Incoming Lab Results From [facility]      Component Value Ref Range & Units Status Collected Lab   Pain Management Drug Panel Interp, Urine Consistent    Final 05/24/2019  1:45 PM [facility]   (NOTE)   ________________________________________________________________   DRUGS EXPECTED:   HYDROCODONE [5/24/19]   ________________________________________________________________   CONSISTENT with medications provided:   HYDROCODONE : based on hydrocodone, norhydrocodone   ________________________________________________________________                  Past Medical History:   Diagnosis Date    Arthritis     Bronchitis     Bunion of left foot     Diabetes mellitus ([address]     not on any meds    Eczema     arms, trunk    GERD (gastroesophageal reflux disease)     Hearing loss     left and right ears, wears hearing aids    Hyperlipidemia     Hypertension     Kidney infection     Osteoporosis     Urge incontinence     Urinary frequency     Wears glasses     for reading       Past Surgical History:   Procedure Laterality Date    APPENDECTOMY      WITH HYSTERECTOMY    ARTHROPLASTY Left 10/12/2017    METATARSAL HEAD RESECTION 3RD LEFT FOOT  & EXCISION LESION SOFT TISSUE LEFT FOOT performed by Roim Cueva DPM at [facility] BLEPHAROPLASTY Bilateral     BREAST BIOPSY Left     benign    COLONOSCOPY      CYST REMOVAL Right     HEEL    CYST REMOVAL Right     wrist    FINE NEEDLE ASPIRATION  05/26/2020    left knee    FOOT SURGERY Left 04/12/2019    bunionectomy    HYSTERECTOMY      JOINT REPLACEMENT Bilateral     lt taking differently: take 1 capsule by mouth once patient takes at supper), Disp: 90 capsule, Rfl: 0    simvastatin (ZOCOR) 20 MG tablet, take 1 tablet by mouth ONCE NIGHTLY, Disp: 30 tablet, Rfl: 3    loratadine (CLARITIN) 10 MG tablet, take 1 tablet by mouth once daily, Disp: 30 tablet, Rfl: 3    venlafaxine (EFFEXOR-XR) 37.5 MG XR capsule, take 1 capsule by mouth once daily (Patient taking differently: take 1 capsule by mouth every evening), Disp: 30 capsule, Rfl: 3    lisinopril-hydrochlorothiazide (PRINZIDE) 20-12.5 MG per tablet, Take 1 tablet by mouth daily, Disp: 30 tablet, Rfl: 3    Calcium Carbonate-Vitamin D (CALCIUM + D PO), Take by mouth, Disp: , Rfl:     fluticasone (FLONASE) 50 MCG/ACT nasal spray, , Disp: , Rfl:     acetaminophen (TYLENOL) 500 MG tablet, Take 500 mg by mouth as needed for Pain, Disp: , Rfl:     hydrOXYzine (ATARAX) 50 MG tablet, 50 mg every 6 hours as needed , Disp: , Rfl: 0    busPIRone (BUSPAR) 10 MG tablet, Take 1 tablet by mouth 2 times daily.  (Patient taking differently: Take 10 mg by mouth 2 times daily as needed ), Disp: 60 tablet, Rfl: 3    Family History   Problem Relation Age of Onset    Stroke Father     Emphysema Father     Diabetes Mother     Heart Failure Mother     Osteoarthritis Mother        Social History     Socioeconomic History    Marital status:      Spouse name: Not on file    Number of children: Not on file    Years of education: Not on file    Highest education level: Not on file   Occupational History    Occupation: disability   Social Needs    Financial resource strain: Not on file    Food insecurity     Worry: Not on file     Inability: Not on file   [employer] needs     Medical: Not on file     Non-medical: Not on file   Tobacco Use    Smoking status: Never Smoker    Smokeless tobacco: Never Used   Substance and Sexual Activity    Alcohol use: No     Alcohol/week: 0.0 standard drinks    Drug use: No    Sexual pain management    Chronic, continuous use of opioids    Chronic pain of both knees    Relevant Medications    HYDROcodone-acetaminophen (NORCO) 5-325 MG per tablet (Start on 7/2/2020)    Chronic bilateral low back pain without sciatica    Relevant Medications    HYDROcodone-acetaminophen (NORCO) 5-325 MG per tablet (Start on 7/2/2020)    Arthralgia of both lower legs (Chronic)        Treatment Plan:  DISCUSSION: Treatment options discussed withpatient and all questions answered to patient's satisfaction. Possible side effects, risk of tolerance and or dependence and alternative treatments discussed    Obtaining appropriate analgesic effect of treatment   No signs of potential drug abuse or diversion identified    [x] Ill effects of being on chronic pain medications such as sleep disturbances, respiratory depression, hormonal changes, withdrawal symptoms, chronic opioid dependence and tolerance as well as risk of taking opioids with Benzodiazepines and taking opioids along with alcohol,  werediscussed with patient. I had asked the patient to minimize medication use and utilize pain medications only for uncontrolled rest pain or pain with exertional activities. I advised patient not to self-escalate painmedications without consulting with us. At each of patient's future visits we will try to taper pain medications, while adjusting the adjunct medications, and re-evaluating for Physical Therapy to improve spinal andjoint strength. We will continue to have discussions to decrease pain medications as tolerated. Counseled patient on effects their pain medication and /or their medical condition mayhave on their  ability to drive or operate machinery. Instructed not to drive or operate machinery if drowsy     I also discussed with the patient regarding the dangers of combining narcotic pain medication with tranquilizers, alcohol or illegal drugs or taking the medication any way other than prescribed.  The dangers were discussed  including respiratory depression and death. Patient was told to tell  all  physicians regarding the medications he is getting from pain clinic. Patient is warned not to take any unprescribed medications over-the-countermedications that can depress breathing . Patient is advised to talk to the pharmacist or physicians if planning to take any over-the-counter medications before  takeing them. Patient is strongly advised to avoid tranquilizers or  relaxants, illegal drugs  or any medications that can depress breathing  Patient is also advised to tell us if there is any changes in their medications from other physicians.     Location:  patient  at   Her sister\"s house   , provider working from home  Phone call: 11 minutes    TREATMENT OPTIONS:       Medication Agreement Requirements Met  Continue Opioid therapy  Script written for hydrocodone  Follow up appointment made

## 2020-07-15 ENCOUNTER — OFFICE VISIT (OUTPATIENT)
Dept: UROLOGY | Age: 65
End: 2020-07-15
Payer: COMMERCIAL

## 2020-07-15 VITALS — TEMPERATURE: 98.3 F

## 2020-07-15 PROCEDURE — 95970 ALYS NPGT W/O PRGRMG: CPT | Performed by: NURSE PRACTITIONER

## 2020-07-15 PROCEDURE — 99213 OFFICE O/P EST LOW 20 MIN: CPT | Performed by: NURSE PRACTITIONER

## 2020-07-15 ASSESSMENT — ENCOUNTER SYMPTOMS
EYE PAIN: 0
SHORTNESS OF BREATH: 0
BACK PAIN: 0
ABDOMINAL PAIN: 0
EYE REDNESS: 0
COUGH: 0
WHEEZING: 0
COLOR CHANGE: 0
VOMITING: 0
NAUSEA: 0

## 2020-07-15 NOTE — PROGRESS NOTES
Review of Systems   Constitutional: Negative for activity change, chills and fever. Eyes: Negative for pain, redness and visual disturbance. Respiratory: Negative for cough, shortness of breath and wheezing. Cardiovascular: Negative for chest pain and leg swelling. Gastrointestinal: Negative for abdominal pain, nausea and vomiting. Genitourinary: Negative for difficulty urinating, dysuria, frequency, hematuria, pelvic pain, vaginal bleeding and vaginal discharge. Musculoskeletal: Positive for arthralgias. Negative for back pain, joint swelling and myalgias. Skin: Negative for color change and rash. Neurological: Negative for dizziness, tremors and numbness. Hematological: Negative for adenopathy. Does not bruise/bleed easily.

## 2020-07-15 NOTE — LETTER
[facility]  [facility] UROLOGY SPECIALISTS - [address]  Dept: [phone]  Dept Fax: [phone]        7/15/20    Patient: Minna De Anda  YOB: 1955    Dear Mara Banerjee MD,    I had the pleasure of seeing one of your patients, Navya Dawson today in the office today. Below are the relevant portions of my assessment and plan of care. IMPRESSION:  1. Nocturia    2. Urge incontinence    3. Frequency of urination        PLAN:  same settings    Good battery life 74-95 months    1 yr f/u     Call with worsening s/s   No follow-ups on file. Prescriptions Ordered:  No orders of the defined types were placed in this encounter. Orders Placed:  Orders Placed This Encounter   Procedures    MO ELEC REGINE IMPLT NPGT PHYS/QHP W/O PROGRAMMING         Thank you for allowing me to participate in the care of this patient. I will keep you updated on this patient's follow up and I look forward to serving you and your patients again in the future.     DARREL Foster - CNP

## 2020-07-15 NOTE — PROGRESS NOTES
[facility]  [facility] UROLOGY SPECIALISTS - [facility] [address] [address]  Dept: [address] [facility] Urology Office Note - Established    Patient:  Erica Hammond  YOB: 1955  Date: 7/15/2020    The patient is a 75 y.o. female whopresents today for evaluation of the following problems:   Chief Complaint   Patient presents with    Incontinence     Interstim check; pt c/o soreness around the Interstim site, but states it is effective        HPI  Here for interstim check, placed 7/10/19. She had frequency, urge incontinence, 3 PPD, nocturia x2. Now she is using 1 pad during the day and 1 at night, not leaking at all- just for protection, can make it every 2 hours depending on fluids, drinks iced tea throughout the day, rare pop, 2 c coffee int he morning. Nocturai x1- she is overall pleased with her symptoms. She went out of town and slept on a couch and feels some back soreness when laying on the side of her interstim, no erythema, no edema. Summary of old records: N/A    Additional History: N/A    Procedures Today:Interstim programming:     The programming head was placed over the implanted neurostimulator. Previous programed electrode selectionsettings: C6 amp 0.5 amp     Final electrode setting: same c6 amp 0.5 amp     Impedence all under 4000 ohms verified: all yes    Battery life checked: 74-95 mos    Sensation in pelvic area. Rt buttock     Urinalysis today:  No results found for this visit on 07/15/20.     Imaging Reviewed during this Office Visit:   (results were independently reviewed by physician and radiology report verified)    AUA Symptom Score (7/15/2020):  INCOMPLETE EMPTYING: How often have you had the sensation of not emptying your bladder?: Not at all  FREQUENCY: How often do you have to urinate less than every two hours?: Less than 1 to 5 times  INTERMITTENCY: How often have you found you stopped and started again several times when you urinated?: Not at all  URGENCY: How often have you found it difficult to postpone urination?: More than Half the time  WEAK STREAM: How often have you had a weak urinary stream?: Not at all  STRAINING: How often have you had to strain to start  urination?: Not at all  NOCTURIA: How many times did you typically get up at night to uriniate?: 2 Times  TOTAL I-PSS SCORE[de-identified] 7  How would you feel if you were to spend the rest of your life with your urinary condition?: Mostly Satisfied    Last BUN and creatinine:  Lab Results   Component Value Date    BUN 23 06/26/2019     Lab Results   Component Value Date    CREATININE 1.26 (H) 06/26/2019       Additional Lab/Culture results:    PAST MEDICAL, FAMILY AND SOCIAL HISTORY UPDATE:  Past Medical History:   Diagnosis Date    Arthritis     Bronchitis     Bunion of left foot     Diabetes mellitus ([address]     not on any meds    Eczema     arms, trunk    GERD (gastroesophageal reflux disease)     Hearing loss     left and right ears, wears hearing aids    Hyperlipidemia     Hypertension     Kidney infection     Osteoporosis     Urge incontinence     Urinary frequency     Wears glasses     for reading     Past Surgical History:   Procedure Laterality Date    APPENDECTOMY      WITH HYSTERECTOMY    ARTHROPLASTY Left 10/12/2017    METATARSAL HEAD RESECTION 3RD LEFT FOOT  & EXCISION LESION SOFT TISSUE LEFT FOOT performed by Maria D Akbar DPM at [address]      BLEPHAROPLASTY Bilateral     BREAST BIOPSY Left     benign    COLONOSCOPY      CYST REMOVAL Right     HEEL    CYST REMOVAL Right     wrist    FINE NEEDLE ASPIRATION  05/26/2020    left knee    FOOT SURGERY Left 04/12/2019    bunionectomy    HYSTERECTOMY      JOINT REPLACEMENT Bilateral     lt had revision knees    KNEE ARTHROSCOPY Right 8/12/15    Dr Deedee Dominguez    KNEE ARTHROSCOPY Left 02/22/2017     &RIGHT  KNEE MANIPULATION    OTHER SURGICAL HISTORY  07/10/2019    SACRAL NERVE STIMULATOR IMPLANT STAGE 1 AND 2      CO COLON CA SCRN NOT  [address] IND N/A 7/17/2018    COLONOSCOPY performed by Javy Middleton MD at [place] Left 2/22/2017    KNEE ARTHROSCOPY FOR LATERAL PATELLA RELEASE, SYNOVECTOMY AND STEROID INJECTION, LEFT KNEE. MANIPULATION OF RIGHT KNEE. performed by Dorcas Tian MD at [address] N/A 7/10/2019    SACRAL NERVE STIMULATOR IMPLANT STAGE 1 AND 2  C-ARM performed by Karri Petersen MD at [address] [facility] Left 05/02/2017    3rd toe    TONSILLECTOMY      TYMPANOPLASTY Left      Family History   Problem Relation Age of Onset    Stroke Father     Emphysema Father     Diabetes Mother     Heart Failure Mother     Osteoarthritis Mother      Outpatient Medications Marked as Taking for the 7/15/20 encounter (Office Visit) with DARREL Townsend - CNP   Medication Sig Dispense Refill    triamcinolone (KENALOG) 0.1 % cream apply 1 APPLICATION externally to affected area twice a day if needed for itching      HYDROcodone-acetaminophen (NORCO) 5-325 MG per tablet Take 1 tablet by mouth every 8 hours as needed for Pain for up to 30 days.  90 tablet 0    D3-50 1.25 MG (91363 UT) CAPS take 1 capsule by mouth every month      gabapentin (NEURONTIN) 100 MG capsule take 1 capsule by mouth once daily at bedtime  0    fluticasone (FLONASE) 50 MCG/ACT nasal spray       amLODIPine (NORVASC) 5 MG tablet take 1 tablet by mouth once daily, takes at night  0    acetaminophen (TYLENOL) 500 MG tablet Take 500 mg by mouth as needed for Pain      hydrOXYzine (ATARAX) 50 MG tablet 50 mg every 6 hours as needed   0    alendronate (FOSAMAX) 35 MG tablet Take 1 tablet by mouth every 7 days 4 tablet 3    omeprazole (PRILOSEC) 20 MG capsule take 1 capsule by mouth once daily (Patient taking differently: take 1 capsule by mouth once patient takes at supper) 90 capsule 0    simvastatin (ZOCOR) 20 MG tablet take 1 tablet by mouth ONCE NIGHTLY 30 tablet 3    loratadine (CLARITIN) 10 MG tablet take 1 tablet by mouth once daily 30 tablet 3    venlafaxine (EFFEXOR-XR) 37.5 MG XR capsule take 1 capsule by mouth once daily (Patient taking differently: take 1 capsule by mouth every evening) 30 capsule 3    lisinopril-hydrochlorothiazide (PRINZIDE) 20-12.5 MG per tablet Take 1 tablet by mouth daily 30 tablet 3    Calcium Carbonate-Vitamin D (CALCIUM + D PO) Take by mouth      busPIRone (BUSPAR) 10 MG tablet Take 1 tablet by mouth 2 times daily. (Patient taking differently: Take 10 mg by mouth 2 times daily as needed ) 60 tablet 3      (All medications reviewed and updated by provider sincelast office visit or hospitalization)   Aspirin; Codeine; Avelox [moxifloxacin]; Lidoderm [lidocaine]; and Sulfa antibiotics  Social History     Tobacco Use   Smoking Status Never Smoker   Smokeless Tobacco Never Used      (If patient a smoker, smoking cessation counseling offered)     Social History     Substance and Sexual Activity   Alcohol Use No    Alcohol/week: 0.0 standard drinks       REVIEW OF SYSTEMS:  Review of Systems      Physical Exam:      Vitals:    07/15/20 1544   Temp: 98.3 °F (36.8 °C)     There is no height or weight on file to calculate BMI. Patient is a 75 y.o. female in noacute distress and alert and oriented to person, place and time. Physical Exam  Constitutional: Patient in no acute distress. Neuro: Alert andoriented to person, place and time. Psych: Mood normal, affect normal  Skin: warm, pink, No rash noted  HEENT: Head: Normocephalic and atraumatic  Conjunctivae and EOM are normal. Pupils are equal, round  Nose: Normal  Right External Ear: Normal; Left External Ear: Normal  Mouth: Mucosa Moist  Neck: Supple  Lungs: Respiratory effort is normal  Cardiovascular: strong and reg,   Abdomen: Soft, non-tender, non-distended   Bladder non-tender and not distended.  interstim site RT buttock, no induration, erythema, no

## 2020-07-24 ENCOUNTER — HOSPITAL ENCOUNTER (OUTPATIENT)
Dept: PAIN MANAGEMENT | Age: 65
Discharge: HOME | End: 2020-07-24
Payer: COMMERCIAL

## 2020-07-24 ENCOUNTER — HOSPITAL ENCOUNTER (OUTPATIENT)
Age: 65
Discharge: HOME | End: 2020-07-24
Payer: COMMERCIAL

## 2020-07-24 PROCEDURE — 80307 DRUG TEST PRSMV CHEM ANLYZR: CPT

## 2020-07-24 PROCEDURE — 99442 PR PHYS/QHP TELEPHONE EVALUATION 11-20 MIN: CPT | Performed by: NURSE PRACTITIONER

## 2020-07-24 PROCEDURE — 99213 OFFICE O/P EST LOW 20 MIN: CPT

## 2020-07-24 RX ORDER — HYDROCODONE BITARTRATE AND ACETAMINOPHEN 5; 325 MG/1; MG/1
1 TABLET ORAL EVERY 8 HOURS PRN
Qty: 90 TABLET | Refills: 0 | Status: SHIPPED | OUTPATIENT
Start: 2020-08-01 | End: 2020-08-27 | Stop reason: SDUPTHER

## 2020-07-24 ASSESSMENT — ENCOUNTER SYMPTOMS
RESPIRATORY NEGATIVE: 1
EYES NEGATIVE: 1
GASTROINTESTINAL NEGATIVE: 1

## 2020-07-24 NOTE — PROGRESS NOTES
[facility] PROGRESS NOTE      Patient  Phone call to  review Medication Agreement    Chief Complaint: left knee pain      She c/o left knee pain which has increased, She has history of left knee arthroplasty in 200 but continues to have pain. Roberta Ying She will be having surgery left knee on 7- at [facility]. er sleep is good. No Ed visits. Knee Pain    The incident occurred more than 1 week ago. The pain is present in the left knee. Quality: sharp. The pain is at a severity of 6/10. The pain has been constant since onset. Associated symptoms include muscle weakness. Foreign body present: left knee replacement. The symptoms are aggravated by weight bearing (walkingl). She has tried elevation and ice for the symptoms. The treatment provided mild relief. Patient denies any new neurological symptoms. No bowel or bladder incontinence, no weakness, and no falling. Any new diagnostic workup: [x] no  [] yes [] Xray [] CT scan [] MRI [] DEXA scan     [] Other                    Treatment goals:  Functional status: have left knee surgery      Aberrancy:   Any alcoholic beverages no      Any illegal drugs   no    Analgesia:6    Adverse  Effects :none    ADL;s :activity limites        Pill count: appropriate    Morphine equivalent dose as reported on [place]ARRS: 15  Periodic Controlled Substance Monitoring: Possible medication side effects, risk of tolerance/dependence & alternative treatments discussed., No signs of potential drug abuse or diversion identified., Obtaining appropriate analgesic effect of treatment., Assessed functional status. Steph Welsh, APRN - CNP)  Review ofOARRS does not show any aberrant prescription behavior. Medication is helping the patient stay active. Patient denies any side effects and reports adequate analgesia. No sign of misuse/abuse.         When was thelast UDS:  5-           Was the UDS appropriate:yes      Record/Diagnostics Review:      As above, I did review the imaging    5/27/2019  3:55 PM - Kenneth, Kamari Incoming Lab Results From [facility]     Component  Value  Ref Range & Units  Status  Collected  Lab    Pain Management Drug Panel Interp, Urine  Consistent   Final  05/24/2019  1:45 PM  [facility]    (NOTE)   ________________________________________________________________   DRUGS EXPECTED:   HYDROCODONE [5/24/19]   ________________________________________________________________   CONSISTENT with medications provided:   HYDROCODONE : based on hydrocodone, norhydrocodone            Past Medical History:   Diagnosis Date    Arthritis     Bronchitis     Bunion of left foot     Diabetes mellitus ([address]     not on any meds    Eczema     arms, trunk    GERD (gastroesophageal reflux disease)     Hearing loss     left and right ears, wears hearing aids    Hyperlipidemia     Hypertension     Kidney infection     Osteoporosis     Urge incontinence     Urinary frequency     Wears glasses     for reading       Past Surgical History:   Procedure Laterality Date    APPENDECTOMY      WITH HYSTERECTOMY    ARTHROPLASTY Left 10/12/2017    METATARSAL HEAD RESECTION 3RD LEFT FOOT  & EXCISION LESION SOFT TISSUE LEFT FOOT performed by Tashia You DPM at [facility] BLEPHAROPLASTY Bilateral     BREAST BIOPSY Left     benign    COLONOSCOPY      CYST REMOVAL Right     HEEL    CYST REMOVAL Right     wrist    FINE NEEDLE ASPIRATION  05/26/2020    left knee    FOOT SURGERY Left 04/12/2019    bunionectomy    HYSTERECTOMY      JOINT REPLACEMENT Bilateral     lt had revision knees    KNEE ARTHROSCOPY Right 8/12/15    Dr Mir Lozano ARTHROSCOPY Left 02/22/2017     &RIGHT  KNEE MANIPULATION    OTHER SURGICAL HISTORY  07/10/2019    SACRAL NERVE STIMULATOR IMPLANT STAGE 1 AND 2      UT COLON CA SCRN NOT  [address] IND N/A 7/17/2018    COLONOSCOPY performed by Cynthia Champion MD at [place] Left 2/22/2017    KNEE ARTHROSCOPY FOR LATERAL PATELLA RELEASE, SYNOVECTOMY AND STEROID INJECTION, LEFT KNEE. MANIPULATION OF RIGHT KNEE. performed by Cathe Najjar, MD at [address] N/A 7/10/2019    SACRAL NERVE STIMULATOR IMPLANT STAGE 1 AND 2  C-ARM performed by Gaurang Silverio MD at [address] Left 05/02/2017    3rd toe    TONSILLECTOMY      TYMPANOPLASTY Left        Allergies   Allergen Reactions    Aspirin Other (See Comments)     Chest pain    Codeine Other (See Comments)     Chest pain    Avelox [Moxifloxacin] Rash    Lidoderm [Lidocaine] Rash     Skin reddened , itching    Sulfa Antibiotics Rash         Current Outpatient Medications:     HYDROcodone-acetaminophen (NORCO) 5-325 MG per tablet, Take 1 tablet by mouth every 8 hours as needed for Pain for up to 30 days. , Disp: 90 tablet, Rfl: 0    D3-50 1.25 MG (87929 UT) CAPS, take 1 capsule by mouth every month, Disp: , Rfl:     gabapentin (NEURONTIN) 100 MG capsule, take 1 capsule by mouth once daily at bedtime, Disp: , Rfl: 0    amLODIPine (NORVASC) 5 MG tablet, take 1 tablet by mouth once daily, takes at night, Disp: , Rfl: 0    alendronate (FOSAMAX) 35 MG tablet, Take 1 tablet by mouth every 7 days, Disp: 4 tablet, Rfl: 3    omeprazole (PRILOSEC) 20 MG capsule, take 1 capsule by mouth once daily (Patient taking differently: take 1 capsule by mouth once patient takes at supper), Disp: 90 capsule, Rfl: 0    simvastatin (ZOCOR) 20 MG tablet, take 1 tablet by mouth ONCE NIGHTLY, Disp: 30 tablet, Rfl: 3    loratadine (CLARITIN) 10 MG tablet, take 1 tablet by mouth once daily, Disp: 30 tablet, Rfl: 3    venlafaxine (EFFEXOR-XR) 37.5 MG XR capsule, take 1 capsule by mouth once daily (Patient taking differently: take 1 capsule by mouth every evening), Disp: 30 capsule, Rfl: 3    lisinopril-hydrochlorothiazide (PRINZIDE) 20-12.5 MG per tablet, Take 1 tablet by mouth daily, Disp: 30 tablet, Rfl: 3    Calcium Carbonate-Vitamin D (CALCIUM + D PO), Take by mouth, Disp: , Rfl:     triamcinolone (KENALOG) 0.1 % cream, apply 1 APPLICATION externally to affected area twice a day if needed for itching, Disp: , Rfl:     fluticasone (FLONASE) 50 MCG/ACT nasal spray, , Disp: , Rfl:     acetaminophen (TYLENOL) 500 MG tablet, Take 500 mg by mouth as needed for Pain, Disp: , Rfl:     hydrOXYzine (ATARAX) 50 MG tablet, 50 mg every 6 hours as needed , Disp: , Rfl: 0    busPIRone (BUSPAR) 10 MG tablet, Take 1 tablet by mouth 2 times daily.  (Patient taking differently: Take 10 mg by mouth 2 times daily as needed ), Disp: 60 tablet, Rfl: 3    Family History   Problem Relation Age of Onset    Stroke Father     Emphysema Father     Diabetes Mother     Heart Failure Mother     Osteoarthritis Mother        Social History     Socioeconomic History    Marital status:      Spouse name: Not on file    Number of children: Not on file    Years of education: Not on file    Highest education level: Not on file   Occupational History    Occupation: disability   Social Needs    Financial resource strain: Not on file    Food insecurity     Worry: Not on file     Inability: Not on file   [employer] needs     Medical: Not on file     Non-medical: Not on file   Tobacco Use    Smoking status: Never Smoker    Smokeless tobacco: Never Used   Substance and Sexual Activity    Alcohol use: No     Alcohol/week: 0.0 standard drinks    Drug use: No    Sexual activity: Not Currently   Lifestyle    Physical activity     Days per week: Not on file     Minutes per session: Not on file    Stress: Not on file   Relationships    Social connections     Talks on phone: Not on file     Gets together: Not on file     Attends Yazidi service: Not on file     Active member of club or organization: Not on file     Attends meetings of clubs or organizations: Not on file     Relationship status: Not on file    Intimate partner violence     Fear of current or ex partner: Not on file     Emotionally abused: Not on file     Physically abused: Not on file     Forced sexual activity: Not on file   Other Topics Concern    Not on file   Social History Narrative    Not on file         Review of Systems:  Review of Systems   Constitution: Negative. HENT: Positive for hearing loss. Hearing aids    Eyes: Negative. Cardiovascular: Negative. Respiratory: Negative. Endocrine: Negative. Hematologic/Lymphatic: Negative. Skin: Negative. Musculoskeletal: Positive for joint pain. Gastrointestinal: Negative. Genitourinary: Negative. Neurological: Negative. Psychiatric/Behavioral: Negative. Physical Exam:    Physical Exam  Skin:         Neurological:      Mental Status: She is alert. Psychiatric:         Mood and Affect: Mood normal.         Thought Content: Thought content normal.           Assessment:    Problem List Items Addressed This Visit     Status post total bilateral knee replacement - Primary    Primary osteoarthritis of both knees    Encounter for medication monitoring    Chronic, continuous use of opioids    Chronic bilateral low back pain without sciatica    Arthralgia of both lower legs (Chronic)              Treatment Plan:  DISCUSSION: Treatment options discussed withpatient and all questions answered to patient's satisfaction. Possible side effects, risk of tolerance and or dependence and alternative treatments discussed    Obtaining appropriate analgesic effect of treatment   No signs of potential drug abuse or diversion identified    [x] Ill effects of being on chronic pain medications such as sleep disturbances, respiratory depression, hormonal changes, withdrawal symptoms, chronic opioid dependence and tolerance as well as risk of taking opioids with Benzodiazepines and taking opioids along with alcohol,  werediscussed with patient.  I had asked the patient to minimize medication use and utilize pain medications

## 2020-07-28 LAB
6-ACETYLMORPHINE, UR: NOT DETECTED
7-AMINOCLONAZEPAM, URINE: NOT DETECTED
ALPHA-OH-ALPRAZ, URINE: NOT DETECTED
ALPRAZOLAM, URINE: NOT DETECTED
AMPHETAMINES, URINE: NOT DETECTED
BARBITURATES, URINE: NOT DETECTED
BENZOYLECGONINE, UR: NOT DETECTED
BUPRENORPHINE URINE: NOT DETECTED
CARISOPRODOL, UR: NOT DETECTED
CLONAZEPAM, URINE: NOT DETECTED
CODEINE, URINE: NOT DETECTED
CREATININE URINE: 112.6 MG/DL (ref 20–400)
DIAZEPAM, URINE: NOT DETECTED
DRUGS EXPECTED, UR: NORMAL
EER HI RES INTERP UR: NORMAL
ETHYL GLUCURONIDE UR: NOT DETECTED
FENTANYL URINE: NOT DETECTED
HYDROCODONE, URINE: PRESENT
HYDROMORPHONE, URINE: PRESENT
LORAZEPAM, URINE: NOT DETECTED
MARIJUANA METAB, UR: NOT DETECTED
MDA, UR: NOT DETECTED
MDEA, EVE, UR: NOT DETECTED
MDMA URINE: NOT DETECTED
MEPERIDINE METAB, UR: NOT DETECTED
METHADONE, URINE: NOT DETECTED
METHAMPHETAMINE, URINE: NOT DETECTED
METHYLPHENIDATE: NOT DETECTED
MIDAZOLAM, URINE: NOT DETECTED
MORPHINE URINE: NOT DETECTED
NORBUPRENORPHINE, URINE: NOT DETECTED
NORDIAZEPAM, URINE: NOT DETECTED
NORFENTANYL, URINE: NOT DETECTED
NORHYDROCODONE, URINE: NOT DETECTED
NOROXYCODONE, URINE: NOT DETECTED
NOROXYMORPHONE, URINE: NOT DETECTED
OXAZEPAM, URINE: NOT DETECTED
OXYCODONE URINE: NOT DETECTED
OXYMORPHONE, URINE: NOT DETECTED
PAIN MANAGEMENT DRUG PANEL INTERP, URINE: NORMAL
PAIN MGT DRUG PANEL, HI RES, UR: NORMAL
PCP,URINE: NOT DETECTED
PHENTERMINE, UR: NOT DETECTED
PROPOXYPHENE, URINE: NOT DETECTED
TAPENTADOL, URINE: NOT DETECTED
TAPENTADOL-O-SULFATE, URINE: NOT DETECTED
TEMAZEPAM, URINE: NOT DETECTED
TRAMADOL, URINE: NOT DETECTED
ZOLPIDEM, URINE: NOT DETECTED

## 2020-07-29 ENCOUNTER — TELEPHONE (OUTPATIENT)
Dept: PAIN MANAGEMENT | Age: 65
End: 2020-07-29

## 2020-07-29 NOTE — TELEPHONE ENCOUNTER
Patient calls [facility] and states she had surgery on Monday by Dr Alison York who gave her an Rx for Norco 5/325, to take every 4 hours x7 days. Patient wanted to know if it is ok to fill. I stated yes and to put our medications aside until she finishes the medication from Dr. Alison York. I also stated to have the pharmacy call if there are any questions. Patient verbalizes understanding.

## 2020-08-25 ASSESSMENT — ENCOUNTER SYMPTOMS
ABDOMINAL PAIN: 0
RESPIRATORY NEGATIVE: 1
GASTROINTESTINAL NEGATIVE: 1
EYE REDNESS: 0
CONSTIPATION: 0
EYES NEGATIVE: 1
WHEEZING: 0
SHORTNESS OF BREATH: 0
NAUSEA: 0
EYE PAIN: 0
ALLERGIC/IMMUNOLOGIC NEGATIVE: 1

## 2020-08-25 NOTE — PROGRESS NOTES
denies side effects from medications like nausea, vomiting, constipation or drowsiness. Patient reports current activities of daily living ar possible due to medications and would like to continue them. ACTIVITY/SOCIAL/EMOTIONAL:  Sleep Pattern: 6 hours per night. nightime awakenings  Home Exercises: daily .   Activity:not significantly changed  Emotional Issues: normal.   Currently seeing a Psychiatrist or Psychologist:  Yes     ADVERSE MEDICATION EFFECTS:   Nausea and vomiting: no   Constipation: yes-Undercontrol-: yes  Dizziness/drowsy/sleepy--no  Urinary Retention: no    ABERRANT BEHAVIORS SINCE LAST VISIT  Lost rx/pills:------------------------------------------ no  Taking  medication as prescribed: ----------- yes  Urine Drug Screen ---------------------------------  yes  Recent ER visits: -------------------------------------No  Pill count is appropriate: ---------------------------yes   Refills for prescriptions appropriate:---------- yes      Past Medical History:   Diagnosis Date    Arthritis     Bronchitis     Bunion of left foot     Diabetes mellitus ([address]     not on any meds    Eczema     arms, trunk    GERD (gastroesophageal reflux disease)     Hearing loss     left and right ears, wears hearing aids    Hyperlipidemia     Hypertension     Kidney infection     Osteoporosis     Urge incontinence     Urinary frequency     Wears glasses     for reading       Past Surgical History:   Procedure Laterality Date    APPENDECTOMY      WITH HYSTERECTOMY    ARTHROPLASTY Left 10/12/2017    METATARSAL HEAD RESECTION 3RD LEFT FOOT  & EXCISION LESION SOFT TISSUE LEFT FOOT performed by Alvino Gregory DPM at [facility] BLEPHAROPLASTY Bilateral     BREAST BIOPSY Left     benign    COLONOSCOPY      CYST REMOVAL Right     HEEL    CYST REMOVAL Right     wrist    FINE NEEDLE ASPIRATION  05/26/2020    left knee    FOOT SURGERY Left 04/12/2019    bunionectomy    HYSTERECTOMY      JOINT REPLACEMENT Bilateral     lt had revision knees    KNEE ARTHROSCOPY Right 8/12/15    Dr Benji Ruiz ARTHROSCOPY Left 02/22/2017     &RIGHT  KNEE MANIPULATION    OTHER SURGICAL HISTORY  07/10/2019    SACRAL NERVE STIMULATOR IMPLANT STAGE 1 AND 2      OR COLON CA SCRN NOT HI RSK IND N/A 7/17/2018    COLONOSCOPY performed by Jhony Hernandez MD at [place] Left 2/22/2017    KNEE ARTHROSCOPY FOR LATERAL PATELLA RELEASE, SYNOVECTOMY AND STEROID INJECTION, LEFT KNEE. MANIPULATION OF RIGHT KNEE.  performed by Cornel Carias MD at [facility] N/A 7/10/2019    SACRAL NERVE STIMULATOR IMPLANT STAGE 1 AND 2  C-ARM performed by Leila Julien MD at [facility] Left 05/02/2017    3rd toe    TONSILLECTOMY      TYMPANOPLASTY Left        Family History   Problem Relation Age of Onset    Stroke Father     Emphysema Father     Diabetes Mother     Heart Failure Mother     Osteoarthritis Mother        Social History     Socioeconomic History    Marital status:      Spouse name: None    Number of children: None    Years of education: None    Highest education level: None   Occupational History    Occupation: disability   Social Needs    Financial resource strain: None    Food insecurity     Worry: None     Inability: None    Transportation needs     Medical: None     Non-medical: None   Tobacco Use    Smoking status: Never Smoker    Smokeless tobacco: Never Used   Substance and Sexual Activity    Alcohol use: No     Alcohol/week: 0.0 standard drinks    Drug use: No    Sexual activity: Not Currently   Lifestyle    Physical activity     Days per week: None     Minutes per session: None    Stress: None   Relationships    Social connections     Talks on phone: None     Gets together: None     Attends [employer]od[employer] service: None     Active member of club or organization: None     Attends meetings of ________________________________________________________________   CONSISTENT with medications provided:   HYDROCODONE: based on hydrocodone, hydromorphone        X-Ray reports:     EXAMINATION:    THREE PHASE BONE SCAN        5/24/2018 11:15 am        TECHNIQUE:    The patient was injected intravenously with 27 mCi of 99 mTc MDP. Initial    blood flow and pool images of the knees were acquired. After 3 hours, delayed    bone images were acquired. COMPARISON:    Similar exam 02/06/2017        HISTORY:    ORDERING SYSTEM PROVIDED HISTORY: Chronic pain of both knees    TECHNOLOGIST PROVIDED HISTORY:    Ordering Physician Provided Reason for Exam: chronic pain, both knees    Mechanism of Injury: pt fell on rt knee sept 2017    Additional signs and symptoms: bilateral knee pain- lt knee all the time    Relevant Medical/Surgical History: bilateral knee pain replacements ( lt    8-9yrs, rt 2years)        FINDINGS:    There is symmetric blood flow activity. Increased blood pool activity    surrounding the left knee arthroplasty. Delayed images show photopenia corresponding with the left knee arthroplasty. There is increased uptake surrounding the femoral and tibial components of    the left knee prosthesis matching the location of blood pool activity. Activity is greatest along the lateral tibial component and medial and    lateral femoral condylar components of the prosthesis. Photopenia from right knee arthroplasty with mild surrounding uptake. Impression    1. Blood pool and delayed phase uptake surrounding the left knee    arthroplasty could be due to early loosening. 2.  No evidence of complication of the right knee arthroplasty. Narrative X-Rays taken in clinic today and preliminarily reviewed by me AP and lateral views of the left knee show evidence of revision total knee arthroplasty components in appropriate position. Longstem femoral and tibial components. treatment for people with 10 year fracture    probabilities above or below these levels. Impression    Osteopenia of the lumbar spine and within the left hip by WHO criteria. Clinical  impression:  1. Status post total bilateral knee replacement    2. Primary osteoarthritis of both knees    3. Arthralgia of both lower legs    4. Chronic pain of both knees    5. Status post total right knee replacement    6. Chronic bilateral low back pain without sciatica    7. Encounter for long-term (current) use of other medications    8. Encounter for medication monitoring    9. Pain of right lower extremity    10. Hx of total knee arthroplasty, right        Plan of care: We will continue current pain medications  Current medications are being tolerated without any Adverse side effects. Orders Placed This Encounter   Medications    HYDROcodone-acetaminophen (NORCO) 5-325 MG per tablet     Sig: Take 1 tablet by mouth every 8 hours as needed for Pain for up to 30 days. Dispense:  90 tablet     Refill:  0     Reduce doses taken as pain becomes manageable     Urine drug screens have been appropriate. No aberrant activity noted. Analgesia is achieved. Activities of daily living are possible because of medications. Safe use of medications explained to patient. PDMP Monitoring:    Last PDMP Trish Haile as Reviewed [facility]):  Review User Review Instant Review Result   Tristan Domínguez 8/27/2020  1:24 PM Reviewed PDMP [1]     Counselling/Preventive measures for pain  Control:    [x]  Spine strengthening exercises are discussed with patient in detail. [x] Ill effects of being on chronic pain medications such as sleep disturbances, hormonal changes, withdrawal symptoms,  chronic opioid dependence and tolerance were discussed with patient. I had asked the patient to minimize medication use and utilize pain medications only for uncontrolled rest pain or pain with exertional activities.  I advised patient not to self escalate pain medications without consulting with us. At each of patient's future visits we will try to taper pain medications, while adjusting the adjunct medications, and re-evaluating for Physical Therapy to improve spinal and joint strength. We will continue to have discussions to decrease pain medications as tolerated. I also discussed with the patient regarding the dangers of combining narcotic pain medication with tranquilizers, alcohol or illegal drugs or taking the medication any other than prescribed. The dangers including the respiratory depression and death. Patient was told to tell  to all  physicians regarding the medications he is getting from pain clinic. Patient is warned not to take any unprescribed medications over-the-counter medications that can depress breathing . Patient is advised to talk to the pharmacist or physicians if planning to take any over-the-counter medications before  takeing them. Patient is strongly advised to avoid tranquilizers or  Relaxants for any medications that can depress breathing or recreational drugs. Patient is also advised to tell us if there is any changes in his medications from other physicians. We discussed the same at today's visit and have not been to implement it, as the patient's pain is not under control with current medications. Decision Making Process : Patient's health history and referral records thoroughly reviewed before focused physical examination and discussion with patient. I have spent 25 mins. Over 50% of today's visit is spent on examining the patient and counseling and coordinating the care. Level of complexity of date to be reviewed is Moderate. The chart date reviewed include the following: Imaging Reports. Summary of Care. Time spent reviewing with patient the below reports:   Medication safety, Treatment options.     Level of diagnosis and management options of this case is multiple: involving the following management options: Interventions as needed, medication management as appropriate, future visits, activity modification, heat/ice as needed, Urine drug screen as required. [x]The patient's questions were answered to the best of my abilities. This note was created using voice recognition software. There may be inaccuracies of transcription  that are inadvertently overlooked prior to the signature. There is any questions about the transcription please contact me. Return in  4 weeks  with physician / CNP  for further plan of treatment. Due to the COVID-19 pandemic and the appropriate interventions by Fanta Uriostegui, our non-urgent pain management patients will not be seen in the office at this time for their protection and the protection of our staff. To offer continuity of care, their prescriptions will be escribed this month after a careful chart review and review of their OARRS report  Pursuant to the emergency declaration under the [address] and [place] [address] waiver authority and the [employer] Act, this Virtual Visit was conducted, with patient's consent, to reduce the patient's risk of exposure to COVID-19 and provide continuity of care for an established patient. Services were provided through a video synchronous discussion virtually to substitute for in-person appointment. \"  Documentation:  I communicated with the patient and/or health care decision maker about plan of care  Details of this discussion including any medical advice provided: Total Time: minutes: 21-30 minutes    I affirm this is a Patient Initiated Episode with an Established Patient who has not had a related appointment within my department in the past 7 days or scheduled within the next 24 hours.     Electronically signed by Tracy Mendez MD on 8/29/2020 at 12:42 PM

## 2020-08-27 ENCOUNTER — HOSPITAL ENCOUNTER (OUTPATIENT)
Dept: PAIN MANAGEMENT | Age: 65
Discharge: HOME | End: 2020-08-27
Payer: COMMERCIAL

## 2020-08-27 PROCEDURE — 99213 OFFICE O/P EST LOW 20 MIN: CPT

## 2020-08-27 PROCEDURE — 99443 PR PHYS/QHP TELEPHONE EVALUATION 21-30 MIN: CPT | Performed by: PAIN MEDICINE

## 2020-08-27 RX ORDER — HYDROCODONE BITARTRATE AND ACETAMINOPHEN 5; 325 MG/1; MG/1
1 TABLET ORAL EVERY 8 HOURS PRN
Qty: 90 TABLET | Refills: 0 | Status: SHIPPED | OUTPATIENT
Start: 2020-09-02 | End: 2020-09-28 | Stop reason: SDUPTHER

## 2020-08-29 ASSESSMENT — ENCOUNTER SYMPTOMS
BACK PAIN: 0
VOMITING: 0

## 2020-09-28 ENCOUNTER — HOSPITAL ENCOUNTER (OUTPATIENT)
Dept: PAIN MANAGEMENT | Age: 65
Discharge: HOME | End: 2020-09-28
Payer: COMMERCIAL

## 2020-09-28 PROCEDURE — 99213 OFFICE O/P EST LOW 20 MIN: CPT

## 2020-09-28 PROCEDURE — 99441 PR PHYS/QHP TELEPHONE EVALUATION 5-10 MIN: CPT | Performed by: NURSE PRACTITIONER

## 2020-09-28 RX ORDER — HYDROCODONE BITARTRATE AND ACETAMINOPHEN 5; 325 MG/1; MG/1
1 TABLET ORAL EVERY 8 HOURS PRN
Qty: 90 TABLET | Refills: 0 | Status: SHIPPED | OUTPATIENT
Start: 2020-10-02 | End: 2020-10-28 | Stop reason: SDUPTHER

## 2020-09-28 ASSESSMENT — ENCOUNTER SYMPTOMS
RESPIRATORY NEGATIVE: 1
GASTROINTESTINAL NEGATIVE: 1
EYES NEGATIVE: 1

## 2020-09-28 NOTE — PROGRESS NOTES
[facility] PROGRESS NOTE      Patient phone call to   review Medication Agreement    Chief Complaint: knee pain    She c/o left knee pain radiating down leg. She had left knee revision  this past July, She is still in PT. She has better ROM left knee, She ambulates with a cane and walker. She states had xrays last month  of her left knee. She has history of right knee arthroplasty. Her sleep is good. No Ed visits. Knee Pain    The incident occurred more than 1 week ago. There was no injury mechanism. The pain is present in the left leg. Quality: sharp. The pain is at a severity of 7/10. The pain is moderate. The pain has been fluctuating since onset. She reports no foreign bodies present. The symptoms are aggravated by weight bearing (weather). She has tried ice for the symptoms. The treatment provided mild relief. Patient denies any new neurological symptoms. No bowel or bladder incontinence, no weakness, and no falling. Any new diagnostic workup: [] no  [] yes [] Xray [] CT scan [] MRI [] DEXA scan     [] Other                    Treatment goals:  Functional status: feel better, be more active      Aberrancy:   Any alcoholic beverages   no    Any illegal drugs   no    Analgesia:7      Adverse  Effects :    ADL;s :in physical therapy, doing housework        Pill count: appropriate fill date 10-2-2020    Morphine equivalent dose as reported on OARRS:15  Periodic Controlled Substance Monitoring: Possible medication side effects, risk of tolerance/dependence & alternative treatments discussed., No signs of potential drug abuse or diversion identified. , Assessed functional status., Obtaining appropriate analgesic effect of treatment. Katarina Lee, APRN - CNP)  Review ofOARRS does not show any aberrant prescription behavior. Medication is helping the patient stay active. Patient denies any side effects and reports adequate analgesia. No sign of misuse/abuse.         When was thelast UDS:     7-        Was the UDS appropriate:yes      Record/Diagnostics Review:      As above, I did review the imaging    7/28/2020  1:29 PM - Kenneth, Kamari Incoming Lab Results From [facility]     Component  Value  Ref Range & Units  Status  Collected  Lab    Pain Management Drug Panel Interp, Urine  Consistent   Final  07/24/2020  2:49 PM  [facility]    (NOTE)   ________________________________________________________________   DRUGS EXPECTED:   HYDROCODONE   ________________________________________________________________   CONSISTENT with medications provided:   HYDROCODONE: based on hydrocodone, hydromorphone   ________________________________________________________________   INTERPRETIVE INFORMATION: Pain [facility] [place], [place] Spec/EMIT, Ur,                            Interp   Interpretation depends on accuracy and completeness of patient   medication information submitted by client.             Past Medical History:   Diagnosis Date    Arthritis     Bronchitis     Bunion of left foot     Diabetes mellitus ([address]     not on any meds    Eczema     arms, trunk    GERD (gastroesophageal reflux disease)     Hearing loss     left and right ears, wears hearing aids    Hyperlipidemia     Hypertension     Kidney infection     Osteoporosis     Urge incontinence     Urinary frequency     Wears glasses     for reading       Past Surgical History:   Procedure Laterality Date    APPENDECTOMY      WITH HYSTERECTOMY    ARTHROPLASTY Left 10/12/2017    METATARSAL HEAD RESECTION 3RD LEFT FOOT  & EXCISION LESION SOFT TISSUE LEFT FOOT performed [facility] Traci [facility] D[facility] BLEPHAROPLASTY Bilateral     BREAST BIOPSY Left     benign    COLONOSCOPY      CYST REMOVAL Right     HEEL    CYST REMOVAL Right     wrist    FINE NEEDLE ASPIRATION  05/26/2020    left knee    FOOT SURGERY Left 04/12/2019    bunionectomy    HYSTERECTOMY      JOINT REPLACEMENT Bilateral     lt had revision knees    KNEE ARTHROSCOPY Right 8/12/15    Dr Decker [place] ARTHROSCOPY Left 02/22/2017     &RIGHT  KNEE MANIPULATION    OTHER SURGICAL HISTORY  07/10/2019    SACRAL NERVE STIMULATOR IMPLANT STAGE 1 AND 2      [place] COLON CA SCRN NOT HI RSK IND N/A 7/17/2018    COLONOSCOPY performed by Paco Bearden MD at [place] Left 2/22/2017    KNEE ARTHROSCOPY FOR LATERAL PATELLA RELEASE, SYNOVECTOMY AND STEROID INJECTION, LEFT KNEE. MANIPULATION OF RIGHT KNEE. performed by Hardeep Franz MD at [address] N/A 7/10/2019    SACRAL NERVE STIMULATOR IMPLANT STAGE 1 AND 2  C-ARM performed by Hilary Patrick MD at [facility] Left 05/02/2017    3rd toe    TONSILLECTOMY      TYMPANOPLASTY Left        Allergies   Allergen Reactions    Aspirin Other (See Comments)     Chest pain    Celecoxib     Codeine Other (See Comments)     Chest pain    Avelox [Moxifloxacin] Rash    Lidoderm [Lidocaine] Rash     Skin reddened , itching    Sulfa Antibiotics Rash         Current Outpatient Medications:     HYDROcodone-acetaminophen (NORCO) 5-325 MG per tablet, Take 1 tablet by mouth every 8 hours as needed for Pain for up to 30 days. , Disp: 90 tablet, Rfl: 0    D3-50 1.25 MG (88358 UT) CAPS, take 1 capsule by mouth every month, Disp: , Rfl:     gabapentin (NEURONTIN) 100 MG capsule, take 1 capsule by mouth once daily at bedtime, Disp: , Rfl: 0    amLODIPine (NORVASC) 5 MG tablet, take 1 tablet by mouth once daily, takes at night, Disp: , Rfl: 0    alendronate (FOSAMAX) 35 MG tablet, Take 1 tablet by mouth every 7 days, Disp: 4 tablet, Rfl: 3    omeprazole (PRILOSEC) 20 MG capsule, take 1 capsule by mouth once daily (Patient taking differently: take 1 capsule by mouth once patient takes at supper), Disp: 90 capsule, Rfl: 0    simvastatin (ZOCOR) 20 MG tablet, take 1 tablet by mouth ONCE NIGHTLY, Disp: 30 tablet, Rfl: 3    loratadine (CLARITIN) 10 MG tablet, take 1 tablet by mouth once daily, Disp: 30 tablet, Rfl: 3    venlafaxine (EFFEXOR-XR) 37.5 MG XR capsule, take 1 capsule by mouth once daily (Patient taking differently: take 1 capsule by mouth every evening), Disp: 30 capsule, Rfl: 3    lisinopril-hydrochlorothiazide (PRINZIDE) 20-12.5 MG per tablet, Take 1 tablet by mouth daily, Disp: 30 tablet, Rfl: 3    Calcium Carbonate-Vitamin D (CALCIUM + D PO), Take by mouth, Disp: , Rfl:     triamcinolone (KENALOG) 0.1 % cream, apply 1 APPLICATION externally to affected area twice a day if needed for itching, Disp: , Rfl:     fluticasone (FLONASE) 50 MCG/ACT nasal spray, , Disp: , Rfl:     acetaminophen (TYLENOL) 500 MG tablet, Take 500 mg by mouth as needed for Pain, Disp: , Rfl:     hydrOXYzine (ATARAX) 50 MG tablet, 50 mg every 6 hours as needed , Disp: , Rfl: 0    busPIRone (BUSPAR) 10 MG tablet, Take 1 tablet by mouth 2 times daily.  (Patient taking differently: Take 10 mg by mouth 2 times daily as needed ), Disp: 60 tablet, Rfl: 3    Family History   Problem Relation Age of Onset    Stroke Father     Emphysema Father     Diabetes Mother     Heart Failure Mother     Osteoarthritis Mother        Social History     Socioeconomic History    Marital status:      Spouse name: Not on file    Number of children: Not on file    Years of education: Not on file    Highest education level: Not on file   Occupational History    Occupation: disability   Social Needs    Financial resource strain: Not on file    Food insecurity     Worry: Not on file     Inability: Not on file   [employer] needs     Medical: Not on file     Non-medical: Not on file   Tobacco Use    Smoking status: Never Smoker    Smokeless tobacco: Never Used   Substance and Sexual Activity    Alcohol use: No     Alcohol/week: 0.0 standard drinks    Drug use: No    Sexual activity: Not Currently   Lifestyle    Physical activity     Days per week: Not on file     Minutes per session: Not on file    Stress: Not on file   Relationships    Social connections     Talks on phone: Not on file     Gets together: Not on file     Attends [employer] service: Not on file     Active member of club or organization: Not on file     Attends meetings of clubs or organizations: Not on file     Relationship status: Not on file    Intimate partner violence     Fear of current or ex partner: Not on file     Emotionally abused: Not on file     Physically abused: Not on file     Forced sexual activity: Not on file   Other Topics Concern    Not on file   Social History Narrative    Not on file         Review of Systems:  Review of Systems   Constitution: Negative. HENT: Positive for hearing loss. Hearting aids   Eyes: Negative. Cardiovascular: Negative. Respiratory: Negative. Endocrine:        Diabetic   Hematologic/Lymphatic: Bruises/bleeds easily. Skin: Negative. Musculoskeletal: Positive for joint pain. Gastrointestinal: Negative. Genitourinary: Negative. Neurological: Positive for loss of balance. Uses walker   Psychiatric/Behavioral: Negative. Physical Exam:    Physical Exam  Skin:         Neurological:      Mental Status: She is alert and oriented to person, place, and time. Psychiatric:         Mood and Affect: Mood normal.         Thought Content: Thought content normal.           Assessment:    Problem List Items Addressed This Visit     Status post total right knee replacement - Primary    Status post total bilateral knee replacement    Primary osteoarthritis of both knees    Encounter for medication monitoring    Encounter for chronic pain management    Chronic, continuous use of opioids    Chronic bilateral low back pain without sciatica            Treatment Plan:  DISCUSSION: Treatment options discussed withpatient and all questions answered to patient's satisfaction.      Possible side effects, risk of tolerance and or dependence and alternative treatments discussed    Obtaining appropriate analgesic effect of treatment   No signs of potential drug abuse or diversion identified    [x] Ill effects of being on chronic pain medications such as sleep disturbances, respiratory depression, hormonal changes, withdrawal symptoms, chronic opioid dependence and tolerance as well as risk of taking opioids with Benzodiazepines and taking opioids along with alcohol,  werediscussed with patient. I had asked the patient to minimize medication use and utilize pain medications only for uncontrolled rest pain or pain with exertional activities. I advised patient not to self-escalate painmedications without consulting with us. At each of patient's future visits we will try to taper pain medications, while adjusting the adjunct medications, and re-evaluating for Physical Therapy to improve spinal andjoint strength. We will continue to have discussions to decrease pain medications as tolerated. Counseled patient on effects their pain medication and /or their medical condition mayhave on their  ability to drive or operate machinery. Instructed not to drive or operate machinery if drowsy     I also discussed with the patient regarding the dangers of combining narcotic pain medication with tranquilizers, alcohol or illegal drugs or taking the medication any way other than prescribed. The dangers were discussed  including respiratory depression and death. Patient was told to tell  all  physicians regarding the medications he is getting from pain clinic. Patient is warned not to take any unprescribed medications over-the-countermedications that can depress breathing . Patient is advised to talk to the pharmacist or physicians if planning to take any over-the-counter medications before  takeing them.  Patient is strongly advised to avoid tranquilizers or  relaxants, illegal drugs  or any medications that can depress breathing  Patient is also advised to tell us if there is any changes in their medications from other physicians.     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

## 2020-10-28 ENCOUNTER — HOSPITAL ENCOUNTER (OUTPATIENT)
Dept: PAIN MANAGEMENT | Age: 65
Discharge: HOME | End: 2020-10-28
Payer: COMMERCIAL

## 2020-10-28 PROCEDURE — 99213 OFFICE O/P EST LOW 20 MIN: CPT

## 2020-10-28 PROCEDURE — 99441 PR PHYS/QHP TELEPHONE EVALUATION 5-10 MIN: CPT | Performed by: NURSE PRACTITIONER

## 2020-10-28 RX ORDER — HYDROCODONE BITARTRATE AND ACETAMINOPHEN 5; 325 MG/1; MG/1
1 TABLET ORAL EVERY 8 HOURS PRN
Qty: 90 TABLET | Refills: 0 | Status: SHIPPED | OUTPATIENT
Start: 2020-11-02 | End: 2020-11-20 | Stop reason: SDUPTHER

## 2020-10-28 RX ORDER — DOXEPIN HYDROCHLORIDE 25 MG/1
CAPSULE ORAL
COMMUNITY
Start: 2020-10-03

## 2020-10-28 RX ORDER — NITROFURANTOIN MACROCRYSTALS 100 MG/1
CAPSULE ORAL
COMMUNITY
Start: 2020-10-27 | End: 2021-01-07

## 2020-10-28 ASSESSMENT — ENCOUNTER SYMPTOMS
EYES NEGATIVE: 1
RESPIRATORY NEGATIVE: 1
GASTROINTESTINAL NEGATIVE: 1

## 2020-10-28 NOTE — PROGRESS NOTES
[facility] PROGRESS NOTE      Patient  Phone call to  review Medication Agreement    Chief Complaint: left knee pain  She c/;o left knee pain. She had left knee revision this past July. She states the pain is improving. She walks with a cne, She is getting home PT. Her sleep is good. She has history of right knee arthroplasty. No Ed visits. Knee Pain    The incident occurred more than 1 week ago. There was no injury mechanism. The pain is present in the left knee. Quality: sharp. The pain is at a severity of 3/10. The pain is moderate. The pain has been improving since onset. Associated symptoms include a loss of motion. Foreign body present: left and right knee replaced. Exacerbated by: PT. She has tried rest and ice for the symptoms. The treatment provided mild relief. Patient denies any new neurological symptoms. No bowel or bladder incontinence, no weakness, and no falling. Any new diagnostic workup: [x] no  [] yes [] Xray [] CT scan [] MRI [] DEXA scan     [] Other                    Treatment goals:  Functional status: be more active      Aberrancy:   Any alcoholic beverages    no   Any illegal drugs   no      Analgesia:3      Adverse  Effects :none    ADL;s :in PT        Pill count: appropriate fill date 11-2-2020    Morphine equivalent dose as reported on OARRS:15     Review ofOARRS does not show any aberrant prescription behavior. Medication is helping the patient stay active. Patient denies any side effects and reports adequate analgesia. No sign of misuse/abuse.         When was thelast UDS:   7-          Was the UDS appropriate:yes    Record/Diagnostics Review:      As above, I did review the imaging    7/28/2020  1:29 PM - Kenneth, Kamari Incoming Lab Results From [facility]     Component  Value  Ref Range & Units  Status  Collected  Lab    Pain Management Drug Panel Interp, Urine  Consistent   Final  07/24/2020  2:49 PM  [facility]    (NOTE) ________________________________________________________________   DRUGS EXPECTED:   HYDROCODONE   ________________________________________________________________   CONSISTENT with medications provided:   HYDROCODONE: based on hydrocodone, hydromorphone   ________________________________________________________________   INTERPRETIVE INFORMATION: [facility], [place] Spec/EMIT, Ur,                            Interp   Interpretation depends on accuracy and completeness of patient   medication information submitted by client.             Past Medical History:   Diagnosis Date    Arthritis     Bronchitis     Bunion of left foot     Diabetes mellitus ([address]     not on any meds    Eczema     arms, trunk    GERD (gastroesophageal reflux disease)     Hearing loss     left and right ears, wears hearing aids    Hyperlipidemia     Hypertension     Kidney infection     Osteoporosis     Urge incontinence     Urinary frequency     Wears glasses     for reading       Past Surgical History:   Procedure Laterality Date    APPENDECTOMY      WITH HYSTERECTOMY    ARTHROPLASTY Left 10/12/2017    METATARSAL HEAD RESECTION 3RD LEFT FOOT  & EXCISION LESION SOFT TISSUE LEFT FOOT performed by Yue Reardon DPM at [facility] BLEPHAROPLASTY Bilateral     BREAST BIOPSY Left     benign    COLONOSCOPY      CYST REMOVAL Right     HEEL    CYST REMOVAL Right     wrist    FINE NEEDLE ASPIRATION  05/26/2020    left knee    FOOT SURGERY Left 04/12/2019    bunionectomy    HYSTERECTOMY      JOINT REPLACEMENT Bilateral     lt had revision knees    KNEE ARTHROSCOPY Right 8/12/15    Dr Wilbur Simmons ARTHROSCOPY Left 02/22/2017     &RIGHT  KNEE MANIPULATION    OTHER SURGICAL HISTORY  07/10/2019    SACRAL NERVE STIMULATOR IMPLANT STAGE 1 AND 2      ND COLON CA SCRN NOT  [address] IND N/A 7/17/2018    COLONOSCOPY performed by Yasemin Hdz MD at [facility] Left 2/22/2017 KNEE ARTHROSCOPY FOR LATERAL PATELLA RELEASE, SYNOVECTOMY AND STEROID INJECTION, LEFT KNEE. MANIPULATION OF RIGHT KNEE. performed by Wily Riggs MD at [address] N/A 7/10/2019    SACRAL NERVE STIMULATOR IMPLANT STAGE 1 AND 2  C-ARM performed by Son Borjas MD at [address] Left 05/02/2017    3rd toe    TONSILLECTOMY      TYMPANOPLASTY Left        Allergies   Allergen Reactions    Aspirin Other (See Comments)     Chest pain    Celecoxib     Codeine Other (See Comments)     Chest pain    Avelox [Moxifloxacin] Rash    Lidoderm [Lidocaine] Rash     Skin reddened , itching    Sulfa Antibiotics Rash         Current Outpatient Medications:     nitrofurantoin (MACRODANTIN) 100 MG capsule, , Disp: , Rfl:     HYDROcodone-acetaminophen (NORCO) 5-325 MG per tablet, Take 1 tablet by mouth every 8 hours as needed for Pain for up to 30 days. , Disp: 90 tablet, Rfl: 0    D3-50 1.25 MG (11834 UT) CAPS, take 1 capsule by mouth every month, Disp: , Rfl:     gabapentin (NEURONTIN) 100 MG capsule, take 1 capsule by mouth once daily at bedtime, Disp: , Rfl: 0    amLODIPine (NORVASC) 5 MG tablet, take 1 tablet by mouth once daily, takes at night, Disp: , Rfl: 0    alendronate (FOSAMAX) 35 MG tablet, Take 1 tablet by mouth every 7 days, Disp: 4 tablet, Rfl: 3    omeprazole (PRILOSEC) 20 MG capsule, take 1 capsule by mouth once daily (Patient taking differently: take 1 capsule by mouth once patient takes at supper), Disp: 90 capsule, Rfl: 0    simvastatin (ZOCOR) 20 MG tablet, take 1 tablet by mouth ONCE NIGHTLY, Disp: 30 tablet, Rfl: 3    loratadine (CLARITIN) 10 MG tablet, take 1 tablet by mouth once daily, Disp: 30 tablet, Rfl: 3    venlafaxine (EFFEXOR-XR) 37.5 MG XR capsule, take 1 capsule by mouth once daily (Patient taking differently: take 1 capsule by mouth every evening), Disp: 30 capsule, Rfl: 3    lisinopril-hydrochlorothiazide (PRINZIDE) 20-12.5 MG per tablet, Take 1 tablet by mouth daily, Disp: 30 tablet, Rfl: 3    Calcium Carbonate-Vitamin D (CALCIUM + D PO), Take by mouth, Disp: , Rfl:     doxepin (SINEQUAN) 25 MG capsule, take 1 capsule by mouth twice a day if needed itching, Disp: , Rfl:     diclofenac sodium (VOLTAREN) 1 % GEL, diclofenac 1 % topical gel  apply 2 grams topically to affected area four times a day, Disp: , Rfl:     triamcinolone (KENALOG) 0.1 % cream, apply 1 APPLICATION externally to affected area twice a day if needed for itching, Disp: , Rfl:     fluticasone (FLONASE) 50 MCG/ACT nasal spray, , Disp: , Rfl:     acetaminophen (TYLENOL) 500 MG tablet, Take 500 mg by mouth as needed for Pain, Disp: , Rfl:     hydrOXYzine (ATARAX) 50 MG tablet, 50 mg every 6 hours as needed , Disp: , Rfl: 0    busPIRone (BUSPAR) 10 MG tablet, Take 1 tablet by mouth 2 times daily.  (Patient taking differently: Take 10 mg by mouth 2 times daily as needed ), Disp: 60 tablet, Rfl: 3    Family History   Problem Relation Age of Onset    Stroke Father     Emphysema Father     Diabetes Mother     Heart Failure Mother     Osteoarthritis Mother        Social History     Socioeconomic History    Marital status:      Spouse name: Not on file    Number of children: Not on file    Years of education: Not on file    Highest education level: Not on file   Occupational History    Occupation: disability   Social Needs    Financial resource strain: Not on file    Food insecurity     Worry: Not on file     Inability: Not on file   [employer] needs     Medical: Not on file     Non-medical: Not on file   Tobacco Use    Smoking status: Never Smoker    Smokeless tobacco: Never Used   Substance and Sexual Activity    Alcohol use: No     Alcohol/week: 0.0 standard drinks    Drug use: No    Sexual activity: Not Currently   Lifestyle    Physical activity     Days per week: Not on file     Minutes per session: Not on file    Stress: Not on file Relationships    Social connections     Talks on phone: Not on file     Gets together: Not on file     Attends Yarsani service: Not on file     Active member of club or organization: Not on file     Attends meetings of clubs or organizations: Not on file     Relationship status: Not on file    Intimate partner violence     Fear of current or ex partner: Not on file     Emotionally abused: Not on file     Physically abused: Not on file     Forced sexual activity: Not on file   Other Topics Concern    Not on file   Social History Narrative    Not on file         Review of Systems:  Review of Systems   Constitution: Negative. HENT: Positive for hearing loss. Eyes: Negative. Cardiovascular: Negative. Respiratory: Negative. Endocrine: Negative. Hematologic/Lymphatic: Bruises/bleeds easily. Skin: Negative. Musculoskeletal: Positive for falls. Gastrointestinal: Negative. Genitourinary:        UTI   Neurological: Negative. Psychiatric/Behavioral: Negative. Physical Exam:    Physical Exam  Skin:         Neurological:      Mental Status: She is alert and oriented to person, place, and time. Psychiatric:         Mood and Affect: Mood normal.         Thought Content:  Thought content normal.           Assessment:    Problem List Items Addressed This Visit     Status post total right knee replacement - Primary    Relevant Medications    HYDROcodone-acetaminophen (NORCO) 5-325 MG per tablet (Start on 11/2/2020)    Status post total bilateral knee replacement    Relevant Medications    HYDROcodone-acetaminophen ([address]) 5-325 MG per tablet (Start on 11/2/2020)    Primary osteoarthritis of both knees    Relevant Medications    HYDROcodone-acetaminophen (NORCO) 5-325 MG per tablet (Start on 11/2/2020)    Encounter for medication monitoring    Encounter for chronic pain management    Chronic pain of both knees    Relevant Medications    doxepin (SINEQUAN) 25 MG capsule HYDROcodone-acetaminophen (NORCO) 5-325 MG per tablet (Start on 11/2/2020)            Treatment Plan:  DISCUSSION: Treatment options discussed withpatient and all questions answered to patient's satisfaction. Possible side effects, risk of tolerance and or dependence and alternative treatments discussed    Obtaining appropriate analgesic effect of treatment   No signs of potential drug abuse or diversion identified    [x] Ill effects of being on chronic pain medications such as sleep disturbances, respiratory depression, hormonal changes, withdrawal symptoms, chronic opioid dependence and tolerance as well as risk of taking opioids with Benzodiazepines and taking opioids along with alcohol,  werediscussed with patient. I had asked the patient to minimize medication use and utilize pain medications only for uncontrolled rest pain or pain with exertional activities. I advised patient not to self-escalate painmedications without consulting with us. At each of patient's future visits we will try to taper pain medications, while adjusting the adjunct medications, and re-evaluating for Physical Therapy to improve spinal andjoint strength. We will continue to have discussions to decrease pain medications as tolerated. Counseled patient on effects their pain medication and /or their medical condition mayhave on their  ability to drive or operate machinery. Instructed not to drive or operate machinery if drowsy     I also discussed with the patient regarding the dangers of combining narcotic pain medication with tranquilizers, alcohol or illegal drugs or taking the medication any way other than prescribed. The dangers were discussed  including respiratory depression and death. Patient was told to tell  all  physicians regarding the medications he is getting from pain clinic. Patient is warned not to take any unprescribed medications over-the-countermedications that can depress breathing .  Patient is advised to talk to the pharmacist or physicians if planning to take any over-the-counter medications before  takeing them. Patient is strongly advised to avoid tranquilizers or  relaxants, illegal drugs  or any medications that can depress breathing  Patient is also advised to tell us if there is any changes in their medications from other physicians.     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

## 2020-11-20 ENCOUNTER — HOSPITAL ENCOUNTER (OUTPATIENT)
Dept: PAIN MANAGEMENT | Age: 65
Discharge: HOME | End: 2020-11-20
Payer: COMMERCIAL

## 2020-11-20 PROCEDURE — 99441 PR PHYS/QHP TELEPHONE EVALUATION 5-10 MIN: CPT | Performed by: NURSE PRACTITIONER

## 2020-11-20 PROCEDURE — 99213 OFFICE O/P EST LOW 20 MIN: CPT

## 2020-11-20 RX ORDER — HYDROCODONE BITARTRATE AND ACETAMINOPHEN 5; 325 MG/1; MG/1
1 TABLET ORAL EVERY 8 HOURS PRN
Qty: 90 TABLET | Refills: 0 | Status: SHIPPED | OUTPATIENT
Start: 2020-12-02 | End: 2020-12-22 | Stop reason: SDUPTHER

## 2020-11-20 ASSESSMENT — ENCOUNTER SYMPTOMS
SHORTNESS OF BREATH: 0
COUGH: 0
CONSTIPATION: 0

## 2020-11-20 NOTE — PROGRESS NOTES
Patient completed a telephone visit today to review medication contract. Chief Complaint: knee pain    [facility] Patient has had knee pain for many years with no known injury. She has had three knee surgeries, including bilateral arthroplasty but pain continues. R>L.  Revision of left knee 7/2020. She is currently in PT.       Knee Pain    The incident occurred more than 1 week ago. There was no injury mechanism. The pain is present in the left knee and right knee. The quality of the pain is described as aching (sharp). The pain is at a severity of 3/10. The pain is mild. The pain has been constant since onset. The symptoms are aggravated by movement and weight bearing. She has tried non-weight bearing, rest, heat and ice for the symptoms. The treatment provided mild relief. Patient denies any new neurological symptoms. No bowel or bladder incontinence, no weakness, and no falling. Pill count: appropriate due 12/2    Morphine equivalent: 15    Periodic Controlled Substance Monitoring: Possible medication side effects, risk of tolerance/dependence & alternative treatments discussed., No signs of potential drug abuse or diversion identified., Obtaining appropriate analgesic effect of treatment.  (Francy Appiah, APRN - CNP)      Past Medical History:   Diagnosis Date    Arthritis     Bronchitis     Bunion of left foot     Diabetes mellitus ([address]     not on any meds    Eczema     arms, trunk    GERD (gastroesophageal reflux disease)     Hearing loss     left and right ears, wears hearing aids    Hyperlipidemia     Hypertension     Kidney infection     Osteoporosis     Urge incontinence     Urinary frequency     Wears glasses     for reading       Past Surgical History:   Procedure Laterality Date    APPENDECTOMY      WITH HYSTERECTOMY    ARTHROPLASTY Left 10/12/2017    METATARSAL HEAD RESECTION 3RD LEFT FOOT  & EXCISION LESION SOFT TISSUE LEFT FOOT performed by Noreen Fraire DPM at apply 1 APPLICATION externally to affected area twice a day if needed for itching, Disp: , Rfl:     D3-50 1.25 MG (53853 UT) CAPS, take 1 capsule by mouth every month, Disp: , Rfl:     gabapentin (NEURONTIN) 100 MG capsule, take 1 capsule by mouth once daily at bedtime, Disp: , Rfl: 0    fluticasone (FLONASE) 50 MCG/ACT nasal spray, , Disp: , Rfl:     amLODIPine (NORVASC) 5 MG tablet, take 1 tablet by mouth once daily, takes at night, Disp: , Rfl: 0    acetaminophen (TYLENOL) 500 MG tablet, Take 500 mg by mouth as needed for Pain, Disp: , Rfl:     hydrOXYzine (ATARAX) 50 MG tablet, 50 mg every 6 hours as needed , Disp: , Rfl: 0    alendronate (FOSAMAX) 35 MG tablet, Take 1 tablet by mouth every 7 days, Disp: 4 tablet, Rfl: 3    omeprazole (PRILOSEC) 20 MG capsule, take 1 capsule by mouth once daily (Patient taking differently: take 1 capsule by mouth once patient takes at supper), Disp: 90 capsule, Rfl: 0    simvastatin (ZOCOR) 20 MG tablet, take 1 tablet by mouth ONCE NIGHTLY, Disp: 30 tablet, Rfl: 3    loratadine (CLARITIN) 10 MG tablet, take 1 tablet by mouth once daily, Disp: 30 tablet, Rfl: 3    venlafaxine (EFFEXOR-XR) 37.5 MG XR capsule, take 1 capsule by mouth once daily (Patient taking differently: take 1 capsule by mouth every evening), Disp: 30 capsule, Rfl: 3    lisinopril-hydrochlorothiazide (PRINZIDE) 20-12.5 MG per tablet, Take 1 tablet by mouth daily, Disp: 30 tablet, Rfl: 3    Calcium Carbonate-Vitamin D (CALCIUM + D PO), Take by mouth, Disp: , Rfl:     busPIRone (BUSPAR) 10 MG tablet, Take 1 tablet by mouth 2 times daily.  (Patient taking differently: Take 10 mg by mouth 2 times daily as needed ), Disp: 60 tablet, Rfl: 3    Family History   Problem Relation Age of Onset    Stroke Father     Emphysema Father     Diabetes Mother     Heart Failure Mother     Osteoarthritis Mother        Social History     Socioeconomic History    Marital status:      Spouse name: Not on file  Number of children: Not on file    Years of education: Not on file    Highest education level: Not on file   Occupational History    Occupation: disability   Social Needs    Financial resource strain: Not on file    Food insecurity     Worry: Not on file     Inability: Not on file   [employer] needs     Medical: Not on file     Non-medical: Not on file   Tobacco Use    Smoking status: Never Smoker    Smokeless tobacco: Never Used   Substance and Sexual Activity    Alcohol use: No     Alcohol/week: 0.0 standard drinks    Drug use: No    Sexual activity: Not Currently   Lifestyle    Physical activity     Days per week: Not on file     Minutes per session: Not on file    Stress: Not on file   Relationships    Social connections     Talks on phone: Not on file     Gets together: Not on file     Attends Alevism service: Not on file     Active member of club or organization: Not on file     Attends meetings of clubs or organizations: Not on file     Relationship status: Not on file    Intimate partner violence     Fear of current or ex partner: Not on file     Emotionally abused: Not on file     Physically abused: Not on file     Forced sexual activity: Not on file   Other Topics Concern    Not on file   Social History Narrative    Not on file       Review of Systems:  Review of Systems   Constitution: Negative for chills and fever. Cardiovascular: Negative for chest pain and palpitations. Respiratory: Negative for cough and shortness of breath. Musculoskeletal: Positive for joint pain. Gastrointestinal: Negative for constipation. Neurological: Negative for disturbances in coordination and loss of balance. Physical Exam:  There were no vitals taken for this visit. Physical Exam  Neurological:      Mental Status: She is alert.    Psychiatric:         Mood and Affect: Mood normal.         Record/Diagnostics Review:    Last james 7/2020 and was appropriate     Assessment:  Problem List Items Addressed This Visit     Chronic pain of both knees    Relevant Medications    HYDROcodone-acetaminophen (NORCO) 5-325 MG per tablet (Start on 12/2/2020)    Encounter for chronic pain management - Primary    Status post total bilateral knee replacement    Relevant Medications    HYDROcodone-acetaminophen ([address]) 5-325 MG per tablet (Start on 12/2/2020)    Primary osteoarthritis of both knees    Relevant Medications    HYDROcodone-acetaminophen (NORCO) 5-325 MG per tablet (Start on 12/2/2020)    Encounter for medication monitoring             Treatment Plan:  Patient relates current medications are helping the pain. Patient reports taking pain medications as prescribed, denies obtaining medications from different sources and denies use of illegal drugs. Patient denies side effects from medications like nausea, vomiting, constipation or drowsiness. Patient reports current activities of daily living are possible due to medications and would like to continue them. As always, we encourage daily stretching and strengthening exercises, and recommend minimizing use of pain medications unless patient cannot get through daily activities due to pain. Contract requirements met. Continue opioid therapy. Script written for norco  Follow up appointment made for 4 weeks    Jessika Salazar is a 77 y.o. female being evaluated by a Virtual Visit (telephone visit) encounter to address concerns as mentioned above. A caregiver was present when appropriate. Due to this being a TeleHealth encounter (During YXIMH-20 public health emergency), evaluation of the following organ systems was limited: Vitals/Constitutional/EENT/Resp/CV/GI//MS/Neuro/Skin/Heme-Lymph-Imm.   Pursuant to the emergency declaration under the [address] waiver authority and the [employer] Act, this Virtual Visit was conducted with patient's (and/or legal guardian's) consent, to reduce the patient's risk of exposure to COVID-19 and provide necessary medical care. The patient (and/or legal guardian) has also been advised to contact this office for worsening conditions or problems, and seek emergency medical treatment and/or call 911 if deemed necessary. Patient identification was verified at the start of the visit: Yes    Total time spent for this encounter: 10 minutes    Services were provided through a telephone discussion virtually to substitute for in-person clinic visit. Patient and provider were located at their individual homes. --Winnie Spurling, APRN - CNP on 11/20/2020 at 3:37 PM    An electronic signature was used to authenticate this note.

## 2020-12-22 RX ORDER — HYDROCODONE BITARTRATE AND ACETAMINOPHEN 5; 325 MG/1; MG/1
1 TABLET ORAL EVERY 8 HOURS PRN
Qty: 18 TABLET | Refills: 0 | Status: SHIPPED | OUTPATIENT
Start: 2020-12-31 | End: 2021-01-06

## 2021-01-07 ENCOUNTER — HOSPITAL ENCOUNTER (OUTPATIENT)
Dept: PAIN MANAGEMENT | Age: 66
Discharge: HOME | End: 2021-01-07
Payer: COMMERCIAL

## 2021-01-07 DIAGNOSIS — F11.90 CHRONIC, CONTINUOUS USE OF OPIOIDS: ICD-10-CM

## 2021-01-07 DIAGNOSIS — G89.29 CHRONIC BILATERAL LOW BACK PAIN WITHOUT SCIATICA: ICD-10-CM

## 2021-01-07 DIAGNOSIS — Z96.653 STATUS POST TOTAL BILATERAL KNEE REPLACEMENT: ICD-10-CM

## 2021-01-07 DIAGNOSIS — M25.561 CHRONIC PAIN OF BOTH KNEES: ICD-10-CM

## 2021-01-07 DIAGNOSIS — G89.29 CHRONIC PAIN OF BOTH KNEES: ICD-10-CM

## 2021-01-07 DIAGNOSIS — G89.29 ENCOUNTER FOR CHRONIC PAIN MANAGEMENT: ICD-10-CM

## 2021-01-07 DIAGNOSIS — Z51.81 ENCOUNTER FOR MEDICATION MONITORING: ICD-10-CM

## 2021-01-07 DIAGNOSIS — M25.562 CHRONIC PAIN OF BOTH KNEES: ICD-10-CM

## 2021-01-07 DIAGNOSIS — Z96.651 STATUS POST TOTAL RIGHT KNEE REPLACEMENT: Primary | ICD-10-CM

## 2021-01-07 DIAGNOSIS — M17.0 PRIMARY OSTEOARTHRITIS OF BOTH KNEES: ICD-10-CM

## 2021-01-07 DIAGNOSIS — M54.50 CHRONIC BILATERAL LOW BACK PAIN WITHOUT SCIATICA: ICD-10-CM

## 2021-01-07 PROBLEM — E66.01 CLASS 2 SEVERE OBESITY DUE TO EXCESS CALORIES WITH SERIOUS COMORBIDITY IN ADULT (HCC): Status: ACTIVE | Noted: 2020-12-06

## 2021-01-07 PROBLEM — E66.812 CLASS 2 SEVERE OBESITY DUE TO EXCESS CALORIES WITH SERIOUS COMORBIDITY IN ADULT: Status: ACTIVE | Noted: 2020-12-06

## 2021-01-07 PROBLEM — J96.01 ACUTE RESPIRATORY FAILURE WITH HYPOXIA (HCC): Status: ACTIVE | Noted: 2020-12-06

## 2021-01-07 PROBLEM — U07.1 PNEUMONIA DUE TO COVID-19 VIRUS: Status: ACTIVE | Noted: 2020-12-03

## 2021-01-07 PROBLEM — J12.82 PNEUMONIA DUE TO COVID-19 VIRUS: Status: ACTIVE | Noted: 2020-12-03

## 2021-01-07 PROCEDURE — 99213 OFFICE O/P EST LOW 20 MIN: CPT

## 2021-01-07 PROCEDURE — 99442 PR PHYS/QHP TELEPHONE EVALUATION 11-20 MIN: CPT | Performed by: NURSE PRACTITIONER

## 2021-01-07 RX ORDER — HYDROCODONE BITARTRATE AND ACETAMINOPHEN 5; 325 MG/1; MG/1
TABLET ORAL
COMMUNITY
Start: 2021-01-06

## 2021-01-07 RX ORDER — APIXABAN 2.5 MG/1
TABLET, FILM COATED ORAL
COMMUNITY
Start: 2020-12-17 | End: 2021-05-12

## 2021-01-07 RX ORDER — HYDROCODONE BITARTRATE AND ACETAMINOPHEN 5; 325 MG/1; MG/1
1 TABLET ORAL EVERY 8 HOURS PRN
Qty: 90 TABLET | Refills: 0 | Status: SHIPPED | OUTPATIENT
Start: 2021-01-12 | End: 2021-02-03 | Stop reason: SDUPTHER

## 2021-01-07 RX ORDER — BENZONATATE 100 MG/1
CAPSULE ORAL
COMMUNITY
Start: 2020-12-08

## 2021-01-07 ASSESSMENT — ENCOUNTER SYMPTOMS
RESPIRATORY NEGATIVE: 1
EYES NEGATIVE: 1
GASTROINTESTINAL NEGATIVE: 1

## 2021-01-07 NOTE — PROGRESS NOTES
[address] [facility] PROGRESS NOTE      Patient  completed []  video visit   [x]   phone call:      Minutes :11   to  review Medication Agreement    Location:  Provider:  working from    [x]    home    []   [address] , patient athome   Chief Complaint: left knee pain    She c/o left knee pain which is improving. She had left knee revision this past July, She currently is in PT which she states is helping and improving her ROM. Her sleep is good, Activity has slowed down, She states was hospitalized last month for 5 days with Covid,  Knee Pain   The incident occurred more than 1 week ago. There was no injury mechanism. The pain is present in the left knee and right leg. The pain is at a severity of 5/10. The pain is moderate. The pain has been improving since onset. Foreign body present: knee replacement. Exacerbated by: walking. She has tried rest for the symptoms. The treatment provided mild relief.      Treatment goals:  Functional status: get better      Aberrancy:   Any alcoholic beverages  no           Any illegal drugs   no      Analgesia:       5              Adverse  Effects :none      ADL;s :activity decreased    Data:    When was thelast UDS:   7-          Was the UDS appropriate:yes      Record/Diagnostics Review:      As above, I did review the imaging       7/28/2020  1:29 PM - Kenneth, M[facility] Incoming Lab Results From [facility]    Component Value Ref Range & Units Status Collected Lab   Pain Management Drug Panel Interp, Urine Consistent   Final 07/24/2020  2:49 PM [facility]   (NOTE)   ________________________________________________________________   DRUGS EXPECTED:   HYDROCODONE   ________________________________________________________________   CONSISTENT with medications provided:   HYDROCODONE: based on hydrocodone, hydromorphone   ________________________________________________________________   INTERPRETIVE INFORMATION: Pain [facility] [place], [place] Spec/EMIT, Ur,                            Interp   Interpretation depends on accuracy and completeness of patient   medication information submitted by client. 6-Acetylmorphine, Ur Not Detected                        Pill count: appropriate  fill date :filled script for 12- as she missed last appt , filled on 1-6-2021 for 6 days worth of hydrocodone    Morphine equivalent dose as reported on [place]ARRS:15  Periodic Controlled Substance Monitoring: Possible medication side effects, risk of tolerance/dependence & alternative treatments discussed., No signs of potential drug abuse or diversion identified. , Assessed functional status., Obtaining appropriate analgesic effect of treatment. Tahmina Mcgarry, APRN - CNP)  Review ofOARRS does not show any aberrant prescription behavior. Medication is helping the patient stay active. Patient denies any side effects and reports adequate analgesia. No sign of misuse/abuse.             Past Medical History:   Diagnosis Date    Arthritis     Bronchitis     Bunion of left foot     Diabetes mellitus ([address]     not on any meds    Eczema     arms, trunk    GERD (gastroesophageal reflux disease)     Hearing loss     left and right ears, wears hearing aids    Hyperlipidemia     Hypertension     Kidney infection     Osteoporosis     Urge incontinence     Urinary frequency     Wears glasses     for reading       Past Surgical History:   Procedure Laterality Date    APPENDECTOMY      WITH HYSTERECTOMY    ARTHROPLASTY Left 10/12/2017    METATARSAL HEAD RESECTION 3RD LEFT FOOT  & EXCISION LESION SOFT TISSUE LEFT FOOT performed by Major Elkins DPM at [facility] BLEPHAROPLASTY Bilateral     BREAST BIOPSY Left     benign    COLONOSCOPY      CYST REMOVAL Right     HEEL    CYST REMOVAL Right     wrist    FINE NEEDLE ASPIRATION  05/26/2020    left knee    FOOT SURGERY Left 04/12/2019    bunionectomy    HYSTERECTOMY      JOINT REPLACEMENT Bilateral     lt had revision knees    KNEE ARTHROSCOPY Right 8/12/15    Dr Genna Tatum ARTHROSCOPY Left 02/22/2017     &RIGHT  KNEE MANIPULATION    OTHER SURGICAL HISTORY  07/10/2019    SACRAL NERVE STIMULATOR IMPLANT STAGE 1 AND 2      DC COLON CA SCRN NOT HI [facility]SK IND N/A 7/17/2018    COLONOSCOPY performed by Wilfrid English MD at [place] Left 2/22/2017    KNEE ARTHROSCOPY FOR LATERAL PATELLA RELEASE, SYNOVECTOMY AND STEROID INJECTION, LEFT KNEE. MANIPULATION OF RIGHT KNEE. performed by Robin Sawyer MD at [address] N/A 7/10/2019    SACRAL NERVE STIMULATOR IMPLANT STAGE 1 AND 2  C-ARM performed by Mariya Liriano MD at [facility] Left 05/02/2017    3rd toe    TONSILLECTOMY      TYMPANOPLASTY Left        Allergies   Allergen Reactions    Aspirin Other (See Comments)     Chest pain    Celecoxib     Codeine Other (See Comments)     Chest pain    Avelox [Moxifloxacin] Rash    Lidoderm [Lidocaine] Rash     Skin reddened , itching    Sulfa Antibiotics Rash         Current Outpatient Medications:     HYDROcodone-acetaminophen (NORCO) 5-325 MG per tablet, , Disp: , Rfl:     ELIQUIS 2.5 MG TABS tablet, take 1 tablet by mouth twice a day, Disp: , Rfl:     [START ON 1/12/2021] HYDROcodone-acetaminophen (NORCO) 5-325 MG per tablet, Take 1 tablet by mouth every 8 hours as needed for Pain for up to 30 days. , Disp: 90 tablet, Rfl: 0    D3-50 1.25 MG (16803 UT) CAPS, take 1 capsule by mouth every month, Disp: , Rfl:     gabapentin (NEURONTIN) 100 MG capsule, take 1 capsule by mouth once daily at bedtime, Disp: , Rfl: 0    amLODIPine (NORVASC) 5 MG tablet, take 1 tablet by mouth once daily, takes at night, Disp: , Rfl: 0    alendronate (FOSAMAX) 35 MG tablet, Take 1 tablet by mouth every 7 days, Disp: 4 tablet, Rfl: 3    omeprazole (PRILOSEC) 20 MG capsule, take 1 capsule by mouth once daily (Patient taking differently: take 1 capsule by mouth once patient takes at supper), Disp: 90 capsule, Rfl: 0    simvastatin (ZOCOR) 20 MG tablet, take 1 tablet by mouth ONCE NIGHTLY, Disp: 30 tablet, Rfl: 3    loratadine (CLARITIN) 10 MG tablet, take 1 tablet by mouth once daily, Disp: 30 tablet, Rfl: 3    venlafaxine (EFFEXOR-XR) 37.5 MG XR capsule, take 1 capsule by mouth once daily (Patient taking differently: take 1 capsule by mouth every evening), Disp: 30 capsule, Rfl: 3    lisinopril-hydrochlorothiazide (PRINZIDE) 20-12.5 MG per tablet, Take 1 tablet by mouth daily, Disp: 30 tablet, Rfl: 3    benzonatate (TESSALON) 100 MG capsule, take 1 capsule by mouth three times a day if needed for cough, Disp: , Rfl:     doxepin (SINEQUAN) 25 MG capsule, take 1 capsule by mouth twice a day if needed itching, Disp: , Rfl:     diclofenac sodium (VOLTAREN) 1 % GEL, diclofenac 1 % topical gel  apply 2 grams topically to affected area four times a day, Disp: , Rfl:     triamcinolone (KENALOG) 0.1 % cream, apply 1 APPLICATION externally to affected area twice a day if needed for itching, Disp: , Rfl:     fluticasone (FLONASE) 50 MCG/ACT nasal spray, , Disp: , Rfl:     acetaminophen (TYLENOL) 500 MG tablet, Take 500 mg by mouth as needed for Pain, Disp: , Rfl:     hydrOXYzine (ATARAX) 50 MG tablet, 50 mg every 6 hours as needed , Disp: , Rfl: 0    Calcium Carbonate-Vitamin D (CALCIUM + D PO), Take by mouth, Disp: , Rfl:     busPIRone (BUSPAR) 10 MG tablet, Take 1 tablet by mouth 2 times daily.  (Patient taking differently: Take 10 mg by mouth 2 times daily as needed ), Disp: 60 tablet, Rfl: 3    Family History   Problem Relation Age of Onset    Stroke Father     Emphysema Father     Diabetes Mother     Heart Failure Mother     Osteoarthritis Mother        Social History     Socioeconomic History    Marital status:      Spouse name: Not on file    Number of children: Not on file    Years of education: Not on file    Highest education level: Not on file total right knee replacement - Primary    Relevant Medications    HYDROcodone-acetaminophen (NORCO) 5-325 MG per tablet (Start on 1/12/2021)    Status post total bilateral knee replacement    Relevant Medications    HYDROcodone-acetaminophen (NORCO) 5-325 MG per tablet (Start on 1/12/2021)    Primary osteoarthritis of both knees    Relevant Medications    HYDROcodone-acetaminophen (NORCO) 5-325 MG per tablet    HYDROcodone-acetaminophen ([address]) 5-325 MG per tablet (Start on 1/12/2021)    Encounter for medication monitoring    Encounter for chronic pain management    Chronic, continuous use of opioids    Chronic pain of both knees    Relevant Medications    HYDROcodone-acetaminophen (NORCO) 5-325 MG per tablet    HYDROcodone-acetaminophen (NORCO) 5-325 MG per tablet (Start on 1/12/2021)    Chronic bilateral low back pain without sciatica    Relevant Medications    HYDROcodone-acetaminophen (NORCO) 5-325 MG per tablet    HYDROcodone-acetaminophen (NORCO) 5-325 MG per tablet (Start on 1/12/2021)              Treatment Plan:  DISCUSSION: Treatment options discussed withpatient and all questions answered to patient's satisfaction. Possible side effects, risk of tolerance and or dependence and alternative treatments discussed    Obtaining appropriate analgesic effect of treatment   No signs of potential drug abuse or diversion identified    [x] Ill effects of being on chronic pain medications such as sleep disturbances, respiratory depression, hormonal changes, withdrawal symptoms, chronic opioid dependence and tolerance as well as risk of taking opioids with Benzodiazepines and taking opioids along with alcohol,  werediscussed with patient. I had asked the patient to minimize medication use and utilize pain medications only for uncontrolled rest pain or pain with exertional activities. I advised patient not to self-escalate painmedications without consulting with us.   At each of patient's future visits we will try to taper pain medications, while adjusting the adjunct medications, and re-evaluating for Physical Therapy to improve spinal andjoint strength. We will continue to have discussions to decrease pain medications as tolerated. Counseled patient on effects their pain medication and /or their medical condition mayhave on their  ability to drive or operate machinery. Instructed not to drive or operate machinery if drowsy     I also discussed with the patient regarding the dangers of combining narcotic pain medication with tranquilizers, alcohol or illegal drugs or taking the medication any way other than prescribed. The dangers were discussed  including respiratory depression and death. Patient was told to tell  all  physicians regarding the medications he is getting from pain clinic. Patient is warned not to take any unprescribed medications over-the-countermedications that can depress breathing . Patient is advised to talk to the pharmacist or physicians if planning to take any over-the-counter medications before  takeing them. Patient is strongly advised to avoid tranquilizers or  relaxants, illegal drugs  or any medications that can depress breathing  Patient is also advised to tell us if there is any changes in their medications from other physicians.       1. She did not fill script for hydrocodone 6 days worth to start 12- until 1-6-2021 will e-prescribe script to start on 1-      TREATMENT OPTIONS:       Medication Agreement Requirements Met  Continue Opioid therapy  Script written for hydrocodone  Follow up appointment made

## 2021-02-03 ENCOUNTER — HOSPITAL ENCOUNTER (OUTPATIENT)
Dept: PAIN MANAGEMENT | Age: 66
Discharge: HOME | End: 2021-02-03
Payer: COMMERCIAL

## 2021-02-03 DIAGNOSIS — Z96.651 STATUS POST TOTAL RIGHT KNEE REPLACEMENT: Primary | ICD-10-CM

## 2021-02-03 DIAGNOSIS — G89.29 CHRONIC PAIN OF BOTH KNEES: ICD-10-CM

## 2021-02-03 DIAGNOSIS — Z51.81 ENCOUNTER FOR MEDICATION MONITORING: ICD-10-CM

## 2021-02-03 DIAGNOSIS — G89.29 ENCOUNTER FOR CHRONIC PAIN MANAGEMENT: ICD-10-CM

## 2021-02-03 DIAGNOSIS — M17.0 PRIMARY OSTEOARTHRITIS OF BOTH KNEES: ICD-10-CM

## 2021-02-03 DIAGNOSIS — Z96.653 STATUS POST TOTAL BILATERAL KNEE REPLACEMENT: ICD-10-CM

## 2021-02-03 DIAGNOSIS — M25.562 CHRONIC PAIN OF BOTH KNEES: ICD-10-CM

## 2021-02-03 DIAGNOSIS — M25.561 CHRONIC PAIN OF BOTH KNEES: ICD-10-CM

## 2021-02-03 DIAGNOSIS — F11.90 CHRONIC, CONTINUOUS USE OF OPIOIDS: ICD-10-CM

## 2021-02-03 PROCEDURE — 99213 OFFICE O/P EST LOW 20 MIN: CPT

## 2021-02-03 PROCEDURE — 99443 PR PHYS/QHP TELEPHONE EVALUATION 21-30 MIN: CPT | Performed by: PAIN MEDICINE

## 2021-02-03 RX ORDER — HYDROCODONE BITARTRATE AND ACETAMINOPHEN 5; 325 MG/1; MG/1
1 TABLET ORAL EVERY 8 HOURS PRN
Qty: 90 TABLET | Refills: 0 | Status: SHIPPED | OUTPATIENT
Start: 2021-02-11 | End: 2021-03-11 | Stop reason: SDUPTHER

## 2021-02-03 NOTE — PROGRESS NOTES
America Huber is a 77 y.o. female evaluated on 2/3/2021. Modality of virtual service provided -via  telephone   Consent:  Patient and/or health care decision maker is aware that that patient may receive a bill for this telephone service, depending on one's insurance coverage, and has provided verbal consent to proceed: Yes    Patient identification was verified at the start of the visit: Yes    Chief complaint: America Huber is 77 y.o.,  female, with chief complaint of pain involving the knees bilaterally worse on the left side. Patient is complaining of pain involving the low back area and both knees-patient apparently had undergone bilateral total knee replacement and she reports she had a revision on the left knee on July 27, 2020 and she did physical therapy which she did not help the pain. Patient reports that she was shoveling the snow and her pain has gotten worse. Patient reports the medications are helping her pain. It is not clear to me whether she had infection and needed revision on the left side. She reports she has a spacer in her knee. Apparently patient's  is sick and she has to do all the work at home which is making her pain worse. Knee Pain   The incident occurred more than 1 week ago. There was no injury mechanism. The pain is present in the left knee. Quality: sharp. The pain is at a severity of 5/10 (4-9). The pain is moderate. The pain has been constant since onset. Pertinent negatives include no numbness or tingling. Foreign body present: mechanical knee. The symptoms are aggravated by weight bearing. Treatments tried: percocet. The treatment provided mild relief. Alleviating factors:ice and rest   Lifestyle changes experienced with pain: Prevents or limits ADLs, Increases w/activity. , Increases w/prolonged sitting/standing/walking  Mood changes,no  Patient currently unemployed. Physical therapy did not help the pain.   Are you under psychological counseling at present: Yes  Goals for treatment include:  Decrease in pain  Enjoy daily and recreational activities, return to previous status. Patient relates current medications are helping the pain. Patient reports taking pain medications as prescribed, denies obtaining medications from different sources and denies use of illegal drugs. Patient denies side effects from medications like nausea, vomiting, constipation or drowsiness. Patient reports current activities of daily living ar possible due to medications and would like to continue them. ACTIVITY/SOCIAL/EMOTIONAL:  Sleep Pattern: 6 hours per night.  generally restful sleep  Home Exercises: daily Knee exercises  Activity:unchanged  Emotional Issues: normal.   Currently seeing a Psychiatrist or Psychologist:  No     ADVERSE MEDICATION EFFECTS:   Nausea and vomiting: no   Constipation: no-Undercontrol-: yes  Dizziness/drowsy/sleepy--no  Urinary Retention: no    ABERRANT BEHAVIORS SINCE LAST VISIT  Lost rx/pills:------------------------------------------ no  Taking  medication as prescribed: ----------- yes  Urine Drug Screen ---------------------------------  yes             Date------------------------------------------------- 7/28/2020              Results as expected ---------------------yes    Recent ER visits: -------------------------------------No  Pill count is appropriate: ---------------------------yes   Refills for prescriptions appropriate:---------- yes      Past Medical History:   Diagnosis Date    Arthritis     Bronchitis     Bunion of left foot     Diabetes mellitus ([address]     not on any meds    Eczema     arms, trunk    GERD (gastroesophageal reflux disease)     Hearing loss     left and right ears, wears hearing aids    Hyperlipidemia     Hypertension     Kidney infection     Osteoporosis     Urge incontinence     Urinary frequency     Wears glasses     for reading       Past Surgical History:   Procedure Laterality Date  APPENDECTOMY      WITH HYSTERECTOMY    ARTHROPLASTY Left 10/12/2017    METATARSAL HEAD RESECTION 3RD LEFT FOOT  & EXCISION LESION SOFT TISSUE LEFT FOOT performed by Jonelle Rodriguez DPM at [facility] BLEPHAROPLASTY Bilateral     BREAST BIOPSY Left     benign    COLONOSCOPY      CYST REMOVAL Right     HEEL    CYST REMOVAL Right     wrist    FINE NEEDLE ASPIRATION  05/26/2020    left knee    FOOT SURGERY Left 04/12/2019    bunionectomy    HYSTERECTOMY      JOINT REPLACEMENT Bilateral     lt had revision knees    KNEE ARTHROSCOPY Right 8/12/15    Dr Helton Doom ARTHROSCOPY Left 02/22/2017     &RIGHT  KNEE MANIPULATION    OTHER SURGICAL HISTORY  07/10/2019    SACRAL NERVE STIMULATOR IMPLANT STAGE 1 AND 2      WI COLON CA SCRN NOT HI RSK IND N/A 7/17/2018    COLONOSCOPY performed by William Myers MD at [place] Left 2/22/2017    KNEE ARTHROSCOPY FOR LATERAL PATELLA RELEASE, SYNOVECTOMY AND STEROID INJECTION, LEFT KNEE. MANIPULATION OF RIGHT KNEE.  performed by Miriam Hou MD at [address] N/A 7/10/2019    SACRAL NERVE STIMULATOR IMPLANT STAGE 1 AND 2  C-ARM performed by Grant Pérez MD at [address] [place] [address] Left 05/02/2017    3rd toe    TONSILLECTOMY      TYMPANOPLASTY Left        Family History   Problem Relation Age of Onset    Stroke Father     Emphysema Father     Diabetes Mother     Heart Failure Mother     Osteoarthritis Mother        Social History     Socioeconomic History    Marital status:      Spouse name: None    Number of children: None    Years of education: None    Highest education level: None   Occupational History    Occupation: disability   Social Needs    Financial resource strain: None    Food insecurity     Worry: None     Inability: None    Transportation needs     Medical: None     Non-medical: None   Tobacco Use    Smoking status: Never Smoker  Smokeless tobacco: Never Used   Substance and Sexual Activity    Alcohol use: No     Alcohol/week: 0.0 standard drinks    Drug use: No    Sexual activity: Not Currently   Lifestyle    Physical activity     Days per week: None     Minutes per session: None    Stress: None   Relationships    Social connections     Talks on phone: None     Gets together: None     Attends Scientologist service: None     Active member of club or organization: None     Attends meetings of clubs or organizations: None     Relationship status: None    Intimate partner violence     Fear of current or ex partner: None     Emotionally abused: None     Physically abused: None     Forced sexual activity: None   Other Topics Concern    None   Social History Narrative    None       Allergies   Allergen Reactions    Aspirin Other (See Comments)     Chest pain    Celecoxib     Codeine Other (See Comments)     Chest pain    Avelox [Moxifloxacin] Rash    Lidoderm [Lidocaine] Rash     Skin reddened , itching    Sulfa Antibiotics Rash       Current Outpatient Medications on File Prior to Encounter   Medication Sig Dispense Refill    HYDROcodone-acetaminophen (NORCO) 5-325 MG per tablet       ELIQUIS 2.5 MG TABS tablet take 1 tablet by mouth twice a day      benzonatate (TESSALON) 100 MG capsule take 1 capsule by mouth three times a day if needed for cough      doxepin (SINEQUAN) 25 MG capsule take 1 capsule by mouth twice a day if needed itching      diclofenac sodium (VOLTAREN) 1 % GEL diclofenac 1 % topical gel   apply 2 grams topically to affected area four times a day      triamcinolone (KENALOG) 0.1 % cream apply 1 APPLICATION externally to affected area twice a day if needed for itching      D3-50 1.25 MG (43608 UT) CAPS take 1 capsule by mouth every month      gabapentin (NEURONTIN) 100 MG capsule take 1 capsule by mouth once daily at bedtime  0    fluticasone (FLONASE) 50 MCG/ACT nasal spray       amLODIPine (NORVASC) 5 MG tablet take 1 tablet by mouth once daily, takes at night  0    acetaminophen (TYLENOL) 500 MG tablet Take 500 mg by mouth as needed for Pain      hydrOXYzine (ATARAX) 50 MG tablet 50 mg every 6 hours as needed   0    alendronate (FOSAMAX) 35 MG tablet Take 1 tablet by mouth every 7 days 4 tablet 3    omeprazole (PRILOSEC) 20 MG capsule take 1 capsule by mouth once daily (Patient taking differently: take 1 capsule by mouth once patient takes at supper) 90 capsule 0    simvastatin (ZOCOR) 20 MG tablet take 1 tablet by mouth ONCE NIGHTLY 30 tablet 3    loratadine (CLARITIN) 10 MG tablet take 1 tablet by mouth once daily 30 tablet 3    venlafaxine (EFFEXOR-XR) 37.5 MG XR capsule take 1 capsule by mouth once daily (Patient taking differently: take 1 capsule by mouth every evening) 30 capsule 3    lisinopril-hydrochlorothiazide (PRINZIDE) 20-12.5 MG per tablet Take 1 tablet by mouth daily 30 tablet 3    Calcium Carbonate-Vitamin D (CALCIUM + D PO) Take by mouth      busPIRone (BUSPAR) 10 MG tablet Take 1 tablet by mouth 2 times daily. (Patient taking differently: Take 10 mg by mouth 2 times daily as needed ) 60 tablet 3     No current facility-administered medications on file prior to encounter. Review of Systems   Constitutional: Negative for activity change, appetite change, diaphoresis and unexpected weight change. HENT: Negative. Negative for congestion, ear pain, sinus pain and sore throat. Eyes: Negative. Negative for pain, redness and visual disturbance. Respiratory: Negative. Negative for apnea, shortness of breath and wheezing. Cardiovascular: Negative. Negative for chest pain and palpitations. Gastrointestinal: Negative. Negative for abdominal pain, constipation, nausea and vomiting. Endocrine: Negative. Negative for cold intolerance and polyuria. Genitourinary: Negative. Negative for dysuria and hematuria. Musculoskeletal: Positive for arthralgias.  Negative for back pain.        Left knee pain   Skin: Negative. Negative for pallor and wound. Allergic/Immunologic: Negative. Negative for immunocompromised state. Neurological: Negative. Negative for tingling, syncope, numbness and headaches. Hematological: Negative. Does not bruise/bleed easily. Psychiatric/Behavioral: Negative. Negative for self-injury, sleep disturbance and suicidal ideas. The patient is not nervous/anxious. Physical Exam   Physical Exam  Skin:         Neurological:      Mental Status: She is alert and oriented to person, place, and time. Psychiatric:         Mood and Affect: Mood normal.         DATA:  LAB.:  7/28/2020  1:29 PM - Kenneth, [facility] Incoming Lab Results From [facility]    Component Value Ref Range & Units Status Collected Lab   Pain Management Drug Panel Interp, Urine Consistent   Final 07/24/2020  2:49 PM [facility]   (NOTE)   ________________________________________________________________   DRUGS EXPECTED:   HYDROCODONE   ________________________________________________________________   UCWSCWYTEL with medications provided:   HYDROCODONE: based on hydrocodone, hydromorphone   ________________________________________________________________       X-Ray reports:     EXAMINATION:    THREE PHASE BONE SCAN        5/24/2018 11:15 am        TECHNIQUE:    The patient was injected intravenously with 27 mCi of 99 mTc MDP. Initial    blood flow and pool images of the knees were acquired. After 3 hours, delayed    bone images were acquired.         COMPARISON:    Similar exam 02/06/2017        HISTORY:    ORDERING SYSTEM PROVIDED HISTORY: Chronic pain of both knees    TECHNOLOGIST PROVIDED HISTORY:    Ordering Physician Provided Reason for Exam: chronic pain, both knees    Mechanism of Injury: pt fell on rt knee sept 2017    Additional signs and symptoms: bilateral knee pain- lt knee all the time    Relevant Medical/Surgical History: bilateral knee pain replacements ( lt    8-9yrs, rt 2years) FINDINGS:    There is symmetric blood flow activity. Increased blood pool activity    surrounding the left knee arthroplasty. Delayed images show photopenia corresponding with the left knee arthroplasty. There is increased uptake surrounding the femoral and tibial components of    the left knee prosthesis matching the location of blood pool activity. Activity is greatest along the lateral tibial component and medial and    lateral femoral condylar components of the prosthesis. Photopenia from right knee arthroplasty with mild surrounding uptake. Impression    1. Blood pool and delayed phase uptake surrounding the left knee    arthroplasty could be due to early loosening. 2.  No evidence of complication of the right knee arthroplasty. Narrative X-Rays taken in clinic today and preliminarily reviewed by me AP and lateral views of the left knee show evidence of revision total knee arthroplasty components in appropriate position. Longstem femoral and tibial components. Patient does have evidence of osteopenia in the tibial component. 3/4/2020 9:22 am        TECHNIQUE:    A bone density DEXA scan was performed of the lumbar spine and left hip on a    [employer] system. COMPARISON:    10/16/2015        HISTORY:    ORDERING SYSTEM PROVIDED HISTORY: Encounter for imaging to assess osteoporosis        FINDINGS:    LUMBAR SPINE:        The bone mineral density in the lumbar spine is measured at 0.995 g/cm2,    which corresponds to a T-score of -1.5 and a Z-score of -0.6. This is within    the osteopenic range by WHO criteria. Since prior exam, -9.6 % change in the lumbar spine, which is significant. LEFT HIP:        The bone mineral density in the total hip is measured at 0.955 g/cm2    corresponding to a T-score of -0.4 and a Z-score of 0.3. This is within the    normal range by WHO criteria.         The bone mineral density of the femoral neck is measured at 0.860 g/cm2    corresponding to a T-score of -1.3 and a Z-score of -0.2. This is within the    osteopenic range by WHO criteria. Since prior exam, -4.3 % change in the total hip, which is significant. WHO fracture risk assessment tool (FRAX) projects a 10-year fracture risk of    a major osteoporotic fracture at 11.7 % and hip fracture at 2 %. The patient's 10-year fracture risk is increased if untreated. Fracture    probability may be lower if the patient has received treatment. FRAX may    underestimate fracture probability in patients who have impaired functional    status from rheumatoid arthritis, history of frequent falls, history of    multiple fractures, severe vertebral fractures, parental history of non-hip    fragility fractures, glucocorticoid doses in excess of 7.5 mg/day or frequent    use, high dose inhaled glucocorticoids, and if the T-score of the lumbar    spine is > 1 SD lower than the femoral neck. Clinical judgment and/or    patient preferences may indicate treatment for people with 10 year fracture    probabilities above or below these levels. Impression    Osteopenia of the lumbar spine and within the left hip by WHO criteria. Clinical  impression:  1. Status post total right knee replacement    2. Status post total bilateral knee replacement    3. Primary osteoarthritis of both knees    4. Encounter for chronic pain management    5. Encounter for medication monitoring    6. Chronic, continuous use of opioids    7. Chronic pain of both knees        Plan of care: We will continue current pain medications  Current medications are being tolerated without any Adverse side effects. Orders Placed This Encounter   Medications    HYDROcodone-acetaminophen (NORCO) 5-325 MG per tablet     Sig: Take 1 tablet by mouth every 8 hours as needed for Pain for up to 30 days.      Dispense:  90 tablet     Refill:  0     Reduce doses taken as pain becomes manageable     Urine drug screens have been appropriate. No aberrant activity noted. Analgesia is achieved. Activities of daily living are possible because of medications. Safe use of medications explained to patient. PDMP Monitoring:    Last PDMP Kulwant Jarrett as Reviewed [facility]):  Review User Review Instant Review Result   Liz Saba 1/31/2021  6:28 AM Reviewed PDMP [1]     Counselling/Preventive measures for pain  Control:    [x]  Spine strengthening exercises are discussed with patient in detail. [x] Ill effects of being on chronic pain medications such as sleep disturbances, hormonal changes, withdrawal symptoms,  chronic opioid dependence and tolerance were discussed with patient. I had asked the patient to minimize medication use and utilize pain medications only for uncontrolled rest pain or pain with exertional activities. I advised patient not to self escalate pain medications without consulting with us. At each of patient's future visits we will try to taper pain medications, while adjusting the adjunct medications, and re-evaluating for Physical Therapy to improve spinal and joint strength. We will continue to have discussions to decrease pain medications as tolerated. I also discussed with the patient regarding the dangers of combining narcotic pain medication with tranquilizers, alcohol or illegal drugs or taking the medication any other than prescribed. The dangers including the respiratory depression and death. Patient was told to tell  to all  physicians regarding the medications he is getting from pain clinic. Patient is warned not to take any unprescribed medications over-the-counter medications that can depress breathing . Patient is advised to talk to the pharmacist or physicians if planning to take any over-the-counter medications before  takeing them.  Patient is strongly advised to avoid tranquilizers or  Relaxants for any medications that can depress breathing or recreational drugs. Patient is also advised to tell us if there is any changes in his medications from other physicians. We discussed the same at today's visit and have not been to implement it, as the patient's pain is not under control with current medications. Decision Making Process : Patient's health history and referral records thoroughly reviewed before focused physical examination and discussion with patient. I have spent 25 mins. Over 50% of today's visit is spent on examining the patient and counseling anddinating the care. Level of complexity of date to be reviewed is Moderate. The chart date reviewed include the following: Imaging Reports. Summary of Care. Time spent reviewing with patient the below reports:   Medication safety, Treatment options. Level of diagnosis and management options of this case is multiple: involving the following management options: Interventions as needed, medication management as appropriate, future visits, activity modification, heat/ice as needed, Urine drug screen as required. [x]The patient's questions were answered to the best of my abilities. This note was created using voice recognition software. There may be inaccuracies of transcription  that are inadvertently overlooked prior to the signature. There is any questions about the transcription please contact me. Return in  4 weeks  with physician / CNP  for further plan of treatment. Due to the COVID-19 pandemic and the appropriate interventions by Fanta Uriostegui, our non-urgent pain management patients will not be seen in the office at this time for their protection and the protection of our staff.  To offer continuity of care, their prescriptions will be escribed this month after a careful chart review and review of their OARRS report  Pursuant to the emergency declaration under the [address] and [facility], [facility] waiver authority and the Coronavirus Preparedness and Response Supplemental Appropriations Act, this Virtual Visit was conducted, with patient's consent, to reduce the patient's risk of exposure to COVID-19 and provide continuity of care for an established patient. Services were provided through a video synchronous discussion virtually to substitute for in-person appointment. \"  Documentation:  I communicated with the patient and/or health care decision maker about plan of care  Details of this discussion including any medical advice provided: Total Time: minutes: 21-30 minutes    I affirm this is a Patient Initiated Episode with an Established Patient who has not had a related appointment within my department in the past 7 days or scheduled within the next 24 hours.     Electronically signed by Denice Quinn MD on 2/3/2021 at 4:21 PM

## 2021-03-11 ENCOUNTER — HOSPITAL ENCOUNTER (OUTPATIENT)
Dept: PAIN MANAGEMENT | Age: 66
Discharge: HOME | End: 2021-03-11
Payer: COMMERCIAL

## 2021-03-11 DIAGNOSIS — M25.562 CHRONIC PAIN OF BOTH KNEES: ICD-10-CM

## 2021-03-11 DIAGNOSIS — M17.0 PRIMARY OSTEOARTHRITIS OF BOTH KNEES: ICD-10-CM

## 2021-03-11 DIAGNOSIS — Z51.81 ENCOUNTER FOR MEDICATION MONITORING: Primary | ICD-10-CM

## 2021-03-11 DIAGNOSIS — M25.561 CHRONIC PAIN OF BOTH KNEES: ICD-10-CM

## 2021-03-11 DIAGNOSIS — G89.29 CHRONIC PAIN OF BOTH KNEES: ICD-10-CM

## 2021-03-11 DIAGNOSIS — Z96.653 STATUS POST TOTAL BILATERAL KNEE REPLACEMENT: ICD-10-CM

## 2021-03-11 PROCEDURE — 99213 OFFICE O/P EST LOW 20 MIN: CPT

## 2021-03-11 PROCEDURE — 99441 PR PHYS/QHP TELEPHONE EVALUATION 5-10 MIN: CPT | Performed by: NURSE PRACTITIONER

## 2021-03-11 RX ORDER — HYDROCODONE BITARTRATE AND ACETAMINOPHEN 5; 325 MG/1; MG/1
1 TABLET ORAL EVERY 8 HOURS PRN
Qty: 90 TABLET | Refills: 0 | Status: SHIPPED | OUTPATIENT
Start: 2021-03-13 | End: 2021-04-12

## 2021-03-11 ASSESSMENT — ENCOUNTER SYMPTOMS
CONSTIPATION: 0
SHORTNESS OF BREATH: 0
COUGH: 0

## 2021-03-11 NOTE — PROGRESS NOTES
Patient completed a telephone visit today to review medication contract. Chief Complaint: knee pain    PMH  Patient has had knee pain for many years with no known injury. She has had three knee surgeries, including bilateral arthroplasty but pain continues. R>L.  Revision of left knee 7/2020. She completed  PT and continues exercises at home. She will see the orthopedic surgeon later this month for follow up. Knee Pain   The incident occurred more than 1 week ago. There was no injury mechanism. The pain is present in the left knee. The quality of the pain is described as stabbing. The pain is at a severity of 5/10. The pain is moderate. The pain has been constant since onset. The symptoms are aggravated by movement and weight bearing. She has tried non-weight bearing and rest for the symptoms. The treatment provided mild relief. Patient denies any new neurological symptoms. No bowel or bladder incontinence, no weakness, and no falling. Pill count: appropriate due 3/13    Morphine equivalent: 15    Periodic Controlled Substance Monitoring: Possible medication side effects, risk of tolerance/dependence & alternative treatments discussed., No signs of potential drug abuse or diversion identified., Obtaining appropriate analgesic effect of treatment.  (Jaqueline Burks, APRN - CNP)      Past Medical History:   Diagnosis Date    Arthritis     Bronchitis     Bunion of left foot     Diabetes mellitus ([address]     not on any meds    Eczema     arms, trunk    GERD (gastroesophageal reflux disease)     Hearing loss     left and right ears, wears hearing aids    Hyperlipidemia     Hypertension     Kidney infection     Osteoporosis     Urge incontinence     Urinary frequency     Wears glasses     for reading       Past Surgical History:   Procedure Laterality Date    APPENDECTOMY      WITH HYSTERECTOMY    ARTHROPLASTY Left 10/12/2017    METATARSAL HEAD RESECTION 3RD LEFT FOOT  & EXCISION LESION SOFT TISSUE LEFT FOOT performed by Sujey Fernández DPM at [address]      BLEPHAROPLASTY Bilateral     BREAST BIOPSY Left     benign    COLONOSCOPY      CYST REMOVAL Right     HEEL    CYST REMOVAL Right     wrist    FINE NEEDLE ASPIRATION  05/26/2020    left knee    FOOT SURGERY Left 04/12/2019    bunionectomy    HYSTERECTOMY      JOINT REPLACEMENT Bilateral     lt had revision knees    KNEE ARTHROSCOPY Right 8/12/15    Dr Yanick Baird ARTHROSCOPY Left 02/22/2017     &RIGHT  KNEE MANIPULATION    OTHER SURGICAL HISTORY  07/10/2019    SACRAL NERVE STIMULATOR IMPLANT STAGE 1 AND 2      [place] COLON CA SCRN NOT HI RSK IND N/A 7/17/2018    COLONOSCOPY performed by Demian Contreras MD at [place] Left 2/22/2017    KNEE ARTHROSCOPY FOR LATERAL PATELLA RELEASE, SYNOVECTOMY AND STEROID INJECTION, LEFT KNEE. MANIPULATION OF RIGHT KNEE. performed by Catherine Norman MD at [address] N/A 7/10/2019    SACRAL NERVE STIMULATOR IMPLANT STAGE 1 AND 2  C-ARM performed by Christofer Luo MD at [address] [facility] Left 05/02/2017    3rd toe    TONSILLECTOMY      TYMPANOPLASTY Left        Allergies   Allergen Reactions    Aspirin Other (See Comments)     Chest pain    Celecoxib     Codeine Other (See Comments)     Chest pain    Avelox [Moxifloxacin] Rash    Lidoderm [Lidocaine] Rash     Skin reddened , itching    Sulfa Antibiotics Rash         Current Outpatient Medications:     HYDROcodone-acetaminophen (NORCO) 5-325 MG per tablet, Take 1 tablet by mouth every 8 hours as needed for Pain for up to 30 days. , Disp: 90 tablet, Rfl: 0    HYDROcodone-acetaminophen (NORCO) 5-325 MG per tablet, , Disp: , Rfl:     ELIQUIS 2.5 MG TABS tablet, take 1 tablet by mouth twice a day, Disp: , Rfl:     benzonatate (TESSALON) 100 MG capsule, take 1 capsule by mouth three times a day if needed for cough, Disp: , Rfl:     doxepin (SINEQUAN) 25 MG capsule, take 1 capsule by mouth twice a day if needed itching, Disp: , Rfl:     diclofenac sodium (VOLTAREN) 1 % GEL, diclofenac 1 % topical gel  apply 2 grams topically to affected area four times a day, Disp: , Rfl:     triamcinolone (KENALOG) 0.1 % cream, apply 1 APPLICATION externally to affected area twice a day if needed for itching, Disp: , Rfl:     D3-50 1.25 MG (47262 UT) CAPS, take 1 capsule by mouth every month, Disp: , Rfl:     gabapentin (NEURONTIN) 100 MG capsule, take 1 capsule by mouth once daily at bedtime, Disp: , Rfl: 0    fluticasone (FLONASE) 50 MCG/ACT nasal spray, , Disp: , Rfl:     amLODIPine (NORVASC) 5 MG tablet, take 1 tablet by mouth once daily, takes at night, Disp: , Rfl: 0    acetaminophen (TYLENOL) 500 MG tablet, Take 500 mg by mouth as needed for Pain, Disp: , Rfl:     hydrOXYzine (ATARAX) 50 MG tablet, 50 mg every 6 hours as needed , Disp: , Rfl: 0    alendronate (FOSAMAX) 35 MG tablet, Take 1 tablet by mouth every 7 days, Disp: 4 tablet, Rfl: 3    omeprazole (PRILOSEC) 20 MG capsule, take 1 capsule by mouth once daily (Patient taking differently: take 1 capsule by mouth once patient takes at supper), Disp: 90 capsule, Rfl: 0    simvastatin (ZOCOR) 20 MG tablet, take 1 tablet by mouth ONCE NIGHTLY, Disp: 30 tablet, Rfl: 3    loratadine (CLARITIN) 10 MG tablet, take 1 tablet by mouth once daily, Disp: 30 tablet, Rfl: 3    venlafaxine (EFFEXOR-XR) 37.5 MG XR capsule, take 1 capsule by mouth once daily (Patient taking differently: take 1 capsule by mouth every evening), Disp: 30 capsule, Rfl: 3    lisinopril-hydrochlorothiazide (PRINZIDE) 20-12.5 MG per tablet, Take 1 tablet by mouth daily, Disp: 30 tablet, Rfl: 3    Calcium Carbonate-Vitamin D (CALCIUM + D PO), Take by mouth, Disp: , Rfl:     busPIRone (BUSPAR) 10 MG tablet, Take 1 tablet by mouth 2 times daily.  (Patient taking differently: Take 10 mg by mouth 2 times daily as needed ), Disp: 60 tablet, Rfl: 3    Family History   Problem Relation Age of Onset    Stroke Father     Emphysema Father     Diabetes Mother     Heart Failure Mother     Osteoarthritis Mother        Social History     Socioeconomic History    Marital status:      Spouse name: Not on file    Number of children: Not on file    Years of education: Not on file    Highest education level: Not on file   Occupational History    Occupation: disability   Social Needs    Financial resource strain: Not on file    Food insecurity     Worry: Not on file     Inability: Not on file   [employer] needs     Medical: Not on file     Non-medical: Not on file   Tobacco Use    Smoking status: Never Smoker    Smokeless tobacco: Never Used   Substance and Sexual Activity    Alcohol use: No     Alcohol/week: 0.0 standard drinks    Drug use: No    Sexual activity: Not Currently   Lifestyle    Physical activity     Days per week: Not on file     Minutes per session: Not on file    Stress: Not on file   Relationships    Social connections     Talks on phone: Not on file     Gets together: Not on file     Attends Congregation service: Not on file     Active member of club or organization: Not on file     Attends meetings of clubs or organizations: Not on file     Relationship status: Not on file    Intimate partner violence     Fear of current or ex partner: Not on file     Emotionally abused: Not on file     Physically abused: Not on file     Forced sexual activity: Not on file   Other Topics Concern    Not on file   Social History Narrative    Not on file       Review of Systems:  Review of Systems   Constitution: Negative for chills and fever. Cardiovascular: Negative for chest pain and palpitations. Respiratory: Negative for cough and shortness of breath. Musculoskeletal: Positive for joint pain. Gastrointestinal: Negative for constipation.    Neurological: Negative for disturbances in coordination and loss of balance. Physical Exam:  There were no vitals taken for this visit. Physical Exam  Neurological:      Mental Status: She is alert. Psychiatric:         Mood and Affect: Mood normal.         Record/Diagnostics Review:    Last james 7/2020 and was appropriate     Assessment:  Problem List Items Addressed This Visit     Chronic pain of both knees    Relevant Medications    HYDROcodone-acetaminophen (Linnell Canter) 5-325 MG per tablet (Start on 3/13/2021)    Status post total bilateral knee replacement    Relevant Medications    HYDROcodone-acetaminophen (Linnell Canter) 5-325 MG per tablet (Start on 3/13/2021)    Primary osteoarthritis of both knees    Relevant Medications    HYDROcodone-acetaminophen (NORCO) 5-325 MG per tablet (Start on 3/13/2021)    Encounter for medication monitoring - Primary             Treatment Plan:  Patient relates current medications are helping the pain. Patient reports taking pain medications as prescribed, denies obtaining medications from different sources and denies use of illegal drugs. Patient denies side effects from medications like nausea, vomiting, constipation or drowsiness. Patient reports current activities of daily living are possible due to medications and would like to continue them. As always, we encourage daily stretching and strengthening exercises, and recommend minimizing use of pain medications unless patient cannot get through daily activities due to pain. Contract requirements met. Continue opioid therapy. Script written for norco  Follow up appointment made for 4 weeks    Karen Dubose, was evaluated through a synchronous (real-time) audio-video encounter. The patient (or guardian if applicable) is aware that this is a billable service. Verbal consent to proceed has been obtained within the past 12 months.  The visit was conducted pursuant to the emergency declaration under the [facility], [facility]5 waiver authority and the Coronavirus Preparedness and Response Supplemental Appropriations Act. Patient identification was verified, and a caregiver was present when appropriate. The patient was located in a state where the provider was credentialed to provide care. Total time spent for this encounter: 10 minutes    --DARREL Funez CNP on 3/11/2021 at 2:06 PM    An electronic signature was used to authenticate this note.

## 2021-04-13 ENCOUNTER — HOSPITAL ENCOUNTER (OUTPATIENT)
Dept: PAIN MANAGEMENT | Age: 66
Discharge: HOME | End: 2021-04-13
Payer: COMMERCIAL

## 2021-04-13 DIAGNOSIS — Z96.651 STATUS POST TOTAL RIGHT KNEE REPLACEMENT: Primary | ICD-10-CM

## 2021-04-13 DIAGNOSIS — Z96.653 STATUS POST TOTAL BILATERAL KNEE REPLACEMENT: ICD-10-CM

## 2021-04-13 DIAGNOSIS — G89.29 CHRONIC PAIN OF BOTH KNEES: ICD-10-CM

## 2021-04-13 DIAGNOSIS — F11.90 CHRONIC, CONTINUOUS USE OF OPIOIDS: ICD-10-CM

## 2021-04-13 DIAGNOSIS — M17.0 PRIMARY OSTEOARTHRITIS OF BOTH KNEES: ICD-10-CM

## 2021-04-13 DIAGNOSIS — Z51.81 ENCOUNTER FOR MEDICATION MONITORING: ICD-10-CM

## 2021-04-13 DIAGNOSIS — G89.29 ENCOUNTER FOR CHRONIC PAIN MANAGEMENT: ICD-10-CM

## 2021-04-13 DIAGNOSIS — M54.50 CHRONIC BILATERAL LOW BACK PAIN WITHOUT SCIATICA: ICD-10-CM

## 2021-04-13 DIAGNOSIS — M25.561 CHRONIC PAIN OF BOTH KNEES: ICD-10-CM

## 2021-04-13 DIAGNOSIS — G89.29 CHRONIC BILATERAL LOW BACK PAIN WITHOUT SCIATICA: ICD-10-CM

## 2021-04-13 DIAGNOSIS — M25.562 CHRONIC PAIN OF BOTH KNEES: ICD-10-CM

## 2021-04-13 PROCEDURE — 99441 PR PHYS/QHP TELEPHONE EVALUATION 5-10 MIN: CPT | Performed by: NURSE PRACTITIONER

## 2021-04-13 PROCEDURE — 99213 OFFICE O/P EST LOW 20 MIN: CPT

## 2021-04-13 RX ORDER — HYDROCODONE BITARTRATE AND ACETAMINOPHEN 5; 325 MG/1; MG/1
1 TABLET ORAL EVERY 8 HOURS PRN
Qty: 90 TABLET | Refills: 0 | Status: SHIPPED | OUTPATIENT
Start: 2021-04-13 | End: 2021-05-12 | Stop reason: SDUPTHER

## 2021-04-13 ASSESSMENT — ENCOUNTER SYMPTOMS
GASTROINTESTINAL NEGATIVE: 1
EYES NEGATIVE: 1
RESPIRATORY NEGATIVE: 1

## 2021-04-13 NOTE — PROGRESS NOTES
[facility] PROGRESS NOTE      Patient  completed []  video visit   [x]   phone call:   9      Minutes :       []    to  review Medication Agreement    [x]  Follow up after procedure   []  Discuss treatment options      Location:  Provider:  working from    [x]    home    []   [facility] ,   patient at home       Chief Complaint: left knee pain    She c/o left knee pain. She had revision left knee pain 7/2020. She states had done PT after surgery. She states fell 4 weeks ago. She saw her Orthopaedic surgeon  and he wants her to go through PT again. She states does not have transportation. Her sleep is okay. She had 1 Ed visit  for right wrist pain. She had x-ray done. States was told no fracture. Knee Pain   There was no injury mechanism. The pain is present in the left knee. Quality: sharp. The pain is at a severity of 6/10. The pain is moderate. The pain has been worsening since onset. Associated symptoms include muscle weakness. Foreign body present: knee replacement. Exacerbated by: getting up. She has tried ice and elevation for the symptoms.              Treatment goals:  Functional status: get rid of pain      Aberrancy:   Any alcoholic beverages     no       Any illegal drugs   no      Analgesia:    6                 Adverse  Effects :no      ADL;s :able to do household tasks      Data:    When was thelast UDS:  7-          Was the UDS appropriate:  [x] yes []   no      Record/Diagnostics Review:      As above, I did review the imaging       7/28/2020  1:29 PM - Kenneth, [facility] Incoming Lab Results From [facility]    Component Value Ref Range & Units Status Collected Lab   Pain Management Drug Panel Interp, Urine Consistent   Final 07/24/2020  2:49 PM [facility]   (NOTE)   ________________________________________________________________   DRUGS EXPECTED:   HYDROCODONE   ________________________________________________________________   CONSISTENT with medications provided: HYDROCODONE: based on hydrocodone, hydromorphone   ________________________________________________________________   INTERPRETIVE INFORMATION: [facility] [place], [place] Spec/EMIT, Ur,                            Interp   Interpretation depends on accuracy and completeness of patient   medication information submitted by client. Pill count: appropriate    fill date :4-    Morphine equivalent dose as reported on OARRS:15  Periodic Controlled Substance Monitoring: Possible medication side effects, risk of tolerance/dependence & alternative treatments discussed., No signs of potential drug abuse or diversion identified. , Assessed functional status., Obtaining appropriate analgesic effect of treatment. Jose Morris, APRN - CNP)  Review ofOARRS does not show any aberrant prescription behavior. Medication is helping the patient stay active. Patient denies any side effects and reports adequate analgesia. No sign of misuse/abuse.             Past Medical History:   Diagnosis Date    Arthritis     Bronchitis     Bunion of left foot     Diabetes mellitus ([address]     not on any meds    Eczema     arms, trunk    GERD (gastroesophageal reflux disease)     Hearing loss     left and right ears, wears hearing aids    Hyperlipidemia     Hypertension     Kidney infection     Osteoporosis     Urge incontinence     Urinary frequency     Wears glasses     for reading       Past Surgical History:   Procedure Laterality Date    APPENDECTOMY      WITH HYSTERECTOMY    ARTHROPLASTY Left 10/12/2017    METATARSAL HEAD RESECTION 3RD LEFT FOOT  & EXCISION LESION SOFT TISSUE LEFT FOOT performed by Coral Stanley D[facility] at [facility] BLEPHAROPLASTY Bilateral     BREAST BIOPSY Left     benign    COLONOSCOPY      CYST REMOVAL Right     HEEL    CYST REMOVAL Right     wrist    FINE NEEDLE ASPIRATION  05/26/2020    left knee    FOOT SURGERY Left 04/12/2019    bunionectomy    HYSTERECTOMY  JOINT REPLACEMENT Bilateral     lt had revision knees    KNEE ARTHROSCOPY Right 8/12/15    Dr Corrinne Dada ARTHROSCOPY Left 02/22/2017     &RIGHT  KNEE MANIPULATION    OTHER SURGICAL HISTORY  07/10/2019    SACRAL NERVE STIMULATOR IMPLANT STAGE 1 AND 2      [place] COLON CA SCRN NOT HI RSK IND N/A 7/17/2018    COLONOSCOPY performed by Leonila Leach MD at [place] Left 2/22/2017    KNEE ARTHROSCOPY FOR LATERAL PATELLA RELEASE, SYNOVECTOMY AND STEROID INJECTION, LEFT KNEE. MANIPULATION OF RIGHT KNEE.  performed by Sharyle Batten, MD at [address] [facility] N/A 7/10/2019    SACRAL NERVE STIMULATOR IMPLANT STAGE 1 AND 2  C-ARM performed by Tatiana Medina MD at [address] [facility] Left 05/02/2017    3rd toe    TONSILLECTOMY      TYMPANOPLASTY Left        Allergies   Allergen Reactions    Aspirin Other (See Comments)     Chest pain    Celecoxib     Codeine Other (See Comments)     Chest pain    Avelox [Moxifloxacin] Rash    Lidoderm [Lidocaine] Rash     Skin reddened , itching    Sulfa Antibiotics Rash         Current Outpatient Medications:     HYDROcodone-acetaminophen (NORCO) 5-325 MG per tablet, , Disp: , Rfl:     ELIQUIS 2.5 MG TABS tablet, take 1 tablet by mouth twice a day, Disp: , Rfl:     diclofenac sodium (VOLTAREN) 1 % GEL, diclofenac 1 % topical gel  apply 2 grams topically to affected area four times a day, Disp: , Rfl:     gabapentin (NEURONTIN) 100 MG capsule, take 1 capsule by mouth once daily at bedtime, Disp: , Rfl: 0    amLODIPine (NORVASC) 5 MG tablet, take 1 tablet by mouth once daily, takes at night, Disp: , Rfl: 0    omeprazole (PRILOSEC) 20 MG capsule, take 1 capsule by mouth once daily (Patient taking differently: take 1 capsule by mouth once patient takes at supper), Disp: 90 capsule, Rfl: 0    simvastatin (ZOCOR) 20 MG tablet, take 1 tablet by mouth ONCE NIGHTLY, Disp: 30 tablet, Rfl: 3    loratadine (CLARITIN) 10 MG tablet, take 1 tablet by mouth once daily, Disp: 30 tablet, Rfl: 3    venlafaxine (EFFEXOR-XR) 37.5 MG XR capsule, take 1 capsule by mouth once daily (Patient taking differently: take 1 capsule by mouth every evening), Disp: 30 capsule, Rfl: 3    lisinopril-hydrochlorothiazide (PRINZIDE) 20-12.5 MG per tablet, Take 1 tablet by mouth daily, Disp: 30 tablet, Rfl: 3    busPIRone (BUSPAR) 10 MG tablet, Take 1 tablet by mouth 2 times daily.  (Patient taking differently: Take 10 mg by mouth 2 times daily as needed ), Disp: 60 tablet, Rfl: 3    benzonatate (TESSALON) 100 MG capsule, take 1 capsule by mouth three times a day if needed for cough, Disp: , Rfl:     doxepin (SINEQUAN) 25 MG capsule, take 1 capsule by mouth twice a day if needed itching, Disp: , Rfl:     triamcinolone (KENALOG) 0.1 % cream, apply 1 APPLICATION externally to affected area twice a day if needed for itching, Disp: , Rfl:     fluticasone (FLONASE) 50 MCG/ACT nasal spray, , Disp: , Rfl:     acetaminophen (TYLENOL) 500 MG tablet, Take 500 mg by mouth as needed for Pain, Disp: , Rfl:     hydrOXYzine (ATARAX) 50 MG tablet, 50 mg every 6 hours as needed , Disp: , Rfl: 0    Calcium Carbonate-Vitamin D (CALCIUM + D PO), Take by mouth, Disp: , Rfl:     Family History   Problem Relation Age of Onset    Stroke Father     Emphysema Father     Diabetes Mother     Heart Failure Mother     Osteoarthritis Mother        Social History     Socioeconomic History    Marital status:      Spouse name: Not on file    Number of children: Not on file    Years of education: Not on file    Highest education level: Not on file   Occupational History    Occupation: disability   Social Needs    Financial resource strain: Not on file    Food insecurity     Worry: Not on file     Inability: Not on file    Transportation needs     Medical: Not on file     Non-medical: Not on file   Tobacco Use    Smoking status: Never Smoker    osteoarthritis of both knees    Relevant Medications    acetaminophen (TYLENOL) 500 MG tablet    HYDROcodone-acetaminophen (NORCO) 5-325 MG per tablet    HYDROcodone-acetaminophen (NORCO) 5-325 MG per tablet    Encounter for medication monitoring    Encounter for chronic pain management    Chronic, continuous use of opioids    Chronic pain of both knees    Relevant Medications    venlafaxine (EFFEXOR-XR) 37.5 MG XR capsule    acetaminophen (TYLENOL) 500 MG tablet    gabapentin (NEURONTIN) 100 MG capsule    doxepin (SINEQUAN) 25 MG capsule    HYDROcodone-acetaminophen (NORCO) 5-325 MG per tablet    HYDROcodone-acetaminophen (NORCO) 5-325 MG per tablet    Chronic bilateral low back pain without sciatica    Relevant Medications    acetaminophen (TYLENOL) 500 MG tablet    HYDROcodone-acetaminophen (NORCO) 5-325 MG per tablet    HYDROcodone-acetaminophen (NORCO) 5-325 MG per tablet          Treatment Plan:  DISCUSSION: Treatment options discussed withpatient and all questions answered to patient's satisfaction. Possible side effects, risk of tolerance and or dependence and alternative treatments discussed    Obtaining appropriate analgesic effect of treatment   No signs of potential drug abuse or diversion identified    [x] Ill effects of being on chronic pain medications such as sleep disturbances, respiratory depression, hormonal changes, withdrawal symptoms, chronic opioid dependence and tolerance as well as risk of taking opioids with Benzodiazepines and taking opioids along with alcohol,  werediscussed with patient. I had asked the patient to minimize medication use and utilize pain medications only for uncontrolled rest pain or pain with exertional activities. I advised patient not to self-escalate painmedications without consulting with us.   At each of patient's future visits we will try to taper pain medications, while adjusting the adjunct medications, and re-evaluating for Physical Therapy to improve spinal andjoint strength. We will continue to have discussions to decrease pain medications as tolerated. Counseled patient on effects their pain medication and /or their medical condition mayhave on their  ability to drive or operate machinery. Instructed not to drive or operate machinery if drowsy     I also discussed with the patient regarding the dangers of combining narcotic pain medication with tranquilizers, alcohol or illegal drugs or taking the medication any way other than prescribed. The dangers were discussed  including respiratory depression and death. Patient was told to tell  all  physicians regarding the medications he is getting from pain clinic. Patient is warned not to take any unprescribed medications over-the-countermedications that can depress breathing . Patient is advised to talk to the pharmacist or physicians if planning to take any over-the-counter medications before  takeing them. Patient is strongly advised to avoid tranquilizers or  relaxants, illegal drugs  or any medications that can depress breathing  Patient is also advised to tell us if there is any changes in their medications from other physicians.             TREATMENT OPTIONS:       Medication Agreement Requirements Met  Continue Opioid therapy  Script written for  hydrocodone  Follow up appointment made

## 2021-04-28 ENCOUNTER — HOSPITAL ENCOUNTER (OUTPATIENT)
Dept: PHYSICAL THERAPY | Age: 66
Setting detail: THERAPIES SERIES
Discharge: HOME | End: 2021-04-28
Payer: COMMERCIAL

## 2021-04-28 PROCEDURE — 97161 PT EVAL LOW COMPLEX 20 MIN: CPT

## 2021-04-28 PROCEDURE — 97110 THERAPEUTIC EXERCISES: CPT

## 2021-04-28 NOTE — PROGRESS NOTES
[address] [facility]  [address] [facility]         Phone: [phone]       Fax: [phone]    Physical Therapy Lower Extremity Evaluation    Date:  2021  Patient: Jose L Champion  : 1955  MRN: [id number]  Physician:FABRIZIO Kaufman MD      Insurance:[employer] medicare/medicaid   Medical Diagnosis: left knee arthoplasty   Rehab Codes: M15.782  Onset date:20  Next 's appt.: TBD   Visit Count:    Cancel/No Show: 0/0    Subjective: The patient with long history of (B) knee pain and OA. The left knee has had a revision in 20. Since the surgery the patient has a many family and personal issues that have prevented her attendance in physical therapy. She is currently having difficulty with sleep, walking, standing and getting up and down steps and out of chair.      CC:left knee pain     HPI: (20)    PMHx: [x] Unremarkable [x] Diabetes [x] HTN  [] Pacemaker   [] MI/Heart Problems [] Cancer [x] Arthritis [] Other:              [x] Refer to full medical chart  In EPIC     Comorbidities:   [x] Obesity [] Dialysis  [] Other:   [] Asthma/COPD [] Dementia [] Other:   [] Stroke [] Sleep apnea [] Other:   [] Vascular disease [] Rheumatic disease [] Other:     Tests: [x] X-Ray: [x] MRI:  [x] Other: CT scan   Medications: [x] Refer to full medical record [] None [] Other:  Allergies:      [x] Refer to full medical record [] None [] Other:    Function:  Hand Dominance  [x] Right  [] Left  Working:  [] Normal Duty  [] Light Duty  [] Off D/T Condition  [x] Retired    [] Not Employed    []  Disability  [] Other:           Return to work:   Job/ADL Description:    ADL/IADL Previous level of function Current level of function Who currently assists the patient with task   Bathing  [x] Independent  [] Assist [x] Independent  [] Assist    Dress/grooming [x] Independent  [] Assist [x] Independent  [] Assist Difficult    Transfer/mobility [x] Independent  [] Assist [x] Independent  [] Assist    Feeding [x] Independent  [] Assist [x] Independent  [] Assist    Toileting [x] Independent  [] Assist [x] Independent  [] Assist    Driving [x] Independent  [] Assist [] Independent  [x] Assist Family    Housekeeping [x] Independent  [] Assist [x] Independent  [] Assist    Grocery shop/meal prep [x] Independent  [] Assist [x] Independent  [] Assist Scooter in the store      Gait Prior level of function Current level of function    [x] Independent  [] Assist [x] Independent  [] Assist   Device: [x] Independent [x] Independent    [] Straight Cane [] Quad cane [] Straight Cane [] Quad cane    [] Standard walker [] Rolling walker   [] 4 wheeled walker [] Standard walker [] Rolling walker   [] 4 wheeled walker    [] Wheelchair [] Wheelchair     Pain:  [x] Yes  [] No Location: left knee Pain Rating: (0-10 scale) 4/10   Pain altered Tx:  [] Yes  [x] No  Action:  Symptoms:  [] Improving [] Worsening [x] Same  Better:  [x] AM    [] PM    [] Sit    [] Rise/Sit    []Stand    [] Walk    [x] Lying    [] Other:  Worse: [] AM    [x] PM    [] Sit    [x] Rise/Sit    [x]Stand    [x] Walk    [] Lying    [x] Bend                             [] Valsalva    [] Other:  Sleep: [] OK    [x] Disturbed    Objective:    ROM  ° A/P STRENGTH TESTS (+/-) Left Right Not Tested    Left Right Left Right Ant.  Drawer   []   Hip Flex     Post. Drawer   []   Ext     Lachmans   []   ER     Valgus Stress   []   IR     Varus Stress   []   ABD     Abdullahis   []   ADD     Apleys Comp.   []   Knee Flex 90    Apleys Dist.   []   Ext 0-5    Hip Scouring   []   Ankle DF     LAURYs   []   PF     Piriformis   []   INV     Kirks   []   EVER     Talor Tilt   []        Pat-Fem Grind   []       OBSERVATION No Deficit Deficit Not Tested Comments   Posture       Forward Head [] [x] []    Rounded Shoulders [] [x] []    Kyphosis [] [x] [] Increase    Lordosis [] [x] [] loss   Lateral Shift [] [] []    Scoliosis [] [] []    Iliac Crest [] [] []    PSIS [] [] []    ASIS [] [] []    Genu Valgus [] [x] []    Genu Varus [] [] []    Genu Recurvatum [] [] []    Pronation [] [] []    Supination [] [] []    Leg Length Discrp [] [x] []    Slumped Sitting [] [x] []    Palpation [] [] []    Sensation [] [] []    Edema [] [x] []    Neurological [] [] []    Patellar Mobility [] [x] []    Patellar Orientation [] [x] []    Gait [] [x] [] Analysis:poor step lengths        FUNCTION Normal Difficult Unable   Sitting [x] [] []   Standing [] [x] []   Ambulation [] [x] []   Groom/Dress [] [x] []   Lift/Carry [] [x] []   Stairs [] [x] []   Bending [] [x] []   Squat [] [x] []   Kneel [] [] [x]     BALANCE/PROPRIOCEPTION              [] Not tested   Single leg stance       R                     L                                PAIN   Eyes open                     0      Sec.          0    Sec                  . [x]    Eyes closed                          Sec. Sec                  . []        FUNCTIONAL TESTS PAIN NO PAIN COMMENTS   Step Test 4 [] []    6 [] []    8 [] []    Squat [] []    Comments:    Assessment: [x] Patient would continue to benefit from skilled physical therapy services in order to:Assist in walking, pain reduction improving AROM and strength of left knee     Problems:    [x] ? Pain:     [x] ? ROM:    [x] ? Strength:    [x] ? Function:    [x] ? Balance  [x] Edema:  [x] Gait Deviations      STG: (to be met in 6  treatments)  1. ? Pain:to less than 2/10.   2. ? ROM: Left knee 0-105 deg. 3. ? Strength: quad/HS to 2+/5.   4. ? Function:OPTIMAL score 15/3.   5. Independent with Home Exercise Programs  6. Demonstrate Knowledge of fall prevention  LTG: (to be met in 12  treatments)  1. OPTIMAL 9/3  2. Patient goals:Walk better.      Functional Assessment Used: OPTIMAL   Current Status: Score15/3  Goal Status: Score6/3    Evaluation Complexity:  History (Personal factors, co morbidities) [] 0 [x] 1-2 [] 3+   Exam (limitations, restrictions) [] 1-2 [x] 3 [] 4+   Clinical presentation (progression) [x] Stable [] Evolving  [] Unstable   Decision Making [] Low [x] Moderate [] High    [] Low Complexity [x] Moderate Complexity [] High Complexity       Rehab Potential:  [x] Good  [] Fair  [] Poor   Suggested Professional Referral:  [x] No  [] Yes:  Barriers to Goal Achievement[de-identified]  [x] No  [] Yes:  Domestic Concerns:  [x] No  [] Yes:    Pt. Education:  [x] Plans/Goals, Risks/Benefits discussed  [x] Home exercise program    Method of Education: [x] Verbal  [x] Demo  [] Written  Comprehension of Education:  [] Verbalizes understanding. [] Demonstrates understanding. [x] Needs Review. [] Demonstrates/verbalizes understanding of HEP/Ed previously given. Treatment Plan:  [x] Therapeutic Exercise   06689  [] Iontophoresis: 4 mg/mL Dexamethasone Sodium Phosphate  mAmin  11792   [] Therapeutic Activity  01061 [x] Vasopneumatic cold with compression  89401    [x] Gait Training   71947 [] Ultrasound   63778   [] Neuromuscular Re-education  56078 [] Electrical Stimulation Unattended  69289   [x] Manual Therapy  70703 [] Electrical Stimulation Attended  20692   [x] Instruction in HEP  [] Lumbar/Cervical Traction  03930   [] Aquatic Therapy   35893 [x] Cold/hot pack    [] Massage   77919      [] Dry Needling, 1 or 2 muscles  83157   [] Biofeedback, first 15 minutes   59128  [] Biofeedback, additional 15 minutes   47399 [] Dry Needling, 3 or more muscles  66154     Frequency:  2 x/week for 12 visits    Todays Treatment:  Modalities: Exercise, VC   Precautions: Falls  Exercises:  Exercise Reps/ Time Weight/ Level Comments   Review of current HEP    Hold all of current program    Sit stands 10 x   HEP   LAQ/ HS curls  10 x   HEP   HS strech  60 ''   HEP   Ice as needed  30 '   1-2 x daily    Other:Gait - walk with shorter steps and the use of cane. Specific Instructions for next treatment:V.C next visit.      Treatment Charges: Mins Units   [] Evaluation [x]  Low       []  Moderate       []  High 45 1   []  Modalities     [x]  Ther Exercise 15 1   []  Manual Therapy     []  Ther Activities     []  Aquatics     []  Neuromuscular     []  Gait Training     []  Dry Needling           1-2 muscles     []  Dry Needling           3 or more          muscles     [] Vasocompression     []  Other         TOTAL TREATMENT TIME: 60 min     Time in:2:45 pm    Time Out:3:45 pm     Electronically signed by: Terrie Sandhoff, PT        Physician Signature:________________________________Date:__________________  By signing above or cosigning this note, I have reviewed this plan of care and certify a need for medically necessary rehabilitation services.      *PLEASE SIGN ABOVE AND FAX BACK ALL PAGES*

## 2021-04-30 NOTE — PROGRESS NOTES
[facility] Fall Risk Assessment    Risk Factor Scale  Score   History of Falls [] Yes  [x] No 25  0 0   Secondary Diagnosis [x] Yes  [] No 15  0 15   Ambulatory Aid [] Furniture  [] Crutches/cane/walker  [x] None/bedrest/wheelchair/nurse 30  15  0 0   IV/Heparin Lock [] Yes  [x] No 20  0 0   Gait/Transferring [] Impaired  [x] Weak  [] Normal/bedrest/immobile 20  10  0 10   Mental Status [] Forgets limitations  [x] Oriented to own ability 15  0 0      Total:25     Based on the Assessment score: check the appropriate box.     []  No intervention needed   Low =   Score of 0-24    [x]  Use standard prevention interventions Moderate =  Score of 24-44   [x] Give patient handout and discuss fall prevention strategies   [x] Establish goal of education for patient/family RE: fall prevention strategies    []  Use high risk prevention interventions High = Score of 45 and higher   [] Give patient handout and discuss fall prevention strategies   [] Establish goal of education for patient/family Re: fall prevention strategies   [] Discuss lifeline / other resources    Electronically signed by:   Rachell Go PT  Date: 4/30/2021

## 2021-05-04 ENCOUNTER — HOSPITAL ENCOUNTER (OUTPATIENT)
Dept: PHYSICAL THERAPY | Age: 66
Setting detail: THERAPIES SERIES
Discharge: HOME | End: 2021-05-04
Payer: COMMERCIAL

## 2021-05-04 PROCEDURE — 97110 THERAPEUTIC EXERCISES: CPT

## 2021-05-04 PROCEDURE — 97016 VASOPNEUMATIC DEVICE THERAPY: CPT

## 2021-05-04 NOTE — PROGRESS NOTES
[facility]   [address] [facility] [address]   Phone: [phone]   Fax: [phone]    Physical Therapy Daily Treatment Note      Date:  2021  Patient Name:  Geraldo Medina    :  1955  MRN: [id number]  Physician:FABRIZIO Gifford MD      Insurance:[employer] medicare/medicaid   Medical Diagnosis: left knee arthoplasty   Rehab Codes: J97.064  Onset date:20  Next Dr's appt.: TBD   Visit Count:    Cancel/No Show: 0/0    Subjective: The patient with no new complaints of (B) knee pain with the left greater than the right. She continues to have difficulty up and down steps and walking long distances. The left knee AROM is still very limited and painful with her home program.            Pain:  [x] Yes  [] No Location: left knee Pain Rating: (0-10 scale) 6/10  Pain altered Tx:  [x] No  [] Yes  Action:  Comments:    Objective:      ROM  ° A/P STRENGTH TESTS (+/-) Left Right Not Tested    Left Right Left Right Ant. Drawer   []   Hip Flex     Post. Drawer   []   Ext     Lachmans   []   ER     Valgus Stress   []   IR     Varus Stress   []   ABD     Abdullahis   []   ADD     Apleys Comp.   []   Knee Flex 90 115   Apleys Dist.   []   Ext 0-5 5-0   Hip Scouring   []   Ankle DF     LAURYs   []   PF     Piriformis   []   INV     Kirks   []   EVER     Talor Tilt   []        Pat-Fem Grind   []       Modalities:   Exercise , Vasocompression  Precautions:falls   Exercises:  Exercise Reps/ Time Weight/ Level Completed  Today Comments   Neustep  5 min  Lv 1  x    SLR,SAQ, LAQ, Quad sets,  10 x 2   x    PROM knee extension, flexion  60'' x 2 each   x                                                            Other:    Specific Instructions for next treatment:      Assessment: [x] Progressing toward goals. The patient is just learning her HEP and getting use to forcing her left knee into extension and full flexion. Both directions can be painful.  The use of the Vasocompression did help with any residual pain post exercise. [] No change. [] Other:    [x] Patient would continue to benefit from skilled physical therapy services in order to: improve her overall function of her left knee ROMand strength. The pain should be controlled well with the use of    VC. STG/LTG:  STG: (to be met in 6  treatments)  1. ? Pain:to less than 2/10.   2. ? ROM: Left knee 0-105 deg. 3. ? Strength: quad/HS to 2+/5.   4. ? Function:OPTIMAL score 15/3.   5. Independent with Home Exercise Programs  6. Demonstrate Knowledge of fall prevention  LTG: (to be met in 12  treatments)  1. OPTIMAL 9/3  2. Patient goals:Walk better. Pt. Education:  [x] Yes  [] No  [x] Reviewed Prior HEP/Ed  Method of Education: [x] Verbal  [x] Demo  [x] Written  Comprehension of Education:  [] Verbalizes understanding. [] Demonstrates understanding. [x] Needs review. [] Demonstrates/verbalizes HEP/Ed previously given. Plan: [x] Continue per plan of care.    [] Other:      Treatment Charges: Mins Units   []  Modalities     [x]  Ther Exercise 30 2   []  Manual Therapy     []  Ther Activities     []  Aquatics     []  Neuromuscular     [x] Vasocompression 15 1   [] Gait Training     [] Dry needling        [] 1 or 2 muscles        [] 3 or more muscles     []  Other     Total Treatment time 45 3     Time In:10:45 am             Time Out: 11:00 am    Electronically signed by:  Sarah Noonan PT

## 2021-05-06 ENCOUNTER — HOSPITAL ENCOUNTER (OUTPATIENT)
Dept: PHYSICAL THERAPY | Age: 66
Setting detail: THERAPIES SERIES
Discharge: HOME | End: 2021-05-06
Payer: COMMERCIAL

## 2021-05-06 PROCEDURE — 97110 THERAPEUTIC EXERCISES: CPT

## 2021-05-06 PROCEDURE — 97016 VASOPNEUMATIC DEVICE THERAPY: CPT

## 2021-05-06 NOTE — PROGRESS NOTES
[address] [facility]   [address] [facility] [address]   Phone: [phone]   Fax: [phone]    Physical Therapy Daily Treatment Note      Date:  2021  Patient Name:  Bill Christine    :  1955  MRN: [id number]  Physician:FABRIZIO Ashton MD      Insurance:[facility] medicare/medicaid   Medical Diagnosis: left knee arthoplasty   Rehab Codes: F56.413  Onset date:20  Next Dr's appt.: TBD   Visit Count: 2   Cancel/No Show: 0/0    Subjective: The patient with no new complaints of (B) knee pain with the left greater than the right. She continues to have difficulty up and down steps and walking long distances. The left knee AROM is still very limited and painful with her home program.            Pain:  [x] Yes  [] No Location: left knee Pain Rating: (0-10 scale) 6/10  Pain altered Tx:  [x] No  [] Yes  Action:  Comments:    Objective:      ROM  ° A/P STRENGTH TESTS (+/-) Left Right Not Tested    Left Right Left Right Ant.  Drawer   []   Hip Flex     Post. Drawer   []   Ext     Lachmans   []   ER     Valgus Stress   []   IR     Varus Stress   []   ABD     Abdullahis   []   ADD     Apleys Comp.   []   Knee Flex 90 115   Apleys Dist.   []   Ext 0-5 5-0   Hip Scouring   []   Ankle DF     LAURYs   []   PF     Piriformis   []   INV     Kirks   []   EVER     Talor Tilt   []        Pat-Fem Grind   []       Modalities:   Exercise , Vaso compression  Precautions:falls   Exercises:  Exercise Reps/ Time Weight/ Level Completed  Today Comments   Neustep  5 min  Lv 1  x    SLR,SAQ, LAQ, Quad sets,  10 x 2   x    PROM knee extension, flexion  60'' x 2 each   x    Wedge  30 '' x 4  x    4 way hip  15 x   x    Foam Squats, heals, toes  15 x  x    Foam - balance  SLS, NBS, eyes open/closed  30'' x 1 each  x                         VC  15'   x Supine laying with LE elevation    Other:    Specific Instructions for next treatment:Add mor PRE's at tolerated       Assessment: [x] Progressing toward goals. The patient is just learning her HEP and getting use to forcing her left knee into extension and full flexion. Both directions can be painful. [] No change. [] Other:    [x] Patient would continue to benefit from skilled physical therapy services in order to: improve her overall function of her left knee ROMand strength. STG/LTG:  STG: (to be met in 6  treatments)  1. ? Pain:to less than 2/10.   2. ? ROM: Left knee 0-105 deg. 3. ? Strength: quad/HS to 2+/5.   4. ? Function:OPTIMAL score 15/3.   5. Independent with Home Exercise Programs  6. Demonstrate Knowledge of fall prevention  LTG: (to be met in 12  treatments)  1. OPTIMAL 9/3  2. Patient goals:Walk better. Pt. Education:  [x] Yes  [] No  [x] Reviewed Prior HEP/Ed  Method of Education: [x] Verbal  [x] Demo  [x] Written  Comprehension of Education:  [] Verbalizes understanding. [] Demonstrates understanding. [x] Needs review. [] Demonstrates/verbalizes HEP/Ed previously given. Plan: [x] Continue per plan of care.    [] Other:      Treatment Charges: Mins Units   []  Modalities     [x]  Ther Exercise 30 2   []  Manual Therapy     []  Ther Activities     []  Aquatics     []  Neuromuscular     [x] Vasocompression 15 1   [] Gait Training     [] Dry needling        [] 1 or 2 muscles        [] 3 or more muscles     []  Other     Total Treatment time 45 3     Time In:10:15 am             Time Out: 11:00 am    Electronically signed by:  Taiwo Knight PT

## 2021-05-10 ENCOUNTER — HOSPITAL ENCOUNTER (OUTPATIENT)
Dept: PHYSICAL THERAPY | Age: 66
Setting detail: THERAPIES SERIES
Discharge: HOME | End: 2021-05-10
Payer: COMMERCIAL

## 2021-05-10 PROCEDURE — 97110 THERAPEUTIC EXERCISES: CPT

## 2021-05-10 PROCEDURE — 97016 VASOPNEUMATIC DEVICE THERAPY: CPT

## 2021-05-12 ENCOUNTER — HOSPITAL ENCOUNTER (OUTPATIENT)
Dept: PAIN MANAGEMENT | Age: 66
Discharge: HOME | End: 2021-05-12
Payer: COMMERCIAL

## 2021-05-12 ENCOUNTER — HOSPITAL ENCOUNTER (OUTPATIENT)
Dept: PHYSICAL THERAPY | Age: 66
Setting detail: THERAPIES SERIES
Discharge: HOME | End: 2021-05-12
Payer: COMMERCIAL

## 2021-05-12 DIAGNOSIS — Z96.653 STATUS POST TOTAL BILATERAL KNEE REPLACEMENT: ICD-10-CM

## 2021-05-12 DIAGNOSIS — Z96.651 STATUS POST TOTAL RIGHT KNEE REPLACEMENT: Primary | ICD-10-CM

## 2021-05-12 DIAGNOSIS — G89.29 ENCOUNTER FOR CHRONIC PAIN MANAGEMENT: ICD-10-CM

## 2021-05-12 DIAGNOSIS — M25.562 CHRONIC PAIN OF BOTH KNEES: ICD-10-CM

## 2021-05-12 DIAGNOSIS — G89.29 CHRONIC PAIN OF BOTH KNEES: ICD-10-CM

## 2021-05-12 DIAGNOSIS — Z51.81 ENCOUNTER FOR MEDICATION MONITORING: ICD-10-CM

## 2021-05-12 DIAGNOSIS — M25.561 CHRONIC PAIN OF BOTH KNEES: ICD-10-CM

## 2021-05-12 DIAGNOSIS — F11.90 CHRONIC, CONTINUOUS USE OF OPIOIDS: ICD-10-CM

## 2021-05-12 DIAGNOSIS — M17.0 PRIMARY OSTEOARTHRITIS OF BOTH KNEES: ICD-10-CM

## 2021-05-12 PROCEDURE — 99441 PR PHYS/QHP TELEPHONE EVALUATION 5-10 MIN: CPT | Performed by: NURSE PRACTITIONER

## 2021-05-12 PROCEDURE — 99213 OFFICE O/P EST LOW 20 MIN: CPT

## 2021-05-12 PROCEDURE — 97110 THERAPEUTIC EXERCISES: CPT

## 2021-05-12 PROCEDURE — 97016 VASOPNEUMATIC DEVICE THERAPY: CPT

## 2021-05-12 RX ORDER — HYDROCODONE BITARTRATE AND ACETAMINOPHEN 5; 325 MG/1; MG/1
1 TABLET ORAL EVERY 8 HOURS PRN
Qty: 90 TABLET | Refills: 0 | Status: SHIPPED | OUTPATIENT
Start: 2021-05-13 | End: 2021-06-07 | Stop reason: SDUPTHER

## 2021-05-12 RX ORDER — NITROFURANTOIN MACROCRYSTALS 100 MG/1
CAPSULE ORAL
COMMUNITY
End: 2021-05-12 | Stop reason: ALTCHOICE

## 2021-05-12 RX ORDER — DEXAMETHASONE 6 MG/1
TABLET ORAL
COMMUNITY
End: 2021-05-12 | Stop reason: ALTCHOICE

## 2021-05-12 ASSESSMENT — ENCOUNTER SYMPTOMS
RESPIRATORY NEGATIVE: 1
GASTROINTESTINAL NEGATIVE: 1
EYES NEGATIVE: 1

## 2021-05-12 NOTE — PROGRESS NOTES
[facility] PROGRESS NOTE      Patient  completed []  video visit   [x]   phone call:  8       Minutes :       [x]    to  review Medication Agreement    []  Follow up after procedure   []  Discuss treatment options      Location:  Provider:  working from    [x]    home    []   [facility] TAH[facility]A MEDINA ,   patient at  home       Chief Complaint:  Left knee pain    She c/o left knee pain. She had revision left knee 7-2020. She has history of right knee arthroplasty Her Orthopaedic surgeon wanted  her to do PT again, she states to strengthen and improve ROM left knee. . She is back in PT. Her sleep is good. She is active around the house. She has had no Ed visits. Knee Pain   The incident occurred more than 1 week ago. There was no injury mechanism. The pain is present in the left knee. The quality of the pain is described as aching. The pain is at a severity of 5/10. The pain has been worsening since onset. Associated symptoms include a loss of motion and muscle weakness. Foreign body present: knee replacement. Exacerbated by: standing. She has tried ice for the symptoms.            Treatment goals:  Functional status: get back to normal    Aberrancy:   Any alcoholic beverages   no         Any illegal drugsno         Analgesia:    5                 Adverse  Effects :no      ADL;s :in PT      Data:    When was thelast UDS:     7-       Was the UDS appropriate:  [x] yes []   no      Record/Diagnostics Review:      As above, I did review the imaging       7/28/2020  1:29 PM - Kenneth, M[facility]pn Incoming Lab Results From [employer]    Component Value Ref Range & Units Status Collected Lab   Pain Management Drug Panel Interp, Urine Consistent   Final 07/24/2020  2:49 PM [facility]   (NOTE)   ________________________________________________________________   DRUGS EXPECTED:   HYDROCODONE   ________________________________________________________________   CONSISTENT with medications provided:   HYDROCODONE: based on hydrocodone, hydromorphone   ________________________________________________________________   INTERPRETIVE INFORMATION: Pain Mgt [place], Mass Spec/EMIT, Ur,                            Interp   Interpretation depends on accuracy and completeness of patient   medication information submitted by client. Giovanna Mai Feb 4, 2020 12:00 [phone]    Skye Fry Feb 7, 2020  9:53 [phone]    All Reviewers List    Anat Tran on 2/7/2020 10:46   Radiation Dose Estimates    No radiation information found for this patient   Narrative   X-Rays taken in clinic today and preliminarily reviewed by me   AP and lateral views of the left knee show evidence of revision total knee    arthroplasty components in appropriate position.  Longstem femoral and    tibial components. Patient does have evidence of osteopenia in the tibial    component. Pill count: appropriate    fill date :5-    Morphine equivalent dose as reported on OARRS: 15  Periodic Controlled Substance Monitoring: Possible medication side effects, risk of tolerance/dependence & alternative treatments discussed., No signs of potential drug abuse or diversion identified. , Assessed functional status., Obtaining appropriate analgesic effect of treatment. Gerry Gonzalez, APRN - CNP)  Review ofOARRS does not show any aberrant prescription behavior. Medication is helping the patient stay active. Patient denies any side effects and reports adequate analgesia. No sign of misuse/abuse.             Past Medical History:   Diagnosis Date    Arthritis     Bronchitis     Bunion of left foot     Diabetes mellitus ([address]     not on any meds    Eczema     arms, trunk    GERD (gastroesophageal reflux disease)     Hearing loss     left and right ears, wears hearing aids    Hyperlipidemia     Hypertension     Kidney infection     Osteoporosis     Urge incontinence     Urinary frequency     Wears glasses     for reading       Past Surgical History:   Procedure Laterality Date    APPENDECTOMY      WITH HYSTERECTOMY    ARTHROPLASTY Left 10/12/2017    METATARSAL HEAD RESECTION 3RD LEFT FOOT  & EXCISION LESION SOFT TISSUE LEFT FOOT performed by Silvia Daily DPM at [facility] BLEPHAROPLASTY Bilateral     BREAST BIOPSY Left     benign    COLONOSCOPY      CYST REMOVAL Right     HEEL    CYST REMOVAL Right     wrist    FINE NEEDLE ASPIRATION  05/26/2020    left knee    FOOT SURGERY Left 04/12/2019    bunionectomy    HYSTERECTOMY      JOINT REPLACEMENT Bilateral     lt had revision knees    KNEE ARTHROSCOPY Right 8/12/15    Dr Tejeda Aas ARTHROSCOPY Left 02/22/2017     &RIGHT  KNEE MANIPULATION    OTHER SURGICAL HISTORY  07/10/2019    SACRAL NERVE STIMULATOR IMPLANT STAGE 1 AND 2      ME COLON CA SCRN NOT HI RSK IND N/A 7/17/2018    COLONOSCOPY performed by Bernadette Rebolledo MD at [place] Left 2/22/2017    KNEE ARTHROSCOPY FOR LATERAL PATELLA RELEASE, SYNOVECTOMY AND STEROID INJECTION, LEFT KNEE. MANIPULATION OF RIGHT KNEE.  performed by Jodi Hare MD at [address] [place] [address] Ln N/A 7/10/2019    SACRAL NERVE STIMULATOR IMPLANT STAGE 1 AND 2  C-ARM performed by Nicko Davis MD at [address] [place] [address] Left 05/02/2017    3rd toe    TONSILLECTOMY      TYMPANOPLASTY Left        Allergies   Allergen Reactions    Aspirin Other (See Comments)     Chest pain    Celecoxib     Codeine Other (See Comments)     Chest pain    Avelox [Moxifloxacin] Rash    Lidoderm [Lidocaine] Rash     Skin reddened , itching    Sulfa Antibiotics Rash         Current Outpatient Medications:     HYDROcodone-acetaminophen (NORCO) 5-325 MG per tablet, , Disp: , Rfl:     gabapentin (NEURONTIN) 100 MG capsule, take 1 capsule by mouth once daily at bedtime, Disp: , Rfl: 0    amLODIPine (NORVASC) 5 MG tablet, take 1 tablet by mouth once daily, takes at night, Disp: , Rfl: 0    omeprazole (PRILOSEC) 20 MG capsule, take 1 capsule by mouth once daily (Patient taking differently: take 1 capsule by mouth once patient takes at supper), Disp: 90 capsule, Rfl: 0    simvastatin (ZOCOR) 20 MG tablet, take 1 tablet by mouth ONCE NIGHTLY, Disp: 30 tablet, Rfl: 3    loratadine (CLARITIN) 10 MG tablet, take 1 tablet by mouth once daily, Disp: 30 tablet, Rfl: 3    venlafaxine (EFFEXOR-XR) 37.5 MG XR capsule, take 1 capsule by mouth once daily (Patient taking differently: take 1 capsule by mouth every evening), Disp: 30 capsule, Rfl: 3    lisinopril-hydrochlorothiazide (PRINZIDE) 20-12.5 MG per tablet, Take 1 tablet by mouth daily, Disp: 30 tablet, Rfl: 3    Calcium Carbonate-Vitamin D (CALCIUM + D PO), Take by mouth, Disp: , Rfl:     HYDROcodone-acetaminophen (NORCO) 5-325 MG per tablet, Take 1 tablet by mouth every 8 hours as needed for Pain for up to 30 days. , Disp: 90 tablet, Rfl: 0    benzonatate (TESSALON) 100 MG capsule, take 1 capsule by mouth three times a day if needed for cough, Disp: , Rfl:     doxepin (SINEQUAN) 25 MG capsule, take 1 capsule by mouth twice a day if needed itching, Disp: , Rfl:     diclofenac sodium (VOLTAREN) 1 % GEL, diclofenac 1 % topical gel  apply 2 grams topically to affected area four times a day, Disp: , Rfl:     triamcinolone (KENALOG) 0.1 % cream, apply 1 APPLICATION externally to affected area twice a day if needed for itching, Disp: , Rfl:     fluticasone (FLONASE) 50 MCG/ACT nasal spray, , Disp: , Rfl:     acetaminophen (TYLENOL) 500 MG tablet, Take 500 mg by mouth as needed for Pain, Disp: , Rfl:     hydrOXYzine (ATARAX) 50 MG tablet, 50 mg every 6 hours as needed , Disp: , Rfl: 0    busPIRone (BUSPAR) 10 MG tablet, Take 1 tablet by mouth 2 times daily.  (Patient taking differently: Take 10 mg by mouth 2 times daily as needed ), Disp: 60 tablet, Rfl: 3    Family History   Problem Relation Age of Onset    Stroke Father     Emphysema Father     Diabetes Mother     Heart Failure Mother     Osteoarthritis Mother        Social History     Socioeconomic History    Marital status:      Spouse name: Not on file    Number of children: Not on file    Years of education: Not on file    Highest education level: Not on file   Occupational History    Occupation: disability   Social Needs    Financial resource strain: Not on file    Food insecurity     Worry: Not on file     Inability: Not on file   [employer] needs     Medical: Not on file     Non-medical: Not on file   Tobacco Use    Smoking status: Never Smoker    Smokeless tobacco: Never Used   Substance and Sexual Activity    Alcohol use: No     Alcohol/week: 0.0 standard drinks    Drug use: No    Sexual activity: Not Currently   Lifestyle    Physical activity     Days per week: Not on file     Minutes per session: Not on file    Stress: Not on file   Relationships    Social connections     Talks on phone: Not on file     Gets together: Not on file     Attends Jewish service: Not on file     Active member of club or organization: Not on file     Attends meetings of clubs or organizations: Not on file     Relationship status: Not on file    Intimate partner violence     Fear of current or ex partner: Not on file     Emotionally abused: Not on file     Physically abused: Not on file     Forced sexual activity: Not on file   Other Topics Concern    Not on file   Social History Narrative    Not on file         Review of Systems:  Review of Systems   Constitution: Negative. HENT: Positive for hearing loss. Hearing aid   Eyes: Negative. Cardiovascular: Negative. Respiratory: Negative. Endocrine: Negative. Hematologic/Lymphatic: Bruises/bleeds easily. Skin: Negative. Musculoskeletal: Positive for joint pain. Gastrointestinal: Negative. Genitourinary: Negative. Neurological: Negative.     Psychiatric/Behavioral: The patient is nervous/anxious. Managing         Physical Exam:  There were no vitals taken for this visit. Physical Exam  Skin:         Neurological:      Mental Status: She is alert and oriented to person, place, and time. Psychiatric:         Mood and Affect: Mood normal.         Thought Content: Thought content normal.           Assessment:    Problem List Items Addressed This Visit     Status post total right knee replacement - Primary    Status post total bilateral knee replacement    Primary osteoarthritis of both knees    Encounter for medication monitoring    Encounter for chronic pain management    Chronic, continuous use of opioids    Chronic pain of both knees            Treatment Plan:  DISCUSSION: Treatment options discussed withpatient and all questions answered to patient's satisfaction. Possible side effects, risk of tolerance and or dependence and alternative treatments discussed    Obtaining appropriate analgesic effect of treatment   No signs of potential drug abuse or diversion identified    [x] Ill effects of being on chronic pain medications such as sleep disturbances, respiratory depression, hormonal changes, withdrawal symptoms, chronic opioid dependence and tolerance as well as risk of taking opioids with Benzodiazepines and taking opioids along with alcohol,  werediscussed with patient. I had asked the patient to minimize medication use and utilize pain medications only for uncontrolled rest pain or pain with exertional activities. I advised patient not to self-escalate painmedications without consulting with us. At each of patient's future visits we will try to taper pain medications, while adjusting the adjunct medications, and re-evaluating for Physical Therapy to improve spinal andjoint strength. We will continue to have discussions to decrease pain medications as tolerated.       Counseled patient on effects their pain medication and /or their medical condition mayhave on their  ability to drive or operate machinery. Instructed not to drive or operate machinery if drowsy     I also discussed with the patient regarding the dangers of combining narcotic pain medication with tranquilizers, alcohol or illegal drugs or taking the medication any way other than prescribed. The dangers were discussed  including respiratory depression and death. Patient was told to tell  all  physicians regarding the medications he is getting from pain clinic. Patient is warned not to take any unprescribed medications over-the-countermedications that can depress breathing . Patient is advised to talk to the pharmacist or physicians if planning to take any over-the-counter medications before  takeing them. Patient is strongly advised to avoid tranquilizers or  relaxants, illegal drugs  or any medications that can depress breathing  Patient is also advised to tell us if there is any changes in their medications from other physicians.             TREATMENT OPTIONS:       Medication Agreement Requirements Met  Continue Opioid therapy  Script written for  hydrocodone  Follow up appointment made

## 2021-05-18 ENCOUNTER — HOSPITAL ENCOUNTER (OUTPATIENT)
Dept: WOMENS IMAGING | Age: 66
Discharge: HOME | End: 2021-05-20
Payer: COMMERCIAL

## 2021-05-18 DIAGNOSIS — Z12.39 SCREENING BREAST EXAMINATION: ICD-10-CM

## 2021-05-18 PROCEDURE — 77063 BREAST TOMOSYNTHESIS BI: CPT

## 2021-05-19 ENCOUNTER — HOSPITAL ENCOUNTER (OUTPATIENT)
Dept: PHYSICAL THERAPY | Age: 66
Setting detail: THERAPIES SERIES
Discharge: HOME | End: 2021-05-19
Payer: COMMERCIAL

## 2021-05-19 PROCEDURE — 97110 THERAPEUTIC EXERCISES: CPT

## 2021-05-19 PROCEDURE — 97016 VASOPNEUMATIC DEVICE THERAPY: CPT

## 2021-05-19 NOTE — PROGRESS NOTES
[facility]   [address] [facility] [address]   Phone: [phone]   Fax: [phone]    Physical Therapy Discharge Note    Date:  2021  Patient Name:  Rm Zhang    :  1955  MRN: [id number]  Physician:FABRIZIO King MD      Insurance:[facility] medicare/medicaid   Medical Diagnosis: Left knee arthoplasty   Rehab Codes: O25.611  Onset date:20  Next 's appt.: TBD   Visit Count:    Cancel/No Show: 0/0    Subjective: The patient with continued left knee pain greater than the right knee. Walking and weight bearing remain difficult. The use of a quad cane in the left hand helps with walking. Pain:  [x] Yes  [] No Location: left knee Pain Rating: (0-10 scale) 6/10  Pain altered Tx:  [x] No  [] Yes  Action:  Comments:Home program is fair. Objective:      ROM  ° A/P STRENGTH TESTS (+/-) Left Right Not Tested    Left Right Left Right Ant. Drawer (-)  []   Hip Flex     Post. Drawer (+)  []   Ext     Lachmans   []   ER     Valgus Stress (+)  []   IR     Varus Stress (+)  []   ABD     Abdullahis   []   ADD     Apleys Comp.   []   Knee Flex 90 115 2/5 4/5 Apleys Dist.   []   Ext 0-5 5-0 2/5 4/5 Hip Scouring   []   Ankle DF     LAURYs   []   PF     Piriformis   []   INV     Kirks   []   EVER     Talor Tilt   []        Pat-Fem Grind (+)  []       Modalities:   Exercise , Vaso compression  Precautions:falls   Exercises:  Exercise Reps/ Time Weight/ Level Completed  Today Comments   Neustep  5 min  Lv 3  x    SLR,SAQ, LAQ, Quad sets,  15 x 3   x    PROM knee extension 60'' x 2 each   x    Wedge  30 '' x 4  x    4 way hip  15 x       Foam Squats, heals, toes  15 x  x    Foam - balance  SLS, NBS, eyes open/closed  30'' x 1 each  x                         VC  15'   x Supine laying with LE elevation      Assessment:       [x] Other: Discharge PT. No change from last visit with overall function (gait) of her left knee A/PROM and/or strength.   The

## 2021-06-07 ENCOUNTER — HOSPITAL ENCOUNTER (OUTPATIENT)
Dept: PAIN MANAGEMENT | Age: 66
Discharge: HOME | End: 2021-06-07
Payer: COMMERCIAL

## 2021-06-07 DIAGNOSIS — G89.29 CHRONIC PAIN OF BOTH KNEES: ICD-10-CM

## 2021-06-07 DIAGNOSIS — Z96.651 STATUS POST TOTAL RIGHT KNEE REPLACEMENT: Primary | ICD-10-CM

## 2021-06-07 DIAGNOSIS — G89.29 ENCOUNTER FOR CHRONIC PAIN MANAGEMENT: ICD-10-CM

## 2021-06-07 DIAGNOSIS — M17.0 PRIMARY OSTEOARTHRITIS OF BOTH KNEES: ICD-10-CM

## 2021-06-07 DIAGNOSIS — Z51.81 ENCOUNTER FOR MEDICATION MONITORING: ICD-10-CM

## 2021-06-07 DIAGNOSIS — G89.29 CHRONIC BILATERAL LOW BACK PAIN WITHOUT SCIATICA: ICD-10-CM

## 2021-06-07 DIAGNOSIS — M54.50 CHRONIC BILATERAL LOW BACK PAIN WITHOUT SCIATICA: ICD-10-CM

## 2021-06-07 DIAGNOSIS — M25.562 CHRONIC PAIN OF BOTH KNEES: ICD-10-CM

## 2021-06-07 DIAGNOSIS — F11.90 CHRONIC, CONTINUOUS USE OF OPIOIDS: ICD-10-CM

## 2021-06-07 DIAGNOSIS — Z96.653 STATUS POST TOTAL BILATERAL KNEE REPLACEMENT: ICD-10-CM

## 2021-06-07 DIAGNOSIS — M25.561 CHRONIC PAIN OF BOTH KNEES: ICD-10-CM

## 2021-06-07 PROCEDURE — 99213 OFFICE O/P EST LOW 20 MIN: CPT

## 2021-06-07 PROCEDURE — 99441 PR PHYS/QHP TELEPHONE EVALUATION 5-10 MIN: CPT | Performed by: NURSE PRACTITIONER

## 2021-06-07 RX ORDER — VENLAFAXINE 75 MG/1
TABLET ORAL
COMMUNITY
Start: 2021-05-25

## 2021-06-07 RX ORDER — HYDROCODONE BITARTRATE AND ACETAMINOPHEN 5; 325 MG/1; MG/1
1 TABLET ORAL EVERY 8 HOURS PRN
Qty: 90 TABLET | Refills: 0 | Status: SHIPPED | OUTPATIENT
Start: 2021-06-12 | End: 2021-07-08 | Stop reason: SDUPTHER

## 2021-06-07 ASSESSMENT — ENCOUNTER SYMPTOMS
RESPIRATORY NEGATIVE: 1
GASTROINTESTINAL NEGATIVE: 1

## 2021-06-07 NOTE — PROGRESS NOTES
[facility] PROGRESS NOTE      Patient  completed []  video visit   [x]   phone call:   8      Minutes :       [x]    to  review Medication Agreement    []  Follow up after procedure   []  Discuss treatment options      Location:  Provider:  working from    []    home    [x]   [facility] - [facility] ,   patient at   home    Chief Complaint:  Left knee pain    Shew c/o left knee pain. She had revision of left knee 7/2020. She completed PT. She gets spasms back of her left calf sometimes. She still has problems straightening her left leg. She will see her Orthopaedic Dr again in September. She states exercising at home,She sleeps well. She is active,  She cares for her . No Ed visits,      Knee Pain   The incident occurred more than 1 week ago. There was no injury mechanism. The pain is present in the left knee. The quality of the pain is described as aching. The pain is at a severity of 3/10. The pain is moderate. The pain has been constant since onset. Associated symptoms include a loss of motion and muscle weakness. Foreign body present: left knee arthroplasty. The symptoms are aggravated by weight bearing (walking). She has tried ice for the symptoms. The treatment provided mild relief.            Treatment goals:  Functional status: get better      Aberrancy:   Any alcoholic beverages       no     Any illegal drugs   no      Analgesia:      3               Adverse  Effects :no    ADL;s :home exercises      Data:    When was thelast UDS:   7-         Was the UDS appropriate:  [x] yes []   no      Record/Diagnostics Review:      As above, I did review the imaging     7/28/2020  1:29 PM - Kenneth, Kaceypn Incoming Lab Results From [facility]    Component Value Ref Range & Units Status Collected Lab   Pain Management Drug Panel Interp, Urine Consistent   Final 07/24/2020  2:49 PM [facility]   (NOTE)   ________________________________________________________________   DRUGS EXPECTED:   HYDROCODONE ________________________________________________________________   CONSISTENT with medications provided:   HYDROCODONE: based on hydrocodone, hydromorphone   ________________________________________________________________   INTERPRETIVE INFORMATION: Pain Mgt [place], Mass Spec/EMIT, Ur,                            Interp   Interpretation depends on accuracy and completeness of patient   medication information submitted by client. Pill count: appropriate    fill date :6-    Morphine equivalent dose as reported on OARRS:15  Periodic Controlled Substance Monitoring: Possible medication side effects, risk of tolerance/dependence & alternative treatments discussed., No signs of potential drug abuse or diversion identified. , Assessed functional status., Obtaining appropriate analgesic effect of treatment. Osiel Mckeon, APRN - CNP)  Review ofOARRS does not show any aberrant prescription behavior. Medication is helping the patient stay active. Patient denies any side effects and reports adequate analgesia. No sign of misuse/abuse.             Past Medical History:   Diagnosis Date    Arthritis     Bronchitis     Bunion of left foot     Diabetes mellitus ([address]     not on any meds    Eczema     arms, trunk    GERD (gastroesophageal reflux disease)     Hearing loss     left and right ears, wears hearing aids    Hyperlipidemia     Hypertension     Kidney infection     Osteoporosis     Urge incontinence     Urinary frequency     Wears glasses     for reading       Past Surgical History:   Procedure Laterality Date    APPENDECTOMY      WITH HYSTERECTOMY    ARTHROPLASTY Left 10/12/2017    METATARSAL HEAD RESECTION 3RD LEFT FOOT  & EXCISION LESION SOFT TISSUE LEFT FOOT performed by Brenda Beck DPM at [facility] BLEPHAROPLASTY Bilateral     BREAST BIOPSY Left     benign    COLONOSCOPY      CYST REMOVAL Right     HEEL    CYST REMOVAL Right     wrist    FINE NEEDLE ASPIRATION  05/26/2020    left knee    FOOT SURGERY Left 04/12/2019    bunionectomy    HYSTERECTOMY      JOINT REPLACEMENT Bilateral     lt had revision knees    KNEE ARTHROSCOPY Right 8/12/15    Dr Aldo Cho ARTHROSCOPY Left 02/22/2017     &RIGHT  KNEE MANIPULATION    OTHER SURGICAL HISTORY  07/10/2019    SACRAL NERVE STIMULATOR IMPLANT STAGE 1 AND 2      ID COLON CA SCRN NOT HI [facility]SK IND N/A 7/17/2018    COLONOSCOPY performed by Camilla Underwood MD at [place] Left 2/22/2017    KNEE ARTHROSCOPY FOR LATERAL PATELLA RELEASE, SYNOVECTOMY AND STEROID INJECTION, LEFT KNEE. MANIPULATION OF RIGHT KNEE.  performed by Ledy Jackson MD at [address] N/A 7/10/2019    SACRAL NERVE STIMULATOR IMPLANT STAGE 1 AND 2  C-ARM performed by Judie Ny MD at [address] [place] [address] Left 05/02/2017    3rd toe    TONSILLECTOMY      TYMPANOPLASTY Left        Allergies   Allergen Reactions    Aspirin Other (See Comments)     Chest pain    Celecoxib     Codeine Other (See Comments)     Chest pain    Avelox [Moxifloxacin] Rash    Lidoderm [Lidocaine] Rash     Skin reddened , itching    Sulfa Antibiotics Rash         Current Outpatient Medications:     venlafaxine (EFFEXOR) 75 MG tablet, take 1 tablet by mouth once daily with food, Disp: , Rfl:     HYDROcodone-acetaminophen (NORCO) 5-325 MG per tablet, , Disp: , Rfl:     gabapentin (NEURONTIN) 100 MG capsule, take 1 capsule by mouth once daily at bedtime, Disp: , Rfl: 0    amLODIPine (NORVASC) 5 MG tablet, take 1 tablet by mouth once daily, takes at night, Disp: , Rfl: 0    omeprazole (PRILOSEC) 20 MG capsule, take 1 capsule by mouth once daily (Patient taking differently: take 1 capsule by mouth once patient takes at supper), Disp: 90 capsule, Rfl: 0    simvastatin (ZOCOR) 20 MG tablet, take 1 tablet by mouth ONCE NIGHTLY, Disp: 30 tablet, Rfl: 3    loratadine (CLARITIN) 10 MG tablet, take 1 tablet by mouth once daily, Disp: 30 tablet, Rfl: 3    lisinopril-hydrochlorothiazide (PRINZIDE) 20-12.5 MG per tablet, Take 1 tablet by mouth daily, Disp: 30 tablet, Rfl: 3    HYDROcodone-acetaminophen (NORCO) 5-325 MG per tablet, Take 1 tablet by mouth every 8 hours as needed for Pain for up to 30 days. , Disp: 90 tablet, Rfl: 0    benzonatate (TESSALON) 100 MG capsule, take 1 capsule by mouth three times a day if needed for cough, Disp: , Rfl:     doxepin (SINEQUAN) 25 MG capsule, take 1 capsule by mouth twice a day if needed itching, Disp: , Rfl:     diclofenac sodium (VOLTAREN) 1 % GEL, diclofenac 1 % topical gel  apply 2 grams topically to affected area four times a day, Disp: , Rfl:     triamcinolone (KENALOG) 0.1 % cream, apply 1 APPLICATION externally to affected area twice a day if needed for itching, Disp: , Rfl:     fluticasone (FLONASE) 50 MCG/ACT nasal spray, , Disp: , Rfl:     acetaminophen (TYLENOL) 500 MG tablet, Take 500 mg by mouth as needed for Pain, Disp: , Rfl:     hydrOXYzine (ATARAX) 50 MG tablet, 50 mg every 6 hours as needed , Disp: , Rfl: 0    Calcium Carbonate-Vitamin D (CALCIUM + D PO), Take by mouth, Disp: , Rfl:     busPIRone (BUSPAR) 10 MG tablet, Take 1 tablet by mouth 2 times daily.  (Patient taking differently: Take 10 mg by mouth 2 times daily as needed ), Disp: 60 tablet, Rfl: 3    Family History   Problem Relation Age of Onset    Stroke Father     Emphysema Father     Diabetes Mother     Heart Failure Mother     Osteoarthritis Mother        Social History     Socioeconomic History    Marital status:      Spouse name: Not on file    Number of children: Not on file    Years of education: Not on file    Highest education level: Not on file   Occupational History    Occupation: disability   Tobacco Use    Smoking status: Never Smoker    Smokeless tobacco: Never Used   Vaping Use    Vaping Use: Never used   Substance and Sexual Activity    Alcohol use: No     Alcohol/week: 0.0 standard drinks    Drug use: No    Sexual activity: Not Currently   Other Topics Concern    Not on file   Social History Narrative    Not on file     Social Determinants of Health     Financial Resource Strain:     Difficulty of Paying Living Expenses:    Food Insecurity:     Worried About Running Out of Food in the Last Year:     Ran Out of Food in the Last Year:    Transportation Needs:     Lack of Transportation (Medical):  Lack of Transportation (Non-Medical):    Physical Activity:     Days of Exercise per Week:     Minutes of Exercise per Session:    Stress:     Feeling of Stress :    Social Connections:     Frequency of Communication with Friends and Family:     Frequency of Social Gatherings with Friends and Family:     Attends [employer] Services:     Active Member of Clubs or Organizations:     Attends Club or Organization Meetings:     Marital Status:    Intimate Partner Violence:     Fear of Current or Ex-Partner:     Emotionally Abused:     Physically Abused:     Sexually Abused:          Review of Systems:  Review of Systems   Constitutional: Negative. HENT: Positive for hearing loss. Hearing aids   Cardiovascular: Negative. Respiratory: Negative. Endocrine: Negative. Hematologic/Lymphatic: Bruises/bleeds easily. Skin: Negative. Musculoskeletal: Positive for joint pain. Gastrointestinal: Negative. Genitourinary: Negative. Neurological: Negative. Psychiatric/Behavioral: Positive for depression. Improved         Physical Exam:  There were no vitals taken for this visit. Physical Exam  Skin:         Neurological:      Mental Status: She is alert and oriented to person, place, and time. Psychiatric:         Mood and Affect: Mood normal.         Thought Content:  Thought content normal.           Assessment:    Problem List Items Addressed This Visit     Status post total right knee replacement - regarding the medications he is getting from pain clinic. Patient is warned not to take any unprescribed medications over-the-countermedications that can depress breathing . Patient is advised to talk to the pharmacist or physicians if planning to take any over-the-counter medications before  takeing them. Patient is strongly advised to avoid tranquilizers or  relaxants, illegal drugs  or any medications that can depress breathing  Patient is also advised to tell us if there is any changes in their medications from other physicians.             TREATMENT OPTIONS:       Medication Agreement Requirements Met  Continue Opioid therapy  Script written for  hydrocodone  Follow up appointment made

## 2021-07-08 ENCOUNTER — HOSPITAL ENCOUNTER (OUTPATIENT)
Dept: PAIN MANAGEMENT | Age: 66
Discharge: HOME | End: 2021-07-08
Payer: COMMERCIAL

## 2021-07-08 DIAGNOSIS — G89.29 CHRONIC BILATERAL LOW BACK PAIN WITHOUT SCIATICA: ICD-10-CM

## 2021-07-08 DIAGNOSIS — G89.29 ENCOUNTER FOR CHRONIC PAIN MANAGEMENT: ICD-10-CM

## 2021-07-08 DIAGNOSIS — M25.562 CHRONIC PAIN OF BOTH KNEES: ICD-10-CM

## 2021-07-08 DIAGNOSIS — M17.0 PRIMARY OSTEOARTHRITIS OF BOTH KNEES: ICD-10-CM

## 2021-07-08 DIAGNOSIS — Z96.653 STATUS POST TOTAL BILATERAL KNEE REPLACEMENT: Primary | ICD-10-CM

## 2021-07-08 DIAGNOSIS — G89.29 CHRONIC PAIN OF BOTH KNEES: ICD-10-CM

## 2021-07-08 DIAGNOSIS — F11.90 CHRONIC, CONTINUOUS USE OF OPIOIDS: ICD-10-CM

## 2021-07-08 DIAGNOSIS — Z51.81 ENCOUNTER FOR MEDICATION MONITORING: ICD-10-CM

## 2021-07-08 DIAGNOSIS — M25.561 CHRONIC PAIN OF BOTH KNEES: ICD-10-CM

## 2021-07-08 DIAGNOSIS — M54.50 CHRONIC BILATERAL LOW BACK PAIN WITHOUT SCIATICA: ICD-10-CM

## 2021-07-08 PROCEDURE — 99213 OFFICE O/P EST LOW 20 MIN: CPT

## 2021-07-08 PROCEDURE — 99441 PR PHYS/QHP TELEPHONE EVALUATION 5-10 MIN: CPT | Performed by: NURSE PRACTITIONER

## 2021-07-08 RX ORDER — HYDROCODONE BITARTRATE AND ACETAMINOPHEN 5; 325 MG/1; MG/1
1 TABLET ORAL EVERY 8 HOURS PRN
Qty: 90 TABLET | Refills: 0 | Status: SHIPPED | OUTPATIENT
Start: 2021-07-12 | End: 2021-08-09 | Stop reason: SDUPTHER

## 2021-07-08 ASSESSMENT — ENCOUNTER SYMPTOMS
RESPIRATORY NEGATIVE: 1
EYES NEGATIVE: 1
ROS SKIN COMMENTS: RASH ON STOMACH
GASTROINTESTINAL NEGATIVE: 1

## 2021-07-08 NOTE — PROGRESS NOTES
[facility] PROGRESS NOTE      Patient  completed []  video visit   [x]   phone call:    10     Minutes :       [x]    to  review Medication Agreement    []  Follow up after procedure   []  Discuss treatment options      Location:  Provider:  working from    [x]    home    []   [facility] - [facility] ,   patient at home        Chief Complaint: left knee pain    She c/o left knee and leg pain. The pain kept her awake all night. She had revision of left knee in 7/2020. She has an appt with her Orthopaedic surgeon in 2 months. She normally has been sleeping okay, She states her activity has been a little slow. Knee Pain   There was no injury mechanism. The pain is present in the left knee and left thigh. The pain is at a severity of 5/10. The pain is moderate. The pain has been fluctuating since onset. Loss of motion: sharp. Foreign body present: left knee replaced. Nothing aggravates the symptoms. She has tried ice for the symptoms.                Treatment goals:  Functional status: get better      Aberrancy:   Any alcoholic beverages     no       Any illegal drugs no       Analgesia:      5               Adverse  Effects :no    ADL;s :knee exercises    Data:    When was thelast UDS:  7-          Was the UDS appropriate:  [x] yes []   no      Record/Diagnostics Review:      As above, I did review the imaging     7/28/2020  1:29 PM - KennethKamari marrufo Incoming Lab Results From [facility]    Component Value Ref Range & Units Status Collected Lab   Pain Management Drug Panel Interp, Urine Consistent   Final 07/24/2020  2:49 PM [facility]   (NOTE)   ________________________________________________________________   DRUGS EXPECTED:   HYDROCODONE   ________________________________________________________________   CONSISTENT with medications provided:   HYDROCODONE: based on hydrocodone, hydromorphone   ________________________________________________________________   INTERPRETIVE INFORMATION: Pain Mgt Leger, Mass Spec/EMIT, Ur,                            Interp   Interpretation depends on accuracy and completeness of patient   medication information submitted by client. 6-Acetylmorphine, Ur Not Detected   Final             SHARRON Gardiner on 2/7/2020 10:46   Radiation Dose Estimates    No radiation information found for this patient   Narrative   X-Rays taken in clinic today and preliminarily reviewed by me   AP and lateral views of the left knee show evidence of revision total knee    arthroplasty components in appropriate position.  Longstem femoral and    tibial components. Patient does have evidence of osteopenia in the tibial    component. EXAMINATION:   BONE DENSITOMETRY       3/4/2020 9:22 am       TECHNIQUE:   A bone density DEXA scan was performed of the lumbar spine and left hip on a   Lunar [employer] system.       COMPARISON:   10/16/2015       HISTORY:   ORDERING SYSTEM PROVIDED HISTORY: Encounter for imaging to assess osteoporosis       FINDINGS:   LUMBAR SPINE:       The bone mineral density in the lumbar spine is measured at 0.995 g/cm2,   which corresponds to a T-score of -1.5 and a Z-score of -0.6.  This is within   the osteopenic range by WHO criteria.       Since prior exam, -9.6 % change in the lumbar spine, which is significant.       LEFT HIP:       The bone mineral density in the total hip is measured at 0.955 g/cm2   corresponding to a T-score of -0.4 and a Z-score of 0.3.  This is within the   normal range by WHO criteria.       The bone mineral density of the femoral neck is measured at 0.860 g/cm2   corresponding to a T-score of -1.3 and a Z-score of -0.2.  This is within the   osteopenic range by WHO criteria.       Since prior exam, -4.3 % change in the total hip, which is significant.       WHO fracture risk assessment tool (FRAX) projects a 10-year fracture risk of   a major osteoporotic fracture at 11.7 % and hip fracture at 2 %.        The patient's 10-year fracture risk is increased if untreated.  Fracture   probability may be lower if the patient has received treatment. Andrea Gonsales may   underestimate fracture probability in patients who have impaired functional   status from rheumatoid arthritis, history of frequent falls, history of   multiple fractures, severe vertebral fractures, parental history of non-hip   fragility fractures, glucocorticoid doses in excess of 7.5 mg/day or frequent   use, high dose inhaled glucocorticoids, and if the T-score of the lumbar   spine is > 1 SD lower than the femoral neck.  Clinical judgment and/or   patient preferences may indicate treatment for people with 10 year fracture   probabilities above or below these levels.           Impression   Osteopenia of the lumbar spine and within the left hip by WHO criteria. Pill count: appropriate    fill date :7-    Morphine equivalent dose as reported on OARRS:15  Periodic Controlled Substance Monitoring: Possible medication side effects, risk of tolerance/dependence & alternative treatments discussed., No signs of potential drug abuse or diversion identified. , Assessed functional status., Obtaining appropriate analgesic effect of treatment. Dorcas Ramirez, APRN - CNP)  Review ofOARRS does not show any aberrant prescription behavior. Medication is helping the patient stay active. Patient denies any side effects and reports adequate analgesia. No sign of misuse/abuse.             Past Medical History:   Diagnosis Date    Arthritis     Bronchitis     Bunion of left foot     Diabetes mellitus ([address]     not on any meds    Eczema     arms, trunk    GERD (gastroesophageal reflux disease)     Hearing loss     left and right ears, wears hearing aids    Hyperlipidemia     Hypertension     Kidney infection     Osteoporosis     Urge incontinence     Urinary frequency     Wears glasses     for reading       Past Surgical History:   Procedure Laterality Date    APPENDECTOMY      WITH HYSTERECTOMY four times a day, Disp: , Rfl:     gabapentin (NEURONTIN) 100 MG capsule, take 1 capsule by mouth once daily at bedtime, Disp: , Rfl: 0    amLODIPine (NORVASC) 5 MG tablet, take 1 tablet by mouth once daily, takes at night, Disp: , Rfl: 0    omeprazole (PRILOSEC) 20 MG capsule, take 1 capsule by mouth once daily (Patient taking differently: take 1 capsule by mouth once patient takes at supper), Disp: 90 capsule, Rfl: 0    simvastatin (ZOCOR) 20 MG tablet, take 1 tablet by mouth ONCE NIGHTLY, Disp: 30 tablet, Rfl: 3    loratadine (CLARITIN) 10 MG tablet, take 1 tablet by mouth once daily, Disp: 30 tablet, Rfl: 3    lisinopril-hydrochlorothiazide (PRINZIDE) 20-12.5 MG per tablet, Take 1 tablet by mouth daily, Disp: 30 tablet, Rfl: 3    Calcium Carbonate-Vitamin D (CALCIUM + D PO), Take by mouth, Disp: , Rfl:     HYDROcodone-acetaminophen (NORCO) 5-325 MG per tablet, Take 1 tablet by mouth every 8 hours as needed for Pain for up to 30 days. , Disp: 90 tablet, Rfl: 0    benzonatate (TESSALON) 100 MG capsule, take 1 capsule by mouth three times a day if needed for cough, Disp: , Rfl:     doxepin (SINEQUAN) 25 MG capsule, take 1 capsule by mouth twice a day if needed itching, Disp: , Rfl:     triamcinolone (KENALOG) 0.1 % cream, apply 1 APPLICATION externally to affected area twice a day if needed for itching, Disp: , Rfl:     fluticasone (FLONASE) 50 MCG/ACT nasal spray, , Disp: , Rfl:     acetaminophen (TYLENOL) 500 MG tablet, Take 500 mg by mouth as needed for Pain, Disp: , Rfl:     hydrOXYzine (ATARAX) 50 MG tablet, 50 mg every 6 hours as needed , Disp: , Rfl: 0    busPIRone (BUSPAR) 10 MG tablet, Take 1 tablet by mouth 2 times daily.  (Patient taking differently: Take 10 mg by mouth 2 times daily as needed ), Disp: 60 tablet, Rfl: 3    Family History   Problem Relation Age of Onset    Stroke Father     Emphysema Father     Diabetes Mother     Heart Failure Mother     Osteoarthritis Mother Social History     Socioeconomic History    Marital status:      Spouse name: Not on file    Number of children: Not on file    Years of education: Not on file    Highest education level: Not on file   Occupational History    Occupation: disability   Tobacco Use    Smoking status: Never Smoker    Smokeless tobacco: Never Used   Vaping Use    Vaping Use: Never used   Substance and Sexual Activity    Alcohol use: No     Alcohol/week: 0.0 standard drinks    Drug use: No    Sexual activity: Not Currently   Other Topics Concern    Not on file   Social History Narrative    Not on file     Social Determinants of Health     Financial Resource Strain:     Difficulty of Paying Living Expenses:    Food Insecurity:     Worried About Running Out of Food in the Last Year:     [address] in the Last Year:    Transportation Needs:     Lack of Transportation (Medical):  Lack of Transportation (Non-Medical):    Physical Activity:     Days of Exercise per Week:     Minutes of Exercise per Session:    Stress:     Feeling of Stress :    Social Connections:     Frequency of Communication with Friends and Family:     Frequency of Social Gatherings with Friends and Family:     Attends [employer] Services:     Active Member of Clubs or Organizations:     Attends Club or Organization Meetings:     Marital Status:    Intimate Partner Violence:     Fear of Current or Ex-Partner:     Emotionally Abused:     Physically Abused:     Sexually Abused:          Review of Systems:  Review of Systems   Constitutional: Negative. HENT: Positive for hearing loss. Eyes: Negative. Cardiovascular: Negative. Respiratory: Negative. Endocrine: Negative. Hematologic/Lymphatic: Negative. Skin: Positive for rash. Rash on stomach   Musculoskeletal: Positive for arthritis and joint pain. Gastrointestinal: Negative. Genitourinary: Negative. Neurological: Negative. Psychiatric/Behavioral: Negative. Physical Exam:  There were no vitals taken for this visit. Physical Exam  Skin:         Neurological:      Mental Status: She is alert and oriented to person, place, and time. Psychiatric:         Mood and Affect: Mood normal.         Thought Content: Thought content normal.           Assessment:      Problem List Items Addressed This Visit     Status post total bilateral knee replacement - Primary    Primary osteoarthritis of both knees    Encounter for medication monitoring    Encounter for chronic pain management    Chronic, continuous use of opioids    Chronic bilateral low back pain without sciatica          Treatment Plan:  DISCUSSION: Treatment options discussed withpatient and all questions answered to patient's satisfaction. Possible side effects, risk of tolerance and or dependence and alternative treatments discussed    Obtaining appropriate analgesic effect of treatment   No signs of potential drug abuse or diversion identified    [x] Ill effects of being on chronic pain medications such as sleep disturbances, respiratory depression, hormonal changes, withdrawal symptoms, chronic opioid dependence and tolerance as well as risk of taking opioids with Benzodiazepines and taking opioids along with alcohol,  werediscussed with patient. I had asked the patient to minimize medication use and utilize pain medications only for uncontrolled rest pain or pain with exertional activities. I advised patient not to self-escalate painmedications without consulting with us. At each of patient's future visits we will try to taper pain medications, while adjusting the adjunct medications, and re-evaluating for Physical Therapy to improve spinal andjoint strength. We will continue to have discussions to decrease pain medications as tolerated.       Counseled patient on effects their pain medication and /or their medical condition mayhave on their  ability to drive or operate machinery. Instructed not to drive or operate machinery if drowsy     I also discussed with the patient regarding the dangers of combining narcotic pain medication with tranquilizers, alcohol or illegal drugs or taking the medication any way other than prescribed. The dangers were discussed  including respiratory depression and death. Patient was told to tell  all  physicians regarding the medications he is getting from pain clinic. Patient is warned not to take any unprescribed medications over-the-countermedications that can depress breathing . Patient is advised to talk to the pharmacist or physicians if planning to take any over-the-counter medications before  takeing them. Patient is strongly advised to avoid tranquilizers or  relaxants, illegal drugs  or any medications that can depress breathing  Patient is also advised to tell us if there is any changes in their medications from other physicians.       1. UDT, instructed to get done by tomorrow      TREATMENT OPTIONS:     UDT  Medication Agreement Requirements Met  Continue Opioid therapy  Script written for  hydrocodone  Follow up appointment made

## 2021-07-09 ENCOUNTER — HOSPITAL ENCOUNTER (OUTPATIENT)
Age: 66
Discharge: HOME | End: 2021-07-09
Payer: COMMERCIAL

## 2021-07-09 DIAGNOSIS — G89.29 CHRONIC PAIN OF BOTH KNEES: ICD-10-CM

## 2021-07-09 DIAGNOSIS — M54.50 CHRONIC BILATERAL LOW BACK PAIN WITHOUT SCIATICA: ICD-10-CM

## 2021-07-09 DIAGNOSIS — G89.29 CHRONIC BILATERAL LOW BACK PAIN WITHOUT SCIATICA: ICD-10-CM

## 2021-07-09 DIAGNOSIS — M25.562 CHRONIC PAIN OF BOTH KNEES: ICD-10-CM

## 2021-07-09 DIAGNOSIS — M25.561 CHRONIC PAIN OF BOTH KNEES: ICD-10-CM

## 2021-07-09 DIAGNOSIS — M17.0 PRIMARY OSTEOARTHRITIS OF BOTH KNEES: ICD-10-CM

## 2021-07-09 DIAGNOSIS — Z51.81 ENCOUNTER FOR MEDICATION MONITORING: ICD-10-CM

## 2021-07-09 PROCEDURE — 80307 DRUG TEST PRSMV CHEM ANLYZR: CPT

## 2021-07-13 LAB
6-ACETYLMORPHINE, UR: NOT DETECTED
7-AMINOCLONAZEPAM, URINE: NOT DETECTED
ALPHA-OH-ALPRAZ, URINE: NOT DETECTED
ALPHA-OH-MIDAZOLAM, URINE: NOT DETECTED
ALPRAZOLAM, URINE: NOT DETECTED
AMPHETAMINES, URINE: NOT DETECTED
BARBITURATES, URINE: NOT DETECTED
BENZOYLECGONINE, UR: NOT DETECTED
BUPRENORPHINE URINE: NOT DETECTED
CARISOPRODOL, UR: NOT DETECTED
CLONAZEPAM, URINE: NOT DETECTED
CODEINE, URINE: NOT DETECTED
CREATININE URINE: 65 MG/DL (ref 20–400)
DIAZEPAM, URINE: NOT DETECTED
DRUGS EXPECTED, UR: NORMAL
EER HI RES INTERP UR: NORMAL
ETHYL GLUCURONIDE UR: NOT DETECTED
FENTANYL URINE: NOT DETECTED
GABAPENTIN: NOT DETECTED
HYDROCODONE, URINE: PRESENT
HYDROMORPHONE, URINE: PRESENT
LORAZEPAM, URINE: NOT DETECTED
MARIJUANA METAB, UR: NOT DETECTED
MDA, UR: NOT DETECTED
MDEA, EVE, UR: NOT DETECTED
MDMA URINE: NOT DETECTED
MEPERIDINE METAB, UR: NOT DETECTED
METHADONE, URINE: NOT DETECTED
METHAMPHETAMINE, URINE: NOT DETECTED
METHYLPHENIDATE: NOT DETECTED
MIDAZOLAM, URINE: NOT DETECTED
MORPHINE URINE: NOT DETECTED
NALOXONE URINE: NOT DETECTED
NORBUPRENORPHINE, URINE: NOT DETECTED
NORDIAZEPAM, URINE: NOT DETECTED
NORFENTANYL, URINE: NOT DETECTED
NORHYDROCODONE, URINE: PRESENT
NOROXYCODONE, URINE: NOT DETECTED
NOROXYMORPHONE, URINE: NOT DETECTED
OXAZEPAM, URINE: NOT DETECTED
OXYCODONE URINE: NOT DETECTED
OXYMORPHONE, URINE: NOT DETECTED
PAIN MANAGEMENT DRUG PANEL INTERP, URINE: NORMAL
PAIN MGT DRUG PANEL, HI RES, UR: NORMAL
PCP,URINE: NOT DETECTED
PHENTERMINE, UR: NOT DETECTED
PREGABALIN: NOT DETECTED
TAPENTADOL, URINE: NOT DETECTED
TAPENTADOL-O-SULFATE, URINE: NOT DETECTED
TEMAZEPAM, URINE: NOT DETECTED
TRAMADOL, URINE: NOT DETECTED
ZOLPIDEM METABOLITE (ZCA), URINE: NOT DETECTED
ZOLPIDEM, URINE: NOT DETECTED

## 2021-08-09 ENCOUNTER — HOSPITAL ENCOUNTER (OUTPATIENT)
Dept: PAIN MANAGEMENT | Age: 66
Discharge: HOME | End: 2021-08-09
Payer: COMMERCIAL

## 2021-08-09 DIAGNOSIS — M25.562 CHRONIC PAIN OF BOTH KNEES: ICD-10-CM

## 2021-08-09 DIAGNOSIS — Z96.651 STATUS POST TOTAL RIGHT KNEE REPLACEMENT: Primary | ICD-10-CM

## 2021-08-09 DIAGNOSIS — Z96.653 STATUS POST TOTAL BILATERAL KNEE REPLACEMENT: ICD-10-CM

## 2021-08-09 DIAGNOSIS — Z51.81 ENCOUNTER FOR MEDICATION MONITORING: ICD-10-CM

## 2021-08-09 DIAGNOSIS — F11.90 CHRONIC, CONTINUOUS USE OF OPIOIDS: ICD-10-CM

## 2021-08-09 DIAGNOSIS — G89.29 CHRONIC PAIN OF BOTH KNEES: ICD-10-CM

## 2021-08-09 DIAGNOSIS — M25.561 CHRONIC PAIN OF BOTH KNEES: ICD-10-CM

## 2021-08-09 DIAGNOSIS — M17.0 PRIMARY OSTEOARTHRITIS OF BOTH KNEES: ICD-10-CM

## 2021-08-09 PROCEDURE — 99442 PR PHYS/QHP TELEPHONE EVALUATION 11-20 MIN: CPT | Performed by: NURSE PRACTITIONER

## 2021-08-09 PROCEDURE — 99213 OFFICE O/P EST LOW 20 MIN: CPT

## 2021-08-09 RX ORDER — CHLORHEXIDINE GLUCONATE 0.12 MG/ML
RINSE ORAL
COMMUNITY
Start: 2021-08-02

## 2021-08-09 RX ORDER — HYDROCODONE BITARTRATE AND ACETAMINOPHEN 5; 325 MG/1; MG/1
1 TABLET ORAL EVERY 8 HOURS PRN
Qty: 90 TABLET | Refills: 0 | Status: SHIPPED | OUTPATIENT
Start: 2021-08-11 | End: 2021-09-08 | Stop reason: SDUPTHER

## 2021-08-09 RX ORDER — PENICILLIN V POTASSIUM 500 MG/1
TABLET ORAL
COMMUNITY
Start: 2021-08-02 | End: 2021-09-08

## 2021-08-09 ASSESSMENT — ENCOUNTER SYMPTOMS
RESPIRATORY NEGATIVE: 1
GASTROINTESTINAL NEGATIVE: 1
EYES NEGATIVE: 1
BACK PAIN: 1

## 2021-08-09 NOTE — PROGRESS NOTES
[facility] PROGRESS NOTE      Patient  completed []  video visit   [x]   phone call:  11       Minutes :       [x]    to  review Medication Agreement    []  Follow up after procedure   []  Discuss treatment options      Location:  Provider:  working from    [x]    home    []   [facility] - [facility] ,   patient at home       Chief Complaint: knee pain and back pain    She c/o  Low back and knee pain. She states tripped and fell. She had right knee revision  July, 2020 and has history of left total knee and increased pain haylie she fell both knees hurt. She did not go to the Ed. She sleeps well. Back Pain  This is a chronic problem. The current episode started more than 1 year ago. The problem occurs intermittently. The pain is present in the lumbar spine. Quality: sharp. The pain does not radiate. The pain is at a severity of 5/10. The pain is moderate. The pain is worse during the night. Exacerbated by: movement. Risk factors include menopause (osteopenia). She has tried analgesics for the symptoms. The treatment provided mild relief. Knee Pain   The injury mechanism was a fall. The pain is present in the left knee and right knee. The quality of the pain is described as aching (stinging). The pain is at a severity of 5/10. The pain is moderate. The pain has been intermittent since onset. Foreign body present: left and right total knee. Exacerbated by: activity. She has tried ice (pain meds) for the symptoms.                  Treatment goals:  Functional status: get better      Aberrancy:   Any alcoholic beverages no           Any illegal drugs  no       Analgesia:     5                Adverse  Effects :no      ADL;s :active    Data:    When was thelast UDS:   7-9-2021         Was the UDS appropriate:  [x] yes []   no      Record/Diagnostics Review:      As above, I did review the imaging       7/13/2021 10:48 PM - Kenneth, Kamari Incoming Lab Results From "[facility] [employer]    Component Value Ref Range & Units Status Collected Lab   Pain Management Drug Panel Interp, Urine Consistent   Final 07/09/2021  9:19 AM [facility]   (NOTE)   ________________________________________________________________   DRUGS EXPECTED:   HYDROCODONE   ________________________________________________________________   CONSISTENT with medications provided:   HYDROCODONE: based on hydrocodone, norhydrocodone, hydromorphone   ________________________________________________________________   INTERPRETIVE INFORMATION: Targeted drug profile Interp   Interpretation depends on accuracy and completeness of patient                    Pill count: appropriate    fill date :  8-    Morphine equivalent dose as reported on OARRS:  15  Periodic Controlled Substance Monitoring: Possible medication side effects, risk of tolerance/dependence & alternative treatments discussed., No signs of potential drug abuse or diversion identified. , Assessed functional status., Obtaining appropriate analgesic effect of treatment. David Para, APRN - CNP)  Review ofOARRS does not show any aberrant prescription behavior. Medication is helping the patient stay active. Patient denies any side effects and reports adequate analgesia. No sign of misuse/abuse.       EXAMINATION:   BONE DENSITOMETRY       3/4/2020 9:22 am       TECHNIQUE:   A bone density DEXA scan was performed of the lumbar spine and left hip on a   Lunar Prodig[employer] system.       COMPARISON:   10/16/2015       HISTORY:   ORDERING SYSTEM PROVIDED HISTORY: Encounter for imaging to assess osteoporosis       FINDINGS:   LUMBAR SPINE:       The bone mineral density in the lumbar spine is measured at 0.995 g/cm2,   which corresponds to a T-score of -1.5 and a Z-score of -0.6.  This is within   the osteopenic range by WHO criteria.       Since prior exam, -9.6 % change in the lumbar spine, which is significant.       LEFT HIP:       The bone mineral density in the total hip is measured at 0.955 g/cm2   corresponding to a T-score of -0.4 and a Z-score of 0.3.  This is within the   normal range by [facility] criteria.       The bone mineral density of the femoral neck is measured at 0.860 g/cm2   corresponding to a T-score of -1.3 and a Z-score of -0.2.  This is within the   osteopenic range by WHO criteria.       Since prior exam, -4.3 % change in the total hip, which is significant.       WHO fracture risk assessment tool (FRAX) projects a 10-year fracture risk of   a major osteoporotic fracture at 11.7 % and hip fracture at 2 %.        The patient's 10-year fracture risk is increased if untreated.  Fracture   probability may be lower if the patient has received treatment. Santhosh Hernandez may   underestimate fracture probability in patients who have impaired functional   status from rheumatoid arthritis, history of frequent falls, history of   multiple fractures, severe vertebral fractures, parental history of non-hip   fragility fractures, glucocorticoid doses in excess of 7.5 mg/day or frequent   use, high dose inhaled glucocorticoids, and if the T-score of the lumbar   spine is > 1 SD lower than the femoral neck.  Clinical judgment and/or   patient preferences may indicate treatment for people with 10 year fracture   probabilities above or below these levels.           Impression   Osteopenia of the lumbar spine and within the left hip by WHO criteria.                 Past Medical History:   Diagnosis Date    Arthritis     Bronchitis     Bunion of left foot     Diabetes mellitus ([address]     not on any meds    Eczema     arms, trunk    GERD (gastroesophageal reflux disease)     Hearing loss     left and right ears, wears hearing aids    Hyperlipidemia     Hypertension     Kidney infection     Osteoporosis     Urge incontinence     Urinary frequency     Wears glasses     for reading       Past Surgical History:   Procedure Laterality Date    APPENDECTOMY      WITH HYSTERECTOMY    ARTHROPLASTY Left 10/12/2017    METATARSAL HEAD RESECTION 3RD LEFT FOOT  & EXCISION LESION SOFT TISSUE LEFT FOOT performed by Lexa Colon DPM at [address] [place] [address] Av      BLEPHAROPLASTY Bilateral     BREAST BIOPSY Left     benign    COLONOSCOPY      CYST REMOVAL Right     HEEL    CYST REMOVAL Right     wrist    FINE NEEDLE ASPIRATION  05/26/2020    left knee    FOOT SURGERY Left 04/12/2019    bunionectomy    HYSTERECTOMY      JOINT REPLACEMENT Bilateral     lt had revision knees    KNEE ARTHROSCOPY Right 8/12/15    Dr Leticia Austin ARTHROSCOPY Left 02/22/2017     &RIGHT  KNEE MANIPULATION    OTHER SURGICAL HISTORY  07/10/2019    SACRAL NERVE STIMULATOR IMPLANT STAGE 1 AND 2      WI COLON CA SCRN NOT HI RSK IND N/A 7/17/2018    COLONOSCOPY performed by Katia Hollis MD at [place] Left 2/22/2017    KNEE ARTHROSCOPY FOR LATERAL PATELLA RELEASE, SYNOVECTOMY AND STEROID INJECTION, LEFT KNEE. MANIPULATION OF RIGHT KNEE. performed by Soto Sun MD at [address] N/A 7/10/2019    SACRAL NERVE STIMULATOR IMPLANT STAGE 1 AND 2  C-ARM performed by Jennyfer Frost MD at [address] [place] [address] Left 05/02/2017    3rd toe    TONSILLECTOMY      TYMPANOPLASTY Left        Allergies   Allergen Reactions    Aspirin Other (See Comments)     Chest pain    Celecoxib     Codeine Other (See Comments)     Chest pain    Avelox [Moxifloxacin] Rash    Lidoderm [Lidocaine] Rash     Skin reddened , itching    Sulfa Antibiotics Rash         Current Outpatient Medications:     penicillin v potassium (VEETID) 500 MG tablet, take 1 tablet by mouth four times a day until finished, Disp: , Rfl:     chlorhexidine (PERIDEX) 0.12 % solution, RINSE WITH 1/4 OUNCE BY MOUTH FOR 30 SECONDS THEN SPIT OUT. DO NO. ..  (REFER TO PRESCRIPTION NOTES). , Disp: , Rfl:     B Complex Vitamins (VITAMIN B COMPLEX PO), Take by mouth, Disp: , Rfl:     venlafaxine (EFFEXOR) 75 MG tablet, take 1 tablet by mouth once daily with food, Disp: , Rfl:     HYDROcodone-acetaminophen (NORCO) 5-325 MG per tablet, , Disp: , Rfl:     gabapentin (NEURONTIN) 100 MG capsule, take 1 capsule by mouth once daily at bedtime, Disp: , Rfl: 0    amLODIPine (NORVASC) 5 MG tablet, take 1 tablet by mouth once daily, takes at night, Disp: , Rfl: 0    omeprazole (PRILOSEC) 20 MG capsule, take 1 capsule by mouth once daily (Patient taking differently: take 1 capsule by mouth once patient takes at supper), Disp: 90 capsule, Rfl: 0    simvastatin (ZOCOR) 20 MG tablet, take 1 tablet by mouth ONCE NIGHTLY, Disp: 30 tablet, Rfl: 3    loratadine (CLARITIN) 10 MG tablet, take 1 tablet by mouth once daily, Disp: 30 tablet, Rfl: 3    lisinopril-hydrochlorothiazide (PRINZIDE) 20-12.5 MG per tablet, Take 1 tablet by mouth daily, Disp: 30 tablet, Rfl: 3    Calcium Carbonate-Vitamin D (CALCIUM + D PO), Take by mouth, Disp: , Rfl:     busPIRone (BUSPAR) 10 MG tablet, Take 1 tablet by mouth 2 times daily. (Patient taking differently: Take 10 mg by mouth 2 times daily as needed ), Disp: 60 tablet, Rfl: 3    HYDROcodone-acetaminophen (NORCO) 5-325 MG per tablet, Take 1 tablet by mouth every 8 hours as needed for Pain for up to 30 days. , Disp: 90 tablet, Rfl: 0    benzonatate (TESSALON) 100 MG capsule, take 1 capsule by mouth three times a day if needed for cough, Disp: , Rfl:     doxepin (SINEQUAN) 25 MG capsule, take 1 capsule by mouth twice a day if needed itching, Disp: , Rfl:     diclofenac sodium (VOLTAREN) 1 % GEL, diclofenac 1 % topical gel  apply 2 grams topically to affected area four times a day, Disp: , Rfl:     triamcinolone (KENALOG) 0.1 % cream, apply 1 APPLICATION externally to affected area twice a day if needed for itching, Disp: , Rfl:     fluticasone (FLONASE) 50 MCG/ACT nasal spray, , Disp: , Rfl:     acetaminophen (TYLENOL) 500 MG tablet, Take 500 mg by mouth as needed for Pain, Disp: , Rfl:     hydrOXYzine (ATARAX) 50 MG tablet, 50 mg every 6 hours as needed , Disp: , Rfl: 0    Family History   Problem Relation Age of Onset    Stroke Father     Emphysema Father     Diabetes Mother     Heart Failure Mother     Osteoarthritis Mother        Social History     Socioeconomic History    Marital status:      Spouse name: Not on file    Number of children: Not on file    Years of education: Not on file    Highest education level: Not on file   Occupational History    Occupation: disability   Tobacco Use    Smoking status: Never Smoker    Smokeless tobacco: Never Used   Vaping Use    Vaping Use: Never used   Substance and Sexual Activity    Alcohol use: No     Alcohol/week: 0.0 standard drinks    Drug use: No    Sexual activity: Not Currently   Other Topics Concern    Not on file   Social History Narrative    Not on file     Social Determinants of Health     Financial Resource Strain:     Difficulty of Paying Living Expenses:    Food Insecurity:     Worried About [employer] in the Last Year:     [employer] in the Last Year:    Transportation Needs:     Lack of Transportation (Medical):  Lack of Transportation (Non-Medical):    Physical Activity:     Days of Exercise per Week:     Minutes of Exercise per Session:    Stress:     Feeling of Stress :    Social Connections:     Frequency of Communication with Friends and Family:     Frequency of Social Gatherings with Friends and Family:     Attends Protestant Services:     Active Member of Clubs or Organizations:     Attends Club or Organization Meetings:     Marital Status:    Intimate Partner Violence:     Fear of Current or Ex-Partner:     Emotionally Abused:     Physically Abused:     Sexually Abused:          Review of Systems:  Review of Systems   Constitutional: Negative. HENT: Positive for hearing loss. Eyes: Negative. Cardiovascular: Negative. Respiratory: Negative.     Endocrine:        Pre-diabetic Hematologic/Lymphatic: Positive for bleeding problem. Skin: Negative. Musculoskeletal: Positive for arthritis, back pain, falls and joint pain. Gastrointestinal: Negative. Genitourinary: Negative. Neurological: Negative. Psychiatric/Behavioral: Negative. Physical Exam:  There were no vitals taken for this visit. Physical Exam  Skin:         Neurological:      Mental Status: She is alert and oriented to person, place, and time. Psychiatric:         Mood and Affect: Mood normal.         Thought Content: Thought content normal.           Assessment:    Problem List Items Addressed This Visit     Status post total right knee replacement - Primary    Status post total bilateral knee replacement    Primary osteoarthritis of both knees    Encounter for medication monitoring    Chronic, continuous use of opioids    Chronic pain of both knees              Treatment Plan:  DISCUSSION: Treatment options discussed withpatient and all questions answered to patient's satisfaction. Possible side effects, risk of tolerance and or dependence and alternative treatments discussed    Obtaining appropriate analgesic effect of treatment   No signs of potential drug abuse or diversion identified    [x] Ill effects of being on chronic pain medications such as sleep disturbances, respiratory depression, hormonal changes, withdrawal symptoms, chronic opioid dependence and tolerance as well as risk of taking opioids with Benzodiazepines and taking opioids along with alcohol,  werediscussed with patient. I had asked the patient to minimize medication use and utilize pain medications only for uncontrolled rest pain or pain with exertional activities. I advised patient not to self-escalate painmedications without consulting with us.   At each of patient's future visits we will try to taper pain medications, while adjusting the adjunct medications, and re-evaluating for Physical Therapy to improve spinal andjoint strength. We will continue to have discussions to decrease pain medications as tolerated. Counseled patient on effects their pain medication and /or their medical condition mayhave on their  ability to drive or operate machinery. Instructed not to drive or operate machinery if drowsy     I also discussed with the patient regarding the dangers of combining narcotic pain medication with tranquilizers, alcohol or illegal drugs or taking the medication any way other than prescribed. The dangers were discussed  including respiratory depression and death. Patient was told to tell  all  physicians regarding the medications he is getting from pain clinic. Patient is warned not to take any unprescribed medications over-the-countermedications that can depress breathing . Patient is advised to talk to the pharmacist or physicians if planning to take any over-the-counter medications before  takeing them. Patient is strongly advised to avoid tranquilizers or  relaxants, illegal drugs  or any medications that can depress breathing  Patient is also advised to tell us if there is any changes in their medications from other physicians.             TREATMENT OPTIONS:       Medication Agreement Requirements Met  Continue Opioid therapy  Script written for  hydrocodone  Follow up appointment made

## 2021-09-01 NOTE — PROGRESS NOTES
[facility]  [facility] [place]  [address]  [address]  [address] [facility] [address]  Dept: [address] [facility] Urology Office Note - Established    Patient:  Americo Silverio  YOB: 1955  Date: 4/29/2019    The patient is a 70 y.o. female whopresents today for evaluation of the following problems:   Chief Complaint   Patient presents with    Urinary Frequency     2 months med check Myrbteriq       HPI  Overactive Bladder:  Patient is here today for an overactive bladder which started a few year(s) ago. Recently the OAB symptoms: are improving  Current medical Rx for OAB: myrbetriq  Frequency: 1.5 hours  Nocturia x 2   Consumption of bladder irritants? no  Incontinence? Stress incontinence: Severity = not present. Urge Incontinence:  Severity = moderate.   Number of pads per day: 3                  Summary of old records: N/A    Additional History: N/A    Procedures Today: N/A    Urinalysis today:  Results for POC orders placed in visit on 04/29/19   POCT Urinalysis no Micro   Result Value Ref Range    Color, UA YELLOW     Clarity, UA CLEAR     Glucose, UA POC NEG     Bilirubin, UA      Ketones, UA      Spec Grav, UA      Blood, UA POC NEG     pH, UA      Protein, UA POC NEG     Urobilinogen, UA      Leukocytes, UA NEG     Nitrite, UA NEG        Imaging Reviewed during this Office Visit: none  (results were independently reviewed by physician and radiology report verified)    AUA Symptom Score (4/29/2019):  INCOMPLETE EMPTYING: How often have you had the sensation of not emptying your bladder?: Not at all  FREQUENCY: How often do you have to urinate less than every two hours?: Almost always  INTERMITTENCY: How often have you found you stopped and started again several times when you urinated?: Not at all  URGENCY: How often have you found it difficult to postpone urination?: Not at all  WEAK STREAM: How often have you had a weak urinary stream?: Not at all  STRAINING: Emphysema Father     Diabetes Mother     Heart Failure Mother     Osteoarthritis Mother      Outpatient Medications Marked as Taking for the 4/29/19 encounter (Office Visit) with Kim Sims MD   Medication Sig Dispense Refill    HYDROcodone-acetaminophen (NORCO) 5-325 MG per tablet Take 1 tablet by mouth every 8 hours as needed for Pain for up to 30 days.  90 tablet 0    MYRBETRIQ 50 MG TB24 Take 50 mg by mouth daily 30 tablet 11    fluticasone (FLONASE) 50 MCG/ACT nasal spray       lidocaine (XYLOCAINE) 5 % ointment       triamcinolone (KENALOG) 0.1 % cream       sulindac (CLINORIL) 150 MG tablet Take 150 mg by mouth 2 times daily      metFORMIN (GLUCOPHAGE) 500 MG tablet Take 1 tablet (500 mg) by mouth 3 times per day with meals  1    amLODIPine (NORVASC) 5 MG tablet take 1 tablet by mouth once daily, takes at night  0    acetaminophen (TYLENOL) 500 MG tablet Take 500 mg by mouth as needed for Pain      hydrOXYzine (ATARAX) 50 MG tablet 50 mg every 6 hours as needed   0    Blood Glucose Monitoring Suppl (TRUE METRIX METER) W/DEVICE KIT TEST 1 TO 2 TIMES DAILY AS DIRECTED  0    TRUE METRIX BLOOD GLUCOSE TEST strip TEST 1 TO 2 TIMES BEFORE MEALS OR AS DIRECTED  1    Lancets (BD LANCET ULTRAFINE 30G) MISC USE 1 TO 2 TIMES A DAY  1    alendronate (FOSAMAX) 35 MG tablet Take 1 tablet by mouth every 7 days 4 tablet 3    omeprazole (PRILOSEC) 20 MG capsule take 1 capsule by mouth once daily (Patient taking differently: take 1 capsule by mouth once patient takes at supper) 90 capsule 0    simvastatin (ZOCOR) 20 MG tablet take 1 tablet by mouth ONCE NIGHTLY 30 tablet 3    loratadine (CLARITIN) 10 MG tablet take 1 tablet by mouth once daily 30 tablet 3    venlafaxine (EFFEXOR-XR) 37.5 MG XR capsule take 1 capsule by mouth once daily (Patient taking differently: take 1 capsule by mouth every evening) 30 capsule 3    lisinopril-hydrochlorothiazide (PRINZIDE) 20-12.5 MG per tablet Take 1 tablet by Mucosal Advancement Flap Text: Given the location of the defect, shape of the defect and the proximity to free margins a mucosal advancement flap was deemed most appropriate. Incisions were made with a 15 blade scalpel in the appropriate fashion along the cutaneous vermilion border and the mucosal lip. The remaining actinically damaged mucosal tissue was excised.  The mucosal advancement flap was then elevated to the gingival sulcus with care taken to preserve the neurovascular structures and advanced into the primary defect. Care was taken to ensure that precise realignment of the vermilion border was achieved.

## 2021-09-08 ENCOUNTER — HOSPITAL ENCOUNTER (OUTPATIENT)
Dept: PAIN MANAGEMENT | Age: 66
Discharge: HOME | End: 2021-09-08
Payer: COMMERCIAL

## 2021-09-08 DIAGNOSIS — G89.29 CHRONIC PAIN OF BOTH KNEES: ICD-10-CM

## 2021-09-08 DIAGNOSIS — G89.29 ENCOUNTER FOR CHRONIC PAIN MANAGEMENT: ICD-10-CM

## 2021-09-08 DIAGNOSIS — M17.0 PRIMARY OSTEOARTHRITIS OF BOTH KNEES: ICD-10-CM

## 2021-09-08 DIAGNOSIS — M54.50 CHRONIC BILATERAL LOW BACK PAIN WITHOUT SCIATICA: ICD-10-CM

## 2021-09-08 DIAGNOSIS — Z51.81 ENCOUNTER FOR MEDICATION MONITORING: ICD-10-CM

## 2021-09-08 DIAGNOSIS — Z96.651 STATUS POST TOTAL RIGHT KNEE REPLACEMENT: Primary | ICD-10-CM

## 2021-09-08 DIAGNOSIS — G89.29 CHRONIC BILATERAL LOW BACK PAIN WITHOUT SCIATICA: ICD-10-CM

## 2021-09-08 DIAGNOSIS — M25.562 CHRONIC PAIN OF BOTH KNEES: ICD-10-CM

## 2021-09-08 DIAGNOSIS — Z96.653 STATUS POST TOTAL BILATERAL KNEE REPLACEMENT: ICD-10-CM

## 2021-09-08 DIAGNOSIS — M25.561 CHRONIC PAIN OF BOTH KNEES: ICD-10-CM

## 2021-09-08 DIAGNOSIS — F11.90 CHRONIC, CONTINUOUS USE OF OPIOIDS: ICD-10-CM

## 2021-09-08 PROCEDURE — 99213 OFFICE O/P EST LOW 20 MIN: CPT

## 2021-09-08 PROCEDURE — 99441 PR PHYS/QHP TELEPHONE EVALUATION 5-10 MIN: CPT | Performed by: NURSE PRACTITIONER

## 2021-09-08 RX ORDER — HYDROCODONE BITARTRATE AND ACETAMINOPHEN 5; 325 MG/1; MG/1
1 TABLET ORAL EVERY 8 HOURS PRN
Qty: 90 TABLET | Refills: 0 | Status: SHIPPED | OUTPATIENT
Start: 2021-09-10 | End: 2021-10-10

## 2021-09-08 ASSESSMENT — ENCOUNTER SYMPTOMS
GASTROINTESTINAL NEGATIVE: 1
EYES NEGATIVE: 1
RESPIRATORY NEGATIVE: 1

## 2021-09-08 NOTE — PROGRESS NOTES
medications provided:   HYDROCODONE: based on hydrocodone, norhydrocodone, hydromorphone   ________________________________________________________________   INTERPRETIVE INFORMATION: Targeted drug profile Interp   Interpretation depends on accuracy and completeness of patient   medication information submitted by client. 6-Acetylmorphine, Ur Not Detected   Final 07/09/2021  9:1          Quintenkyra Clif Fri Feb 7, 2020  9:53 [phone]    All Reviewers List    Anat Nails on 2/7/2020 10:46   Radiation Dose Estimates    No radiation information found for this patient   Narrative   X-Rays taken in clinic today and preliminarily reviewed by me   AP and lateral views of the left knee show evidence of revision total knee    arthroplasty components in appropriate position.  Longstem femoral and    tibial components. Patient does have evidence of osteopenia in the tibial    component.                     Pill count: appropriate    fill date : 9-    Morphine equivalent dose as reported on OARRS:15  Periodic Controlled Substance Monitoring: Possible medication side effects, risk of tolerance/dependence & alternative treatments discussed., No signs of potential drug abuse or diversion identified. , Assessed functional status., Obtaining appropriate analgesic effect of treatment. Zamzam Parrish, APRN - CNP)  Review ofOARRS does not show any aberrant prescription behavior. Medication is helping the patient stay active. Patient denies any side effects and reports adequate analgesia. No sign of misuse/abuse.             Past Medical History:   Diagnosis Date    Arthritis     Bronchitis     Bunion of left foot     Diabetes mellitus ([address]     not on any meds    Eczema     arms, trunk    GERD (gastroesophageal reflux disease)     Hearing loss     left and right ears, wears hearing aids    Hyperlipidemia     Hypertension     Kidney infection     Osteoporosis     Urge incontinence     Urinary frequency  Wears glasses     for reading       Past Surgical History:   Procedure Laterality Date    APPENDECTOMY      WITH HYSTERECTOMY    ARTHROPLASTY Left 10/12/2017    METATARSAL HEAD RESECTION 3RD LEFT FOOT  & EXCISION LESION SOFT TISSUE LEFT FOOT performed [facility] Vahe Gu[facility]ro DPM [facility] BLEPHAROPLASTY Bilateral     BREAST BIOPSY Left     benign    COLONOSCOPY      CYST REMOVAL Right     HEEL    CYST REMOVAL Right     wrist    FINE NEEDLE ASPIRATION  05/26/2020    left knee    FOOT SURGERY Left 04/12/2019    bunionectomy    HYSTERECTOMY      JOINT REPLACEMENT Bilateral     lt had revision knees    KNEE ARTHROSCOPY Right 8/12/15    Dr Zuluaga Woodruff ARTHROSCOPY Left 02/22/2017     &RIGHT  KNEE MANIPULATION    OTHER SURGICAL HISTORY  07/10/2019    SACRAL NERVE STIMULATOR IMPLANT STAGE 1 AND 2      [place] COLON CA SCRN NOT [facility] IND N/A 7/17/2018    COLONOSCOPY performed by Shanique Funes MD at [place] Left 2/22/2017    KNEE ARTHROSCOPY FOR LATERAL PATELLA RELEASE, SYNOVECTOMY AND STEROID INJECTION, LEFT KNEE. MANIPULATION OF RIGHT KNEE. performed by Frank Schreiber MD at [address] N/A 7/10/2019    SACRAL NERVE STIMULATOR IMPLANT STAGE 1 AND 2  C-ARM performed by Neda Streeter MD at [address] [place] [address] Left 05/02/2017    3rd toe    TONSILLECTOMY      TYMPANOPLASTY Left        Allergies   Allergen Reactions    Aspirin Other (See Comments)     Chest pain    Celecoxib     Codeine Other (See Comments)     Chest pain    Avelox [Moxifloxacin] Rash    Lidoderm [Lidocaine] Rash     Skin reddened , itching    Sulfa Antibiotics Rash         Current Outpatient Medications:     chlorhexidine (PERIDEX) 0.12 % solution, RINSE WITH 1/4 OUNCE BY MOUTH FOR 30 SECONDS THEN SPIT OUT. DO NO. ..  (REFER TO PRESCRIPTION NOTES). , Disp: , Rfl:     B Complex Vitamins (VITAMIN B COMPLEX PO), Take by mouth, Disp: , Rfl:     venlafaxine (EFFEXOR) 75 MG tablet, take 1 tablet by mouth once daily with food, Disp: , Rfl:     HYDROcodone-acetaminophen (NORCO) 5-325 MG per tablet, , Disp: , Rfl:     gabapentin (NEURONTIN) 100 MG capsule, take 1 capsule by mouth once daily at bedtime, Disp: , Rfl: 0    amLODIPine (NORVASC) 5 MG tablet, take 1 tablet by mouth once daily, takes at night, Disp: , Rfl: 0    omeprazole (PRILOSEC) 20 MG capsule, take 1 capsule by mouth once daily (Patient taking differently: take 1 capsule by mouth once patient takes at supper), Disp: 90 capsule, Rfl: 0    simvastatin (ZOCOR) 20 MG tablet, take 1 tablet by mouth ONCE NIGHTLY, Disp: 30 tablet, Rfl: 3    loratadine (CLARITIN) 10 MG tablet, take 1 tablet by mouth once daily, Disp: 30 tablet, Rfl: 3    lisinopril-hydrochlorothiazide (PRINZIDE) 20-12.5 MG per tablet, Take 1 tablet by mouth daily, Disp: 30 tablet, Rfl: 3    Calcium Carbonate-Vitamin D (CALCIUM + D PO), Take by mouth, Disp: , Rfl:     busPIRone (BUSPAR) 10 MG tablet, Take 1 tablet by mouth 2 times daily. (Patient taking differently: Take 10 mg by mouth 2 times daily as needed ), Disp: 60 tablet, Rfl: 3    HYDROcodone-acetaminophen (NORCO) 5-325 MG per tablet, Take 1 tablet by mouth every 8 hours as needed for Pain for up to 30 days. , Disp: 90 tablet, Rfl: 0    benzonatate (TESSALON) 100 MG capsule, take 1 capsule by mouth three times a day if needed for cough, Disp: , Rfl:     doxepin (SINEQUAN) 25 MG capsule, take 1 capsule by mouth twice a day if needed itching, Disp: , Rfl:     diclofenac sodium (VOLTAREN) 1 % GEL, diclofenac 1 % topical gel  apply 2 grams topically to affected area four times a day, Disp: , Rfl:     triamcinolone (KENALOG) 0.1 % cream, apply 1 APPLICATION externally to affected area twice a day if needed for itching, Disp: , Rfl:     fluticasone (FLONASE) 50 MCG/ACT nasal spray, , Disp: , Rfl:     acetaminophen (TYLENOL) 500 MG tablet, Take 500 mg by mouth Endocrine: Negative. Hematologic/Lymphatic: Bruises/bleeds easily. Skin: Negative. Musculoskeletal: Positive for joint pain. Gastrointestinal: Negative. Neurological: Negative. Physical Exam:  There were no vitals taken for this visit. Physical Exam  Skin:         Neurological:      Mental Status: She is alert and oriented to person, place, and time. Psychiatric:         Mood and Affect: Mood normal.         Thought Content: Thought content normal.           Assessment:      Problem List Items Addressed This Visit     Status post total right knee replacement - Primary    Status post total bilateral knee replacement    Primary osteoarthritis of both knees    Encounter for medication monitoring    Encounter for chronic pain management    Chronic, continuous use of opioids    Chronic bilateral low back pain without sciatica            Treatment Plan:  DISCUSSION: Treatment options discussed withpatient and all questions answered to patient's satisfaction. Possible side effects, risk of tolerance and or dependence and alternative treatments discussed    Obtaining appropriate analgesic effect of treatment   No signs of potential drug abuse or diversion identified    [x] Ill effects of being on chronic pain medications such as sleep disturbances, respiratory depression, hormonal changes, withdrawal symptoms, chronic opioid dependence and tolerance as well as risk of taking opioids with Benzodiazepines and taking opioids along with alcohol,  werediscussed with patient. I had asked the patient to minimize medication use and utilize pain medications only for uncontrolled rest pain or pain with exertional activities. I advised patient not to self-escalate painmedications without consulting with us. At each of patient's future visits we will try to taper pain medications, while adjusting the adjunct medications, and re-evaluating for Physical Therapy to improve spinal andjoint strength.  We will continue to have discussions to decrease pain medications as tolerated. Counseled patient on effects their pain medication and /or their medical condition mayhave on their  ability to drive or operate machinery. Instructed not to drive or operate machinery if drowsy     I also discussed with the patient regarding the dangers of combining narcotic pain medication with tranquilizers, alcohol or illegal drugs or taking the medication any way other than prescribed. The dangers were discussed  including respiratory depression and death. Patient was told to tell  all  physicians regarding the medications he is getting from pain clinic. Patient is warned not to take any unprescribed medications over-the-countermedications that can depress breathing . Patient is advised to talk to the pharmacist or physicians if planning to take any over-the-counter medications before  takeing them. Patient is strongly advised to avoid tranquilizers or  relaxants, illegal drugs  or any medications that can depress breathing  Patient is also advised to tell us if there is any changes in their medications from other physicians.             TREATMENT OPTIONS:       Medication Agreement Requirements Met  Continue Opioid therapy  Script written for  hydrocodone  Follow up appointment made

## 2021-10-06 ENCOUNTER — HOSPITAL ENCOUNTER (OUTPATIENT)
Dept: PAIN MANAGEMENT | Age: 66
Discharge: HOME | End: 2021-10-06
Payer: COMMERCIAL

## 2021-10-06 DIAGNOSIS — M17.0 PRIMARY OSTEOARTHRITIS OF BOTH KNEES: ICD-10-CM

## 2021-10-06 DIAGNOSIS — M25.562 CHRONIC PAIN OF BOTH KNEES: Primary | ICD-10-CM

## 2021-10-06 DIAGNOSIS — G89.29 ENCOUNTER FOR CHRONIC PAIN MANAGEMENT: ICD-10-CM

## 2021-10-06 DIAGNOSIS — M54.50 CHRONIC BILATERAL LOW BACK PAIN WITHOUT SCIATICA: ICD-10-CM

## 2021-10-06 DIAGNOSIS — Z51.81 ENCOUNTER FOR MEDICATION MONITORING: ICD-10-CM

## 2021-10-06 DIAGNOSIS — G89.29 CHRONIC PAIN OF BOTH KNEES: Primary | ICD-10-CM

## 2021-10-06 DIAGNOSIS — G89.29 CHRONIC BILATERAL LOW BACK PAIN WITHOUT SCIATICA: ICD-10-CM

## 2021-10-06 DIAGNOSIS — M25.561 CHRONIC PAIN OF BOTH KNEES: Primary | ICD-10-CM

## 2021-10-06 DIAGNOSIS — F11.90 CHRONIC, CONTINUOUS USE OF OPIOIDS: ICD-10-CM

## 2021-10-06 DIAGNOSIS — Z96.653 STATUS POST TOTAL BILATERAL KNEE REPLACEMENT: ICD-10-CM

## 2021-10-06 PROCEDURE — 99442 PR PHYS/QHP TELEPHONE EVALUATION 11-20 MIN: CPT | Performed by: NURSE PRACTITIONER

## 2021-10-06 PROCEDURE — 99213 OFFICE O/P EST LOW 20 MIN: CPT

## 2021-10-06 RX ORDER — DEXAMETHASONE 0.5 MG/5ML
ELIXIR ORAL
COMMUNITY

## 2021-10-06 RX ORDER — HYDROCODONE BITARTRATE AND ACETAMINOPHEN 5; 325 MG/1; MG/1
1 TABLET ORAL EVERY 8 HOURS PRN
Qty: 90 TABLET | Refills: 0 | Status: SHIPPED | OUTPATIENT
Start: 2021-10-10 | End: 2021-11-08 | Stop reason: SDUPTHER

## 2021-10-06 ASSESSMENT — ENCOUNTER SYMPTOMS
BACK PAIN: 1
GASTROINTESTINAL NEGATIVE: 1
EYES NEGATIVE: 1
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
## 2021-10-19 ENCOUNTER — OFFICE VISIT (OUTPATIENT)
Dept: FAMILY MEDICINE CLINIC | Age: 66
End: 2021-10-19
Payer: MEDICARE

## 2021-10-19 ENCOUNTER — HOSPITAL ENCOUNTER (OUTPATIENT)
Age: 66
Setting detail: SPECIMEN
Discharge: HOME | End: 2021-10-19
Payer: MEDICARE

## 2021-10-19 VITALS
SYSTOLIC BLOOD PRESSURE: 140 MMHG | OXYGEN SATURATION: 97 % | DIASTOLIC BLOOD PRESSURE: 82 MMHG | TEMPERATURE: 98.1 F | HEART RATE: 88 BPM

## 2021-10-19 DIAGNOSIS — R05.9 COUGH: ICD-10-CM

## 2021-10-19 DIAGNOSIS — Z20.822 EXPOSURE TO COVID-19 VIRUS: ICD-10-CM

## 2021-10-19 DIAGNOSIS — R09.81 NOSE CONGESTION: ICD-10-CM

## 2021-10-19 DIAGNOSIS — Z20.822 SUSPECTED COVID-19 VIRUS INFECTION: Primary | ICD-10-CM

## 2021-10-19 PROCEDURE — 99213 OFFICE O/P EST LOW 20 MIN: CPT | Performed by: NURSE PRACTITIONER

## 2021-10-19 ASSESSMENT — ENCOUNTER SYMPTOMS
ABDOMINAL PAIN: 0
SORE THROAT: 0
CHEST TIGHTNESS: 0
DIARRHEA: 0
ALLERGIC/IMMUNOLOGIC NEGATIVE: 1
EYE ITCHING: 0
EYE DISCHARGE: 0
SHORTNESS OF BREATH: 0
EYES NEGATIVE: 1
VOMITING: 0
COUGH: 1
NAUSEA: 0

## 2021-10-19 ASSESSMENT — PATIENT HEALTH QUESTIONNAIRE - PHQ9
SUM OF ALL RESPONSES TO PHQ QUESTIONS 1-9: 0
1. LITTLE INTEREST OR PLEASURE IN DOING THINGS: 0
SUM OF ALL RESPONSES TO PHQ9 QUESTIONS 1 & 2: 0
2. FEELING DOWN, DEPRESSED OR HOPELESS: 0

## 2021-10-19 NOTE — PROGRESS NOTES
[address] [address]  Dept: [phone]  Dept Fax: [phone]    Rema Card is a 77 y.o. female who presents today forher medical conditions/complaints as noted below. Rema Card is c/o of   Chief Complaint   Patient presents with    Congestion     onset 1 wk     Cough     HPI:     Cough  This is a new problem. The current episode started in the past 7 days. The problem has been unchanged. The problem occurs every few minutes. Associated symptoms include nasal congestion. Pertinent negatives include no chest pain, chills, fever, headaches, postnasal drip, rash, sore throat or shortness of breath. She has tried nothing for the symptoms. Reports exposure to Covid + person   Chills, cough meds nasal congestion.   Tylenol PRN     Past Medical History:   Diagnosis Date    Arthritis     Bronchitis     Bunion of left foot     Diabetes mellitus ([address]     not on any meds    Eczema     arms, trunk    GERD (gastroesophageal reflux disease)     Hearing loss     left and right ears, wears hearing aids    Hyperlipidemia     Hypertension     Kidney infection     Osteoporosis     Urge incontinence     Urinary frequency     Wears glasses     for reading      Past Surgical History:   Procedure Laterality Date    APPENDECTOMY      WITH HYSTERECTOMY    ARTHROPLASTY Left 10/12/2017    METATARSAL HEAD RESECTION 3RD LEFT FOOT  & EXCISION LESION SOFT TISSUE LEFT FOOT performed [facility] Jearl Rubinstein, DPM [facility] BLEPHAROPLASTY Bilateral     BREAST BIOPSY Left     benign    COLONOSCOPY      CYST REMOVAL Right     HEEL    CYST REMOVAL Right     wrist    FINE NEEDLE ASPIRATION  05/26/2020    left knee    FOOT SURGERY Left 04/12/2019    bunionectomy    HYSTERECTOMY      JOINT REPLACEMENT Bilateral     lt had revision knees    KNEE ARTHROSCOPY Right 8/12/15    Dr Celestine Leong ARTHROSCOPY Left 02/22/2017     &RIGHT  KNEE MANIPULATION    OTHER SURGICAL HISTORY  07/10/2019    SACRAL NERVE STIMULATOR IMPLANT STAGE 1 AND 2      UT COLON CA SCRN NOT HI RSK IND N/A 7/17/2018    COLONOSCOPY performed by Amilcar Melara MD at [place] Left 2/22/2017    KNEE ARTHROSCOPY FOR LATERAL PATELLA RELEASE, SYNOVECTOMY AND STEROID INJECTION, LEFT KNEE. MANIPULATION OF RIGHT KNEE. performed by Cherrie Sullivan MD at [address] N/A 7/10/2019    SACRAL NERVE STIMULATOR IMPLANT STAGE 1 AND 2  C-ARM performed by Aaron Kaplan MD at [facility] Left 05/02/2017    3rd toe    TONSILLECTOMY      TYMPANOPLASTY Left        Family History   Problem Relation Age of Onset    Stroke Father     Emphysema Father     Diabetes Mother     Heart Failure Mother     Osteoarthritis Mother        Social History     Tobacco Use    Smoking status: Never Smoker    Smokeless tobacco: Never Used   Substance Use Topics    Alcohol use: No     Alcohol/week: 0.0 standard drinks      Current Outpatient Medications   Medication Sig Dispense Refill    dexamethasone 0.5 MG/5ML elixir dexamethasone 0.5 mg/5 mL oral solution   RINSE with 5 milliliters for 1 MINUTE four times a day SPIT DO NOT EAT OR DRINK for 30 MINUTES      HYDROcodone-acetaminophen (NORCO) 5-325 MG per tablet Take 1 tablet by mouth every 8 hours as needed for Pain for up to 30 days. 90 tablet 0    chlorhexidine (PERIDEX) 0.12 % solution RINSE WITH 1/4 OUNCE BY MOUTH FOR 30 SECONDS THEN SPIT OUT. DO NO. ..  (REFER TO PRESCRIPTION NOTES).       B Complex Vitamins (VITAMIN B COMPLEX PO) Take by mouth      venlafaxine (EFFEXOR) 75 MG tablet take 1 tablet by mouth once daily with food      HYDROcodone-acetaminophen (NORCO) 5-325 MG per tablet       benzonatate (TESSALON) 100 MG capsule take 1 capsule by mouth three times a day if needed for cough      doxepin (SINEQUAN) 25 MG capsule take 1 capsule by mouth twice a day if needed itching      diclofenac sodium (VOLTAREN) 1 % GEL diclofenac 1 % topical gel   apply 2 grams topically to affected area four times a day      triamcinolone (KENALOG) 0.1 % cream apply 1 APPLICATION externally to affected area twice a day if needed for itching      gabapentin (NEURONTIN) 100 MG capsule take 1 capsule by mouth once daily at bedtime  0    fluticasone (FLONASE) 50 MCG/ACT nasal spray       amLODIPine (NORVASC) 5 MG tablet take 1 tablet by mouth once daily, takes at night  0    acetaminophen (TYLENOL) 500 MG tablet Take 500 mg by mouth as needed for Pain      hydrOXYzine (ATARAX) 50 MG tablet 50 mg every 6 hours as needed   0    omeprazole (PRILOSEC) 20 MG capsule take 1 capsule by mouth once daily (Patient taking differently: take 1 capsule by mouth once patient takes at supper) 90 capsule 0    simvastatin (ZOCOR) 20 MG tablet take 1 tablet by mouth ONCE NIGHTLY 30 tablet 3    loratadine (CLARITIN) 10 MG tablet take 1 tablet by mouth once daily 30 tablet 3    lisinopril-hydrochlorothiazide (PRINZIDE) 20-12.5 MG per tablet Take 1 tablet by mouth daily 30 tablet 3    Calcium Carbonate-Vitamin D (CALCIUM + D PO) Take by mouth      busPIRone (BUSPAR) 10 MG tablet Take 1 tablet by mouth 2 times daily. (Patient taking differently: Take 10 mg by mouth 2 times daily as needed ) 60 tablet 3     No current facility-administered medications for this visit. Allergies   Allergen Reactions    Aspirin Other (See Comments)     Chest pain    Celecoxib     Codeine Other (See Comments)     Chest pain    Avelox [Moxifloxacin] Rash    Lidoderm [Lidocaine] Rash     Skin reddened , itching    Sulfa Antibiotics Rash     Subjective:      Review of Systems   Constitutional: Negative for appetite change, chills, fatigue and fever. HENT: Positive for congestion. Negative for postnasal drip and sore throat. Eyes: Negative.   Negative for discharge and itching. Respiratory: Positive for cough. Negative for chest tightness and shortness of breath. Cardiovascular: Negative for chest pain, palpitations and leg swelling. Gastrointestinal: Negative for abdominal pain, diarrhea, nausea and vomiting. Endocrine: Negative. Genitourinary: Negative for dysuria, frequency and urgency. Musculoskeletal: Negative for neck pain and neck stiffness. Skin: Negative for rash. Allergic/Immunologic: Negative. Neurological: Negative for dizziness, weakness, light-headedness and headaches. Hematological: Negative for adenopathy. Psychiatric/Behavioral: Negative for confusion. Objective:      Physical Exam  Vitals reviewed. Constitutional:       Appearance: She is well-developed. HENT:      Head: Normocephalic. Right Ear: External ear normal.      Left Ear: External ear normal.      Nose: Nose normal.   Eyes:      Conjunctiva/sclera: Conjunctivae normal.      Pupils: Pupils are equal, round, and reactive to light. Cardiovascular:      Rate and Rhythm: Normal rate and regular rhythm. Heart sounds: Normal heart sounds, S1 normal and S2 normal. No murmur heard. No friction rub. No gallop. Pulmonary:      Effort: Pulmonary effort is normal. No respiratory distress. Breath sounds: Normal breath sounds. No stridor, decreased air movement or transmitted upper airway sounds. No decreased breath sounds, wheezing, rhonchi or rales. Abdominal:      General: Bowel sounds are normal.      Palpations: Abdomen is soft. Musculoskeletal:         General: Normal range of motion. Cervical back: Normal range of motion and neck supple. Lymphadenopathy:      Cervical: No cervical adenopathy. Skin:     General: Skin is warm and dry. Findings: No rash. Neurological:      Mental Status: She is alert and oriented to person, place, and time. Cranial Nerves: No cranial nerve deficit.       Coordination: Coordination normal.      Deep Tendon Reflexes: Reflexes are normal and symmetric. Psychiatric:         Thought Content: Thought content normal.       BP (!) 140/82   Pulse 88   Temp 98.1 °F (36.7 °C)   SpO2 97%     Assessment:       Diagnosis Orders   1. Suspected COVID-19 virus infection  COVID-19   2. Cough  COVID-19   3. Nose congestion  COVID-19   4. Exposure to COVID-19 virus  COVID-19           Plan:     1.) Covid swab obtained and sent to lab- will call with results   2.) Quarantine while waiting for Covid results   3.) Symptom management encouraged   4.) Follow-up with PCP PRN     Advance Care Planning  People with COVID-19 may have no symptoms, mild symptoms, such as fever, cough, and shortness of breath or they may have more severe illness, developing severe and fatal pneumonia. As a result, Advance Care Planning with attention to naming a health care decision maker (someone you trust to make healthcare decisions for you if you could not speak for yourself) and sharing other health care preferences is important BEFORE a possible health crisis. Please contact your Primary Care Provider to discuss Advance Care Planning. Preventing the Spread of Coronavirus Disease 2019 in Homes and Residential Communities  For the most recent information go to [email or web address]    Prevention steps for People with confirmed or suspected COVID-19 (including persons under investigation) who do not need to be hospitalized  and   People with confirmed COVID-19 who were hospitalized and determined to be medically stable to go home    Your healthcare provider and public health staff will evaluate whether you can be cared for at home. If it is determined that you do not need to be hospitalized and can be isolated at home, you will be monitored by staff from your local or state health department.  You should follow the prevention steps below until a healthcare provider or local or state health department says you can return to your normal activities. Stay home except to get medical care  People who are mildly ill with COVID-19 are able to isolate at home during their illness. You should restrict activities outside your home, except for getting medical care. Do not go to work, school, or public areas. Avoid using public transportation, ride-sharing, or taxis. Separate yourself from other people and animals in your home  People: As much as possible, you should stay in a specific room and away from other people in your home. Also, you should use a separate bathroom, if available. Animals: You should restrict contact with pets and other animals while you are sick with COVID-19, just like you would around other people. Although there have not been reports of pets or other animals becoming sick with COVID-19, it is still recommended that people sick with COVID-19 limit contact with animals until more information is known about the virus. When possible, have another member of your household care for your animals while you are sick. If you are sick with COVID-19, avoid contact with your pet, including petting, snuggling, being kissed or licked, and sharing food. If you must care for your pet or be around animals while you are sick, wash your hands before and after you interact with pets and wear a facemask. Call ahead before visiting your doctor  If you have a medical appointment, call the healthcare provider and tell them that you have or may have COVID-19. This will help the healthcare providers office take steps to keep other people from getting infected or exposed. Wear a facemask  You should wear a facemask when you are around other people (e.g., sharing a room or vehicle) or pets and before you enter a healthcare providers office.  If you are not able to wear a facemask (for example, because it causes trouble breathing), then people who live with you should not stay in the same room with you, or they should wear a facemask if they enter your room. Cover your coughs and sneezes  Cover your mouth and nose with a tissue when you cough or sneeze. Throw used tissues in a lined trash can. Immediately wash your hands with soap and water for at least 20 seconds or, if soap and water are not available, clean your hands with an alcohol-based hand  that contains at least 60% alcohol. Clean your hands often  Wash your hands often with soap and water for at least 20 seconds, especially after blowing your nose, coughing, or sneezing; going to the bathroom; and before eating or preparing food. If soap and water are not readily available, use an alcohol-based hand  with at least 60% alcohol, covering all surfaces of your hands and rubbing them together until they feel dry. Soap and water are the best option if hands are visibly dirty. Avoid touching your eyes, nose, and mouth with unwashed hands. Avoid sharing personal household items  You should not share dishes, drinking glasses, cups, eating utensils, towels, or bedding with other people or pets in your home. After using these items, they should be washed thoroughly with soap and water. Clean all high-touch surfaces everyday  High touch surfaces include counters, tabletops, doorknobs, bathroom fixtures, toilets, phones, keyboards, tablets, and bedside tables. Also, clean any surfaces that may have blood, stool, or body fluids on them. Use a household cleaning spray or wipe, according to the label instructions. Labels contain instructions for safe and effective use of the cleaning product including precautions you should take when applying the product, such as wearing gloves and making sure you have good ventilation during use of the product. Monitor your symptoms  Seek prompt medical attention if your illness is worsening (e.g., difficulty breathing).  Before seeking care, call your healthcare provider and tell them that you have, or are being evaluated for, COVID-19. Put on a facemask before you enter the facility. These steps will help the healthcare providers office to keep other people in the office or waiting room from getting infected or exposed. Ask your healthcare provider to call the local or state health department. Persons who are placed under active monitoring or facilitated self-monitoring should follow instructions provided by their local health department or occupational health professionals, as appropriate. When working with your local health department check their available hours. If you have a medical emergency and need to call 911, notify the dispatch personnel that you have, or are being evaluated for COVID-19. If possible, put on a facemask before emergency medical services arrive. Discontinuing home isolation  Patients with confirmed COVID-19 should remain under home isolation precautions until the risk of secondary transmission to others is thought to be low. The decision to discontinue home isolation precautions should be made on a case-by-case basis, in consultation with healthcare providers and state and [facility] health departments. Problem List     None           Patient given educationalmaterials - see patient instructions. Discussed use, benefit, and side effectsof prescribed medications. All patient questions answered. Pt verbalized understanding. Instructed to continue current medications, diet and exercise. Patient agreedwith treatment plan. Follow up as directed.      Electronically signed by DARREL Garg CNP on 10/19/2021 at 1:58 PM

## 2021-10-19 NOTE — PATIENT INSTRUCTIONS

## 2021-10-20 DIAGNOSIS — R09.81 NOSE CONGESTION: ICD-10-CM

## 2021-10-20 DIAGNOSIS — R05.9 COUGH: ICD-10-CM

## 2021-10-20 DIAGNOSIS — Z20.822 SUSPECTED COVID-19 VIRUS INFECTION: ICD-10-CM

## 2021-10-20 DIAGNOSIS — Z20.822 EXPOSURE TO COVID-19 VIRUS: ICD-10-CM

## 2021-10-20 LAB
SARS-COV-2: NORMAL
SARS-COV-2: NOT DETECTED
SOURCE: NORMAL

## 2021-11-03 ASSESSMENT — ENCOUNTER SYMPTOMS
CONSTIPATION: 0
EYE REDNESS: 0
VOMITING: 0
WHEEZING: 0
GASTROINTESTINAL NEGATIVE: 1
ABDOMINAL PAIN: 0
EYE PAIN: 0
BACK PAIN: 0
EYES NEGATIVE: 1
ALLERGIC/IMMUNOLOGIC NEGATIVE: 1
NAUSEA: 0
RESPIRATORY NEGATIVE: 1
SHORTNESS OF BREATH: 0

## 2021-11-03 NOTE — PROGRESS NOTES
Ashley Mead is a 74 y.o. female evaluated on 11/8/2021. Modality of virtual service provided -via telephone   Consent:  Patient and/or health care decision maker is aware that that patient may receive a bill for this telephone service, depending on one's insurance coverage, and has provided verbal consent to proceed: Yes    Patient identification was verified at the start of the visit: Yes    Chief complaint: Ashley Mead is 74 y.o.,  female, with  with chief complaint of pain involving bilateral knee pain. .    Patient is complaining of increased pain in her knees especially in the left knee. Apparently patient had a change in insurance and she received only 21 pills/month according to the patient. She has been out of her medication and her pain levels have gone up. She reports she is not able to function. Her sleep patterns are poor. She wakes up in the middle of the night with severe pain. Patient had several surgeries on her knees and present only medications are controlling her pain. Knee Pain   The incident occurred more than 1 week ago. There was no injury mechanism. The pain is present in the left knee and right knee. Quality: sharp. The pain is at a severity of 7/10 (4-9). The pain is severe. The pain has been constant since onset. Pertinent negatives include no numbness or tingling. Foreign body present: mechanical knee. The symptoms are aggravated by weight bearing. Treatments tried: percocet. The treatment provided mild relief. Alleviating factors:ice and rest   Lifestyle changes experienced with pain: Wakes from sleep, Prevents or limits ADLs, Increases w/activity.  , Increases w/prolonged sitting/standing/walking  Mood changes,none  Patient currently disability. Physical therapy did not help the pain.     Are you under psychological counseling at present: No  Goals for treatment include:  Decrease in pain  Enjoy daily and recreational activities, return to previous status. Patient relates current medications are helping the pain. Patient reports taking pain medications as prescribed, denies obtaining medications from different sources and denies use of illegal drugs. Patient denies side effects from medications like nausea, vomiting, constipation or drowsiness. Patient reports current activities of daily living ar possible due to medications and would like to continue them. ACTIVITY/SOCIAL/EMOTIONAL:  Sleep Pattern: 6 hours per night.  difficulty falling asleep, nightime awakenings and difficulty falling back asleep if awakened  Home Exercises:  Knee exercises  Activity:not significantly changed  Emotional Issues: normal.   Currently seeing a Psychiatrist or Psychologist:  No     ADVERSE MEDICATION EFFECTS:   Nausea and vomiting: no   Constipation: yes-Undercontrol-: no  Dizziness/drowsy/sleepy--no  Urinary Retention: yes    ABERRANT BEHAVIORS SINCE LAST VISIT  Lost rx/pills:------------------------------------------ no  Taking  medication as prescribed: ----------- yes  Urine Drug Screen ---------------------------------  yes             Date-------------------------------------------------7/13/ 2021              Results as expected ---------------------yes    Recent ER visits: -------------------------------------Yes and No  Pill count is appropriate: ---------------------------yes   Refills for prescriptions appropriate:---------- yes      Past Medical History:   Diagnosis Date    Arthritis     Bronchitis     Bunion of left foot     Diabetes mellitus ([address]     not on any meds    Eczema     arms, trunk    GERD (gastroesophageal reflux disease)     Hearing loss     left and right ears, wears hearing aids    Hyperlipidemia     Hypertension     Kidney infection     Osteoporosis     Urge incontinence     Urinary frequency     Wears glasses     for reading       Past Surgical History:   Procedure Laterality Date    APPENDECTOMY      WITH HYSTERECTOMY    ARTHROPLASTY Left 10/12/2017    METATARSAL HEAD RESECTION 3RD LEFT FOOT  & EXCISION LESION SOFT TISSUE LEFT FOOT performed by Mir Mathur DPM at [facility] BLEPHAROPLASTY Bilateral     BREAST BIOPSY Left     benign    COLONOSCOPY      CYST REMOVAL Right     HEEL    CYST REMOVAL Right     wrist    FINE NEEDLE ASPIRATION  05/26/2020    left knee    FOOT SURGERY Left 04/12/2019    bunionectomy    HYSTERECTOMY      JOINT REPLACEMENT Bilateral     lt had revision knees    KNEE ARTHROSCOPY Right 8/12/15    Dr Anna Gains ARTHROSCOPY Left 02/22/2017     &RIGHT  KNEE MANIPULATION    OTHER SURGICAL HISTORY  07/10/2019    SACRAL NERVE STIMULATOR IMPLANT STAGE 1 AND 2      MO COLON CA SCRN NOT HI RSK IND N/A 7/17/2018    COLONOSCOPY performed by Jhonny Pimentel MD at [place] Left 2/22/2017    KNEE ARTHROSCOPY FOR LATERAL PATELLA RELEASE, SYNOVECTOMY AND STEROID INJECTION, LEFT KNEE. MANIPULATION OF RIGHT KNEE.  performed by Desire Cook MD at [address] N/A 7/10/2019    SACRAL NERVE STIMULATOR IMPLANT STAGE 1 AND 2  C-ARM performed by Sunni Gama MD at [facility] [employer] TOE SURGERY Left 05/02/2017    3rd toe    TONSILLECTOMY      TYMPANOPLASTY Left        Family History   Problem Relation Age of Onset    Stroke Father     Emphysema Father     Diabetes Mother     Heart Failure Mother     Osteoarthritis Mother        Social History     Socioeconomic History    Marital status:      Spouse name: None    Number of children: None    Years of education: None    Highest education level: None   Occupational History    Occupation: disability   Tobacco Use    Smoking status: Never Smoker    Smokeless tobacco: Never Used   Vaping Use    Vaping Use: Never used   Substance and Sexual Activity    Alcohol use: No     Alcohol/week: 0.0 standard drinks    Drug use: No    Sexual activity: Not Currently Other Topics Concern    None   Social History Narrative    None     Social Determinants of Health     Financial Resource Strain:     Difficulty of Paying Living Expenses: Not on file   Food Insecurity:     Worried About Running Out of Food in the Last Year: Not on file    Mahendra of Food in the Last Year: Not on file   Transportation Needs:     Lack of Transportation (Medical): Not on file    Lack of Transportation (Non-Medical):  Not on file   Physical Activity:     Days of Exercise per Week: Not on file    Minutes of Exercise per Session: Not on file   Stress:     Feeling of Stress : Not on file   Social Connections:     Frequency of Communication with Friends and Family: Not on file    Frequency of Social Gatherings with Friends and Family: Not on file    Attends Holiness Services: Not on file    Active Member of [address] [place] [address] [employer] or Organizations: Not on file    Attends Club or Organization Meetings: Not on file    Marital Status: Not on file   Intimate Partner Violence:     Fear of Current or Ex-Partner: Not on file    Emotionally Abused: Not on file    Physically Abused: Not on file    Sexually Abused: Not on file   Housing Stability:     Unable to Pay for Housing in the Last Year: Not on file    Number of [place] in the Last Year: Not on file    Unstable Housing in the Last Year: Not on file       Allergies   Allergen Reactions    Aspirin Other (See Comments)     Chest pain    Celecoxib     Codeine Other (See Comments)     Chest pain    Avelox [Moxifloxacin] Rash    Lidoderm [Lidocaine] Rash     Skin reddened , itching    Sulfa Antibiotics Rash       Current Outpatient Medications on File Prior to Encounter   Medication Sig Dispense Refill    dexamethasone 0.5 MG/5ML elixir dexamethasone 0.5 mg/5 mL oral solution   RINSE with 5 milliliters for 1 MINUTE four times a day SPIT DO NOT EAT OR DRINK for 30 MINUTES      chlorhexidine (PERIDEX) 0.12 % solution RINSE WITH 1/4 OUNCE BY MOUTH FOR 30 SECONDS THEN SPIT OUT. DO NO. ..  (REFER TO PRESCRIPTION NOTES).  B Complex Vitamins (VITAMIN B COMPLEX PO) Take by mouth      venlafaxine (EFFEXOR) 75 MG tablet take 1 tablet by mouth once daily with food      HYDROcodone-acetaminophen (NORCO) 5-325 MG per tablet       benzonatate (TESSALON) 100 MG capsule take 1 capsule by mouth three times a day if needed for cough      doxepin (SINEQUAN) 25 MG capsule take 1 capsule by mouth twice a day if needed itching      diclofenac sodium (VOLTAREN) 1 % GEL diclofenac 1 % topical gel   apply 2 grams topically to affected area four times a day      triamcinolone (KENALOG) 0.1 % cream apply 1 APPLICATION externally to affected area twice a day if needed for itching      gabapentin (NEURONTIN) 100 MG capsule take 1 capsule by mouth once daily at bedtime  0    fluticasone (FLONASE) 50 MCG/ACT nasal spray       amLODIPine (NORVASC) 5 MG tablet take 1 tablet by mouth once daily, takes at night  0    acetaminophen (TYLENOL) 500 MG tablet Take 500 mg by mouth as needed for Pain      hydrOXYzine (ATARAX) 50 MG tablet 50 mg every 6 hours as needed   0    omeprazole (PRILOSEC) 20 MG capsule take 1 capsule by mouth once daily (Patient taking differently: take 1 capsule by mouth once patient takes at supper) 90 capsule 0    simvastatin (ZOCOR) 20 MG tablet take 1 tablet by mouth ONCE NIGHTLY 30 tablet 3    loratadine (CLARITIN) 10 MG tablet take 1 tablet by mouth once daily 30 tablet 3    lisinopril-hydrochlorothiazide (PRINZIDE) 20-12.5 MG per tablet Take 1 tablet by mouth daily 30 tablet 3    Calcium Carbonate-Vitamin D (CALCIUM + D PO) Take by mouth      busPIRone (BUSPAR) 10 MG tablet Take 1 tablet by mouth 2 times daily. (Patient taking differently: Take 10 mg by mouth 2 times daily as needed ) 60 tablet 3     No current facility-administered medications on file prior to encounter.         Review of Systems   Constitutional: Negative for activity change, appetite change, diaphoresis and unexpected weight change. HENT: Negative. Negative for congestion, ear pain and sore throat. Eyes: Negative. Negative for pain, redness and visual disturbance. Respiratory: Negative. Negative for shortness of breath and wheezing. Cardiovascular: Negative. Negative for chest pain and palpitations. Gastrointestinal: Negative. Negative for abdominal pain, constipation, nausea and vomiting. Endocrine: Negative. Negative for cold intolerance and polyuria. Genitourinary: Negative. Negative for dysuria and hematuria. Musculoskeletal: Positive for arthralgias. Negative for back pain. Left knee pain   Skin: Negative. Negative for pallor and wound. Allergic/Immunologic: Negative. Neurological: Negative. Negative for tingling, syncope, numbness and headaches. Hematological: Negative. Does not bruise/bleed easily. Psychiatric/Behavioral: Negative. Negative for self-injury, sleep disturbance and suicidal ideas. The patient is not nervous/anxious. Patient denies any change in the review of systems since her last visit    mPhysical Exam  Skin:         Neurological:      Mental Status: She is alert and oriented to person, place, and time.    Psychiatric:         Mood and Affect: Mood normal.        Ortho Exam     DATA:  LAB.:  7/13/2021 10:48 PM - Kenneth, [facility] Incoming Lab Results From [email or web address]    Component Value Ref Range & Units Status Collected Lab   Pain Management Drug Panel Interp, Urine Consistent   Final 07/09/2021  9:19 AM [facility]   (NOTE)   ________________________________________________________________   DRUGS EXPECTED:   HYDROCODONE   ________________________________________________________________   CONSISTENT with medications provided:   HYDROCODONE: based on hydrocodone, norhydrocodone, hydromorphone        X-Ray reports:     EXAMINATION:    THREE PHASE BONE SCAN        5/24/2018 11:15 am        TECHNIQUE:    The patient was injected intravenously with 27 mCi of 99 mTc MDP. Initial    blood flow and pool images of the knees were acquired. After 3 hours, delayed    bone images were acquired. COMPARISON:    Similar exam 02/06/2017        HISTORY:    ORDERING SYSTEM PROVIDED HISTORY: Chronic pain of both knees    TECHNOLOGIST PROVIDED HISTORY:    Ordering Physician Provided Reason for Exam: chronic pain, both knees    Mechanism of Injury: pt fell on rt knee sept 2017    Additional signs and symptoms: bilateral knee pain- lt knee all the time    Relevant Medical/Surgical History: bilateral knee pain replacements ( lt    8-9yrs, rt 2years)        FINDINGS:    There is symmetric blood flow activity. Increased blood pool activity    surrounding the left knee arthroplasty. Delayed images show photopenia corresponding with the left knee arthroplasty. There is increased uptake surrounding the femoral and tibial components of    the left knee prosthesis matching the location of blood pool activity. Activity is greatest along the lateral tibial component and medial and    lateral femoral condylar components of the prosthesis. Photopenia from right knee arthroplasty with mild surrounding uptake. Impression    1. Blood pool and delayed phase uptake surrounding the left knee    arthroplasty could be due to early loosening. 2.  No evidence of complication of the right knee arthroplasty. Narrative X-Rays taken in clinic today and preliminarily reviewed by me AP and lateral views of the left knee show evidence of revision total knee arthroplasty components in appropriate position. Longstem femoral and tibial components. Patient does have evidence of osteopenia in the tibial component. 3/4/2020 9:22 am        TECHNIQUE:    A bone density DEXA scan was performed of the lumbar spine and left hip on a    [employer] system.         COMPARISON:    10/16/2015        HISTORY:    ORDERING SYSTEM PROVIDED HISTORY: replacement    3. Primary osteoarthritis of both knees    4. Chronic, continuous use of opioids    5. Encounter for medication monitoring        Plan of care: We will continue current pain medications  Current medications are being tolerated without any Adverse side effects. Orders Placed This Encounter   Medications    HYDROcodone-acetaminophen (NORCO) 5-325 MG per tablet     Sig: Take 1 tablet by mouth every 8 hours as needed for Pain for up to 30 days. Dispense:  90 tablet     Refill:  0     Reduce doses taken as pain becomes manageable     Urine drug screens have been appropriate. No aberrant activity noted. Analgesia is achieved. Activities of daily living are possible because of medications. Safe use of medications explained to patient. PDMP Monitoring:    Last PDMP [place] as Reviewed [facility]):  Review User Review Instant Review Result   Cally Rose 11/3/2021  5:22 AM Reviewed PDMP [1]     Counselling/Preventive measures for pain  Control:    [x]  Spine strengthening exercises are discussed with patient in detail. [x] Ill effects of being on chronic pain medications such as sleep disturbances, hormonal changes, withdrawal symptoms,  chronic opioid dependence and tolerance were discussed with patient. I had asked the patient to minimize medication use and utilize pain medications only for uncontrolled rest pain or pain with exertional activities. I advised patient not to self escalate pain medications without consulting with us. At each of patient's future visits we will try to taper pain medications, while adjusting the adjunct medications, and re-evaluating for Physical Therapy to improve spinal and joint strength. We will continue to have discussions to decrease pain medications as tolerated. I also discussed with the patient regarding the dangers of combining narcotic pain medication with tranquilizers, alcohol or illegal drugs or taking the medication any other than prescribed.  The dangers including the respiratory depression and death. Patient was told to tell  to all  physicians regarding the medications he is getting from pain clinic. Patient is warned not to take any unprescribed medications over-the-counter medications that can depress breathing . Patient is advised to talk to the pharmacist or physicians if planning to take any over-the-counter medications before  takeing them. Patient is strongly advised to avoid tranquilizers or  Relaxants for any medications that can depress breathing or recreational drugs. Patient is also advised to tell us if there is any changes in his medications from other physicians. We discussed the same at today's visit and have not been to implement it, as the patient's pain is not under control with current medications. Decision Making Process : Patient's health history and referral records thoroughly reviewed before focused physical examination and discussion with patient. Over 50% of today's visit is spent on examining the patient and counseling and coordinating the care. Level of complexity of date to be reviewed is Moderate. The chart date reviewed include the following: Imaging Reports. Summary of Care. Time spent reviewing with patient the below reports:   Medication safety, Treatment options. Level of diagnosis and management options of this case is multiple: involving the following management options: Interventions as needed, medication management as appropriate, future visits, activity modification, heat/ice as needed, Urine drug screen as required. [x]The patient's questions were answered to the best of my abilities. This note was created using voice recognition software. There may be inaccuracies of transcription  that are inadvertently overlooked prior to the signature. There is any questions about the transcription please contact me. Return in  4 weeks  with physician / CNP  for further plan of treatment.   Due to the COVID-19 pandemic and the appropriate interventions by [facility], our non-urgent pain management patients will not be seen in the office at this time for their protection and the protection of our staff. To offer continuity of care, their prescriptions will be escribed this month after a careful chart review and review of their OARRS report  Pursuant to the emergency declaration under the [employer] and [facility], [facility] waiver authority and the [employer] and [employer] Act, this Virtual Visit was conducted, with patient's consent, to reduce the patient's risk of exposure to COVID-19 and provide continuity of care for an established patient. Services were provided through a video synchronous discussion virtually to substitute for in-person appointment. \"  Documentation:  I communicated with the patient and/or health care decision maker about plan of care  Details of this discussion including any medical advice provided: Total Time: minutes: 21-30 minutes    I affirm this is a Patient Initiated Episode with an Established Patient who has not had a related appointment within my department in the past 7 days or scheduled within the next 24 hours.     Electronically signed by Yaritza Garcia MD on 11/8/2021 at 1:24 PM

## 2021-11-08 ENCOUNTER — HOSPITAL ENCOUNTER (OUTPATIENT)
Dept: PAIN MANAGEMENT | Age: 66
Discharge: HOME | End: 2021-11-08
Payer: MEDICARE

## 2021-11-08 DIAGNOSIS — Z96.653 STATUS POST TOTAL BILATERAL KNEE REPLACEMENT: ICD-10-CM

## 2021-11-08 DIAGNOSIS — M17.0 PRIMARY OSTEOARTHRITIS OF BOTH KNEES: ICD-10-CM

## 2021-11-08 DIAGNOSIS — Z51.81 ENCOUNTER FOR MEDICATION MONITORING: ICD-10-CM

## 2021-11-08 DIAGNOSIS — G89.29 CHRONIC PAIN OF BOTH KNEES: Primary | ICD-10-CM

## 2021-11-08 DIAGNOSIS — M25.561 CHRONIC PAIN OF BOTH KNEES: Primary | ICD-10-CM

## 2021-11-08 DIAGNOSIS — M25.562 CHRONIC PAIN OF BOTH KNEES: Primary | ICD-10-CM

## 2021-11-08 DIAGNOSIS — F11.90 CHRONIC, CONTINUOUS USE OF OPIOIDS: ICD-10-CM

## 2021-11-08 PROCEDURE — 99213 OFFICE O/P EST LOW 20 MIN: CPT

## 2021-11-08 PROCEDURE — 99214 OFFICE O/P EST MOD 30 MIN: CPT | Performed by: PAIN MEDICINE

## 2021-11-08 RX ORDER — HYDROCODONE BITARTRATE AND ACETAMINOPHEN 5; 325 MG/1; MG/1
1 TABLET ORAL EVERY 8 HOURS PRN
Qty: 90 TABLET | Refills: 0 | Status: SHIPPED | OUTPATIENT
Start: 2021-11-08 | End: 2021-12-08

## 2021-11-08 ASSESSMENT — ENCOUNTER SYMPTOMS: SORE THROAT: 0

## 2022-04-20 ENCOUNTER — HOSPITAL ENCOUNTER (OUTPATIENT)
Dept: CT IMAGING | Age: 67
Discharge: HOME | End: 2022-04-22
Payer: COMMERCIAL

## 2022-04-20 ENCOUNTER — HOSPITAL ENCOUNTER (OUTPATIENT)
Age: 67
Discharge: HOME | End: 2022-04-20
Payer: COMMERCIAL

## 2022-04-20 DIAGNOSIS — Z96.652 PRESENCE OF LEFT ARTIFICIAL KNEE JOINT: ICD-10-CM

## 2022-04-20 LAB
C-REACTIVE PROTEIN: 9.5 MG/L (ref 0–5)
SEDIMENTATION RATE, ERYTHROCYTE: 76 MM/HR (ref 0–30)

## 2022-04-20 PROCEDURE — 85652 RBC SED RATE AUTOMATED: CPT

## 2022-04-20 PROCEDURE — 73700 CT LOWER EXTREMITY W/O DYE: CPT

## 2022-04-20 PROCEDURE — 86140 C-REACTIVE PROTEIN: CPT

## 2022-04-20 PROCEDURE — 36415 COLL VENOUS BLD VENIPUNCTURE: CPT

## 2022-05-02 ENCOUNTER — HOSPITAL ENCOUNTER (OUTPATIENT)
Age: 67
Discharge: HOME | End: 2022-05-02
Payer: COMMERCIAL

## 2022-05-02 LAB
C-REACTIVE PROTEIN: 8.8 MG/L (ref 0–5)
SEDIMENTATION RATE, ERYTHROCYTE: 74 MM/HR (ref 0–30)

## 2022-05-02 PROCEDURE — 36415 COLL VENOUS BLD VENIPUNCTURE: CPT

## 2022-05-02 PROCEDURE — 86140 C-REACTIVE PROTEIN: CPT

## 2022-05-02 PROCEDURE — 85652 RBC SED RATE AUTOMATED: CPT

## 2022-08-17 ENCOUNTER — HOSPITAL ENCOUNTER (OUTPATIENT)
Dept: WOMENS IMAGING | Age: 67
Discharge: HOME | End: 2022-08-19
Payer: COMMERCIAL

## 2022-08-17 DIAGNOSIS — Z01.89 DIAGNOSTIC SKIN AND SENSITIZATION TESTS: ICD-10-CM

## 2022-08-17 DIAGNOSIS — Z78.0 MENOPAUSE: ICD-10-CM

## 2022-08-17 DIAGNOSIS — Z12.31 ENCOUNTER FOR SCREENING MAMMOGRAM FOR MALIGNANT NEOPLASM OF BREAST: ICD-10-CM

## 2022-08-17 PROCEDURE — 77063 BREAST TOMOSYNTHESIS BI: CPT

## 2022-08-17 PROCEDURE — 77080 DXA BONE DENSITY AXIAL: CPT

## 2022-12-12 NOTE — PROGRESS NOTES
Patient is here today to review medication contract. Chief Complaint: knee pain    PMH: Patient has had knee pain for many years with no known injury. She has had three knee surgeries but pain continues. She had PT in the past with moderate relief. Knee Pain    Incident onset: chronic. The injury mechanism is unknown. The pain is present in the left knee and right knee. The pain is at a severity of 4/10. The pain is mild. The pain has been constant since onset. Associated symptoms include a loss of motion. Pertinent negatives include no numbness or tingling. The symptoms are aggravated by movement and weight bearing. She has tried rest, non-weight bearing and ice for the symptoms. The treatment provided mild relief. Norco dose will be reduced today to TID per Unique's last note - patient is agreeable to this. Patient denies any new neurological symptoms. No bowel or bladder incontinence, no weakness, and no falling. Pill count: #2 tabs left - due to fill yesterday    Morphine equivalent: 20    Controlled Substances Monitoring:     Attestation: The Prescription Monitoring Report for this patient was reviewed today.  (José Miguel Santizo CNP)  Documentation: Possible medication side effects, risk of tolerance and/or dependence, and alternative treatments discussed., No signs of potential drug abuse or diversion identified., Existing medication contract. (José Miguel Santizo CNP)    Past Medical History:   Diagnosis Date    Arthritis     Diabetes mellitus ([address]     Eczema     arms, trunk    GERD (gastroesophageal reflux disease)     Hearing loss     left and right ears, wears hearing aids    Hyperlipidemia     Hypertension     Kidney infection     Osteoporosis     Wears glasses     for reading       Past Surgical History:   Procedure Laterality Date    APPENDECTOMY      WITH HYSTERECTOMY    ARTHROPLASTY Left 10/12/2017    METATARSAL HEAD RESECTION 3RD LEFT FOOT  & EXCISION LESION SOFT (FLONASE) 50 MCG/ACT nasal spray, instill 2 sprays into each nostril once daily ,AVOID SPRAYING TOWARDS SEPTUM, Disp: , Rfl: 0    Blood Glucose Monitoring Suppl (TRUE METRIX METER) W/DEVICE KIT, TEST 1 TO 2 TIMES DAILY AS DIRECTED, Disp: , Rfl: 0    TRUE METRIX BLOOD GLUCOSE TEST strip, TEST 1 TO 2 TIMES BEFORE MEALS OR AS DIRECTED, Disp: , Rfl: 1    Lancets (BD LANCET ULTRAFINE 30G) MISC, USE 1 TO 2 TIMES A DAY, Disp: , Rfl: 1    alendronate (FOSAMAX) 35 MG tablet, Take 1 tablet by mouth every 7 days, Disp: 4 tablet, Rfl: 3    omeprazole (PRILOSEC) 20 MG capsule, take 1 capsule by mouth once daily (Patient taking differently: take 1 capsule by mouth once patient takes at supper), Disp: 90 capsule, Rfl: 0    simvastatin (ZOCOR) 20 MG tablet, take 1 tablet by mouth ONCE NIGHTLY, Disp: 30 tablet, Rfl: 3    loratadine (CLARITIN) 10 MG tablet, take 1 tablet by mouth once daily, Disp: 30 tablet, Rfl: 3    venlafaxine (EFFEXOR-XR) 37.5 MG XR capsule, take 1 capsule by mouth once daily (Patient taking differently: take 1 capsule by mouth every evening), Disp: 30 capsule, Rfl: 3    BIOTIN PO,  Take 1 tablet by mouth daily , Disp: , Rfl:     lisinopril-hydrochlorothiazide (PRINZIDE) 20-12.5 MG per tablet, Take 1 tablet by mouth daily, Disp: 30 tablet, Rfl: 3    Calcium Carbonate-Vitamin D (CALCIUM + D PO), Take by mouth, Disp: , Rfl:     busPIRone (BUSPAR) 10 MG tablet, Take 1 tablet by mouth 2 times daily.  (Patient taking differently: Take 10 mg by mouth 2 times daily as needed ), Disp: 60 tablet, Rfl: 3    Family History   Problem Relation Age of Onset    Stroke Father     Emphysema Father     Diabetes Mother     Heart Failure Mother     Osteoarthritis Mother        Social History     Social History    Marital status:      Spouse name: N/A    Number of children: N/A    Years of education: N/A     Occupational History    disability      Social History Main Topics    Smoking status: Never Smoker  Smokeless tobacco: Never Used    Alcohol use No    Drug use: No    Sexual activity: Not Currently     Other Topics Concern    Not on file     Social History Narrative    No narrative on file       Review of Systems:  Review of Systems   Constitution: Negative for chills and fever. Cardiovascular: Negative for chest pain and irregular heartbeat. Respiratory: Negative for cough and shortness of breath. Musculoskeletal: Positive for joint pain and joint swelling. Gastrointestinal: Negative for constipation. Neurological: Negative for disturbances in coordination, loss of balance, numbness and tingling. Physical Exam:  T 98.8  /81  P 96  SpO2 98  R 18    9/16/2017  1:37 PM - Kenneth, [facility] Incoming Lab Results From [facility]     Component Results     Component Value Ref Range & Units Status Collected Lab   Pain Management Drug Panel Interp, Urine Consistent   Final 09/12/2017  1:40  [facility]   (NOTE)   ________________________________________________________________   DRUGS EXPECTED:   HYDROCODONE [9/12/17]   ________________________________________________________________   CONSISTENT with medications provided:   HYDROCODONE : based on hydrocodone, norhydrocodone   ________________________________________________________________   INTERPRETIVE INFORMATION: Pain Mgt [place], Mass Spec/EMIT, Ur,                            Interp   Interpretation depends on accuracy and completeness of patient   medication information submitted by client.     6-Acetylmorphine, Ur Not Detected   Final 09/12/2017  1:40  [facility]   7-Aminoclonazepam, Urine Not Detected   Final 09/12/2017  1:40 PM [address] [place] [address], Urine Not Detected   Final 09/12/2017  1:40  [facility]   Alprazolam, Urine Not Detected   Final 09/12/2017  1:40  [facility] [address] [facility]   Amphetamines, urine Not Detected   Final 09/12/2017  1:40 PM [facility]         Physical Exam   Constitutional: She is oriented to person, place, and time and well-developed, well-nourished, and in no distress. HENT:   Head: Normocephalic. Eyes: EOM are normal.   Neck: Normal range of motion. Pulmonary/Chest: Effort normal.   Musculoskeletal:        Right knee: She exhibits decreased range of motion. Tenderness found. Left knee: She exhibits decreased range of motion and swelling. Tenderness found. Neurological: She is alert and oriented to person, place, and time. Skin: Skin is warm and dry. Psychiatric: Affect normal.       Record/Diagnostics Review:    XR right knee 2016    Assessment:  Problem List Items Addressed This Visit     Pain in joint, lower leg (Chronic)    Chronic pain of both knees - Primary    Relevant Medications    HYDROcodone-acetaminophen (NORCO) 5-325 MG per tablet    Encounter for chronic pain management      Other Visit Diagnoses    None. Treatment Plan:  DISCUSSION: Treatment options discussed with patient and all questions answered to patient's satisfaction. TREATMENT OPTIONS:   Reduced Norco dose to TID  Contract compliance requirements are met. Satisfactory pain management plan. Medications help with personal goals and self-care needs. Follow up appointment scheduled. intact

## 2022-12-22 ENCOUNTER — OFFICE VISIT (OUTPATIENT)
Dept: UROLOGY | Age: 67
End: 2022-12-22
Payer: COMMERCIAL

## 2022-12-22 VITALS
WEIGHT: 196 LBS | SYSTOLIC BLOOD PRESSURE: 139 MMHG | BODY MASS INDEX: 39.51 KG/M2 | HEIGHT: 59 IN | DIASTOLIC BLOOD PRESSURE: 84 MMHG | HEART RATE: 75 BPM

## 2022-12-22 DIAGNOSIS — Z96.82 SACRAL NERVE STIMULATOR PRESENT: ICD-10-CM

## 2022-12-22 DIAGNOSIS — N39.41 URGE INCONTINENCE: ICD-10-CM

## 2022-12-22 DIAGNOSIS — N32.81 OVERACTIVE BLADDER: Primary | ICD-10-CM

## 2022-12-22 DIAGNOSIS — R35.1 NOCTURIA: ICD-10-CM

## 2022-12-22 PROCEDURE — 95971 ALYS SMPL SP/PN NPGT W/PRGRM: CPT | Performed by: NURSE PRACTITIONER

## 2022-12-22 PROCEDURE — 99213 OFFICE O/P EST LOW 20 MIN: CPT | Performed by: NURSE PRACTITIONER

## 2022-12-22 PROCEDURE — 1123F ACP DISCUSS/DSCN MKR DOCD: CPT | Performed by: NURSE PRACTITIONER

## 2022-12-22 ASSESSMENT — ENCOUNTER SYMPTOMS
EYES NEGATIVE: 1
EYE PAIN: 0
VOMITING: 0
EYE REDNESS: 0
GASTROINTESTINAL NEGATIVE: 1
CONSTIPATION: 0
NAUSEA: 0
SHORTNESS OF BREATH: 0
BACK PAIN: 0
RESPIRATORY NEGATIVE: 1
COUGH: 0
ABDOMINAL PAIN: 0
WHEEZING: 0
DIARRHEA: 0

## 2022-12-22 NOTE — PROGRESS NOTES
[address] [facility] [id number]  [facility]pt[facility] Urology Office Note - Established    Patient:  Unique Peñaloza  YOB: 1955  Date: 12/22/2022    The patient is a 69 y.o. female whopresents today for evaluation of the following problems:   Chief Complaint   Patient presents with    Incontinence     Interstim is not working, has not been seen since 2020       HPI  Patient is presenting for f/u interstim. She had interstim device placed 7/10/2019 with Dr. Frankie Runner for her OAB symptoms. She had a recent knee surgery and forgot to turn the device off. She believes that her interstim device is malfunctioning now, as her urinary symptoms are worsening. She has issues with urgency, urge incontinence (2-3 panty liners per day), and nocturia (x4). She drinks iced tea and a couple of cups of coffee a day. No hematuria or dysuria. Denies any other  concerns today. Summary of old records: N/A    Additional History: N/A    Procedures Today:  Interstim programming:   The programming head was placed over the implanted neurostimulator. Previously programmed electrode selection settings: Program 3 @0.4V    Final electrode setting: Program 2 @0.3V    Feels stimulation at implant site- mild and not bothersome. Impedence all under 4000 ohms verified: Yes, ok. Battery life checked: Yes 29-50 mos    Follow up: 6 mos or sooner if needed      Urinalysis today:  No results found for this visit on 12/22/22.     Imaging Reviewed during this Office Visit: none  (results were independently reviewed by physician and radiology report verified)      Last BUN and creatinine:  Lab Results   Component Value Date    BUN 23 06/26/2019     Lab Results   Component Value Date    CREATININE 1.05 10/28/2022       Additional Lab/Culture results: none    PAST MEDICAL, FAMILY AND SOCIAL HISTORY UPDATE:  Past Medical History:   Diagnosis Date    Arthritis     Bronchitis     Bunion of left foot     Diabetes mellitus ([address]     not on any meds    Eczema     arms, trunk    GERD (gastroesophageal reflux disease)     Hearing loss     left and right ears, wears hearing aids    Hyperlipidemia     Hypertension     Kidney infection     Osteoporosis     Urge incontinence     Urinary frequency     Wears glasses     for reading     Past Surgical History:   Procedure Laterality Date    APPENDECTOMY      WITH HYSTERECTOMY    ARTHROPLASTY Left 10/12/2017    METATARSAL HEAD RESECTION 3RD LEFT FOOT  & EXCISION LESION SOFT TISSUE LEFT FOOT performed by Hank Hill DPM at [address] [facility] Bilateral     BREAST BIOPSY Left     benign    COLONOSCOPY      CYST REMOVAL Right     HEEL    CYST REMOVAL Right     wrist    FINE NEEDLE ASPIRATION  05/26/2020    left knee    FOOT SURGERY Left 04/12/2019    bunionectomy    HYSTERECTOMY (CERVIX STATUS UNKNOWN)      JOINT REPLACEMENT Bilateral     lt had revision knees    KNEE ARTHROSCOPY Right 8/12/15    Dr Keke Trejo ARTHROSCOPY Left 02/22/2017     &RIGHT  KNEE MANIPULATION    OTHER SURGICAL HISTORY  07/10/2019    SACRAL NERVE STIMULATOR IMPLANT STAGE 1 AND 2      MA COLON CA SCRN NOT HI RSK IND N/A 7/17/2018    COLONOSCOPY performed by Shayne Bautista MD at [address] Left 2/22/2017    KNEE ARTHROSCOPY FOR LATERAL PATELLA RELEASE, SYNOVECTOMY AND STEROID INJECTION, LEFT KNEE. MANIPULATION OF RIGHT KNEE.  performed by Mago Sheehan MD at [facility] N/A 7/10/2019    SACRAL NERVE STIMULATOR IMPLANT STAGE 1 AND 2  C-ARM performed by Trae Carballo MD at [place] [address] Left 05/02/2017    3rd toe    TONSILLECTOMY      TYMPANOPLASTY Left      Family History   Problem Relation Age of Onset    Stroke Father     Emphysema Father     Diabetes Mother     Heart Failure Mother     Osteoarthritis Mother No outpatient medications have been marked as taking for the 12/22/22 encounter (Office Visit) with DARREL Mays CNP. (All medications reviewed and updated by provider sincelast office visit or hospitalization)   Aspirin, Celecoxib, Codeine, Avelox [moxifloxacin], Lidoderm [lidocaine], and Sulfa antibiotics  Social History     Tobacco Use   Smoking Status Never   Smokeless Tobacco Never      (If patient a smoker, smoking cessation counseling offered)     Social History     Substance and Sexual Activity   Alcohol Use No    Alcohol/week: 0.0 standard drinks       REVIEW OF SYSTEMS:  Review of Systems      Physical Exam:      Vitals:    12/22/22 0905   BP: 139/84   Pulse: 75     Body mass index is 39.59 kg/m². Patient is a 69 y.o. female in noacute distress and alert and oriented to person, place and time. Physical Exam  Constitutional: Patient in no acute distress. Neuro: Alert andoriented to person, place and time. Psych: Mood normal, affect normal  Lungs: Respiratory effort is normal  Cardiovascular: Warm & Pink  Abdomen: Soft, non-tender, non-distended   Bladder non-tender and not distended. Musculoskeletal: Normal gait and station      and Plan      1. Overactive bladder    2. Urge incontinence    3. Nocturia    4. Sacral nerve stimulator present           Plan:    Interstim reprogrammed today. Discussed time and double voiding. Needs to watch irritants. Will f/u 6 mos or sooner if needed. Return in about 6 months (around 6/22/2023). Prescriptions Ordered:  No orders of the defined types were placed in this encounter. Orders Placed:  Orders Placed This Encounter   Procedures    HI ELEC REGINE IMPLT NPGT PHYS/QHP W/O PROGRAMMING    HI ELEC REGINE IMPLT NPGT SMPL SP/PN NPGT [address] DARREL Kaur CNP    Reviewed and agree with the ROS entered by the MA.

## 2022-12-22 NOTE — PROGRESS NOTES
Review of Systems   Constitutional: Negative. Negative for appetite change, chills and fever. Eyes: Negative. Negative for pain, redness and visual disturbance. Respiratory: Negative. Negative for cough, shortness of breath and wheezing. Cardiovascular: Negative. Negative for chest pain and leg swelling. Gastrointestinal: Negative. Negative for abdominal pain, constipation, diarrhea, nausea and vomiting. Genitourinary:  Positive for frequency (at night). Negative for difficulty urinating, dysuria, flank pain, hematuria and urgency. Musculoskeletal: Negative. Negative for back pain, joint swelling and myalgias. Skin: Negative. Negative for rash and wound. Neurological: Negative. Negative for dizziness, tremors and numbness. Hematological: Negative. Does not bruise/bleed easily.

## 2023-04-20 ENCOUNTER — HOSPITAL ENCOUNTER (OUTPATIENT)
Age: 68
Discharge: HOME | End: 2023-04-20
Payer: COMMERCIAL

## 2023-04-20 LAB
ALT SERPL-CCNC: 12 U/L (ref 5–33)
CHOLEST SERPL-MCNC: 180 MG/DL
CHOLESTEROL/HDL RATIO: 3.8
HDLC SERPL-MCNC: 47 MG/DL
LDLC SERPL CALC-MCNC: 96 MG/DL (ref 0–130)
TRIGL SERPL-MCNC: 183 MG/DL

## 2023-04-20 PROCEDURE — 84460 ALANINE AMINO (ALT) (SGPT): CPT

## 2023-04-20 PROCEDURE — 80061 LIPID PANEL: CPT

## 2023-04-20 PROCEDURE — 36415 COLL VENOUS BLD VENIPUNCTURE: CPT

## 2023-05-04 RX ORDER — LANOLIN ALCOHOL/MO/W.PET/CERES
CREAM (GRAM) TOPICAL
Qty: 30 TABLET | OUTPATIENT
Start: 2023-05-04

## 2023-07-10 ENCOUNTER — OFFICE VISIT (OUTPATIENT)
Dept: INTERNAL MEDICINE CLINIC | Age: 68
End: 2023-07-10
Payer: MEDICAID

## 2023-07-10 VITALS
HEART RATE: 66 BPM | WEIGHT: 177.4 LBS | DIASTOLIC BLOOD PRESSURE: 88 MMHG | SYSTOLIC BLOOD PRESSURE: 140 MMHG | BODY MASS INDEX: 35.76 KG/M2 | OXYGEN SATURATION: 98 % | HEIGHT: 59 IN

## 2023-07-10 DIAGNOSIS — I10 PRIMARY HYPERTENSION: ICD-10-CM

## 2023-07-10 DIAGNOSIS — Z96.653 STATUS POST TOTAL BILATERAL KNEE REPLACEMENT: Primary | ICD-10-CM

## 2023-07-10 DIAGNOSIS — R35.89 POLYURIA: ICD-10-CM

## 2023-07-10 DIAGNOSIS — R73.03 PRE-DIABETES: ICD-10-CM

## 2023-07-10 DIAGNOSIS — H91.90 HEARING LOSS, UNSPECIFIED HEARING LOSS TYPE, UNSPECIFIED LATERALITY: ICD-10-CM

## 2023-07-10 PROCEDURE — 99204 OFFICE O/P NEW MOD 45 MIN: CPT | Performed by: INTERNAL MEDICINE

## 2023-07-10 PROCEDURE — 1123F ACP DISCUSS/DSCN MKR DOCD: CPT | Performed by: INTERNAL MEDICINE

## 2023-07-10 PROCEDURE — 3079F DIAST BP 80-89 MM HG: CPT | Performed by: INTERNAL MEDICINE

## 2023-07-10 PROCEDURE — 3077F SYST BP >= 140 MM HG: CPT | Performed by: INTERNAL MEDICINE

## 2023-07-10 RX ORDER — ATORVASTATIN CALCIUM 20 MG/1
20 TABLET, FILM COATED ORAL DAILY
COMMUNITY
Start: 2023-04-25

## 2023-07-10 RX ORDER — LANOLIN ALCOHOL/MO/W.PET/CERES
1 CREAM (GRAM) TOPICAL NIGHTLY PRN
COMMUNITY
Start: 2023-07-03

## 2023-07-10 RX ORDER — SIMVASTATIN 40 MG
40 TABLET ORAL NIGHTLY
COMMUNITY
Start: 2023-04-03

## 2023-07-10 RX ORDER — TIZANIDINE 4 MG/1
TABLET ORAL
COMMUNITY
Start: 2023-06-19

## 2023-07-10 RX ORDER — VENLAFAXINE HYDROCHLORIDE 37.5 MG/1
CAPSULE, EXTENDED RELEASE ORAL
COMMUNITY
Start: 2023-06-04

## 2023-07-10 RX ORDER — ESOMEPRAZOLE MAGNESIUM 40 MG/1
40 CAPSULE, DELAYED RELEASE ORAL DAILY
COMMUNITY
Start: 2022-10-28

## 2023-07-10 RX ORDER — SODIUM CHLORIDE 50 MG/ML
SOLUTION/ DROPS OPHTHALMIC
COMMUNITY
Start: 2023-04-27

## 2023-07-10 RX ORDER — FLUOCINONIDE 0.5 MG/G
OINTMENT TOPICAL EVERY 24 HOURS
COMMUNITY
Start: 2017-01-18

## 2023-07-10 SDOH — ECONOMIC STABILITY: INCOME INSECURITY: HOW HARD IS IT FOR YOU TO PAY FOR THE VERY BASICS LIKE FOOD, HOUSING, MEDICAL CARE, AND HEATING?: NOT HARD AT ALL

## 2023-07-10 SDOH — ECONOMIC STABILITY: FOOD INSECURITY: WITHIN THE PAST 12 MONTHS, THE FOOD YOU BOUGHT JUST DIDN'T LAST AND YOU DIDN'T HAVE MONEY TO GET MORE.: NEVER TRUE

## 2023-07-10 SDOH — ECONOMIC STABILITY: FOOD INSECURITY: WITHIN THE PAST 12 MONTHS, YOU WORRIED THAT YOUR FOOD WOULD RUN OUT BEFORE YOU GOT MONEY TO BUY MORE.: NEVER TRUE

## 2023-07-10 SDOH — ECONOMIC STABILITY: HOUSING INSECURITY
IN THE LAST 12 MONTHS, WAS THERE A TIME WHEN YOU DID NOT HAVE A STEADY PLACE TO SLEEP OR SLEPT IN A SHELTER (INCLUDING NOW)?: NO

## 2023-07-10 ASSESSMENT — PATIENT HEALTH QUESTIONNAIRE - PHQ9
SUM OF ALL RESPONSES TO PHQ QUESTIONS 1-9: 0
SUM OF ALL RESPONSES TO PHQ QUESTIONS 1-9: 0
2. FEELING DOWN, DEPRESSED OR HOPELESS: 0
SUM OF ALL RESPONSES TO PHQ9 QUESTIONS 1 & 2: 0
1. LITTLE INTEREST OR PLEASURE IN DOING THINGS: 0
SUM OF ALL RESPONSES TO PHQ QUESTIONS 1-9: 0
SUM OF ALL RESPONSES TO PHQ QUESTIONS 1-9: 0

## 2023-07-10 NOTE — PROGRESS NOTES
Visit Information    Have you changed or started any medications since your last visit including any over-the-counter medicines, vitamins, or herbal medicines? no   Are you having any side effects from any of your medications? -  no  Have you stopped taking any of your medications? Is so, why? -  no    Have you seen any other physician or provider since your last visit? No  Have you had any other diagnostic tests since your last visit? No  Have you been seen in the emergency room and/or had an admission to a hospital since we last saw you? No  Have you had your routine dental cleaning in the past 6 months? no    Have you activated your [employer] account? If not, what are your barriers?  No:      Patient Care Team:  Amber Steen MD as PCP - General (Internal Medicine)  Jose Juan Mayer MD as Consulting Physician (Obstetrics & Gynecology)    Medical History Review  Past Medical, Family, and Social History reviewed and does contribute to the patient presenting condition    Health Maintenance   Topic Date Due    COVID-19 Vaccine (1) Never done    Hepatitis C screen  Never done    DTaP/Tdap/Td vaccine (1 - Tdap) Never done    Shingles vaccine (2 of 3) 06/26/2015    Depression Screen  10/19/2022    Flu vaccine (1) 08/01/2023    A1C test (Diabetic or Prediabetic)  10/28/2023    GFR test (Diabetes, CKD 3-4, OR last GFR 15-59)  10/28/2023    Lipids  04/20/2024    Breast cancer screen  08/17/2024    Colorectal Cancer Screen  07/17/2028    DEXA (modify frequency per FRAX score)  Completed    Pneumococcal 65+ years Vaccine  Completed    Hepatitis A vaccine  Aged Out    Hib vaccine  Aged Out    Meningococcal (ACWY) vaccine  Aged Out    Diabetes screen  Discontinued

## 2023-07-10 NOTE — PROGRESS NOTES
[address] [address]  Dept: [phone]  Dept Fax: [phone]    Office Progress/Follow Up Note  Date of patient's visit: 7/10/2023  Patient's Name:  Og Lundy YOB: 1955            Patient Care Team:  Omid Grey MD as PCP - General (Internal Medicine)  Sergio Mosqueda MD as Consulting Physician (Obstetrics & Gynecology)    REASON FOR VISIT: Routine outpatient follow up/Same day visit/Post hospital/ED visit    HISTORY OF PRESENT ILLNESS:      Chief Complaint   Patient presents with    New Patient     Patient is currently on a keto diet    Groin Pain     Patient states that the onset was 1 month ago, she states that it is continuing to get worse. Sharp pain. History was obtained from the patient.  Og Lundy is a 69 y.o. is here for a follow-up  Has hypertension, checks blood pressure daily, stated that it is in 120s, denies any chest pain shortness of breath headache dizziness, otherwise subjective  No hearing loss, has lost hearing aids, requested referral to ENT  Follows up with  Pain management for knee pain  Has had 4 knee surgeries on the left  Follows up urology for overactive    Lost 20 bs   Intentional .     Complaining of groin pain, also polyuria  Pain symptom goes to hip, was told is likely secondary to arthritis    Patient Active Problem List   Diagnosis    Osteoarthrosis involving lower leg    Encounter for long-term (current) use of other medications    Arthralgia of both lower legs    Pain in lower limb    Status post total right knee replacement    Chronic pain of both knees    HLD (hyperlipidemia)    GERD (gastroesophageal reflux disease)    Osteopenia    Encounter for chronic pain management    Status post total bilateral knee replacement    Primary osteoarthritis of both knees    Encounter for medication monitoring    Chronic bilateral low back pain without sciatica    Rectal pain    Generalized

## 2023-07-18 ENCOUNTER — HOSPITAL ENCOUNTER (OUTPATIENT)
Age: 68
Discharge: HOME | End: 2023-07-18
Payer: MEDICARE

## 2023-07-18 DIAGNOSIS — R73.03 PRE-DIABETES: ICD-10-CM

## 2023-07-18 DIAGNOSIS — R35.89 POLYURIA: ICD-10-CM

## 2023-07-18 LAB
ALBUMIN SERPL-MCNC: 3.9 G/DL (ref 3.5–5.2)
ALP SERPL-CCNC: 71 U/L (ref 35–104)
ALT SERPL-CCNC: 10 U/L (ref 5–33)
ANION GAP SERPL CALCULATED.3IONS-SCNC: 12 MMOL/L (ref 9–17)
AST SERPL-CCNC: 18 U/L
BACTERIA URNS QL MICRO: ABNORMAL
BASOPHILS # BLD: 0 K/UL (ref 0–0.2)
BASOPHILS NFR BLD: 1 % (ref 0–2)
BILIRUB SERPL-MCNC: 0.3 MG/DL (ref 0.3–1.2)
BILIRUB UR QL STRIP: NEGATIVE
BUN SERPL-MCNC: 18 MG/DL (ref 8–23)
CALCIUM SERPL-MCNC: 9.7 MG/DL (ref 8.6–10.4)
CHLORIDE SERPL-SCNC: 106 MMOL/L (ref 98–107)
CLARITY UR: CLEAR
CO2 SERPL-SCNC: 26 MMOL/L (ref 20–31)
COLOR UR: YELLOW
CREAT SERPL-MCNC: 1.2 MG/DL (ref 0.5–0.9)
EOSINOPHIL # BLD: 0.3 K/UL (ref 0–0.4)
EOSINOPHILS RELATIVE PERCENT: 4 % (ref 0–4)
EPI CELLS #/AREA URNS HPF: ABNORMAL /HPF
ERYTHROCYTE [DISTWIDTH] IN BLOOD BY AUTOMATED COUNT: 14 % (ref 11.5–14.9)
EST. AVERAGE GLUCOSE BLD GHB EST-MCNC: 120 MG/DL
GFR SERPL CREATININE-BSD FRML MDRD: 49 ML/MIN/1.73M2
GLUCOSE SERPL-MCNC: 116 MG/DL (ref 70–99)
GLUCOSE UR STRIP-MCNC: NEGATIVE MG/DL
HBA1C MFR BLD: 5.8 % (ref 4–6)
HCT VFR BLD AUTO: 39.2 % (ref 36–46)
HGB BLD-MCNC: 12.9 G/DL (ref 12–16)
HGB UR QL STRIP.AUTO: NEGATIVE
KETONES UR STRIP-MCNC: NEGATIVE MG/DL
LEUKOCYTE ESTERASE UR QL STRIP: ABNORMAL
LYMPHOCYTES # BLD: 40 % (ref 24–44)
LYMPHOCYTES NFR BLD: 2.9 K/UL (ref 1–4.8)
MCH RBC QN AUTO: 29.1 PG (ref 26–34)
MCHC RBC AUTO-ENTMCNC: 32.8 G/DL (ref 31–37)
MCV RBC AUTO: 88.8 FL (ref 80–100)
MONOCYTES NFR BLD: 0.4 K/UL (ref 0.1–1.3)
MONOCYTES NFR BLD: 6 % (ref 1–7)
NEUTROPHILS NFR BLD: 49 % (ref 36–66)
NEUTS SEG NFR BLD: 3.6 K/UL (ref 1.3–9.1)
NITRITE UR QL STRIP: POSITIVE
PH UR STRIP: 5.5 [PH] (ref 5–8)
PLATELET # BLD AUTO: 260 K/UL (ref 150–450)
PMV BLD AUTO: 8.3 FL (ref 6–12)
POTASSIUM SERPL-SCNC: 4.1 MMOL/L (ref 3.7–5.3)
PROT SERPL-MCNC: 7.9 G/DL (ref 6.4–8.3)
PROT UR STRIP-MCNC: NEGATIVE MG/DL
RBC # BLD AUTO: 4.42 M/UL (ref 4–5.2)
RBC #/AREA URNS HPF: ABNORMAL /HPF
SODIUM SERPL-SCNC: 144 MMOL/L (ref 135–144)
SP GR UR STRIP: 1.01 (ref 1–1.03)
UROBILINOGEN UR STRIP-ACNC: NORMAL EU/DL (ref 0–1)
WBC #/AREA URNS HPF: ABNORMAL /HPF
WBC OTHER # BLD: 7.3 K/UL (ref 3.5–11)

## 2023-07-18 PROCEDURE — 80053 COMPREHEN METABOLIC PANEL: CPT

## 2023-07-18 PROCEDURE — 81001 URINALYSIS AUTO W/SCOPE: CPT

## 2023-07-18 PROCEDURE — 83036 HEMOGLOBIN GLYCOSYLATED A1C: CPT

## 2023-07-18 PROCEDURE — 36415 COLL VENOUS BLD VENIPUNCTURE: CPT

## 2023-07-18 PROCEDURE — 87186 SC STD MICRODIL/AGAR DIL: CPT

## 2023-07-18 PROCEDURE — 85027 COMPLETE CBC AUTOMATED: CPT

## 2023-07-18 PROCEDURE — 87077 CULTURE AEROBIC IDENTIFY: CPT

## 2023-07-18 PROCEDURE — 87086 URINE CULTURE/COLONY COUNT: CPT

## 2023-07-21 LAB
MICROORGANISM SPEC CULT: ABNORMAL
SPECIMEN DESCRIPTION: ABNORMAL

## 2023-07-24 RX ORDER — ESOMEPRAZOLE MAGNESIUM 40 MG/1
CAPSULE, DELAYED RELEASE ORAL
Qty: 30 CAPSULE | Refills: 5 | Status: SHIPPED | OUTPATIENT
Start: 2023-07-24

## 2023-07-24 RX ORDER — OMEPRAZOLE 20 MG/1
20 CAPSULE, DELAYED RELEASE ORAL DAILY
Qty: 90 CAPSULE | Refills: 0 | OUTPATIENT
Start: 2023-07-24 | End: 2023-10-22

## 2023-07-24 RX ORDER — ATORVASTATIN CALCIUM 20 MG/1
TABLET, FILM COATED ORAL
Qty: 90 TABLET | Refills: 3 | Status: SHIPPED | OUTPATIENT
Start: 2023-07-24

## 2023-08-29 ENCOUNTER — OFFICE VISIT (OUTPATIENT)
Dept: INTERNAL MEDICINE CLINIC | Age: 68
End: 2023-08-29
Payer: MEDICARE

## 2023-08-29 VITALS
HEART RATE: 83 BPM | BODY MASS INDEX: 35.91 KG/M2 | OXYGEN SATURATION: 96 % | SYSTOLIC BLOOD PRESSURE: 142 MMHG | DIASTOLIC BLOOD PRESSURE: 96 MMHG | WEIGHT: 177.8 LBS

## 2023-08-29 DIAGNOSIS — R30.0 DYSURIA: ICD-10-CM

## 2023-08-29 DIAGNOSIS — J96.01 ACUTE RESPIRATORY FAILURE WITH HYPOXIA (HCC): Primary | ICD-10-CM

## 2023-08-29 DIAGNOSIS — R73.03 PRE-DIABETES: ICD-10-CM

## 2023-08-29 LAB
BACTERIA URINE, POC: NORMAL
BILIRUBIN URINE: 0 MG/DL
BLOOD, URINE: NEGATIVE
CASTS URINE, POC: NORMAL
CLARITY: CLEAR
COLOR: YELLOW
CRYSTALS URINE, POC: NORMAL
EPI CELLS URINE, POC: NORMAL
GLUCOSE URINE: NEGATIVE
KETONES, URINE: NEGATIVE
LEUKOCYTE EST, POC: NEGATIVE
NITRITE, URINE: NEGATIVE
PH UA: 6 (ref 4.5–8)
PROTEIN UA: NEGATIVE
RBC URINE, POC: NORMAL
SPECIFIC GRAVITY UA: 1.01 (ref 1–1.03)
UROBILINOGEN, URINE: NORMAL
WBC URINE, POC: NORMAL
YEAST URINE, POC: NORMAL

## 2023-08-29 PROCEDURE — 99213 OFFICE O/P EST LOW 20 MIN: CPT | Performed by: INTERNAL MEDICINE

## 2023-08-29 PROCEDURE — 81000 URINALYSIS NONAUTO W/SCOPE: CPT | Performed by: INTERNAL MEDICINE

## 2023-08-29 PROCEDURE — 1123F ACP DISCUSS/DSCN MKR DOCD: CPT | Performed by: INTERNAL MEDICINE

## 2023-08-29 NOTE — PROGRESS NOTES
[address] [address]  Dept: [phone]  Dept Fax: [phone]    Office Progress/Follow Up Note  Date of patient's visit: 8/29/2023  Patient's Name:  Leopold Salter YOB: 1955            Patient Care Team:  Estevan Cuevas MD as PCP - General (Internal Medicine)  Estevan Cuevas MD as PCP - Empaneled Provider  Yaritza Arnold MD as Consulting Physician (Obstetrics & Gynecology)    REASON FOR VISIT: Routine outpatient follow up/Same day visit/Post hospital/ED visit    HISTORY OF PRESENT ILLNESS:      Chief Complaint   Patient presents with    Diabetes     Patient has been having problems with her sugar and they have been high ever since august 12th. Patients reading last night was 154 and has been as high as 221. Urinary Tract Infection     Patient has been having burning when she is urinating that started a week ago         History was obtained from the patient.  Leopold Salter is a 69 y.o. is here for a follow-up  Has hypertension, checks blood pressure daily, stated that it is in 120s, denies any chest pain shortness of breath headache dizziness, otherwise subjective  No hearing loss, has lost hearing aids, requested referral to ENT  Follows up with  Pain management for knee pain  Has had 4 knee surgeries on the left  Follows up urology for overactive    Lost 20 bs   Intentional .     Complaining of groin pain, also polyuria  Pain symptom goes to hip, was told is likely secondary to arthritis      8/9   Concerned about blood sugars   In 150-200 post prandial   Hba1c : 5.8   Advised diet and exercise     Also having pain and burning micturition   Last week   Will check ua       Patient Active Problem List   Diagnosis    Osteoarthrosis involving lower leg    Encounter for long-term (current) use of other medications    Arthralgia of both lower legs    Pain in lower limb    Status post total right knee replacement    Chronic pain of

## 2023-08-29 NOTE — PROGRESS NOTES
Visit Information    Have you changed or started any medications since your last visit including any over-the-counter medicines, vitamins, or herbal medicines? no   Are you having any side effects from any of your medications? -  no  Have you stopped taking any of your medications? Is so, why? -  no    Have you seen any other physician or provider since your last visit? No  Have you had any other diagnostic tests since your last visit? No  Have you been seen in the emergency room and/or had an admission to a hospital since we last saw you? No  Have you had your routine dental cleaning in the past 6 months? no    Have you activated your [email or web address] account? If not, what are your barriers?  No:      Patient Care Team:  Dolores Wood MD as PCP - General (Internal Medicine)  Dolores Wood MD as PCP - Empaneled Provider  Kirstie Allison MD as Consulting Physician (Obstetrics & Gynecology)    Medical History Review  Past Medical, Family, and Social History reviewed and does not contribute to the patient presenting condition    Health Maintenance   Topic Date Due    COVID-19 Vaccine (1) Never done    Hepatitis C screen  Never done    DTaP/Tdap/Td vaccine (1 - Tdap) Never done    Shingles vaccine (2 of 3) 06/26/2015    Annual Wellness Visit (AWV)  Never done    Flu vaccine (1) 08/01/2023    Lipids  04/20/2024    Depression Screen  07/10/2024    A1C test (Diabetic or Prediabetic)  07/18/2024    GFR test (Diabetes, CKD 3-4, OR last GFR 15-59)  07/18/2024    Breast cancer screen  08/17/2024    Colorectal Cancer Screen  07/17/2028    DEXA (modify frequency per FRAX score)  Completed    Pneumococcal 65+ years Vaccine  Completed    Hepatitis A vaccine  Aged Out    Hib vaccine  Aged Out    Meningococcal (ACWY) vaccine  Aged Out    Diabetes screen  Discontinued

## 2023-09-07 RX ORDER — BUSPIRONE HYDROCHLORIDE 10 MG/1
TABLET ORAL
Qty: 60 TABLET | Refills: 3 | OUTPATIENT
Start: 2023-09-07

## 2023-09-07 RX ORDER — VENLAFAXINE HYDROCHLORIDE 37.5 MG/1
CAPSULE, EXTENDED RELEASE ORAL
Qty: 30 CAPSULE | Refills: 0 | Status: SHIPPED | OUTPATIENT
Start: 2023-09-07

## 2023-10-04 RX ORDER — VENLAFAXINE HYDROCHLORIDE 37.5 MG/1
CAPSULE, EXTENDED RELEASE ORAL
Qty: 30 CAPSULE | Refills: 0 | Status: SHIPPED | OUTPATIENT
Start: 2023-10-04

## 2023-11-03 RX ORDER — LANOLIN ALCOHOL/MO/W.PET/CERES
1 CREAM (GRAM) TOPICAL NIGHTLY PRN
Qty: 30 TABLET | Refills: 0 | Status: SHIPPED | OUTPATIENT
Start: 2023-11-03

## 2023-11-03 NOTE — TELEPHONE ENCOUNTER
----- Message from [facility] sent at 11/3/2023  9:26 AM EDT -----  Subject: Refill Request    QUESTIONS  Name of Medication? melatonin 3 MG TABS tablet  Patient-reported dosage and instructions? 4 MG one at night   How many days do you have left? 0  Preferred Pharmacy? [id number]  Pharmacy phone number (if available)? [phone]  ---------------------------------------------------------------------------  --------------  CALL BACK INFO  What is the best way for the office to contact you? OK to leave message on   voicemail  Preferred Call Back Phone Number? [phone]  ---------------------------------------------------------------------------  --------------  SCRIPT ANSWERS  Relationship to Patient?  Self

## 2023-11-06 RX ORDER — VENLAFAXINE HYDROCHLORIDE 37.5 MG/1
CAPSULE, EXTENDED RELEASE ORAL
Qty: 30 CAPSULE | Refills: 0 | Status: SHIPPED | OUTPATIENT
Start: 2023-11-06

## 2023-11-15 NOTE — PROGRESS NOTES
[address] [facility] Outpatient Physical Therapy   [address] [address]   Phone: [phone]   Fax: [phone]    Physical Therapy Daily Treatment Note    Date:  2021  Patient Name:  Amy Grant    :  1955  MRN: [id number]  Physician:FABRIZIO Gonzáles MD      Insurance:[facility] medicare/medicaid   Medical Diagnosis: Left knee arthoplasty   Rehab Codes: B01.671  Onset date:20  Next Dr's appt.: TBD   Visit Count:    Cancel/No Show: 0/0    Subjective: The patient with continued left knee pain greater than the right knee. Walking and weight bearing remain difficult. The use of a quad cane in the left hand helps with walking. Pain:  [x] Yes  [] No Location: left knee Pain Rating: (0-10 scale) 6/10  Pain altered Tx:  [x] No  [] Yes  Action:  Comments:Home program is fair. Objective:      ROM  ° A/P STRENGTH TESTS (+/-) Left Right Not Tested    Left Right Left Right Ant. Drawer   []   Hip Flex     Post. Drawer   []   Ext     Lachmans   []   ER     Valgus Stress   []   IR     Varus Stress   []   ABD     Abdullahis   []   ADD     Apleys Comp.   []   Knee Flex 90 115   Apleys Dist.   []   Ext 0-5 5-0   Hip Scouring   []   Ankle DF     LAURYs   []   PF     Piriformis   []   INV     Kirks   []   EVER     Talor Tilt   []        Pat-Fem Grind   []       Modalities:   Exercise , Vaso compression  Precautions:falls   Exercises:  Exercise Reps/ Time Weight/ Level Completed  Today Comments   Neustep  5 min  Lv 3  x    SLR,SAQ, LAQ, Quad sets,  15 x 3   x    PROM knee extension 60'' x 2 each   x    Wedge  30 '' x 4  x    4 way hip  15 x       Foam Squats, heals, toes  15 x  x    Foam - balance  SLS, NBS, eyes open/closed  30'' x 1 each  x                         VC  15'   x Supine laying with LE elevation    Other:    Specific Instructions for next treatment:Add more PRE's at tolerated. Assessment: [x] Progressing toward goals.  With exercises, increase to include closed chain exercise. [x] No change. From last visit with knee extension, flexion and gait. [] Other:    [x] Patient would continue to benefit from skilled physical therapy services in order to: improve overall function (gait) of her left knee ROM and strength. STG/LTG:  STG: (to be met in 6  treatments)  1. ? Pain:to less than 2/10.   2. ? ROM: Left knee 0-105 deg. 3. ? Strength: quad/HS to 2+/5.   4. ? Function:OPTIMAL score 15/3.   5. Independent with Home Exercise Program (MET)  6. Demonstrate Knowledge of fall prevention(MET)  LTG: (to be met in 12  treatments)  1. OPTIMAL 9/3  2. Patient goals:Walk better. Pt. Education:  [x] Yes  [] No  [x] Reviewed Prior HEP/Ed  Method of Education: [x] Verbal  [x] Demo  [x] Written  Comprehension of Education:  [x] Verbalizes understanding. [] Demonstrates understanding. [x] Needs review. [] Demonstrates/verbalizes HEP/Ed previously given. Plan: [x] Continue per plan of care.    [] Other:      Treatment Charges: Mins Units   []  Modalities     [x]  Ther Exercise 30 2   []  Manual Therapy     []  Ther Activities     []  Aquatics     []  Neuromuscular     [x] Vasocompression 15 1   [] Gait Training     [] Dry needling        [] 1 or 2 muscles        [] 3 or more muscles     []  Other     Total Treatment time 45 3     Time In:12:50 pm             Time Out: 1:40 pm    Electronically signed by:  Suzan Devlin, PT 5-Fu Pregnancy And Lactation Text: This medication is Pregnancy Category X and contraindicated in pregnancy and in women who may become pregnant. It is unknown if this medication is excreted in breast milk.

## 2023-11-20 RX ORDER — VENLAFAXINE HYDROCHLORIDE 37.5 MG/1
CAPSULE, EXTENDED RELEASE ORAL
Qty: 90 CAPSULE | Refills: 1 | Status: SHIPPED | OUTPATIENT
Start: 2023-11-20

## 2023-11-29 ENCOUNTER — OFFICE VISIT (OUTPATIENT)
Dept: INTERNAL MEDICINE CLINIC | Age: 68
End: 2023-11-29
Payer: MEDICARE

## 2023-11-29 VITALS
SYSTOLIC BLOOD PRESSURE: 126 MMHG | WEIGHT: 177 LBS | HEART RATE: 110 BPM | DIASTOLIC BLOOD PRESSURE: 80 MMHG | BODY MASS INDEX: 35.68 KG/M2 | OXYGEN SATURATION: 98 % | HEIGHT: 59 IN

## 2023-11-29 DIAGNOSIS — E53.8 VITAMIN B12 DEFICIENCY: ICD-10-CM

## 2023-11-29 DIAGNOSIS — U07.1 PNEUMONIA DUE TO COVID-19 VIRUS: ICD-10-CM

## 2023-11-29 DIAGNOSIS — R06.83 SNORING: ICD-10-CM

## 2023-11-29 DIAGNOSIS — M25.561 ARTHRALGIA OF BOTH LOWER LEGS: Chronic | ICD-10-CM

## 2023-11-29 DIAGNOSIS — M25.562 ARTHRALGIA OF BOTH LOWER LEGS: Chronic | ICD-10-CM

## 2023-11-29 DIAGNOSIS — Z00.00 INITIAL MEDICARE ANNUAL WELLNESS VISIT: Primary | ICD-10-CM

## 2023-11-29 DIAGNOSIS — I10 PRIMARY HYPERTENSION: ICD-10-CM

## 2023-11-29 DIAGNOSIS — E55.9 VITAMIN D DEFICIENCY: ICD-10-CM
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
## 2023-11-29 DIAGNOSIS — G47.00 INSOMNIA, UNSPECIFIED TYPE: ICD-10-CM

## 2023-11-29 DIAGNOSIS — M85.88 OSTEOPENIA OF OTHER SITE: ICD-10-CM

## 2023-11-29 DIAGNOSIS — J12.82 PNEUMONIA DUE TO COVID-19 VIRUS: ICD-10-CM

## 2023-11-29 PROCEDURE — 3074F SYST BP LT 130 MM HG: CPT | Performed by: INTERNAL MEDICINE

## 2023-11-29 PROCEDURE — G0438 PPPS, INITIAL VISIT: HCPCS | Performed by: INTERNAL MEDICINE

## 2023-11-29 PROCEDURE — 3079F DIAST BP 80-89 MM HG: CPT | Performed by: INTERNAL MEDICINE

## 2023-11-29 PROCEDURE — 1123F ACP DISCUSS/DSCN MKR DOCD: CPT | Performed by: INTERNAL MEDICINE

## 2023-11-29 RX ORDER — ONDANSETRON 4 MG/1
TABLET, ORALLY DISINTEGRATING ORAL
COMMUNITY
Start: 2023-11-20

## 2023-11-29 ASSESSMENT — PATIENT HEALTH QUESTIONNAIRE - PHQ9
SUM OF ALL RESPONSES TO PHQ QUESTIONS 1-9: 0
SUM OF ALL RESPONSES TO PHQ QUESTIONS 1-9: 0
2. FEELING DOWN, DEPRESSED OR HOPELESS: 0
1. LITTLE INTEREST OR PLEASURE IN DOING THINGS: 0
SUM OF ALL RESPONSES TO PHQ9 QUESTIONS 1 & 2: 0
SUM OF ALL RESPONSES TO PHQ QUESTIONS 1-9: 0
SUM OF ALL RESPONSES TO PHQ QUESTIONS 1-9: 0

## 2023-11-29 NOTE — PROGRESS NOTES
Visit Information    Have you changed or started any medications since your last visit including any over-the-counter medicines, vitamins, or herbal medicines? no   Are you having any side effects from any of your medications? -  no  Have you stopped taking any of your medications? Is so, why? -  no    Have you seen any other physician or provider since your last visit? Yes - Records Obtained  Have you had any other diagnostic tests since your last visit? Yes - Records Obtained  Have you been seen in the emergency room and/or had an admission to a hospital since we last saw you? Yes - Records Obtained  Have you had your routine dental cleaning in the past 6 months? no    Have you activated your [employer] account? If not, what are your barriers?  No:      Patient Care Team:  Jai Gillespie MD as PCP - General (Internal Medicine)  Jai Gillespie MD as PCP - Empaneled Provider  Drew Fofana MD as Consulting Physician (Obstetrics & Gynecology)    Medical History Review  Past Medical, Family, and Social History reviewed and does contribute to the patient presenting condition    Health Maintenance   Topic Date Due    COVID-19 Vaccine (1) Never done    Hepatitis C screen  Never done    DTaP/Tdap/Td vaccine (1 - Tdap) Never done    Respiratory Syncytial Virus (RSV) age 61 yrs+ (3 - 1-dose 60+ series) Never done    Shingles vaccine (2 of 3) 06/26/2015    Annual Wellness Visit (AWV)  Never done    Flu vaccine (1) 08/01/2023    Lipids  04/20/2024    Depression Screen  07/10/2024    A1C test (Diabetic or Prediabetic)  07/18/2024    GFR test (Diabetes, CKD 3-4, OR last GFR 15-59)  07/18/2024    Breast cancer screen  08/17/2024    Colorectal Cancer Screen  07/17/2028    DEXA (modify frequency per FRAX score)  Completed    Pneumococcal 65+ years Vaccine  Completed    Hepatitis A vaccine  Aged Out    Hepatitis B vaccine  Aged Out    Hib vaccine  Aged Out    Meningococcal (ACWY) vaccine  Aged Out    Diabetes screen  Discontinued

## 2023-11-29 NOTE — PROGRESS NOTES
Medicare Annual Wellness Visit    Melissa Sanabria is here for Medicare AWV (Here for [facility])    Assessment & Plan   Pneumonia due to COVID-19 virus  Osteopenia of other site  Arthralgia of both lower legs  Snoring  Insomnia, unspecified type  -     Sleep Study with PAP Titration; Future  Primary hypertension    Recommendations for Preventive Services Due: see orders and patient instructions/AVS.  Recommended screening schedule for the next 5-10 years is provided to the patient in written form: see Patient Instructions/AVS.     No follow-ups on file. Subjective   The following acute and/or chronic problems were also addressed today:  3 falls since last visit   Mechanical   No hitting head   No loc   No dizziness or lightheadedness   No chest pian , no sob     Htn stable     Patient's complete Health Risk Assessment and screening values have been reviewed and are found in Flowsheets. The following problems were reviewed today and where indicated follow up appointments were made and/or referrals ordered. Positive Risk Factor Screenings with Interventions:    Fall Risk:  Do you feel unsteady or are you worried about falling? : (!) yes  2 or more falls in past year?: (!) yes  Fall with injury in past year?: (!) yes     Interventions:    Pt ot          Controlled Medication Review: Today's Pain Level: No data recorded   Opioid Risk: (Low risk score <55) Opioid risk score: 50    Patient is low risk for opioid use disorder or overdose. Last PDMP Idania De Oliveira as Reviewed:  Review User Review Instant Review Result   Pinky Meng 11/3/2021  5:22 AM     Reviewed PDMP [1]     Last Controlled Substance Monitoring Documentation      Flowsheet Row VV Special Use Case from 11/8/2021 in [address] Pain Procedures   Periodic Controlled Substance Monitoring Possible medication side effects, risk of tolerance/dependence & alternative treatments discussed., No signs of potential drug abuse or diversion identified. , Assessed functional

## 2024-01-10 RX ORDER — ESOMEPRAZOLE MAGNESIUM 40 MG/1
CAPSULE, DELAYED RELEASE ORAL
Qty: 30 CAPSULE | Refills: 5 | Status: SHIPPED | OUTPATIENT
Start: 2024-01-10

## 2024-01-16 ENCOUNTER — HOSPITAL ENCOUNTER (OUTPATIENT)
Dept: SLEEP CENTER | Age: 69
Discharge: HOME | End: 2024-01-18
Payer: MEDICARE

## 2024-01-16 DIAGNOSIS — G47.00 INSOMNIA, UNSPECIFIED TYPE: ICD-10-CM

## 2024-01-16 PROCEDURE — 95810 POLYSOM 6/> YRS 4/> PARAM: CPT

## 2024-01-17 VITALS
OXYGEN SATURATION: 97 % | RESPIRATION RATE: 12 BRPM | WEIGHT: 177 LBS | BODY MASS INDEX: 35.68 KG/M2 | HEIGHT: 59 IN | HEART RATE: 104 BPM

## 2024-01-17 ASSESSMENT — SLEEP AND FATIGUE QUESTIONNAIRES
ESS TOTAL SCORE: 2
HOW LIKELY ARE YOU TO NOD OFF OR FALL ASLEEP WHILE LYING DOWN TO REST IN THE AFTERNOON WHEN CIRCUMSTANCES PERMIT: 1
HOW LIKELY ARE YOU TO NOD OFF OR FALL ASLEEP WHILE SITTING INACTIVE IN A PUBLIC PLACE: 0
HOW LIKELY ARE YOU TO NOD OFF OR FALL ASLEEP WHILE WATCHING TV: 1
HOW LIKELY ARE YOU TO NOD OFF OR FALL ASLEEP WHILE SITTING AND READING: 0
HOW LIKELY ARE YOU TO NOD OFF OR FALL ASLEEP WHILE SITTING QUIETLY AFTER LUNCH WITHOUT ALCOHOL: 0
HOW LIKELY ARE YOU TO NOD OFF OR FALL ASLEEP WHILE SITTING AND TALKING TO SOMEONE: 0
HOW LIKELY ARE YOU TO NOD OFF OR FALL ASLEEP IN A CAR, WHILE STOPPED FOR A FEW MINUTES IN TRAFFIC: 0
HOW LIKELY ARE YOU TO NOD OFF OR FALL ASLEEP WHEN YOU ARE A PASSENGER IN A CAR FOR AN HOUR WITHOUT A BREAK: 0

## 2024-01-17 NOTE — PAYOR INFORMATION
Verified Demographics with Patient? No   If no why? Already verified  MSP Completed?  No    If not why? Already completed  Verified Insurance through: Already verified  COB Completed? Not required  Auth Verification #:[id number]  Liability Due: $0  Discount Offered: na%       Previous Balance: $ 0   Discount Offered: na%  Site Collect Status: na  Patient Response: na  Financial Aid Offered? Yes Pt declined  Cards Scanned: yes

## 2024-01-23 LAB — STATUS: NORMAL

## 2024-03-28 RX ORDER — AMLODIPINE BESYLATE 5 MG/1
5 TABLET ORAL DAILY
Qty: 30 TABLET | Refills: 3 | Status: SHIPPED | OUTPATIENT
Start: 2024-03-28

## 2024-04-30 ENCOUNTER — TELEPHONE (OUTPATIENT)
Dept: INTERNAL MEDICINE CLINIC | Age: 69
End: 2024-04-30

## 2024-06-12 RX ORDER — VENLAFAXINE HYDROCHLORIDE 37.5 MG/1
CAPSULE, EXTENDED RELEASE ORAL
Qty: 90 CAPSULE | Refills: 1 | Status: SHIPPED | OUTPATIENT
Start: 2024-06-12

## 2024-08-02 ENCOUNTER — OFFICE VISIT (OUTPATIENT)
Dept: INTERNAL MEDICINE CLINIC | Age: 69
End: 2024-08-02
Payer: MEDICARE

## 2024-08-02 VITALS
HEIGHT: 59 IN | WEIGHT: 176 LBS | SYSTOLIC BLOOD PRESSURE: 158 MMHG | BODY MASS INDEX: 35.48 KG/M2 | DIASTOLIC BLOOD PRESSURE: 78 MMHG

## 2024-08-02 DIAGNOSIS — G89.29 CHRONIC PAIN OF BOTH KNEES: ICD-10-CM

## 2024-08-02 DIAGNOSIS — M25.562 ARTHRALGIA OF BOTH LOWER LEGS: Chronic | ICD-10-CM

## 2024-08-02 DIAGNOSIS — I10 PRIMARY HYPERTENSION: ICD-10-CM

## 2024-08-02 DIAGNOSIS — M25.561 ARTHRALGIA OF BOTH LOWER LEGS: Chronic | ICD-10-CM

## 2024-08-02 DIAGNOSIS — R73.03 PRE-DIABETES: ICD-10-CM

## 2024-08-02 DIAGNOSIS — M25.562 CHRONIC PAIN OF BOTH KNEES: ICD-10-CM

## 2024-08-02 DIAGNOSIS — Z00.00 MEDICARE ANNUAL WELLNESS VISIT, SUBSEQUENT: ICD-10-CM

## 2024-08-02 DIAGNOSIS — Z13.220 SCREENING FOR HYPERLIPIDEMIA: Primary | ICD-10-CM

## 2024-08-02 DIAGNOSIS — R00.0 TACHYCARDIA: ICD-10-CM

## 2024-08-02 DIAGNOSIS — M25.561 CHRONIC PAIN OF BOTH KNEES: ICD-10-CM

## 2024-08-02 PROCEDURE — 3077F SYST BP >= 140 MM HG: CPT | Performed by: INTERNAL MEDICINE

## 2024-08-02 PROCEDURE — 1123F ACP DISCUSS/DSCN MKR DOCD: CPT | Performed by: INTERNAL MEDICINE

## 2024-08-02 PROCEDURE — G0439 PPPS, SUBSEQ VISIT: HCPCS | Performed by: INTERNAL MEDICINE

## 2024-08-02 PROCEDURE — 3078F DIAST BP <80 MM HG: CPT | Performed by: INTERNAL MEDICINE

## 2024-08-02 RX ORDER — ESOMEPRAZOLE MAGNESIUM 40 MG/1
40 CAPSULE, DELAYED RELEASE ORAL DAILY
Qty: 30 CAPSULE | Refills: 5 | Status: SHIPPED | OUTPATIENT
Start: 2024-08-02

## 2024-08-02 RX ORDER — TRIAMCINOLONE ACETONIDE 1 MG/G
CREAM TOPICAL
Qty: 45 G | Refills: 1 | Status: SHIPPED | OUTPATIENT
Start: 2024-08-02

## 2024-08-02 SDOH — ECONOMIC STABILITY: FOOD INSECURITY: WITHIN THE PAST 12 MONTHS, YOU WORRIED THAT YOUR FOOD WOULD RUN OUT BEFORE YOU GOT MONEY TO BUY MORE.: NEVER TRUE

## 2024-08-02 SDOH — ECONOMIC STABILITY: FOOD INSECURITY: WITHIN THE PAST 12 MONTHS, THE FOOD YOU BOUGHT JUST DIDN'T LAST AND YOU DIDN'T HAVE MONEY TO GET MORE.: NEVER TRUE

## 2024-08-02 SDOH — ECONOMIC STABILITY: INCOME INSECURITY: HOW HARD IS IT FOR YOU TO PAY FOR THE VERY BASICS LIKE FOOD, HOUSING, MEDICAL CARE, AND HEATING?: NOT HARD AT ALL

## 2024-08-02 ASSESSMENT — PATIENT HEALTH QUESTIONNAIRE - PHQ9
SUM OF ALL RESPONSES TO PHQ9 QUESTIONS 1 & 2: 0
SUM OF ALL RESPONSES TO PHQ QUESTIONS 1-9: 0
1. LITTLE INTEREST OR PLEASURE IN DOING THINGS: NOT AT ALL
2. FEELING DOWN, DEPRESSED OR HOPELESS: NOT AT ALL

## 2024-08-02 ASSESSMENT — LIFESTYLE VARIABLES
HOW OFTEN DO YOU HAVE A DRINK CONTAINING ALCOHOL: NEVER
HOW MANY STANDARD DRINKS CONTAINING ALCOHOL DO YOU HAVE ON A TYPICAL DAY: PATIENT DOES NOT DRINK

## 2024-08-02 NOTE — PROGRESS NOTES
Visit Information    Have you changed or started any medications since your last visit including any over-the-counter medicines, vitamins, or herbal medicines? no   Are you having any side effects from any of your medications? -  no  Have you stopped taking any of your medications? Is so, why? -  no    Have you seen any other physician or provider since your last visit? No  Have you had any other diagnostic tests since your last visit? No  Have you been seen in the emergency room and/or had an admission to a hospital since we last saw you? No  Have you had your routine dental cleaning in the past 6 months? no    Have you activated your [employer] account? If not, what are your barriers? No:      Patient Care Team:  Myriam Longoria MD as PCP - General (Internal Medicine)  Myriam Longoria MD as PCP - Empaneled Provider  Francisco Downs MD as Consulting Physician (Obstetrics & Gynecology)    Medical History Review  Past Medical, Family, and Social History reviewed and does not contribute to the patient presenting condition    Health Maintenance   Topic Date Due    COVID-19 Vaccine (1) Never done    Hepatitis C screen  Never done    DTaP/Tdap/Td vaccine (1 - Tdap) Never done    Respiratory Syncytial Virus (RSV) Pregnant or age 60 yrs+ (1 - 1-dose 60+ series) Never done    Shingles vaccine (2 of 3) 06/26/2015    Annual Wellness Visit (Medicare Advantage)  01/01/2024    Lipids  04/20/2024    Flu vaccine (1) 08/01/2024    A1C test (Diabetic or Prediabetic)  07/18/2024    GFR test (Diabetes, CKD 3-4, OR last GFR 15-59)  07/18/2024    Breast cancer screen  08/17/2024    Depression Screen  11/29/2024    Colorectal Cancer Screen  07/17/2028    DEXA (modify frequency per FRAX score)  Completed    Pneumococcal 65+ years Vaccine  Completed    Hepatitis A vaccine  Aged Out    Hepatitis B vaccine  Aged Out    Hib vaccine  Aged Out    Polio vaccine  Aged Out    Meningococcal (ACWY) vaccine  Aged Out    Diabetes screen  Discontinued

## 2024-08-02 NOTE — PROGRESS NOTES
Medicare Annual Wellness Visit    Lina Torres is here for Medicare AWV    Assessment & Plan   Screening for hyperlipidemia    Recommendations for Preventive Services Due: see orders and patient instructions/AVS.  Recommended screening schedule for the next 5-10 years is provided to the patient in written form: see Patient Instructions/AVS.     No follow-ups on file.     Subjective   The following acute and/or chronic problems were also addressed today:  Came for follow-up  Was last seen in November 2023, a lot of stress currently  is seeking hospice care at home  Patient has been hypertensive, was very stressed today, does not check blood pressure at home, patient states that she does have blood pressure cuff recommended to check, was also borderline tachycardic, was skipping beats on exam, no chest pain no shortness of breath no dizziness lightheadedness,  Advised to get labs and EKG voiced understanding  Add Lopressor  Mood is stable  Patient states that recently she checked her blood sugar it was in 200s, check HbA1c    Patient's complete Health Risk Assessment and screening values have been reviewed and are found in Flowsheets. The following problems were reviewed today and where indicated follow up appointments were made and/or referrals ordered.    Positive Risk Factor Screenings with Interventions:    Fall Risk:  Do you feel unsteady or are you worried about falling? : no  2 or more falls in past year?: no  Fall with injury in past year?: (!) yes       Interventions:    Reviewed medications, home hazards, visual acuity, and co-morbidities that can increase risk for falls  Blurry vision , follows up with opthal         Controlled Medication Review:    Today's Pain Level: No data recorded   Opioid Risk: (Low risk score <55) Opioid risk score: 40    Patient is low risk for opioid use disorder or overdose.    Last PDMP Travis as Reviewed:  Review User Review Instant Review Result   SASHA ALANIS

## 2024-08-05 ENCOUNTER — HOSPITAL ENCOUNTER (OUTPATIENT)
Age: 69
Discharge: HOME | End: 2024-08-05
Payer: MEDICARE

## 2024-08-05 ENCOUNTER — HOSPITAL ENCOUNTER (OUTPATIENT)
Age: 69
Discharge: HOME | End: 2024-08-07
Payer: MEDICARE

## 2024-08-05 DIAGNOSIS — R73.03 PRE-DIABETES: ICD-10-CM

## 2024-08-05 DIAGNOSIS — I10 PRIMARY HYPERTENSION: ICD-10-CM

## 2024-08-05 DIAGNOSIS — Z13.220 SCREENING FOR HYPERLIPIDEMIA: ICD-10-CM

## 2024-08-05 DIAGNOSIS — R00.0 TACHYCARDIA: ICD-10-CM

## 2024-08-05 LAB
ABSOLUTE BANDS: 0.09 K/UL (ref 0–1)
ANION GAP SERPL CALCULATED.3IONS-SCNC: 13 MMOL/L (ref 9–17)
BANDS: 2 % (ref 0–10)
BASOPHILS # BLD: 0 K/UL (ref 0–0.2)
BASOPHILS NFR BLD: 0 % (ref 0–2)
BUN SERPL-MCNC: 18 MG/DL (ref 8–23)
CALCIUM SERPL-MCNC: 9.2 MG/DL (ref 8.6–10.4)
CHLORIDE SERPL-SCNC: 103 MMOL/L (ref 98–107)
CHOLEST SERPL-MCNC: 190 MG/DL
CHOLESTEROL/HDL RATIO: 4.4
CO2 SERPL-SCNC: 25 MMOL/L (ref 20–31)
CREAT SERPL-MCNC: 1.1 MG/DL (ref 0.5–0.9)
EKG ATRIAL RATE: 83 BPM
EKG P AXIS: 70 DEGREES
EKG P-R INTERVAL: 194 MS
EKG Q-T INTERVAL: 384 MS
EKG QRS DURATION: 74 MS
EKG QTC CALCULATION (BAZETT): 451 MS
EKG R AXIS: 18 DEGREES
EKG T AXIS: 54 DEGREES
EKG VENTRICULAR RATE: 83 BPM
EOSINOPHIL # BLD: 0.09 K/UL (ref 0–0.4)
EOSINOPHILS RELATIVE PERCENT: 2 % (ref 0–4)
ERYTHROCYTE [DISTWIDTH] IN BLOOD BY AUTOMATED COUNT: 14.3 % (ref 11.5–14.9)
EST. AVERAGE GLUCOSE BLD GHB EST-MCNC: 126 MG/DL
GFR, ESTIMATED: 54 ML/MIN/1.73M2
GLUCOSE SERPL-MCNC: 127 MG/DL (ref 70–99)
HBA1C MFR BLD: 6 % (ref 4–6)
HCT VFR BLD AUTO: 40.9 % (ref 36–46)
HDLC SERPL-MCNC: 43 MG/DL
HGB BLD-MCNC: 13.1 G/DL (ref 12–16)
LDLC SERPL CALC-MCNC: 113 MG/DL (ref 0–130)
LYMPHOCYTES NFR BLD: 1.65 K/UL (ref 1–4.8)
LYMPHOCYTES RELATIVE PERCENT: 35 % (ref 24–44)
MCH RBC QN AUTO: 29.5 PG (ref 26–34)
MCHC RBC AUTO-ENTMCNC: 32 G/DL (ref 31–37)
MCV RBC AUTO: 92.1 FL (ref 80–100)
MONOCYTES NFR BLD: 0.47 K/UL (ref 0.1–1.3)
MONOCYTES NFR BLD: 10 % (ref 1–7)
MORPHOLOGY: ABNORMAL
NEUTROPHILS NFR BLD: 51 % (ref 36–66)
NEUTS SEG NFR BLD: 2.4 K/UL (ref 1.3–9.1)
PLATELET # BLD AUTO: 224 K/UL (ref 150–450)
PMV BLD AUTO: 8.8 FL (ref 6–12)
POTASSIUM SERPL-SCNC: 4.6 MMOL/L (ref 3.7–5.3)
RBC # BLD AUTO: 4.44 M/UL (ref 4–5.2)
SODIUM SERPL-SCNC: 141 MMOL/L (ref 135–144)
TRIGL SERPL-MCNC: 171 MG/DL
TSH SERPL DL<=0.05 MIU/L-ACNC: 3.93 UIU/ML (ref 0.3–5)
WBC OTHER # BLD: 4.7 K/UL (ref 3.5–11)

## 2024-08-05 PROCEDURE — 85025 COMPLETE CBC W/AUTO DIFF WBC: CPT

## 2024-08-05 PROCEDURE — 84443 ASSAY THYROID STIM HORMONE: CPT

## 2024-08-05 PROCEDURE — 93005 ELECTROCARDIOGRAM TRACING: CPT

## 2024-08-05 PROCEDURE — 93010 ELECTROCARDIOGRAM REPORT: CPT | Performed by: INTERNAL MEDICINE

## 2024-08-05 PROCEDURE — 80061 LIPID PANEL: CPT

## 2024-08-05 PROCEDURE — 36415 COLL VENOUS BLD VENIPUNCTURE: CPT

## 2024-08-05 PROCEDURE — 80048 BASIC METABOLIC PNL TOTAL CA: CPT

## 2024-08-05 PROCEDURE — 83036 HEMOGLOBIN GLYCOSYLATED A1C: CPT

## 2024-09-05 ENCOUNTER — TELEPHONE (OUTPATIENT)
Dept: INTERNAL MEDICINE CLINIC | Age: 69
End: 2024-09-05

## 2024-09-05 NOTE — TELEPHONE ENCOUNTER
----- Message from Corinne SARKAR sent at 9/5/2024  1:34 PM EDT -----  Regarding: ECC Results Request  ECC Results Request    Which lab or imaging result is the patient calling about:EKG and Blood-work    Which provider ordered the test? Myriam Longoria MD    Was this a Non-[facility] Provider: No    Date the test was preformed (MM/DD/YYYY):PT forgot the date  --------------------------------------------------------------------------------------------------------------------------    Relationship to Patient: Self     Call Back Info: OK to leave message on voicemail  Preferred Call Back Number: Phone [phone]

## 2024-10-02 ENCOUNTER — OFFICE VISIT (OUTPATIENT)
Dept: INTERNAL MEDICINE CLINIC | Age: 69
End: 2024-10-02

## 2024-10-02 VITALS
HEIGHT: 59 IN | OXYGEN SATURATION: 99 % | SYSTOLIC BLOOD PRESSURE: 138 MMHG | BODY MASS INDEX: 35.28 KG/M2 | DIASTOLIC BLOOD PRESSURE: 78 MMHG | HEART RATE: 70 BPM | WEIGHT: 175 LBS

## 2024-10-02 DIAGNOSIS — F51.01 PRIMARY INSOMNIA: ICD-10-CM

## 2024-10-02 DIAGNOSIS — Z12.31 ENCOUNTER FOR SCREENING MAMMOGRAM FOR BREAST CANCER: Primary | ICD-10-CM

## 2024-10-02 RX ORDER — LANOLIN ALCOHOL/MO/W.PET/CERES
3 CREAM (GRAM) TOPICAL NIGHTLY PRN
Qty: 30 TABLET | Refills: 4 | Status: SHIPPED | OUTPATIENT
Start: 2024-10-02

## 2024-10-02 NOTE — PROGRESS NOTES
\"Have you been to the ER, urgent care clinic since your last visit?  Hospitalized since your last visit?\"    NO  Are you sick today? no    Are you allergic to eggs? no    Have you ever had a reaction to the flu vaccine? no    Have you ever had Thais- barre's Syndrome? no    Per order from provider VIS given to patient, consent received,  flu vaccine was administered and documented in vaccine part of chart patient tolerated well.

## 2024-10-02 NOTE — PROGRESS NOTES
[facility]  [address] [address]  Dept: [phone]  Dept Fax: [phone]     Name: Lina Torres  : 1955           Chief Complaint   Patient presents with    Insomnia           Discuss Labs       History of Present Illness:    HPI  Came for follow-up, continues to have insomnia has trouble falling and staying asleep, anxiety depression symptoms controlled had sleep study, no sleep apnea, patient said that she has taken melatonin in the past with seem to help, will give melatonin,  No chest pain no shortness of breath  Patient denies any alcohol or drug use  Past Medical History:    Past Medical History:   Diagnosis Date    Arthritis     Bronchitis     Bunion of left foot     Diabetes mellitus (HCC)     not on any meds    Eczema     arms, trunk    GERD (gastroesophageal reflux disease)     Hearing loss     left and right ears, wears hearing aids    Hyperlipidemia     Hypertension     Kidney infection     Osteoporosis     Urge incontinence     Urinary frequency     Wears glasses     for reading      Reviewed all health maintenance requirements and ordered appropriate tests  Health Maintenance Due   Topic Date Due    Hepatitis C screen  Never done    DTaP/Tdap/Td vaccine (1 - Tdap) Never done    Respiratory Syncytial Virus (RSV) Pregnant or age 60 yrs+ (1 - 1-dose 60+ series) Never done    Shingles vaccine (2 of 3) 2015    Flu vaccine (1) 2024    Breast cancer screen  2024    COVID-19 Vaccine (1 - - season) Never done       Past Surgical History:    Past Surgical History:   Procedure Laterality Date    APPENDECTOMY      WITH HYSTERECTOMY    ARTHROPLASTY Left 10/12/2017    METATARSAL HEAD RESECTION 3RD LEFT FOOT  & EXCISION LESION SOFT TISSUE LEFT FOOT performed by Alexandre Turk DPM at [facility] OR    BLADDER SUSPENSION      BLEPHAROPLASTY Bilateral     BREAST BIOPSY Left     benign    COLONOSCOPY      CYST REMOVAL Right     HEEL

## 2024-10-09 RX ORDER — ATORVASTATIN CALCIUM 20 MG/1
20 TABLET, FILM COATED ORAL DAILY
Qty: 90 TABLET | Refills: 3 | Status: SHIPPED | OUTPATIENT
Start: 2024-10-09

## 2024-10-09 NOTE — TELEPHONE ENCOUNTER
Patient called stating you told her that you were going to send Lipitor to the pharmacy for her. I have pended the medication that was in her history.    Also patient states that you told her that if 1 melatonin didn't work she could take 2. Patient wanted to let you know that she will be taking 2 and may need a new order sent to pharmacy because she will run out of pills before she is due for a refill.

## 2024-12-12 ENCOUNTER — TELEPHONE (OUTPATIENT)
Dept: INTERNAL MEDICINE CLINIC | Age: 69
End: 2024-12-12

## 2024-12-13 RX ORDER — VENLAFAXINE HYDROCHLORIDE 37.5 MG/1
CAPSULE, EXTENDED RELEASE ORAL
Qty: 90 CAPSULE | Refills: 1 | Status: SHIPPED | OUTPATIENT
Start: 2024-12-13

## 2024-12-17 ENCOUNTER — HOSPITAL ENCOUNTER (OUTPATIENT)
Age: 69
Discharge: HOME | End: 2024-12-19
Payer: MEDICARE

## 2024-12-17 ENCOUNTER — HOSPITAL ENCOUNTER (OUTPATIENT)
Dept: GENERAL RADIOLOGY | Age: 69
Discharge: HOME | End: 2024-12-19
Payer: MEDICARE

## 2024-12-17 DIAGNOSIS — R52 PAIN: ICD-10-CM

## 2024-12-17 PROCEDURE — 72072 X-RAY EXAM THORAC SPINE 3VWS: CPT

## 2024-12-17 PROCEDURE — 72120 X-RAY BEND ONLY L-S SPINE: CPT

## 2024-12-24 ENCOUNTER — TELEPHONE (OUTPATIENT)
Dept: INTERNAL MEDICINE CLINIC | Age: 69
End: 2024-12-24

## 2024-12-24 NOTE — TELEPHONE ENCOUNTER
----- Message from Marcelino GAN sent at 12/24/2024 12:31 PM EST -----  Regarding: ECC Appointment Request  ECC Appointment Request    Patient needs appointment for ECC Appointment Type: Existing Condition Follow Up.    Patient Requested Dates(s): January 8, 2025  Patient Requested Time: 10:30AM  Provider Name: Myriam Longoria MD    Reason for Appointment Request: Established Patient - No appointments available during search/ 3months follow up  --------------------------------------------------------------------------------------------------------------------------    Relationship to Patient: Self     Call Back Information: OK to leave message on voicemail  Preferred Call Back Number: Phone [phone]

## 2024-12-26 ENCOUNTER — HOSPITAL ENCOUNTER (OUTPATIENT)
Dept: WOMENS IMAGING | Age: 69
Discharge: HOME | End: 2024-12-28
Attending: INTERNAL MEDICINE
Payer: MEDICARE

## 2024-12-26 DIAGNOSIS — Z12.31 ENCOUNTER FOR SCREENING MAMMOGRAM FOR BREAST CANCER: ICD-10-CM

## 2024-12-26 PROCEDURE — 77063 BREAST TOMOSYNTHESIS BI: CPT

## 2025-01-09 ENCOUNTER — OFFICE VISIT (OUTPATIENT)
Dept: INTERNAL MEDICINE CLINIC | Age: 70
End: 2025-01-09
Payer: MEDICARE

## 2025-01-09 VITALS
HEIGHT: 59 IN | DIASTOLIC BLOOD PRESSURE: 70 MMHG | SYSTOLIC BLOOD PRESSURE: 138 MMHG | WEIGHT: 183.6 LBS | OXYGEN SATURATION: 98 % | BODY MASS INDEX: 37.01 KG/M2 | HEART RATE: 76 BPM

## 2025-01-09 DIAGNOSIS — F41.9 ANXIETY: ICD-10-CM

## 2025-01-09 DIAGNOSIS — I10 PRIMARY HYPERTENSION: ICD-10-CM

## 2025-01-09 DIAGNOSIS — Z13.220 SCREENING FOR HYPERLIPIDEMIA: ICD-10-CM

## 2025-01-09 DIAGNOSIS — Z00.00 MEDICARE ANNUAL WELLNESS VISIT, SUBSEQUENT: Primary | ICD-10-CM

## 2025-01-09 PROCEDURE — 1123F ACP DISCUSS/DSCN MKR DOCD: CPT | Performed by: INTERNAL MEDICINE

## 2025-01-09 PROCEDURE — 3078F DIAST BP <80 MM HG: CPT | Performed by: INTERNAL MEDICINE

## 2025-01-09 PROCEDURE — G0439 PPPS, SUBSEQ VISIT: HCPCS | Performed by: INTERNAL MEDICINE

## 2025-01-09 PROCEDURE — 1159F MED LIST DOCD IN RCRD: CPT | Performed by: INTERNAL MEDICINE

## 2025-01-09 PROCEDURE — 1125F AMNT PAIN NOTED PAIN PRSNT: CPT | Performed by: INTERNAL MEDICINE

## 2025-01-09 PROCEDURE — 3075F SYST BP GE 130 - 139MM HG: CPT | Performed by: INTERNAL MEDICINE

## 2025-01-09 RX ORDER — HYDROXYZINE HYDROCHLORIDE 25 MG/1
25 TABLET, FILM COATED ORAL EVERY 8 HOURS PRN
Qty: 30 TABLET | Refills: 0 | Status: SHIPPED | OUTPATIENT
Start: 2025-01-09 | End: 2025-02-08

## 2025-01-09 RX ORDER — BUSPIRONE HYDROCHLORIDE 5 MG/1
5 TABLET ORAL 2 TIMES DAILY
Qty: 60 TABLET | Refills: 0 | Status: SHIPPED | OUTPATIENT
Start: 2025-01-09 | End: 2025-02-08

## 2025-01-09 SDOH — ECONOMIC STABILITY: FOOD INSECURITY: WITHIN THE PAST 12 MONTHS, THE FOOD YOU BOUGHT JUST DIDN'T LAST AND YOU DIDN'T HAVE MONEY TO GET MORE.: NEVER TRUE

## 2025-01-09 SDOH — ECONOMIC STABILITY: FOOD INSECURITY: WITHIN THE PAST 12 MONTHS, YOU WORRIED THAT YOUR FOOD WOULD RUN OUT BEFORE YOU GOT MONEY TO BUY MORE.: NEVER TRUE

## 2025-01-09 ASSESSMENT — PATIENT HEALTH QUESTIONNAIRE - PHQ9
SUM OF ALL RESPONSES TO PHQ QUESTIONS 1-9: 0
10. IF YOU CHECKED OFF ANY PROBLEMS, HOW DIFFICULT HAVE THESE PROBLEMS MADE IT FOR YOU TO DO YOUR WORK, TAKE CARE OF THINGS AT HOME, OR GET ALONG WITH OTHER PEOPLE: NOT DIFFICULT AT ALL
3. TROUBLE FALLING OR STAYING ASLEEP: NOT AT ALL
SUM OF ALL RESPONSES TO PHQ9 QUESTIONS 1 & 2: 0
7. TROUBLE CONCENTRATING ON THINGS, SUCH AS READING THE NEWSPAPER OR WATCHING TELEVISION: NOT AT ALL
2. FEELING DOWN, DEPRESSED OR HOPELESS: NOT AT ALL
4. FEELING TIRED OR HAVING LITTLE ENERGY: NOT AT ALL
SUM OF ALL RESPONSES TO PHQ QUESTIONS 1-9: 0
1. LITTLE INTEREST OR PLEASURE IN DOING THINGS: NOT AT ALL
SUM OF ALL RESPONSES TO PHQ QUESTIONS 1-9: 0
5. POOR APPETITE OR OVEREATING: NOT AT ALL
SUM OF ALL RESPONSES TO PHQ QUESTIONS 1-9: 0
9. THOUGHTS THAT YOU WOULD BE BETTER OFF DEAD, OR OF HURTING YOURSELF: NOT AT ALL
6. FEELING BAD ABOUT YOURSELF - OR THAT YOU ARE A FAILURE OR HAVE LET YOURSELF OR YOUR FAMILY DOWN: NOT AT ALL
8. MOVING OR SPEAKING SO SLOWLY THAT OTHER PEOPLE COULD HAVE NOTICED. OR THE OPPOSITE, BEING SO FIGETY OR RESTLESS THAT YOU HAVE BEEN MOVING AROUND A LOT MORE THAN USUAL: NOT AT ALL

## 2025-01-09 NOTE — PATIENT INSTRUCTIONS
Learning About Being Active as an Older Adult  Why is being active important as you get older?     Being active is one of the best things you can do for your health. And it's never too late to start. Being active--or getting active, if you aren't already--has definite benefits. It can:  Give you more energy,  Keep your mind sharp.  Improve balance to reduce your risk of falls.  Help you manage chronic illness with fewer medicines.  No matter how old you are, how fit you are, or what health problems you have, there is a form of activity that will work for you. And the more physical activity you can do, the better your overall health will be.  What kinds of activity can help you stay healthy?  Being more active will make your daily activities easier. Physical activity includes planned exercise and things you do in daily life. There are four types of activity:  Aerobic.  Doing aerobic activity makes your heart and lungs strong.  Includes walking, dancing, and gardening.  Aim for at least 2½ hours spread throughout the week.  It improves your energy and can help you sleep better.  Muscle-strengthening.  This type of activity can help maintain muscle and strengthen bones.  Includes climbing stairs, using resistance bands, and lifting or carrying heavy loads.  Aim for at least twice a week.  It can help protect the knees and other joints.  Stretching.  Stretching gives you better range of motion in joints and muscles.  Includes upper arm stretches, calf stretches, and gentle yoga.  Aim for at least twice a week, preferably after your muscles are warmed up from other activities.  It can help you function better in daily life.  Balancing.  This helps you stay coordinated and have good posture.  Includes heel-to-toe walking, shantel chi, and certain types of yoga.  Aim for at least 3 days a week.  It can reduce your risk of falling.  Even if you have a hard time meeting the recommendations, it's better to be more active

## 2025-01-09 NOTE — PROGRESS NOTES
Arm   Position: Sitting   Cuff Size: Large Adult   Pulse: 76   SpO2: 98%   Weight: 83.3 kg (183 lb 9.6 oz)   Height: 1.499 m (4' 11\")      Body mass index is 37.08 kg/m².        General Appearance: alert and oriented to person, place and time, well developed and well- nourished, in no acute distress  Skin: warm and dry, no rash or erythema  Head: normocephalic and atraumatic  Eyes: pupils equal, round, and reactive to light, extraocular eye movements intact, conjunctivae normal  ENT: tympanic membrane, external ear and ear canal normal bilaterally, nose without deformity, nasal mucosa and turbinates normal without polyps  Neck: supple and non-tender without mass, no thyromegaly or thyroid nodules, no cervical lymphadenopathy  Pulmonary/Chest: clear to auscultation bilaterally- no wheezes, rales or rhonchi, normal air movement, no respiratory distress  Cardiovascular: normal rate, regular rhythm, normal S1 and S2, no murmurs, rubs, clicks, or gallops, distal pulses intact, no carotid bruits  Abdomen: soft, non-tender, non-distended, normal bowel sounds, no masses or organomegaly  Extremities: no cyanosis, clubbing or edema  Musculoskeletal: normal range of motion, no joint swelling, deformity or tenderness  Neurologic: reflexes normal and symmetric, no cranial nerve deficit, gait, coordination and speech normal            Allergies   Allergen Reactions    Aspirin Other (See Comments)     Chest pain    Celecoxib     Codeine Other (See Comments)     Chest pain    Avelox [Moxifloxacin] Rash    Lidoderm [Lidocaine] Rash     Skin reddened , itching    Sulfa Antibiotics Rash     Prior to Visit Medications    Medication Sig Taking? Authorizing Provider   venlafaxine (EFFEXOR XR) 37.5 MG extended release capsule take 1 capsule by mouth once daily with food Yes Myriam Longoria MD   atorvastatin (LIPITOR) 20 MG tablet Take 1 tablet by mouth daily Yes Myriam Longoria MD   melatonin 3 MG TABS tablet Take 1 tablet by mouth nightly

## 2025-01-14 ENCOUNTER — TELEPHONE (OUTPATIENT)
Dept: INTERNAL MEDICINE CLINIC | Age: 70
End: 2025-01-14

## 2025-01-14 NOTE — TELEPHONE ENCOUNTER
Patient called and stated that her insurance will not over the melatonin. She would like to know if something else can be called in. Please advise

## 2025-01-15 NOTE — TELEPHONE ENCOUNTER
Spoke with patient and explained the typical OTC cost of medication ($5-$9) as well access to coupons to help with cost.   Patient states her sister gave her Klonopin and it helped her sleep.   She is requesting that instead.   Scheduled appointment to discuss. Patient does not want to try melatonin.

## 2025-01-29 RX ORDER — METOPROLOL TARTRATE 25 MG/1
25 TABLET, FILM COATED ORAL 2 TIMES DAILY
Qty: 180 TABLET | Refills: 1 | Status: SHIPPED | OUTPATIENT
Start: 2025-01-29

## 2025-02-10 RX ORDER — ESOMEPRAZOLE MAGNESIUM 40 MG/1
40 CAPSULE, DELAYED RELEASE ORAL DAILY
Qty: 30 CAPSULE | Refills: 0 | Status: SHIPPED | OUTPATIENT
Start: 2025-02-10

## 2025-02-20 DIAGNOSIS — F41.9 ANXIETY: ICD-10-CM

## 2025-02-20 DIAGNOSIS — I10 PRIMARY HYPERTENSION: ICD-10-CM

## 2025-02-21 RX ORDER — HYDROXYZINE HYDROCHLORIDE 25 MG/1
25 TABLET, FILM COATED ORAL EVERY 8 HOURS PRN
Qty: 30 TABLET | Refills: 0 | Status: SHIPPED | OUTPATIENT
Start: 2025-02-21 | End: 2025-03-23

## 2025-02-21 RX ORDER — BUSPIRONE HYDROCHLORIDE 5 MG/1
5 TABLET ORAL 2 TIMES DAILY
Qty: 60 TABLET | Refills: 0 | Status: SHIPPED | OUTPATIENT
Start: 2025-02-21 | End: 2025-03-23

## 2025-02-24 ENCOUNTER — OFFICE VISIT (OUTPATIENT)
Dept: INTERNAL MEDICINE CLINIC | Age: 70
End: 2025-02-24

## 2025-02-24 VITALS
WEIGHT: 183 LBS | BODY MASS INDEX: 36.89 KG/M2 | HEIGHT: 59 IN | DIASTOLIC BLOOD PRESSURE: 66 MMHG | OXYGEN SATURATION: 97 % | HEART RATE: 73 BPM | SYSTOLIC BLOOD PRESSURE: 128 MMHG

## 2025-02-24 DIAGNOSIS — F41.9 ANXIETY: ICD-10-CM

## 2025-02-24 DIAGNOSIS — I10 PRIMARY HYPERTENSION: ICD-10-CM

## 2025-02-24 DIAGNOSIS — R32 URINARY INCONTINENCE, UNSPECIFIED TYPE: ICD-10-CM
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
## 2025-02-24 RX ORDER — TIZANIDINE 2 MG/1
2 TABLET ORAL NIGHTLY PRN
Qty: 30 TABLET | Refills: 0 | Status: SHIPPED | OUTPATIENT
Start: 2025-02-24 | End: 2025-02-24

## 2025-02-24 RX ORDER — BUSPIRONE HYDROCHLORIDE 10 MG/1
10 TABLET ORAL 2 TIMES DAILY
Qty: 60 TABLET | Refills: 0 | Status: SHIPPED | OUTPATIENT
Start: 2025-02-24 | End: 2025-03-26

## 2025-02-24 RX ORDER — AMLODIPINE BESYLATE 5 MG/1
5 TABLET ORAL DAILY
Qty: 30 TABLET | Refills: 3 | Status: SHIPPED | OUTPATIENT
Start: 2025-02-24

## 2025-02-24 RX ORDER — TIZANIDINE 2 MG/1
2 TABLET ORAL EVERY 8 HOURS PRN
Qty: 30 TABLET | Refills: 0 | Status: SHIPPED | OUTPATIENT
Start: 2025-02-24

## 2025-02-24 RX ORDER — METOPROLOL TARTRATE 25 MG/1
25 TABLET, FILM COATED ORAL 2 TIMES DAILY
Qty: 180 TABLET | Refills: 1 | Status: SHIPPED | OUTPATIENT
Start: 2025-02-24

## 2025-02-24 NOTE — PROGRESS NOTES
[facility]  [place] [address] [facility] [address]  Dept: [phone]  Dept Fax: [phone]     Name: Lina Torres  : 1955           Chief Complaint   Patient presents with    Fall     Fell last night taking out trash, hurt lower back        History of Present Illness:    HPI    Came for follow-up  Fell yesterday on ice, complaining of back pain did not seek any medical attention denies any dizziness lightheadedness did not pass out  No trouble walking  Complaining of back pain and neck pain get x-rays    Hypertension blood pressures currently stable no chest pain no shortness of breath,  On amlodipine metoprolol    Depression and anxiety, multiple family stressors, difficult family dynamics, symptoms controlled does get anxious at times increase BuSpar    Complaining of urinary incontinence and hesitancy, get UA    Past Medical History:    Past Medical History:   Diagnosis Date    Arthritis     Bronchitis     Bunion of left foot     Diabetes mellitus (HCC)     not on any meds    Eczema     arms, trunk    GERD (gastroesophageal reflux disease)     Hearing loss     left and right ears, wears hearing aids    Hyperlipidemia     Hypertension     Kidney infection     Osteoporosis     Urge incontinence     Urinary frequency     Wears glasses     for reading      Reviewed all health maintenance requirements and ordered appropriate tests  Health Maintenance Due   Topic Date Due    Hepatitis C screen  Never done    DTaP/Tdap/Td vaccine (1 - Tdap) Never done    Respiratory Syncytial Virus (RSV) Pregnant or age 60 yrs+ (1 - Risk 60-74 years 1-dose series) Never done    Shingles vaccine (2 of 3) 2015    COVID-19 Vaccine ( -  season) Never done       Past Surgical History:    Past Surgical History:   Procedure Laterality Date    APPENDECTOMY      WITH HYSTERECTOMY    ARTHROPLASTY Left 10/12/2017    METATARSAL HEAD RESECTION 3RD LEFT FOOT  & EXCISION LESION SOFT TISSUE

## 2025-03-06 ENCOUNTER — HOSPITAL ENCOUNTER (OUTPATIENT)
Dept: GENERAL RADIOLOGY | Facility: CLINIC | Age: 70
Discharge: HOME | End: 2025-03-08
Payer: MEDICARE

## 2025-03-06 ENCOUNTER — HOSPITAL ENCOUNTER (OUTPATIENT)
Dept: PHYSICAL THERAPY | Age: 70
Setting detail: THERAPIES SERIES
Discharge: HOME | End: 2025-03-06
Attending: INTERNAL MEDICINE
Payer: MEDICARE

## 2025-03-06 ENCOUNTER — HOSPITAL ENCOUNTER (OUTPATIENT)
Facility: CLINIC | Age: 70
Discharge: HOME | End: 2025-03-08
Payer: MEDICARE

## 2025-03-06 ENCOUNTER — HOSPITAL ENCOUNTER (OUTPATIENT)
Age: 70
Setting detail: SPECIMEN
Discharge: HOME | End: 2025-03-06
Payer: MEDICARE

## 2025-03-06 DIAGNOSIS — F41.9 ANXIETY: ICD-10-CM
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
## 2025-03-06 DIAGNOSIS — I10 PRIMARY HYPERTENSION: ICD-10-CM

## 2025-03-06 DIAGNOSIS — R32 URINARY INCONTINENCE, UNSPECIFIED TYPE: ICD-10-CM

## 2025-03-06 LAB
25(OH)D3 SERPL-MCNC: 20.7 NG/ML (ref 30–100)
BACTERIA URNS QL MICRO: ABNORMAL
BILIRUB UR QL STRIP: NEGATIVE
CASTS #/AREA URNS LPF: ABNORMAL /LPF (ref 0–2)
CASTS #/AREA URNS LPF: ABNORMAL /LPF (ref 0–2)
CLARITY UR: CLEAR
COLOR UR: YELLOW
EPI CELLS #/AREA URNS HPF: ABNORMAL /HPF (ref 0–5)
FOLATE SERPL-MCNC: 6.8 NG/ML (ref 4.8–24.2)
GLUCOSE UR STRIP-MCNC: NEGATIVE MG/DL
HGB UR QL STRIP.AUTO: NEGATIVE
KETONES UR STRIP-MCNC: NEGATIVE MG/DL
LEUKOCYTE ESTERASE UR QL STRIP: ABNORMAL
NITRITE UR QL STRIP: NEGATIVE
PH UR STRIP: 6 [PH] (ref 5–8)
PROT UR STRIP-MCNC: NEGATIVE MG/DL
RBC #/AREA URNS HPF: ABNORMAL /HPF (ref 0–2)
SP GR UR STRIP: 1.01 (ref 1–1.03)
UROBILINOGEN UR STRIP-ACNC: NORMAL EU/DL (ref 0–1)
VIT B12 SERPL-MCNC: 424 PG/ML (ref 232–1245)
WBC #/AREA URNS HPF: ABNORMAL /HPF (ref 0–5)

## 2025-03-06 PROCEDURE — 72100 X-RAY EXAM L-S SPINE 2/3 VWS: CPT

## 2025-03-06 PROCEDURE — 82306 VITAMIN D 25 HYDROXY: CPT

## 2025-03-06 PROCEDURE — 81001 URINALYSIS AUTO W/SCOPE: CPT

## 2025-03-06 PROCEDURE — 97163 PT EVAL HIGH COMPLEX 45 MIN: CPT

## 2025-03-06 PROCEDURE — 36415 COLL VENOUS BLD VENIPUNCTURE: CPT

## 2025-03-06 PROCEDURE — 82746 ASSAY OF FOLIC ACID SERUM: CPT

## 2025-03-06 PROCEDURE — 87077 CULTURE AEROBIC IDENTIFY: CPT

## 2025-03-06 PROCEDURE — 87086 URINE CULTURE/COLONY COUNT: CPT

## 2025-03-06 PROCEDURE — 72040 X-RAY EXAM NECK SPINE 2-3 VW: CPT

## 2025-03-06 PROCEDURE — 82607 VITAMIN B-12: CPT

## 2025-03-06 PROCEDURE — 87186 SC STD MICRODIL/AGAR DIL: CPT

## 2025-03-06 NOTE — CONSULTS
1-2 [] 3 [x] 4+   Clinical presentation (progression) [] Stable [x] Evolving  [] Unstable   Decision Making [] Low [x] Moderate [] High    [] Low Complexity [] Moderate Complexity [x] High Complexity       Treatment Charges: Mins Units Time in/out   [x] Evaluation       []  Low       []  Moderate       [x]  High 43 1    []  Modalities      []  Ther Exercise      []  Manual Therapy      []  Ther Activities      []  Aquatics      []  Neuromuscular       []  Gait      []  Vasocompression      []  Other              TOTAL TREATMENT TIME:     Time in:  1102 am      Time out:  1155 am    Electronically signed by: Lynette Cruz PT    Physician Signature:________________________________Date:__________________  By signing above or cosigning this note, I have reviewed this plan of care and certify a need for medically necessary rehabilitation services.     *PLEASE SIGN ABOVE AND FAX BACK ALL PAGES*

## 2025-03-07 ENCOUNTER — TELEPHONE (OUTPATIENT)
Dept: INTERNAL MEDICINE CLINIC | Age: 70
End: 2025-03-07

## 2025-03-07 RX ORDER — ERGOCALCIFEROL 1.25 MG/1
50000 CAPSULE, LIQUID FILLED ORAL WEEKLY
Qty: 8 CAPSULE | Refills: 0 | Status: SHIPPED | OUTPATIENT
Start: 2025-03-07 | End: 2025-04-26

## 2025-03-07 RX ORDER — CEPHALEXIN 500 MG/1
500 CAPSULE ORAL 2 TIMES DAILY
Qty: 14 CAPSULE | Refills: 0 | Status: SHIPPED | OUTPATIENT
Start: 2025-03-07 | End: 2025-03-12

## 2025-03-07 NOTE — TELEPHONE ENCOUNTER
cephALEXin (KEFLEX) 500 MG capsule [[id number]]    Order Details  Dose: 500 mg Route: Oral Frequency: 2 TIMES DAILY   Dispense Quantity: 14 capsule Refills: 0          Sig: Take 1 capsule by mouth 2 times daily for 5 days       Pharmacy called to clarify quantity verse directions ?

## 2025-03-08 LAB
MICROORGANISM SPEC CULT: ABNORMAL
SPECIMEN DESCRIPTION: ABNORMAL

## 2025-03-10 ENCOUNTER — RESULTS FOLLOW-UP (OUTPATIENT)
Dept: INTERNAL MEDICINE CLINIC | Age: 70
End: 2025-03-10

## 2025-03-10 RX ORDER — ESOMEPRAZOLE MAGNESIUM 40 MG/1
40 CAPSULE, DELAYED RELEASE ORAL DAILY
Qty: 30 CAPSULE | Refills: 0 | Status: SHIPPED | OUTPATIENT
Start: 2025-03-10

## 2025-03-13 DIAGNOSIS — I10 PRIMARY HYPERTENSION: ICD-10-CM

## 2025-03-13 DIAGNOSIS — F41.9 ANXIETY: ICD-10-CM

## 2025-03-13 RX ORDER — HYDROXYZINE HYDROCHLORIDE 25 MG/1
25 TABLET, FILM COATED ORAL EVERY 8 HOURS PRN
Qty: 30 TABLET | Refills: 0 | Status: SHIPPED | OUTPATIENT
Start: 2025-03-13 | End: 2025-04-12

## 2025-03-18 ENCOUNTER — HOSPITAL ENCOUNTER (OUTPATIENT)
Dept: PHYSICAL THERAPY | Age: 70
Setting detail: THERAPIES SERIES
Discharge: HOME | End: 2025-03-18
Attending: INTERNAL MEDICINE
Payer: MEDICARE

## 2025-03-18 PROCEDURE — 97110 THERAPEUTIC EXERCISES: CPT

## 2025-03-18 NOTE — FLOWSHEET NOTE
[facility]   Outpatient Rehabilitation & Therapy  [address]  P: [phone]   F: [phone]    Physical Therapy Daily Treatment Note    Date:  3/18/2025  Patient Name:  Lina Torres    :  1955  MRN: [id number]  Physician: Myriam Longoria MD                                   Insurance: [employer] Medicare / [employer] [facility] Medicaid $0 copay visits based on medical necessity. AUTH REQUIRED after eval through [employer].14 visits approved, valid 3/10 - 2025 BENEFITS   Medical Diagnosis: W19.XXXA (ICD-10-CM) - Fall, initial encounter   Rehab Codes: M54.50 LBP, unspecified  M25.60 Stiffness NEC ,   M62.81 weakness,  R26.2 Difficulty walking, NEC   M54.2 neck pain  R26.81 Unsteadiness on feet     Onset Date: 25               Next 's appt.: 25  Visit Count: 2/16 (Auth14 visits approved, valid 3/10 - )         Cancel/No Show: 0/0    Subjective:  Patient presents to therapy complaining of L knee and Low back pain.  Patient walking with lateral sway and inconsistent step length. Noted had fallen in past taking out trach and more recently when attempting to sit unknowingly when grand Dtr removed her chair causing increased back pain.  Pain limits overall function and ability to perform household chores.  Pain:  [x] Yes  [] No Location: Back/L knee Pain Rating: (0-10 scale) 7-8/10  Pain altered Tx:  [] No  [x] Yes  Action: L knee flexion  Comments:    Objective:  Modalities:   PRECAUTIONS: Fall risk, Osteoporosis, Hx B TKA w/ ongoing L > R knee pain. Has sacral nerve stimulator for bladder control - NO E-STIM.  Exercises:  Exercise       Reps/ Time Weight/ Level Completed  today Comments   Table           bridges 10x         Hooklying abd 10x         Hip abd slides 10x         S/l clamshell 10x         Posterior Pelvic Tilt 10x         LTR - gentle 10x                     Seated           Shld Shrugs 10x         Scapular retraction 10x         Thoracic Ext over mat 10x

## 2025-03-20 ENCOUNTER — HOSPITAL ENCOUNTER (OUTPATIENT)
Dept: PHYSICAL THERAPY | Age: 70
Setting detail: THERAPIES SERIES
Discharge: HOME | End: 2025-03-20
Attending: INTERNAL MEDICINE
Payer: MEDICARE

## 2025-03-20 PROCEDURE — 97110 THERAPEUTIC EXERCISES: CPT

## 2025-03-20 NOTE — FLOWSHEET NOTE
Time in/Out   []  Modalities      [x]  Ther Exercise 47 3 7082-8911   []  Manual Therapy      []  Ther Activities      []  Aquatics      []  Neuromuscular      [] Vasocompression      [] Gait Training      [] Dry needling        [] 1 or 2 muscles        [] 3 or more muscles      []  Other      Total Billable time 47 3      Time In:1245           Time Out: 1332    Electronically signed by:  Deidra De La Fuente PTA

## 2025-03-31 ENCOUNTER — APPOINTMENT (OUTPATIENT)
Dept: PHYSICAL THERAPY | Age: 70
End: 2025-03-31
Attending: INTERNAL MEDICINE
Payer: MEDICARE

## 2025-04-03 ENCOUNTER — HOSPITAL ENCOUNTER (OUTPATIENT)
Dept: PHYSICAL THERAPY | Age: 70
Setting detail: THERAPIES SERIES
Discharge: HOME | End: 2025-04-03
Attending: INTERNAL MEDICINE
Payer: MEDICARE

## 2025-04-03 PROCEDURE — 97110 THERAPEUTIC EXERCISES: CPT

## 2025-04-03 NOTE — FLOWSHEET NOTE
[facility]   Outpatient Rehabilitation & Therapy  [address]  P: [phone]   F: [phone]    Physical Therapy Daily Treatment Note    Date:  4/3/2025  Patient Name:  Lina Torres    :  1955  MRN: [id number]  Physician: Myriam Longoria MD                                   Insurance: [facility] Medicare / [facility] $0 copay visits based on medical necessity. AUTH REQUIRED after eval through [employer].14 visits approved, valid 3/10 - 2025 BENEFITS   Medical Diagnosis: W19.XXXA (ICD-10-CM) - Fall, initial encounter   Rehab Codes: M54.50 LBP, unspecified  M25.60 Stiffness NEC ,   M62.81 weakness,  R26.2 Difficulty walking, NEC   M54.2 neck pain  R26.81 Unsteadiness on feet     Onset Date: 25               Next 's appt.: 25  Visit Count:  (Auth14 visits approved, valid 3/10 - )         Cancel/No Show: 0/0    Subjective:  Patient reporting her upper back feels better and her lower back is what's causing her the most discomfort.  Patient reporting she thinks she may have an infection in her gums and has not yet contacted the dentist.  Pain:  [x] Yes  [] No   Low back 5/10  L knee 4-5/10  Pain altered Tx:  [] No  [x] Yes  Action: L knee flexion  Comments:    Objective:  Modalities:   PRECAUTIONS: Fall risk, Osteoporosis, Hx B TKA w/ ongoing L > R knee pain. Has sacral nerve stimulator for bladder control - NO E-STIM.  Exercises:  Exercise       Reps/ Time Weight/ Level Completed  today Comments   Table           SKTC 3x30\"  x 4/3 added   Piriformis stretch   x 4/3 added   Hamstring stretch 3x 30\"  x 4/3 therapist assisted   bridges 10x  3\"  x     Hooklying abd 10x        Hip abd slides 10x        S/l clamshell 10x         Posterior Pelvic Tilt 10x      TC needed for proper    LTR - gentle 10x          abdominal bracing  10x 3\"   x 4/3 added   Seated           Shld Shrugs 10x        Scapular retraction 10x2     3/20 TC needed for proper muscle group

## 2025-04-08 RX ORDER — ESOMEPRAZOLE MAGNESIUM 40 MG/1
40 CAPSULE, DELAYED RELEASE ORAL DAILY
Qty: 30 CAPSULE | Refills: 0 | Status: SHIPPED | OUTPATIENT
Start: 2025-04-08

## 2025-04-10 ENCOUNTER — HOSPITAL ENCOUNTER (OUTPATIENT)
Dept: PHYSICAL THERAPY | Age: 70
Setting detail: THERAPIES SERIES
Discharge: HOME | End: 2025-04-10
Attending: INTERNAL MEDICINE
Payer: MEDICARE

## 2025-04-10 PROCEDURE — 97110 THERAPEUTIC EXERCISES: CPT

## 2025-04-10 NOTE — FLOWSHEET NOTE
- 7 x weekly - 1-3 sets - 10 reps  - Bent Knee Fallouts  - 1-2 x daily - 7 x weekly - 1-3 sets - 10 reps  - Supine Heel Slide  - 1-2 x daily - 7 x weekly - 1-3 sets - 10 reps  - Seated Long Arc Quad  - 1-2 x daily - 7 x weekly - 1-3 sets - 10 reps - 3-5se hold  Comprehension of Education:  [] Verbalizes understanding.  [] Demonstrates understanding.  [x] Needs review.  [] Demonstrates/verbalizes HEP/Ed previously given.     Plan: [x] Continue per plan of care.   [] Other:      Treatment Charges: Mins Units Time in/Out   []  Modalities      [x]  Ther Exercise 20 2 7627-8860   []  Manual Therapy      []  Ther Activities      []  Aquatics      []  Neuromuscular      [] Vasocompression      [x] Gait Training 5 - 4227-6073   [] Dry needling        [] 1 or 2 muscles        [] 3 or more muscles      []  Other      Total Billable time 25 2      Time In:1135       Time Out: 1200    Electronically signed by:  Deidra De La Fuente PTA

## 2025-04-11 ENCOUNTER — HOSPITAL ENCOUNTER (OUTPATIENT)
Dept: PHYSICAL THERAPY | Age: 70
Setting detail: THERAPIES SERIES
Discharge: HOME | End: 2025-04-11
Attending: INTERNAL MEDICINE
Payer: MEDICARE

## 2025-04-11 NOTE — FLOWSHEET NOTE
[facility]   Outpatient Rehabilitation & Therapy  [address] [facility] [address]  P: [phone]   F: [phone]     Physical Therapy Cancel/No Show note    Date: 2025  Patient: Lina Torres  : 1955  MRN: [id number]    Visit Count:   Cancels/No Shows to date:     For today's appointment patient:    [x]  Cancelled    [] Rescheduled appointment    [] No-show     Reason given by patient:    [x]  Patient ill    []  Conflicting appointment    [] No transportation      [] Conflict with work    [] No reason given    [] Weather related    [] COVID-19    [] Other:      Comments:        [x] Next appointment was confirmed    Electronically signed by: Lynette Cruz, PT

## 2025-04-14 ENCOUNTER — HOSPITAL ENCOUNTER (OUTPATIENT)
Dept: PHYSICAL THERAPY | Age: 70
Setting detail: THERAPIES SERIES
Discharge: HOME | End: 2025-04-14
Attending: INTERNAL MEDICINE
Payer: MEDICARE

## 2025-04-14 PROCEDURE — 97110 THERAPEUTIC EXERCISES: CPT

## 2025-04-14 NOTE — FLOWSHEET NOTE
Code: [email or web address]  URL: [email or web address]  Date: 03/20/2025  Prepared by: Deidra De La Fuente    Exercises  - Supine Bridge  - 1-2 x daily - 7 x weekly - 1-3 sets - 10 reps - 3-5sec hold  - Supine Lower Trunk Rotation  - 1-2 x daily - 7 x weekly - 1-3 sets - 10 reps - 3-5se hold  - Supine Hip Abduction  - 1-2 x daily - 7 x weekly - 1-3 sets - 10 reps  - Bent Knee Fallouts  - 1-2 x daily - 7 x weekly - 1-3 sets - 10 reps  - Supine Heel Slide  - 1-2 x daily - 7 x weekly - 1-3 sets - 10 reps  - Seated Long Arc Quad  - 1-2 x daily - 7 x weekly - 1-3 sets - 10 reps - 3-5se hold  Comprehension of Education:  [] Verbalizes understanding.  [] Demonstrates understanding.  [x] Needs review.  [] Demonstrates/verbalizes HEP/Ed previously given.     Plan: [x] Continue per plan of care.   [] Other:      Treatment Charges: Mins Units Time in/Out   []  Modalities      [x]  Ther Exercise 42 3 8518-3765   []  Manual Therapy      []  Ther Activities      []  Aquatics      []  Neuromuscular      [] Vasocompression      [] Gait Training      [] Dry needling        [] 1 or 2 muscles        [] 3 or more muscles      []  Other      Total Billable time 42 3      Time In:1246       Time Out: 1328    Electronically signed by:  Deidra De La Fuente PTA

## 2025-04-16 ENCOUNTER — HOSPITAL ENCOUNTER (OUTPATIENT)
Dept: PHYSICAL THERAPY | Age: 70
Setting detail: THERAPIES SERIES
Discharge: HOME | End: 2025-04-16
Attending: INTERNAL MEDICINE
Payer: MEDICARE

## 2025-04-16 PROCEDURE — 97116 GAIT TRAINING THERAPY: CPT

## 2025-04-16 PROCEDURE — 97112 NEUROMUSCULAR REEDUCATION: CPT

## 2025-04-16 NOTE — PROGRESS NOTES
[facility]   Outpatient Rehabilitation & Therapy  [address]  P: [phone]   F: [phone]    Physical Therapy Progress Note / Daily Treatment Note    Date:  2025  Patient Name:  Lina Torres    :  1955  MRN: [id number]  Physician: Myriam Longoria MD                                   Insurance: [facility] Medicare / [facility] Medicaid $0 copay visits based on medical necessity. AUTH REQUIRED after eval through [employer].14 visits approved, valid 3/10 - 2025 BENEFITS   Medical Diagnosis: W19.XXXA (ICD-10-CM) - Fall, initial encounter   Rehab Codes: M54.50 LBP, unspecified  M25.60 Stiffness NEC ,   M62.81 weakness,  R26.2 Difficulty walking, NEC   M54.2 neck pain  R26.81 Unsteadiness on feet     Onset Date: 25               Next 's appt.: 25  Visit Count: 714 (Auth14 visits approved, valid 3/10 - )         Cancel/No Show: 1/0  Current progress report period: 3/6/25 - 25      Subjective:  Pt. Notes overall progress.  Denies falls since starting PT.  L knee continues to give out, but not as often. Using [employer] in the morning and in community; ambulates independently vs w/ furniture in home.  Notes significant improvement w/ pain; no neck pain. Chronic L knee pain.  LBP only w/ prolonged standing (I.e. > 10 mins), avg 5/10.  States compliance w/ HEP, increased postural awareness/correction, which seems to have helped w/ LBP.    Pain:  [] Yes  [x] No   Low back denies/10  L knee 5/10  Pain altered Tx:  [] No  [x] Yes  Action: L knee flexion  Comments:    Objective:  AROM  3/6/25 4/16/25   Lx Flx  50%  75%   Lx Ext  Neutral p  > neutral   Trunk Rot L  25%  < 25%   Trunk Rot R  25%  25%   Trunk SB L  25%  p 25%   Trunk SB R  25%  25%   Cx Flx  75% p  75%   Cx Ext  50% p  50%   Cx Rot L  50%  50%   Cx Rot R  50%  50%   Cx SB L  25% p  <25%   Cx SB R <25% p <25%   Comments:    p = pain    Functional Tests:      3/6/25 4/16/25   5x STS  35.16 seconds, w/UE

## 2025-04-22 ENCOUNTER — APPOINTMENT (OUTPATIENT)
Dept: PHYSICAL THERAPY | Age: 70
End: 2025-04-22
Attending: INTERNAL MEDICINE
Payer: MEDICARE

## 2025-04-24 ENCOUNTER — APPOINTMENT (OUTPATIENT)
Dept: PHYSICAL THERAPY | Age: 70
End: 2025-04-24
Attending: INTERNAL MEDICINE
Payer: MEDICARE

## 2025-04-29 ENCOUNTER — APPOINTMENT (OUTPATIENT)
Dept: PHYSICAL THERAPY | Age: 70
End: 2025-04-29
Attending: INTERNAL MEDICINE
Payer: MEDICARE

## 2025-05-09 RX ORDER — ESOMEPRAZOLE MAGNESIUM 40 MG/1
40 CAPSULE, DELAYED RELEASE ORAL DAILY
Qty: 30 CAPSULE | Refills: 0 | Status: SHIPPED | OUTPATIENT
Start: 2025-05-09

## 2025-06-02 ENCOUNTER — TELEPHONE (OUTPATIENT)
Dept: INTERNAL MEDICINE CLINIC | Age: 70
End: 2025-06-02

## 2025-06-02 RX ORDER — ESOMEPRAZOLE MAGNESIUM 40 MG/1
40 CAPSULE, DELAYED RELEASE ORAL DAILY
Qty: 30 CAPSULE | Refills: 0 | Status: SHIPPED | OUTPATIENT
Start: 2025-06-02

## 2025-06-02 NOTE — TELEPHONE ENCOUNTER
----- Message from Dwayne KOURTNEY sent at 5/30/2025  3:26 PM EDT -----  Regarding: [facility] Appointment Request  [facility] Appointment Request    Patient needs appointment for [facility] Appointment Type: Existing Condition Follow Up.    Patient Requested Dates(s): Any day within the month of june  Patient Requested Time: Any time  Provider Name: Myriam Longoria MD     Reason for Appointment Request: Established Patient - Available appointments did not meet patient need  --------------------------------------------------------------------------------------------------------------------------    Relationship to Patient: Self     Call Back Information: OK to leave message on voicemail  Preferred Call Back Number: Phone  Mobile: [phone]

## 2025-06-09 DIAGNOSIS — F41.9 ANXIETY: ICD-10-CM

## 2025-06-09 DIAGNOSIS — I10 PRIMARY HYPERTENSION: ICD-10-CM

## 2025-06-10 RX ORDER — BUSPIRONE HYDROCHLORIDE 10 MG/1
10 TABLET ORAL 2 TIMES DAILY
Qty: 60 TABLET | Refills: 0 | Status: SHIPPED | OUTPATIENT
Start: 2025-06-10

## 2025-06-20 RX ORDER — VENLAFAXINE HYDROCHLORIDE 37.5 MG/1
CAPSULE, EXTENDED RELEASE ORAL
Qty: 90 CAPSULE | Refills: 0 | Status: SHIPPED | OUTPATIENT
Start: 2025-06-20

## 2025-06-25 RX ORDER — ESOMEPRAZOLE MAGNESIUM 40 MG/1
40 CAPSULE, DELAYED RELEASE ORAL DAILY
Qty: 30 CAPSULE | Refills: 0 | Status: SHIPPED | OUTPATIENT
Start: 2025-06-25

## 2025-07-15 DIAGNOSIS — I10 PRIMARY HYPERTENSION: ICD-10-CM

## 2025-07-15 DIAGNOSIS — F41.9 ANXIETY: ICD-10-CM

## 2025-07-15 RX ORDER — BUSPIRONE HYDROCHLORIDE 10 MG/1
10 TABLET ORAL 2 TIMES DAILY
Qty: 60 TABLET | Refills: 0 | Status: SHIPPED | OUTPATIENT
Start: 2025-07-15

## 2025-07-25 RX ORDER — ESOMEPRAZOLE MAGNESIUM 40 MG/1
CAPSULE, DELAYED RELEASE ORAL DAILY
Qty: 30 CAPSULE | Refills: 0 | Status: SHIPPED | OUTPATIENT
Start: 2025-07-25

## (undated) DEVICE — MEDI-VAC YANKAUER SUCTION HANDLE W/BULBOUS TIP: Brand: CARDINAL HEALTH

## (undated) DEVICE — PROGRAMMER NEUROSTIMULATOR PT FOR URIN CTRL INTERSTIM ICON

## (undated) DEVICE — SUTURE PROL SZ 4-0 L18IN NONABSORBABLE BLU L19MM PS-2 3/8 8682G

## (undated) DEVICE — GAUZE,SPONGE,FLUFF,6"X6.75",STRL,5/TRAY: Brand: MEDLINE

## (undated) DEVICE — GLOVE SURG SZ 65 THK91MIL LTX FREE SYN POLYISOPRENE

## (undated) DEVICE — SOLUTION IV 1000ML 0.9% SOD CHL PH 5 INJ USP VIAFLX PLAS

## (undated) DEVICE — SUTURE ETHLN SZ 3-0 L18IN NONABSORBABLE BLK FS-1 L24MM 3/8 663H

## (undated) DEVICE — 1200CC GUARDIAN II: Brand: GUARDIAN

## (undated) DEVICE — PAD,NON-ADHERENT,3X8,STERILE,LF,1/PK: Brand: MEDLINE

## (undated) DEVICE — NO USE 18 MONTHS GOWN STD LG  1153D

## (undated) DEVICE — HYPODERMIC SAFETY NEEDLE: Brand: MAGELLAN

## (undated) DEVICE — SOLUTION IV IRRIG POUR BRL 0.9% SODIUM CHL 2F7124

## (undated) DEVICE — Z DUPLICATE USE 2527422 TUBING O2 STD 7 FT EXTN NO CRUSH VISUAL CNTRST LF

## (undated) DEVICE — LARGE TEAR CROSS CUT RASP, 7MM X 14MM: Brand: MICROAIRE®

## (undated) DEVICE — JELLY,LUBE,STERILE,FLIP TOP,TUBE,2-OZ: Brand: MEDLINE

## (undated) DEVICE — Z INACTIVE USE 2525529 CONNECTOR TBNG FOR O2

## (undated) DEVICE — DRESSING PETRO W3XL3IN OIL EMUL N ADH GZ KNIT IMPREG CELOS

## (undated) DEVICE — SURGICAL PROCEDURE PACK GWN W/ BTC A

## (undated) DEVICE — SUTURE MCRYL SZ 4-0 L18IN ABSRB UD L16MM PC-3 3/8 CIR PRIM Y845G

## (undated) DEVICE — MEDI-VAC NON-CONDUCTIVE SUCTION TUBING: Brand: CARDINAL HEALTH

## (undated) DEVICE — PACK ARTHRO W PCH

## (undated) DEVICE — SUTURE ABSRB BRAID COAT VLT SH 3-0 27IN VCRL J311H

## (undated) DEVICE — BANDAGE,GAUZE,BULKEE II,4.5"X4.1YD,STRL: Brand: MEDLINE

## (undated) DEVICE — STANDARD HYPODERMIC NEEDLE,POLYPROPYLENE HUB: Brand: MONOJECT

## (undated) DEVICE — 3M™ WARMING BLANKET, UPPER BODY, 10 PER CASE, 42268: Brand: BAIR HUGGER™

## (undated) DEVICE — DRAPE,REIN 53X77,STERILE: Brand: MEDLINE

## (undated) DEVICE — 3M™ STERI-STRIP™ COMPOUND BENZOIN TINCTURE 40 BAGS/CARTON 4 CARTONS/CASE C1544: Brand: 3M™ STERI-STRIP™

## (undated) DEVICE — GLOVE SURG BEAD CUF 7 STD PF WHT STRL TRIUMPH LT LTX

## (undated) DEVICE — SUTURE VCRL + SZ 4-0 L27IN ABSRB WHT FS-2 3/8 CIR REV CUT VCP422H

## (undated) DEVICE — GLOVE SURG SZ 6 THK91MIL LTX FREE SYN POLYISOPRENE ANTI

## (undated) DEVICE — AIRWAY LARYN MASK CHILD SZ 4 30ML CUF FOR 50-70KG PT STD

## (undated) DEVICE — SOLUTION SCRB 4OZ 4% CHG H2O AIDED FOR PREOPERATIVE SKIN

## (undated) DEVICE — DEFENDO AIR WATER SUCTION AND BIOPSY VALVE KIT FOR  OLYMPUS: Brand: DEFENDO AIR/WATER/SUCTION AND BIOPSY VALVE

## (undated) DEVICE — [AGGRESSIVE PLUS CUTTER, ARTHROSCOPIC SHAVER BLADE,  DO NOT RESTERILIZE,  DO NOT USE IF PACKAGE IS DAMAGED,  KEEP DRY,  KEEP AWAY FROM SUNLIGHT]: Brand: FORMULA

## (undated) DEVICE — SOLUTION IV IRRIG WATER 1000ML POUR BRL 2F7114

## (undated) DEVICE — INTENDED FOR TISSUE SEPARATION, AND OTHER PROCEDURES THAT REQUIRE A SHARP SURGICAL BLADE TO PUNCTURE OR CUT.: Brand: BARD-PARKER ® CARBON RIB-BACK BLADES

## (undated) DEVICE — ADHESIVE SKIN CLSR 0.7ML TOP DERMBND ADV

## (undated) DEVICE — SVMMC PEDS/UROLOGY MINOR PACK: Brand: MEDLINE INDUSTRIES, INC.

## (undated) DEVICE — CANNULA NSL AD TBNG L7FT PVC STR NONFLARED PRNG O2 DEL W STD

## (undated) DEVICE — SUTURE VIC + SH-13-0 27IN  VCP311H

## (undated) DEVICE — GOWN,POLY REINFORCED,LG: Brand: MEDLINE

## (undated) DEVICE — GLOVE ORANGE PI 7   MSG9070

## (undated) DEVICE — Z CONVERTED USE 2271043 CONTAINER SPEC COLL 4OZ SCR ON LID PEEL PCH

## (undated) DEVICE — DRAPE EQUIP C ARM MINIVIEW

## (undated) DEVICE — PATIENT RETURN ELECTRODE, SINGLE-USE, CONTACT QUALITY MONITORING, ADULT, WITH 9FT CORD, FOR PATIENTS WEIGING OVER 33LBS. (15KG): Brand: MEGADYNE

## (undated) DEVICE — Z DISCONTINUED BY MEDLINE USE 2711682 TRAY SKIN PREP DRY W/ PREM GLV

## (undated) DEVICE — DRESSING TRNSPAR W5XL4.5IN FLM SHT SEMIPERMEABLE WIND

## (undated) DEVICE — 3M™ COBAN™ STERILE SELF-ADHERENT WRAP, 1584S, 4 IN X 5 YD (10 CM X 4,5 M), 18 ROLLS/CASE: Brand: 3M™ COBAN™

## (undated) DEVICE — NEEDLE SPNL L3.5IN PNK HUB S STL REG WALL FIT STYL W/ QNCKE

## (undated) DEVICE — SOLUTION IV IRRIG LACTATED RINGERS 3000ML 2B7487

## (undated) DEVICE — INTRODUCER NEUROSTIMULATOR LD FOR URIN CTRL INTERSTIM

## (undated) DEVICE — Device

## (undated) DEVICE — SUTURE PROL SZ 3-0 L18IN NONABSORBABLE BLU L30MM FS-1 3/8 8663G

## (undated) DEVICE — BLADE OPHTH ORNG GRINDLESS SMALLER ALTERNATIVE TO NO15 GEN

## (undated) DEVICE — 3M™ IOBAN™ 2 ANTIMICROBIAL INCISE DRAPE 6650EZ: Brand: IOBAN™ 2

## (undated) DEVICE — C-ARMOR C-ARM EQUIPMENT COVERS CLEAR STERILE UNIVERSAL FIT 12 PER CASE: Brand: C-ARMOR

## (undated) DEVICE — STRIP,CLOSURE,WOUND,MEDI-STRIP,1/2X4: Brand: MEDLINE

## (undated) DEVICE — DRAPE CAM W7XL46IN FOR CLS SYS CAM

## (undated) DEVICE — Z DISCONTINUED USE 2424143 ADAPTER O2 SWVL CHRISTMAS TREE GRN

## (undated) DEVICE — SINGLE PORT MANIFOLD: Brand: NEPTUNE 2

## (undated) DEVICE — GLOVE ORANGE PI 7 1/2   MSG9075

## (undated) DEVICE — DRAPE C ARM UNIV W41XL74IN CLR PLAS XR VELC CLSR POLY STRP

## (undated) DEVICE — OSCILLATING SAW BLADE, 9MM X 24.6MM X 0.64MM: Brand: MICROAIRE®

## (undated) DEVICE — ANTENNA PT PRGMR EXT DETACH RECHRG DBS LEGEND

## (undated) DEVICE — ENCORE® LATEX ORTHO SIZE 8, STERILE LATEX POWDER-FREE SURGICAL GLOVE: Brand: ENCORE

## (undated) DEVICE — DRAPE EQUIP STAND XL MAYO CVR